# Patient Record
Sex: FEMALE | Race: WHITE | NOT HISPANIC OR LATINO | Employment: UNEMPLOYED | URBAN - METROPOLITAN AREA
[De-identification: names, ages, dates, MRNs, and addresses within clinical notes are randomized per-mention and may not be internally consistent; named-entity substitution may affect disease eponyms.]

---

## 2017-01-04 ENCOUNTER — OFFICE VISIT (OUTPATIENT)
Dept: FAMILY MEDICINE | Facility: CLINIC | Age: 47
End: 2017-01-04
Payer: MEDICAID

## 2017-01-04 VITALS
WEIGHT: 141.13 LBS | DIASTOLIC BLOOD PRESSURE: 68 MMHG | SYSTOLIC BLOOD PRESSURE: 118 MMHG | OXYGEN SATURATION: 95 % | TEMPERATURE: 98 F | HEART RATE: 80 BPM | HEIGHT: 63 IN | BODY MASS INDEX: 25.01 KG/M2

## 2017-01-04 DIAGNOSIS — Z72.0 TOBACCO USE: ICD-10-CM

## 2017-01-04 DIAGNOSIS — G44.86 CERVICOGENIC HEADACHE: ICD-10-CM

## 2017-01-04 DIAGNOSIS — G56.00 CARPAL TUNNEL SYNDROME, UNSPECIFIED LATERALITY: ICD-10-CM

## 2017-01-04 DIAGNOSIS — J44.9 CHRONIC OBSTRUCTIVE PULMONARY DISEASE, UNSPECIFIED COPD TYPE: ICD-10-CM

## 2017-01-04 DIAGNOSIS — R10.9 LEFT FLANK PAIN: ICD-10-CM

## 2017-01-04 DIAGNOSIS — M48.02 CERVICAL STENOSIS OF SPINE: ICD-10-CM

## 2017-01-04 DIAGNOSIS — M54.16 LUMBAR RADICULOPATHY, CHRONIC: Primary | ICD-10-CM

## 2017-01-04 DIAGNOSIS — F41.9 ANXIETY: ICD-10-CM

## 2017-01-04 PROCEDURE — 99999 PR PBB SHADOW E&M-EST. PATIENT-LVL III: CPT | Mod: PBBFAC,,, | Performed by: FAMILY MEDICINE

## 2017-01-04 PROCEDURE — 99213 OFFICE O/P EST LOW 20 MIN: CPT | Mod: PBBFAC,PO | Performed by: FAMILY MEDICINE

## 2017-01-04 PROCEDURE — 99214 OFFICE O/P EST MOD 30 MIN: CPT | Mod: S$PBB,,, | Performed by: FAMILY MEDICINE

## 2017-01-04 RX ORDER — HYDROCODONE BITARTRATE AND ACETAMINOPHEN 10; 325 MG/1; MG/1
1 TABLET ORAL EVERY 8 HOURS PRN
Qty: 90 TABLET | Refills: 0 | Status: SHIPPED | OUTPATIENT
Start: 2017-01-04 | End: 2017-02-06 | Stop reason: SDUPTHER

## 2017-01-04 RX ORDER — TIOTROPIUM BROMIDE 18 UG/1
18 CAPSULE ORAL; RESPIRATORY (INHALATION) DAILY
Qty: 30 CAPSULE | Refills: 11 | Status: SHIPPED | OUTPATIENT
Start: 2017-01-04 | End: 2017-02-06 | Stop reason: SDUPTHER

## 2017-01-04 RX ORDER — ALPRAZOLAM 2 MG/1
TABLET ORAL
Qty: 60 TABLET | Refills: 0 | Status: SHIPPED | OUTPATIENT
Start: 2017-01-04 | End: 2017-02-06 | Stop reason: SDUPTHER

## 2017-01-04 RX ORDER — ALBUTEROL SULFATE 90 UG/1
2 AEROSOL, METERED RESPIRATORY (INHALATION) EVERY 6 HOURS PRN
Qty: 18 G | Refills: 5 | Status: SHIPPED | OUTPATIENT
Start: 2017-01-04 | End: 2017-02-21 | Stop reason: SDUPTHER

## 2017-01-04 RX ORDER — PREDNISONE 5 MG/1
5 TABLET ORAL DAILY
Qty: 30 TABLET | Refills: 0 | Status: SHIPPED | OUTPATIENT
Start: 2017-01-04 | End: 2017-01-14

## 2017-01-04 NOTE — PROGRESS NOTES
"Chief Complaint   Patient presents with    Medication Refill       SUBJECTIVE:  Yasmeen Valderrama is a 46 y.o. female here for follow up of anxiety with chronic pain and chronic cough and sputum with some Rowan.  She is doing very well on her current medications.  Currently has co-morbidities including per problem list.      Social History   Substance Use Topics    Smoking status: Current Every Day Smoker     Packs/day: 2.00     Years: 35.00     Types: Cigarettes     Start date: 12/1/1983    Smokeless tobacco: Never Used    Alcohol use No       Patient Active Problem List    Diagnosis Date Noted    Chronic obstructive pulmonary disease 01/04/2017    Cervical stenosis of spine 01/04/2017    Lumbar radiculopathy, chronic 09/09/2015    Screening 09/09/2015    Unilateral emphysema 12/01/2014    Tobacco use 12/01/2014    Chronic hoarseness 12/01/2014    SOB (shortness of breath) 12/01/2014       Review of Systems   Constitutional: Negative.    HENT: Negative.    Eyes: Negative.    Respiratory: Positive for cough and sputum production.    Cardiovascular: Negative.    Gastrointestinal: Negative.    Genitourinary: Negative.    Musculoskeletal: Positive for joint pain, myalgias and neck pain.   Skin: Negative.    Neurological: Negative.    Endo/Heme/Allergies: Negative.    Psychiatric/Behavioral: The patient is nervous/anxious and has insomnia.        OBJECTIVE:  Visit Vitals    /68 (BP Location: Right arm, Patient Position: Sitting, BP Method: Manual)    Pulse 80    Temp 97.8 °F (36.6 °C) (Oral)    Ht 5' 3" (1.6 m)    Wt 64 kg (141 lb 1.5 oz)    SpO2 95%    BMI 24.99 kg/m2       Wt Readings from Last 3 Encounters:   01/04/17 64 kg (141 lb 1.5 oz)   12/09/16 67.1 kg (148 lb)   12/06/16 67.4 kg (148 lb 9.4 oz)     BP Readings from Last 3 Encounters:   01/04/17 118/68   12/06/16 136/88   11/26/16 139/89       Chronic hoarseness noted, alert and oriented  She has no focal neurological deficits noted on " brief screening exam.  She has limited ROM in her neck with pain    Review of old Records:  Reviewed per [facility]    Review of old labs:  Lab Results   Component Value Date    TSH 1.450 03/08/2016     Lab Results   Component Value Date    WBC 11.27 11/26/2016    HGB 14.5 11/26/2016    HCT 43.5 11/26/2016    MCV 93 11/26/2016     11/26/2016       Chemistry        Component Value Date/Time     11/26/2016 1630    K 3.8 11/26/2016 1630     11/26/2016 1630    CO2 25 11/26/2016 1630    BUN 7 11/26/2016 1630    CREATININE 0.7 11/26/2016 1630    GLU 95 11/26/2016 1630        Component Value Date/Time    CALCIUM 9.5 11/26/2016 1630    ALKPHOS 88 11/26/2016 1630    AST 22 11/26/2016 1630    ALT 13 11/26/2016 1630    BILITOT 0.8 11/26/2016 1630        Lab Results   Component Value Date    CHOL 182 09/09/2015     Lab Results   Component Value Date    HDL 54 09/09/2015     Lab Results   Component Value Date    LDLCALC 110.0 09/09/2015     Lab Results   Component Value Date    TRIG 90 09/09/2015     Lab Results   Component Value Date    CHOLHDL 29.7 09/09/2015         Review of old imaging:  C4/5: There is a posteriorly oriented disc osteophyte complex resulting in moderate narrowing of bilateral neural foramina and mild narrowing of the central canal.    C5/6: There is a circumferential disc osteophyte complex resulting in severe narrowing of the right neural foramina. Moderate central canal narrowing and moderate narrowing of the left neural foramina.    C6/C7: There is a posteriorly directed disc osteophyte complex resulting in moderate central canal narrowing.    C7/T1: There no significant degenerative changes at this level.    The adjacent soft tissues are unremarkable.   Impression     1. There degenerative changes of the cervical spine with some MRI appear most severe at C4-5 and C5-6.         ASSESSMENT:  1. Lumbar radiculopathy, chronic    2. Tobacco use    3. Anxiety    4. Carpal tunnel syndrome,  unspecified laterality    5. Left flank pain    6. Chronic obstructive pulmonary disease, unspecified COPD type    7. Cervicogenic headache    8. Cervical stenosis of spine          PLAN:  1. Tobacco use  Counseled to continue her attempts at quitting  - albuterol (VENTOLIN HFA) 90 mcg/actuation inhaler; Inhale 2 puffs into the lungs every 6 (six) hours as needed for Wheezing.  Dispense: 18 g; Refill: 5    2. Anxiety  The current medical regimen is effective;  continue present plan and medications.  Continue cymbalta as well  - alprazolam (XANAX) 2 MG Tab; TAKE ONE TABLET TWICE DAILY *MAY CAUSE DROWSINESS*  Dispense: 60 tablet; Refill: 0    3. Carpal tunnel syndrome, unspecified laterality  Noted, had prior surgery  - hydrocodone-acetaminophen 10-325mg (NORCO)  mg Tab; Take 1 tablet by mouth every 8 (eight) hours as needed for Pain.  Dispense: 90 tablet; Refill: 0    4. Lumbar radiculopathy, chronic  The current medical regimen is effective;  continue present plan and medications.    - hydrocodone-acetaminophen 10-325mg (NORCO)  mg Tab; Take 1 tablet by mouth every 8 (eight) hours as needed for Pain.  Dispense: 90 tablet; Refill: 0    5. Left flank pain  The current medical regimen is effective;  continue present plan and medications.    - hydrocodone-acetaminophen 10-325mg (NORCO)  mg Tab; Take 1 tablet by mouth every 8 (eight) hours as needed for Pain.  Dispense: 90 tablet; Refill: 0    6. Chronic obstructive pulmonary disease, unspecified COPD type  Start spiriva  - predniSONE (DELTASONE) 5 MG tablet; Take 1 tablet (5 mg total) by mouth once daily.  Dispense: 30 tablet; Refill: 0  - tiotropium (SPIRIVA) 18 mcg inhalation capsule; Inhale 1 capsule (18 mcg total) into the lungs once daily.  Dispense: 30 capsule; Refill: 11    7. Cervicogenic headache  Noted, likley from the neck we will continue with current treatment, consider muscle relaxer    8. Cervical stenosis of spine  Noted.      Return in  about 4 weeks (around 2/1/2017) for assess treatment plan.

## 2017-01-04 NOTE — MR AVS SNAPSHOT
[facility]  [address]  [address]  [place] [address]  Phone: [phone]  Fax: [phone]                  Yasmeen Valderrama   2017 10:30 AM   Office Visit    Description:  Female : 1970   Provider:  Clovis Beauchamp MD   Department:  [facility]           Reason for Visit     Medication Refill           Diagnoses this Visit        Comments    Lumbar radiculopathy, chronic    -  Primary     Tobacco use         Anxiety         Carpal tunnel syndrome, unspecified laterality         Left flank pain         Chronic obstructive pulmonary disease, unspecified COPD type         Cervicogenic headache         Cervical stenosis of spine                To Do List           Goals (5 Years of Data)     None       These Medications        Disp Refills Start End    albuterol (VENTOLIN HFA) 90 mcg/actuation inhaler 18 g 5 2017     Inhale 2 puffs into the lungs every 6 (six) hours as needed for Wheezing. - Inhalation    Pharmacy: [facility] [place] - [address] Ph #: [phone]       alprazolam (XANAX) 2 MG Tab 60 tablet 0 2017     TAKE ONE TABLET TWICE DAILY *MAY CAUSE DROWSINESS*    Pharmacy: [facility] - [facility] [place] - [address] Ph #: [phone]       hydrocodone-acetaminophen 10-325mg (NORCO)  mg Tab 90 tablet 0 2017    Take 1 tablet by mouth every 8 (eight) hours as needed for Pain. - Oral    Pharmacy: [facility] - [facility] [place] - [address] Ph #: [phone]       predniSONE (DELTASONE) 5 MG tablet 30 tablet 0 2017    Take 1 tablet (5 mg total) by mouth once daily. - Oral    Pharmacy: [facility] - [facility] [place] - [address] Ph #: [phone]       tiotropium (SPIRIVA) 18 mcg inhalation capsule 30 capsule 11 2017    Inhale 1 capsule (18 mcg total) into the lungs once daily. -  Inhalation    Pharmacy: [facility] [place] - [address]  #: [phone]         [facility]     [facility] Line - 24/7 Assistance  Registered nurses in the [facility] provide clinical advisement, health education, appointment booking, and other advisory services.  Call for this free service at [phone].             Medications           Message regarding Medications     Verify the changes and/or additions to your medication regime listed below are the same as discussed with your clinician today.  If any of these changes or additions are incorrect, please notify your healthcare provider.        START taking these NEW medications        Refills    predniSONE (DELTASONE) 5 MG tablet 0    Sig: Take 1 tablet (5 mg total) by mouth once daily.    Class: Normal    Route: Oral    tiotropium (SPIRIVA) 18 mcg inhalation capsule 11    Sig: Inhale 1 capsule (18 mcg total) into the lungs once daily.    Class: Normal    Route: Inhalation           Verify that the below list of medications is an accurate representation of the medications you are currently taking.  If none reported, the list may be blank. If incorrect, please contact your healthcare provider. Carry this list with you in case of emergency.           Current Medications     albuterol (PROVENTIL) 2.5 mg /3 mL (0.083 %) nebulizer solution Take 3 mLs (2.5 mg total) by nebulization every 6 (six) hours as needed for Wheezing.    albuterol (VENTOLIN HFA) 90 mcg/actuation inhaler Inhale 2 puffs into the lungs every 6 (six) hours as needed for Wheezing.    alprazolam (XANAX) 2 MG Tab TAKE ONE TABLET TWICE DAILY *MAY CAUSE DROWSINESS*    diazePAM (VALIUM) 10 MG Tab Take one one hour prior to MRI, may repeat in one hour if needed    docusate sodium (COLACE) 100 MG capsule Take 1 capsule (100 mg total) by mouth 3 (three) times daily as needed for Constipation.    duloxetine (CYMBALTA) 20 MG capsule Take 1  "capsule (20 mg total) by mouth once daily.    hydrocodone-acetaminophen 10-325mg (NORCO)  mg Tab Take 1 tablet by mouth every 8 (eight) hours as needed for Pain.    lactulose (CHRONULAC) 10 gram/15 mL solution     predniSONE (DELTASONE) 5 MG tablet Take 1 tablet (5 mg total) by mouth once daily.    tiotropium (SPIRIVA) 18 mcg inhalation capsule Inhale 1 capsule (18 mcg total) into the lungs once daily.           Clinical Reference Information           Vital Signs - Last Recorded  Most recent update: 1/4/2017 10:39 AM by Michelle Andrews MA    BP Pulse Temp Ht Wt SpO2    118/68 (BP Location: Right arm, Patient Position: Sitting, BP Method: Manual) 80 97.8 °F (36.6 °C) (Oral) 5' 3" (1.6 m) 64 kg (141 lb 1.5 oz) 95%    BMI                24.99 kg/m2          Blood Pressure          Most Recent Value    BP  118/68      Allergies as of 1/4/2017     Antivert [Meclizine]      Immunizations Administered on Date of Encounter - 1/4/2017     None      Smoking Cessation     If you would like to quit smoking:   You may be eligible for free services if you are a [place] resident and started smoking cigarettes before September 1, 1988.  Call the [employer] ([employer]) toll free at (872) 146-1143 or ([phone].   Call 8-206-QUIT-NOW if you do not meet the above criteria.            "

## 2017-02-06 ENCOUNTER — OFFICE VISIT (OUTPATIENT)
Dept: FAMILY MEDICINE | Facility: CLINIC | Age: 47
End: 2017-02-06
Payer: MEDICAID

## 2017-02-06 VITALS
HEART RATE: 79 BPM | OXYGEN SATURATION: 95 % | TEMPERATURE: 98 F | BODY MASS INDEX: 26.57 KG/M2 | WEIGHT: 149.94 LBS | SYSTOLIC BLOOD PRESSURE: 132 MMHG | DIASTOLIC BLOOD PRESSURE: 80 MMHG | HEIGHT: 63 IN

## 2017-02-06 DIAGNOSIS — R10.9 LEFT FLANK PAIN: ICD-10-CM

## 2017-02-06 DIAGNOSIS — G56.00 CARPAL TUNNEL SYNDROME, UNSPECIFIED LATERALITY: ICD-10-CM

## 2017-02-06 DIAGNOSIS — J44.1 COPD EXACERBATION: Primary | ICD-10-CM

## 2017-02-06 DIAGNOSIS — F41.9 ANXIETY: ICD-10-CM

## 2017-02-06 DIAGNOSIS — J44.9 CHRONIC OBSTRUCTIVE PULMONARY DISEASE, UNSPECIFIED COPD TYPE: ICD-10-CM

## 2017-02-06 DIAGNOSIS — F40.240 CLAUSTROPHOBIA: ICD-10-CM

## 2017-02-06 DIAGNOSIS — M54.16 LUMBAR RADICULOPATHY, CHRONIC: ICD-10-CM

## 2017-02-06 PROCEDURE — 99999 PR PBB SHADOW E&M-EST. PATIENT-LVL III: CPT | Mod: PBBFAC,,, | Performed by: FAMILY MEDICINE

## 2017-02-06 PROCEDURE — 99213 OFFICE O/P EST LOW 20 MIN: CPT | Mod: PBBFAC,PO | Performed by: FAMILY MEDICINE

## 2017-02-06 PROCEDURE — 99214 OFFICE O/P EST MOD 30 MIN: CPT | Mod: S$PBB,,, | Performed by: FAMILY MEDICINE

## 2017-02-06 RX ORDER — TIOTROPIUM BROMIDE 18 UG/1
18 CAPSULE ORAL; RESPIRATORY (INHALATION) DAILY
Qty: 30 CAPSULE | Refills: 11 | Status: SHIPPED | OUTPATIENT
Start: 2017-02-06 | End: 2017-02-21 | Stop reason: SDUPTHER

## 2017-02-06 RX ORDER — DOXYCYCLINE 100 MG/1
100 CAPSULE ORAL 2 TIMES DAILY
Qty: 28 CAPSULE | Refills: 0 | Status: SHIPPED | OUTPATIENT
Start: 2017-02-06 | End: 2017-02-21 | Stop reason: ALTCHOICE

## 2017-02-06 RX ORDER — HYDROCODONE BITARTRATE AND ACETAMINOPHEN 10; 325 MG/1; MG/1
1 TABLET ORAL EVERY 8 HOURS PRN
Qty: 90 TABLET | Refills: 0 | Status: SHIPPED | OUTPATIENT
Start: 2017-02-06 | End: 2017-02-21 | Stop reason: SDUPTHER

## 2017-02-06 RX ORDER — PREDNISONE 20 MG/1
20 TABLET ORAL DAILY
Qty: 14 TABLET | Refills: 0 | Status: SHIPPED | OUTPATIENT
Start: 2017-02-06 | End: 2017-02-21 | Stop reason: SDUPTHER

## 2017-02-06 RX ORDER — ALPRAZOLAM 2 MG/1
TABLET ORAL
Qty: 60 TABLET | Refills: 5 | Status: SHIPPED | OUTPATIENT
Start: 2017-02-06 | End: 2017-02-21 | Stop reason: SDUPTHER

## 2017-02-06 RX ORDER — DIAZEPAM 10 MG/1
TABLET ORAL
Qty: 2 TABLET | Refills: 0 | Status: CANCELLED | OUTPATIENT
Start: 2017-02-06

## 2017-02-06 NOTE — PROGRESS NOTES
"Chief Complaint   Patient presents with    Medication Refill    Back Pain    Neck Pain       SUBJECTIVE:  Yasmeen Valderrama is a 46 y.o. female here for follow up of chronic conditions that are stable including her pain and her anxiety, but has increased cough and sputum with Rowan and is still smoking.  Currently has co-morbidities including per problem list.      Social History   Substance Use Topics    Smoking status: Current Every Day Smoker     Packs/day: 2.00     Years: 35.00     Types: Cigarettes     Start date: 12/1/1983    Smokeless tobacco: Never Used    Alcohol use No       Patient Active Problem List    Diagnosis Date Noted    Chronic obstructive pulmonary disease 01/04/2017    Cervical stenosis of spine 01/04/2017    Lumbar radiculopathy, chronic 09/09/2015    Screening 09/09/2015    Unilateral emphysema 12/01/2014    Tobacco use 12/01/2014    Chronic hoarseness 12/01/2014    SOB (shortness of breath) 12/01/2014       Review of Systems   Constitutional: Negative.    HENT: Negative.    Eyes: Negative.    Respiratory: Positive for cough, sputum production, shortness of breath and wheezing.    Cardiovascular: Negative.    Gastrointestinal: Negative.    Genitourinary: Negative.    Musculoskeletal: Positive for joint pain and myalgias. Negative for back pain, falls and neck pain.   Skin: Negative.    Neurological: Negative.    Endo/Heme/Allergies: Negative.    Psychiatric/Behavioral: Negative for depression, hallucinations, memory loss, substance abuse and suicidal ideas. The patient is nervous/anxious and has insomnia.        OBJECTIVE:  Visit Vitals    /80 (BP Location: Right arm, Patient Position: Sitting, BP Method: Manual)    Pulse 79    Temp 98 °F (36.7 °C) (Oral)    Ht 5' 3" (1.6 m)    Wt 68 kg (149 lb 14.6 oz)    SpO2 95%    BMI 26.56 kg/m2       Wt Readings from Last 3 Encounters:   02/06/17 68 kg (149 lb 14.6 oz)   01/04/17 64 kg (141 lb 1.5 oz)   12/09/16 67.1 kg (148 " lb)     BP Readings from Last 3 Encounters:   02/06/17 132/80   01/04/17 118/68   12/06/16 136/88       Appears chronically ill, alert and oriented  Hoarse voice unchanged.  Sinuses with mild inflammation  Lungs with coarseness and wheezing  Heart exam normal  No signs of consolidation    Review of old Records:  Reviewed per [facility]    Review of old labs:  Lab Results   Component Value Date    TSH 1.450 03/08/2016     Lab Results   Component Value Date    WBC 11.27 11/26/2016    HGB 14.5 11/26/2016    HCT 43.5 11/26/2016    MCV 93 11/26/2016     11/26/2016       Chemistry        Component Value Date/Time     11/26/2016 1630    K 3.8 11/26/2016 1630     11/26/2016 1630    CO2 25 11/26/2016 1630    BUN 7 11/26/2016 1630    CREATININE 0.7 11/26/2016 1630    GLU 95 11/26/2016 1630        Component Value Date/Time    CALCIUM 9.5 11/26/2016 1630    ALKPHOS 88 11/26/2016 1630    AST 22 11/26/2016 1630    ALT 13 11/26/2016 1630    BILITOT 0.8 11/26/2016 1630        Lab Results   Component Value Date    CHOL 182 09/09/2015     Lab Results   Component Value Date    HDL 54 09/09/2015     Lab Results   Component Value Date    LDLCALC 110.0 09/09/2015     Lab Results   Component Value Date    TRIG 90 09/09/2015     Lab Results   Component Value Date    CHOLHDL 29.7 09/09/2015         Review of old imaging:  N/a      ASSESSMENT:  1. COPD exacerbation    2. Chronic obstructive pulmonary disease, unspecified COPD type    3. Claustrophobia    4. Carpal tunnel syndrome, unspecified laterality    5. Lumbar radiculopathy, chronic    6. Left flank pain    7. Anxiety          PLAN:  1. Chronic obstructive pulmonary disease, unspecified COPD type  The current medical regimen is effective;  continue present plan and medications.    - tiotropium (SPIRIVA) 18 mcg inhalation capsule; Inhale 1 capsule (18 mcg total) into the lungs once daily.  Dispense: 30 capsule; Refill: 11    2. Claustrophobia  Noted, she did well, stop  extra medication    3. Carpal tunnel syndrome, unspecified laterality  The current medical regimen is effective;  continue present plan and medications.    - hydrocodone-acetaminophen 10-325mg (NORCO)  mg Tab; Take 1 tablet by mouth every 8 (eight) hours as needed for Pain.  Dispense: 90 tablet; Refill: 0    4. Lumbar radiculopathy, chronic  The current medical regimen is effective;  continue present plan and medications.    - hydrocodone-acetaminophen 10-325mg (NORCO)  mg Tab; Take 1 tablet by mouth every 8 (eight) hours as needed for Pain.  Dispense: 90 tablet; Refill: 0    5. Left flank pain  Resolved mainly  - hydrocodone-acetaminophen 10-325mg (NORCO)  mg Tab; Take 1 tablet by mouth every 8 (eight) hours as needed for Pain.  Dispense: 90 tablet; Refill: 0    6. COPD exacerbation  Potential medication side effects were discussed with the patient; let me know if any occur.  Most quit tobacco but very difficult, continue to encourage.  - predniSONE (DELTASONE) 20 MG tablet; Take 1 tablet (20 mg total) by mouth once daily.  Dispense: 14 tablet; Refill: 0  - doxycycline (MONODOX) 100 MG capsule; Take 1 capsule (100 mg total) by mouth 2 (two) times daily.  Dispense: 28 capsule; Refill: 0    7. Anxiety  The current medical regimen is effective;  continue present plan and medications.    - alprazolam (XANAX) 2 MG Tab; TAKE ONE TABLET TWICE DAILY *MAY CAUSE DROWSINESS*  Dispense: 60 tablet; Refill: 5      No Follow-up on file.

## 2017-02-21 ENCOUNTER — OFFICE VISIT (OUTPATIENT)
Dept: FAMILY MEDICINE | Facility: CLINIC | Age: 47
End: 2017-02-21
Payer: MEDICAID

## 2017-02-21 VITALS
TEMPERATURE: 97 F | BODY MASS INDEX: 26.57 KG/M2 | HEIGHT: 63 IN | SYSTOLIC BLOOD PRESSURE: 126 MMHG | HEART RATE: 85 BPM | OXYGEN SATURATION: 95 % | WEIGHT: 149.94 LBS | DIASTOLIC BLOOD PRESSURE: 66 MMHG

## 2017-02-21 DIAGNOSIS — F41.9 ANXIETY: ICD-10-CM

## 2017-02-21 DIAGNOSIS — J44.9 CHRONIC OBSTRUCTIVE PULMONARY DISEASE, UNSPECIFIED COPD TYPE: ICD-10-CM

## 2017-02-21 DIAGNOSIS — J44.1 COPD EXACERBATION: Primary | ICD-10-CM

## 2017-02-21 DIAGNOSIS — M54.16 LUMBAR RADICULOPATHY, CHRONIC: ICD-10-CM

## 2017-02-21 DIAGNOSIS — J01.01 ACUTE RECURRENT MAXILLARY SINUSITIS: ICD-10-CM

## 2017-02-21 DIAGNOSIS — Z72.0 TOBACCO USE: ICD-10-CM

## 2017-02-21 PROCEDURE — 99999 PR PBB SHADOW E&M-EST. PATIENT-LVL III: CPT | Mod: PBBFAC,,, | Performed by: FAMILY MEDICINE

## 2017-02-21 PROCEDURE — 99213 OFFICE O/P EST LOW 20 MIN: CPT | Mod: S$PBB,,, | Performed by: FAMILY MEDICINE

## 2017-02-21 PROCEDURE — 99213 OFFICE O/P EST LOW 20 MIN: CPT | Mod: PBBFAC,PO | Performed by: FAMILY MEDICINE

## 2017-02-21 RX ORDER — AMOXICILLIN AND CLAVULANATE POTASSIUM 875; 125 MG/1; MG/1
1 TABLET, FILM COATED ORAL 2 TIMES DAILY
Qty: 20 TABLET | Refills: 0 | Status: SHIPPED | OUTPATIENT
Start: 2017-02-21 | End: 2017-03-03

## 2017-02-21 RX ORDER — PREDNISONE 20 MG/1
40 TABLET ORAL DAILY
Qty: 14 TABLET | Refills: 0 | Status: SHIPPED | OUTPATIENT
Start: 2017-02-21 | End: 2017-02-28

## 2017-02-21 RX ORDER — ALBUTEROL SULFATE 90 UG/1
2 AEROSOL, METERED RESPIRATORY (INHALATION) EVERY 6 HOURS PRN
Qty: 18 G | Refills: 5 | Status: SHIPPED | OUTPATIENT
Start: 2017-02-21 | End: 2017-04-03 | Stop reason: SDUPTHER

## 2017-02-21 RX ORDER — ALBUTEROL SULFATE 0.83 MG/ML
2.5 SOLUTION RESPIRATORY (INHALATION) EVERY 6 HOURS PRN
Qty: 1 BOX | Refills: 11 | Status: SHIPPED | OUTPATIENT
Start: 2017-02-21 | End: 2017-04-03 | Stop reason: SDUPTHER

## 2017-02-21 RX ORDER — HYDROCODONE BITARTRATE AND ACETAMINOPHEN 10; 325 MG/1; MG/1
1 TABLET ORAL EVERY 8 HOURS PRN
Qty: 90 TABLET | Refills: 0 | Status: SHIPPED | OUTPATIENT
Start: 2017-02-21 | End: 2017-04-03 | Stop reason: SDUPTHER

## 2017-02-21 RX ORDER — TIOTROPIUM BROMIDE 18 UG/1
18 CAPSULE ORAL; RESPIRATORY (INHALATION) DAILY
Qty: 30 CAPSULE | Refills: 11 | Status: SHIPPED | OUTPATIENT
Start: 2017-02-21 | End: 2017-04-03 | Stop reason: SDUPTHER

## 2017-02-21 RX ORDER — ALPRAZOLAM 2 MG/1
TABLET ORAL
Qty: 60 TABLET | Refills: 5 | Status: SHIPPED | OUTPATIENT
Start: 2017-02-21 | End: 2017-05-03 | Stop reason: SDUPTHER

## 2017-02-21 NOTE — PROGRESS NOTES
"Chief Complaint   Patient presents with    Sore Throat       SUBJECTIVE:  Yasmeen Valderrama is a 46 y.o. female here for follow up of COPD exacerbation with more sinusitis symptoms and dealing with family member in dying process and she is doing better.  Currently has co-morbidities including per problem list.      Social History   Substance Use Topics    Smoking status: Current Every Day Smoker     Packs/day: 2.00     Years: 35.00     Types: Cigarettes     Start date: 12/1/1983    Smokeless tobacco: Never Used    Alcohol use No       Patient Active Problem List    Diagnosis Date Noted    Anxiety 02/21/2017    Chronic obstructive pulmonary disease 01/04/2017    Cervical stenosis of spine 01/04/2017 12/2016 MRI severe disease C5/6      Lumbar radiculopathy, chronic 09/09/2015    Screening 09/09/2015    Unilateral emphysema 12/01/2014    Tobacco use 12/01/2014    Chronic hoarseness 12/01/2014    SOB (shortness of breath) 12/01/2014       Review of Systems   Constitutional: Positive for malaise/fatigue. Negative for weight loss.   HENT: Positive for congestion and ear discharge.    Eyes: Negative.    Respiratory: Negative.    Cardiovascular: Negative.    Gastrointestinal: Negative.    Genitourinary: Negative.    Musculoskeletal: Positive for back pain, joint pain and myalgias.   Skin: Negative.    Neurological: Positive for headaches.   Endo/Heme/Allergies: Negative.    Psychiatric/Behavioral: Positive for depression. The patient is nervous/anxious and has insomnia.        OBJECTIVE:  Visit Vitals    /66 (BP Location: Right arm, Patient Position: Sitting, BP Method: Manual)    Pulse 85    Temp 97.2 °F (36.2 °C) (Oral)    Ht 5' 3" (1.6 m)    Wt 68 kg (149 lb 14.6 oz)    SpO2 95%    BMI 26.56 kg/m2       Wt Readings from Last 3 Encounters:   02/21/17 68 kg (149 lb 14.6 oz)   02/06/17 68 kg (149 lb 14.6 oz)   01/04/17 64 kg (141 lb 1.5 oz)     BP Readings from Last 3 Encounters:   02/21/17 " 126/66   02/06/17 132/80   01/04/17 118/68       She appears ill and hoarseness and alert and oriented  Nose with thick discharge and facial pain  Neck without LAD  Hoarseness present.  Lungs with coarseness and no consolidation on exam    Review of old Records:  Reviewed per [facility]    Review of old labs:        Review of old imaging:        ASSESSMENT:  1. COPD exacerbation    2. Anxiety    3. Lumbar radiculopathy, chronic    4. Chronic obstructive pulmonary disease, unspecified COPD type    5. Tobacco use    6. Acute recurrent maxillary sinusitis      Problem List Items Addressed This Visit     Tobacco use    Relevant Medications    albuterol (VENTOLIN HFA) 90 mcg/actuation inhaler    Lumbar radiculopathy, chronic    Relevant Medications    hydrocodone-acetaminophen 10-325mg (NORCO)  mg Tab    Chronic obstructive pulmonary disease    Relevant Medications    tiotropium (SPIRIVA) 18 mcg inhalation capsule    albuterol (PROVENTIL) 2.5 mg /3 mL (0.083 %) nebulizer solution    Anxiety    Relevant Medications    alprazolam (XANAX) 2 MG Tab      Other Visit Diagnoses     COPD exacerbation    -  Primary    Relevant Medications    amoxicillin-clavulanate 875-125mg (AUGMENTIN) 875-125 mg per tablet    predniSONE (DELTASONE) 20 MG tablet    Other Relevant Orders    Sedimentation rate, manual    C-reactive protein    Acute recurrent maxillary sinusitis                  PLAN:         Medication List with Changes/Refills   New Medications    AMOXICILLIN-CLAVULANATE 875-125MG (AUGMENTIN) 875-125 MG PER TABLET    Take 1 tablet by mouth 2 (two) times daily.   Current Medications    DOCUSATE SODIUM (COLACE) 100 MG CAPSULE    Take 1 capsule (100 mg total) by mouth 3 (three) times daily as needed for Constipation.    DULOXETINE (CYMBALTA) 20 MG CAPSULE    Take 1 capsule (20 mg total) by mouth once daily.    LACTULOSE (CHRONULAC) 10 GRAM/15 ML SOLUTION       Changed and/or Refilled Medications    Modified Medication Previous  Medication    ALBUTEROL (PROVENTIL) 2.5 MG /3 ML (0.083 %) NEBULIZER SOLUTION albuterol (PROVENTIL) 2.5 mg /3 mL (0.083 %) nebulizer solution       Take 3 mLs (2.5 mg total) by nebulization every 6 (six) hours as needed for Wheezing.    Take 3 mLs (2.5 mg total) by nebulization every 6 (six) hours as needed for Wheezing.    ALBUTEROL (VENTOLIN HFA) 90 MCG/ACTUATION INHALER albuterol (VENTOLIN HFA) 90 mcg/actuation inhaler       Inhale 2 puffs into the lungs every 6 (six) hours as needed for Wheezing.    Inhale 2 puffs into the lungs every 6 (six) hours as needed for Wheezing.    ALPRAZOLAM (XANAX) 2 MG TAB alprazolam (XANAX) 2 MG Tab       TAKE ONE TABLET TWICE DAILY *MAY CAUSE DROWSINESS*    TAKE ONE TABLET TWICE DAILY *MAY CAUSE DROWSINESS*    HYDROCODONE-ACETAMINOPHEN 10-325MG (NORCO)  MG TAB hydrocodone-acetaminophen 10-325mg (NORCO)  mg Tab       Take 1 tablet by mouth every 8 (eight) hours as needed for Pain.    Take 1 tablet by mouth every 8 (eight) hours as needed for Pain.    PREDNISONE (DELTASONE) 20 MG TABLET predniSONE (DELTASONE) 20 MG tablet       Take 2 tablets (40 mg total) by mouth once daily.    Take 1 tablet (20 mg total) by mouth once daily.    TIOTROPIUM (SPIRIVA) 18 MCG INHALATION CAPSULE tiotropium (SPIRIVA) 18 mcg inhalation capsule       Inhale 1 capsule (18 mcg total) into the lungs once daily.    Inhale 1 capsule (18 mcg total) into the lungs once daily.   Discontinued Medications    DOXYCYCLINE (MONODOX) 100 MG CAPSULE    Take 1 capsule (100 mg total) by mouth 2 (two) times daily.     Switch to aumgemtin and prednisone  No Follow-up on file.

## 2017-04-03 ENCOUNTER — OFFICE VISIT (OUTPATIENT)
Dept: FAMILY MEDICINE | Facility: CLINIC | Age: 47
End: 2017-04-03
Payer: MEDICAID

## 2017-04-03 VITALS
WEIGHT: 143.31 LBS | HEIGHT: 63 IN | HEART RATE: 79 BPM | SYSTOLIC BLOOD PRESSURE: 148 MMHG | BODY MASS INDEX: 25.39 KG/M2 | OXYGEN SATURATION: 95 % | TEMPERATURE: 97 F | DIASTOLIC BLOOD PRESSURE: 84 MMHG

## 2017-04-03 DIAGNOSIS — Z99.81 ON HOME OXYGEN THERAPY: ICD-10-CM

## 2017-04-03 DIAGNOSIS — J44.1 COPD EXACERBATION: Primary | ICD-10-CM

## 2017-04-03 DIAGNOSIS — Z72.0 TOBACCO USE: ICD-10-CM

## 2017-04-03 DIAGNOSIS — J43.0 UNILATERAL EMPHYSEMA: ICD-10-CM

## 2017-04-03 DIAGNOSIS — J44.9 CHRONIC OBSTRUCTIVE PULMONARY DISEASE, UNSPECIFIED COPD TYPE: ICD-10-CM

## 2017-04-03 DIAGNOSIS — M54.16 LUMBAR RADICULOPATHY, CHRONIC: ICD-10-CM

## 2017-04-03 PROCEDURE — 99999 PR PBB SHADOW E&M-EST. PATIENT-LVL III: CPT | Mod: PBBFAC,,, | Performed by: FAMILY MEDICINE

## 2017-04-03 PROCEDURE — 99215 OFFICE O/P EST HI 40 MIN: CPT | Mod: S$PBB,,, | Performed by: FAMILY MEDICINE

## 2017-04-03 PROCEDURE — 99213 OFFICE O/P EST LOW 20 MIN: CPT | Mod: PBBFAC,PO | Performed by: FAMILY MEDICINE

## 2017-04-03 RX ORDER — DOXYCYCLINE 100 MG/1
100 CAPSULE ORAL 2 TIMES DAILY
Qty: 60 CAPSULE | Refills: 0 | Status: SHIPPED | OUTPATIENT
Start: 2017-04-03 | End: 2017-05-03

## 2017-04-03 RX ORDER — PREDNISONE 20 MG/1
TABLET ORAL
Qty: 40 TABLET | Refills: 0 | Status: SHIPPED | OUTPATIENT
Start: 2017-04-03 | End: 2017-05-03

## 2017-04-03 RX ORDER — FLUTICASONE PROPIONATE 220 UG/1
1 AEROSOL, METERED RESPIRATORY (INHALATION) 2 TIMES DAILY
Qty: 12 G | Refills: 0 | Status: SHIPPED | OUTPATIENT
Start: 2017-04-03 | End: 2017-05-03

## 2017-04-03 RX ORDER — TIOTROPIUM BROMIDE 18 UG/1
18 CAPSULE ORAL; RESPIRATORY (INHALATION) DAILY
Qty: 30 CAPSULE | Refills: 11 | Status: SHIPPED | OUTPATIENT
Start: 2017-04-03 | End: 2017-05-03 | Stop reason: SDUPTHER

## 2017-04-03 RX ORDER — ALBUTEROL SULFATE 0.83 MG/ML
2.5 SOLUTION RESPIRATORY (INHALATION) EVERY 6 HOURS PRN
Qty: 1 BOX | Refills: 11 | Status: SHIPPED | OUTPATIENT
Start: 2017-04-03 | End: 2017-05-03 | Stop reason: SDUPTHER

## 2017-04-03 RX ORDER — HYDROCODONE BITARTRATE AND ACETAMINOPHEN 10; 325 MG/1; MG/1
1 TABLET ORAL EVERY 8 HOURS PRN
Qty: 90 TABLET | Refills: 0 | Status: SHIPPED | OUTPATIENT
Start: 2017-04-03 | End: 2017-05-03 | Stop reason: SDUPTHER

## 2017-04-03 RX ORDER — ALBUTEROL SULFATE 90 UG/1
2 AEROSOL, METERED RESPIRATORY (INHALATION) EVERY 6 HOURS PRN
Qty: 18 G | Refills: 5 | Status: SHIPPED | OUTPATIENT
Start: 2017-04-03 | End: 2017-05-03 | Stop reason: SDUPTHER

## 2017-04-03 NOTE — PROGRESS NOTES
Chief Complaint   Patient presents with    Sleeping Problem       SUBJECTIVE:  Yasmeen Valderrama is a 46 y.o. female here for new problem of WORSE sleeping with more cough and SoB, has to sleep in chair at times despite her oxygen use at night, she has been having a little more pain as well, using her inhalers and nebulizer the is >5 years old.  She is still smoking but trying to quit with tobacco cessation program and she is getting more sputum and Rowan, no fever.  She notes no leg swelling or CP.  Currently has co-morbidities including per problem list.      Past Medical History:   Diagnosis Date    Anxiety     Asthma     Carpal tunnel syndrome, bilateral     COPD (chronic obstructive pulmonary disease)      Past Surgical History:   Procedure Laterality Date    CARPAL TUNNEL RELEASE      FINGER SURGERY      HYSTERECTOMY       Social History     Social History    Marital status:      Spouse name: N/A    Number of children: N/A    Years of education: N/A     Occupational History    Not on file.     Social History Main Topics    Smoking status: Current Every Day Smoker     Packs/day: 2.00     Years: 35.00     Types: Cigarettes     Start date: 12/1/1983    Smokeless tobacco: Never Used    Alcohol use No    Drug use: No    Sexual activity: Yes     Partners: Male     Other Topics Concern    Not on file     Social History Narrative     Family History   Problem Relation Age of Onset    COPD Mother     Heart disease Father     No Known Problems Sister     No Known Problems Brother      Current Outpatient Prescriptions on File Prior to Visit   Medication Sig Dispense Refill    alprazolam (XANAX) 2 MG Tab TAKE ONE TABLET TWICE DAILY *MAY CAUSE DROWSINESS* 60 tablet 5    docusate sodium (COLACE) 100 MG capsule Take 1 capsule (100 mg total) by mouth 3 (three) times daily as needed for Constipation. 60 capsule 0    duloxetine (CYMBALTA) 20 MG capsule Take 1 capsule (20 mg total) by mouth once  "daily. 30 capsule 11    lactulose (CHRONULAC) 10 gram/15 mL solution        No current facility-administered medications on file prior to visit.      Review of patient's allergies indicates:   Allergen Reactions    Antivert [meclizine] Other (See Comments)     Behavioral changes         Review of Systems   Constitutional: Positive for malaise/fatigue. Negative for chills, diaphoresis, fever and weight loss.   HENT: Positive for congestion.    Eyes: Negative.    Respiratory: Positive for cough, sputum production, shortness of breath and wheezing. Negative for hemoptysis.    Cardiovascular: Negative.    Gastrointestinal: Negative.    Genitourinary: Negative.    Musculoskeletal: Positive for back pain, joint pain, myalgias and neck pain. Negative for falls.   Skin: Negative.    Neurological: Positive for weakness. Negative for headaches.   Endo/Heme/Allergies: Negative.    Psychiatric/Behavioral: Positive for depression. Negative for hallucinations, memory loss, substance abuse and suicidal ideas. The patient has insomnia. The patient is not nervous/anxious.        OBJECTIVE:  BP (!) 148/84 (BP Location: Left arm, Patient Position: Sitting, BP Method: Manual)  Pulse 79  Temp 97.2 °F (36.2 °C) (Oral)   Ht 5' 3" (1.6 m)  Wt 65 kg (143 lb 4.8 oz)  SpO2 95%  BMI 25.38 kg/m2    Wt Readings from Last 3 Encounters:   04/03/17 65 kg (143 lb 4.8 oz)   02/21/17 68 kg (149 lb 14.6 oz)   02/06/17 68 kg (149 lb 14.6 oz)     BP Readings from Last 3 Encounters:   04/03/17 (!) 148/84   02/21/17 126/66   02/06/17 132/80       She is chronically ill appearing, hoarse, alert and oriented  No clubbing no acute cyanosis, mild use of accessory muscles to breath, mild tachypnea  Poor expiratory BS, heart exam is normal  HEENT: with poor dentition and some coryza and PND    Review of old Records:  None noted since last visit.  Poor access to speciality care given insurance    Review of old labs:  Didn't get inflammatory panel, " secondary to transportation    Review of old imaging:  No new imaging noted.  Reviewed prior CT with lung scarring and paraseptal epmhysema  ASSESSMENT:  Problem List Items Addressed This Visit     Unilateral emphysema    Relevant Orders    NEBULIZER KIT (SUPPLIES) FOR HOME USE    Tobacco use, since teenager, has tried to quit, is trying to quit    Relevant Medications    albuterol (VENTOLIN HFA) 90 mcg/actuation inhaler    On home oxygen therapy    Relevant Orders    Ambulatory referral to Pulmonology    Six Minute Walk Test to qualify for Home Oxygen    Lumbar radiculopathy, chronic    Relevant Medications    hydrocodone-acetaminophen 10-325mg (NORCO)  mg Tab    Chronic obstructive pulmonary disease    Relevant Medications    albuterol (PROVENTIL) 2.5 mg /3 mL (0.083 %) nebulizer solution    tiotropium (SPIRIVA) 18 mcg inhalation capsule    fluticasone (FLOVENT HFA) 220 mcg/actuation inhaler    Other Relevant Orders    NEBULIZER KIT (SUPPLIES) FOR HOME USE    NEBULIZER KIT (SUPPLIES) FOR HOME USE      Other Visit Diagnoses     COPD exacerbation    -  Primary    Relevant Medications    doxycycline (MONODOX) 100 MG capsule    predniSONE (DELTASONE) 20 MG tablet    Other Relevant Orders    Ambulatory referral to Pulmonology    Six Minute Walk Test to qualify for Home Oxygen    NEBULIZER KIT (SUPPLIES) FOR HOME USE          ICD-10-CM ICD-9-CM   1. COPD exacerbation J44.1 491.21   2. Tobacco use Z72.0 305.1   3. Chronic obstructive pulmonary disease, unspecified COPD type J44.9 496   4. Lumbar radiculopathy, chronic M54.16 724.4   5. On home oxygen therapy Z99.81 V46.2   6. Unilateral emphysema J43.0 492.8         PLAN:  1. Tobacco use  Must quit  - albuterol (VENTOLIN HFA) 90 mcg/actuation inhaler; Inhale 2 puffs into the lungs every 6 (six) hours as needed for Wheezing.  Dispense: 18 g; Refill: 5    2. Chronic obstructive pulmonary disease, unspecified COPD type  Add ICS, consider LABA next  - albuterol  (PROVENTIL) 2.5 mg /3 mL (0.083 %) nebulizer solution; Take 3 mLs (2.5 mg total) by nebulization every 6 (six) hours as needed for Wheezing.  Dispense: 1 Box; Refill: 11  - tiotropium (SPIRIVA) 18 mcg inhalation capsule; Inhale 1 capsule (18 mcg total) into the lungs once daily.  Dispense: 30 capsule; Refill: 11    3. Lumbar radiculopathy, chronic  The current medical regimen is effective;  continue present plan and medications.    - hydrocodone-acetaminophen 10-325mg (NORCO)  mg Tab; Take 1 tablet by mouth every 8 (eight) hours as needed for Pain.  Dispense: 90 tablet; Refill: 0    4. COPD exacerbation  Potential medication side effects were discussed with the patient; let me know if any occur.  Must stop tobacco  - doxycycline (MONODOX) 100 MG capsule; Take 1 capsule (100 mg total) by mouth 2 (two) times daily.  Dispense: 60 capsule; Refill: 0  - predniSONE (DELTASONE) 20 MG tablet; Take 2 po daily x 7 days, then 1 PO daily x 14 days, then 1/2 tablet for 7 days  Dispense: 40 tablet; Refill: 0    Very high risk, gave ER precautions, RTC in 3 days if not improving, severe exacerbation, get nebulizer, doesn't qualify for oxygen at this time, once she is better from exacerbation will do a resting test and 6 minute walking test to assess for oxygen needs.  Currently only at night.  Medication List with Changes/Refills   New Medications    DOXYCYCLINE (MONODOX) 100 MG CAPSULE    Take 1 capsule (100 mg total) by mouth 2 (two) times daily.    FLUTICASONE (FLOVENT HFA) 220 MCG/ACTUATION INHALER    Inhale 1 puff into the lungs 2 (two) times daily. Controller    PREDNISONE (DELTASONE) 20 MG TABLET    Take 2 po daily x 7 days, then 1 PO daily x 14 days, then 1/2 tablet for 7 days   Current Medications    ALPRAZOLAM (XANAX) 2 MG TAB    TAKE ONE TABLET TWICE DAILY *MAY CAUSE DROWSINESS*    DOCUSATE SODIUM (COLACE) 100 MG CAPSULE    Take 1 capsule (100 mg total) by mouth 3 (three) times daily as needed for Constipation.     DULOXETINE (CYMBALTA) 20 MG CAPSULE    Take 1 capsule (20 mg total) by mouth once daily.    LACTULOSE (CHRONULAC) 10 GRAM/15 ML SOLUTION       Changed and/or Refilled Medications    Modified Medication Previous Medication    ALBUTEROL (PROVENTIL) 2.5 MG /3 ML (0.083 %) NEBULIZER SOLUTION albuterol (PROVENTIL) 2.5 mg /3 mL (0.083 %) nebulizer solution       Take 3 mLs (2.5 mg total) by nebulization every 6 (six) hours as needed for Wheezing.    Take 3 mLs (2.5 mg total) by nebulization every 6 (six) hours as needed for Wheezing.    ALBUTEROL (VENTOLIN HFA) 90 MCG/ACTUATION INHALER albuterol (VENTOLIN HFA) 90 mcg/actuation inhaler       Inhale 2 puffs into the lungs every 6 (six) hours as needed for Wheezing.    Inhale 2 puffs into the lungs every 6 (six) hours as needed for Wheezing.    HYDROCODONE-ACETAMINOPHEN 10-325MG (NORCO)  MG TAB hydrocodone-acetaminophen 10-325mg (NORCO)  mg Tab       Take 1 tablet by mouth every 8 (eight) hours as needed for Pain.    Take 1 tablet by mouth every 8 (eight) hours as needed for Pain.    TIOTROPIUM (SPIRIVA) 18 MCG INHALATION CAPSULE tiotropium (SPIRIVA) 18 mcg inhalation capsule       Inhale 1 capsule (18 mcg total) into the lungs once daily.    Inhale 1 capsule (18 mcg total) into the lungs once daily.       Return in about 3 days (around 4/6/2017) for reassess acute condition.

## 2017-04-04 ENCOUNTER — TELEPHONE (OUTPATIENT)
Dept: FAMILY MEDICINE | Facility: CLINIC | Age: 47
End: 2017-04-04

## 2017-04-04 NOTE — TELEPHONE ENCOUNTER
----- Message from Kemi Mclean sent at 4/4/2017  2:29 PM CDT -----  Contact: Self/[phone]  Patient is following up on a request for a nebulizer machine. Patient is also requesting an Order for a portable oxygen tank. Thank you.

## 2017-04-11 ENCOUNTER — HOSPITAL ENCOUNTER (OUTPATIENT)
Dept: RESPIRATORY THERAPY | Facility: HOSPITAL | Age: 47
Discharge: HOME | End: 2017-04-11
Attending: FAMILY MEDICINE
Payer: MEDICAID

## 2017-04-11 VITALS — OXYGEN SATURATION: 94 % | HEART RATE: 65 BPM | RESPIRATION RATE: 20 BRPM

## 2017-04-11 DIAGNOSIS — Z99.81 ON HOME OXYGEN THERAPY: ICD-10-CM

## 2017-04-11 DIAGNOSIS — J44.1 COPD WITH EXACERBATION: ICD-10-CM

## 2017-04-11 DIAGNOSIS — J44.1 COPD EXACERBATION: ICD-10-CM

## 2017-04-11 PROCEDURE — 94620 *HC PUL STRESS; SIMPLE (6MIN WALK): CPT

## 2017-04-11 PROCEDURE — 94060 EVALUATION OF WHEEZING: CPT | Mod: 59

## 2017-04-11 PROCEDURE — 25000242 PHARM REV CODE 250 ALT 637 W/ HCPCS: Performed by: FAMILY MEDICINE

## 2017-04-11 RX ORDER — ALBUTEROL SULFATE 2.5 MG/.5ML
2.5 SOLUTION RESPIRATORY (INHALATION) ONCE
Status: COMPLETED | OUTPATIENT
Start: 2017-04-11 | End: 2017-04-11

## 2017-04-11 RX ADMIN — ALBUTEROL SULFATE 2.5 MG: 2.5 SOLUTION RESPIRATORY (INHALATION) at 10:04

## 2017-05-03 ENCOUNTER — TELEPHONE (OUTPATIENT)
Dept: FAMILY MEDICINE | Facility: CLINIC | Age: 47
End: 2017-05-03

## 2017-05-03 ENCOUNTER — OFFICE VISIT (OUTPATIENT)
Dept: FAMILY MEDICINE | Facility: CLINIC | Age: 47
End: 2017-05-03
Payer: MEDICAID

## 2017-05-03 VITALS
BODY MASS INDEX: 26.09 KG/M2 | SYSTOLIC BLOOD PRESSURE: 130 MMHG | OXYGEN SATURATION: 96 % | WEIGHT: 147.25 LBS | TEMPERATURE: 98 F | HEART RATE: 94 BPM | HEIGHT: 63 IN | DIASTOLIC BLOOD PRESSURE: 84 MMHG

## 2017-05-03 DIAGNOSIS — R49.0 CHRONIC HOARSENESS: ICD-10-CM

## 2017-05-03 DIAGNOSIS — J02.9 SORE THROAT: Primary | ICD-10-CM

## 2017-05-03 DIAGNOSIS — R22.1 NECK MASS: ICD-10-CM

## 2017-05-03 DIAGNOSIS — J44.9 CHRONIC OBSTRUCTIVE PULMONARY DISEASE, UNSPECIFIED COPD TYPE: ICD-10-CM

## 2017-05-03 DIAGNOSIS — M54.16 LUMBAR RADICULOPATHY, CHRONIC: ICD-10-CM

## 2017-05-03 DIAGNOSIS — G56.00 CARPAL TUNNEL SYNDROME, UNSPECIFIED LATERALITY: ICD-10-CM

## 2017-05-03 DIAGNOSIS — G89.4 CHRONIC PAIN SYNDROME: ICD-10-CM

## 2017-05-03 DIAGNOSIS — B96.89 BACTERIAL PHARYNGITIS: ICD-10-CM

## 2017-05-03 DIAGNOSIS — J02.8 BACTERIAL PHARYNGITIS: ICD-10-CM

## 2017-05-03 DIAGNOSIS — Z72.0 TOBACCO USE: ICD-10-CM

## 2017-05-03 DIAGNOSIS — F41.9 ANXIETY: ICD-10-CM

## 2017-05-03 PROCEDURE — 99999 PR PBB SHADOW E&M-EST. PATIENT-LVL III: CPT | Mod: PBBFAC,,, | Performed by: FAMILY MEDICINE

## 2017-05-03 PROCEDURE — 99215 OFFICE O/P EST HI 40 MIN: CPT | Mod: S$PBB,,, | Performed by: FAMILY MEDICINE

## 2017-05-03 PROCEDURE — 99213 OFFICE O/P EST LOW 20 MIN: CPT | Mod: PBBFAC,PO | Performed by: FAMILY MEDICINE

## 2017-05-03 RX ORDER — ALBUTEROL SULFATE 0.83 MG/ML
2.5 SOLUTION RESPIRATORY (INHALATION) EVERY 6 HOURS PRN
Qty: 1 BOX | Refills: 11 | Status: SHIPPED | OUTPATIENT
Start: 2017-05-03 | End: 2017-06-02 | Stop reason: SDUPTHER

## 2017-05-03 RX ORDER — AMOXICILLIN AND CLAVULANATE POTASSIUM 875; 125 MG/1; MG/1
1 TABLET, FILM COATED ORAL EVERY 12 HOURS
Qty: 20 TABLET | Refills: 0 | Status: SHIPPED | OUTPATIENT
Start: 2017-05-03 | End: 2017-06-02

## 2017-05-03 RX ORDER — TIOTROPIUM BROMIDE 18 UG/1
18 CAPSULE ORAL; RESPIRATORY (INHALATION) DAILY
Qty: 30 CAPSULE | Refills: 11 | Status: SHIPPED | OUTPATIENT
Start: 2017-05-03 | End: 2017-06-02 | Stop reason: SDUPTHER

## 2017-05-03 RX ORDER — ALPRAZOLAM 2 MG/1
TABLET ORAL
Qty: 45 TABLET | Refills: 0 | Status: SHIPPED | OUTPATIENT
Start: 2017-05-03 | End: 2017-06-02 | Stop reason: SDUPTHER

## 2017-05-03 RX ORDER — ALBUTEROL SULFATE 90 UG/1
2 AEROSOL, METERED RESPIRATORY (INHALATION) EVERY 6 HOURS PRN
Qty: 18 G | Refills: 5 | Status: SHIPPED | OUTPATIENT
Start: 2017-05-03 | End: 2017-06-02 | Stop reason: SDUPTHER

## 2017-05-03 RX ORDER — DULOXETIN HYDROCHLORIDE 20 MG/1
20 CAPSULE, DELAYED RELEASE ORAL DAILY
Qty: 30 CAPSULE | Refills: 11 | Status: SHIPPED | OUTPATIENT
Start: 2017-05-03 | End: 2017-06-02

## 2017-05-03 RX ORDER — HYDROCODONE BITARTRATE AND ACETAMINOPHEN 10; 325 MG/1; MG/1
1 TABLET ORAL EVERY 8 HOURS PRN
Qty: 45 TABLET | Refills: 0 | Status: SHIPPED | OUTPATIENT
Start: 2017-05-03 | End: 2017-05-03 | Stop reason: SDUPTHER

## 2017-05-03 RX ORDER — PREDNISONE 20 MG/1
20 TABLET ORAL DAILY
Qty: 10 TABLET | Refills: 0 | Status: SHIPPED | OUTPATIENT
Start: 2017-05-03 | End: 2017-05-13

## 2017-05-03 RX ORDER — HYDROCODONE BITARTRATE AND ACETAMINOPHEN 10; 325 MG/1; MG/1
1 TABLET ORAL EVERY 8 HOURS PRN
Qty: 45 TABLET | Refills: 0 | Status: SHIPPED | OUTPATIENT
Start: 2017-05-17 | End: 2017-06-02 | Stop reason: SDUPTHER

## 2017-05-03 RX ORDER — HYDROCODONE BITARTRATE AND ACETAMINOPHEN 10; 325 MG/1; MG/1
1 TABLET ORAL EVERY 8 HOURS PRN
Qty: 90 TABLET | Refills: 0 | Status: SHIPPED | OUTPATIENT
Start: 2017-05-03 | End: 2017-06-02 | Stop reason: SDUPTHER

## 2017-05-03 NOTE — MR AVS SNAPSHOT
[facility]  [address]  [address]  [place] [address]  Phone: [phone]  Fax: [phone]                  Yasmeen Valderrama   5/3/2017 9:30 AM   Office Visit    Description:  Female : 1970   Provider:  Clovis Beauchamp MD   Department:  [facility]           Reason for Visit     Medication Refill     Sore Throat           Diagnoses this Visit        Comments    Sore throat    -  Primary     Chronic obstructive pulmonary disease, unspecified COPD type         Tobacco use         Carpal tunnel syndrome, unspecified laterality         Lumbar radiculopathy, chronic         Anxiety         Chronic hoarseness         Neck mass         Bacterial pharyngitis         Chronic pain syndrome                To Do List           Future Appointments        Provider Department Dept Phone    5/3/2017 11:15 AM [id number] [phone]    2017 10:30 AM Shiraz Montaño MD [facility] [phone]      Goals (5 Years of Data)     None       These Medications        Disp Refills Start End    albuterol (PROVENTIL) 2.5 mg /3 mL (0.083 %) nebulizer solution 1 Box 11 5/3/2017     Take 3 mLs (2.5 mg total) by nebulization every 6 (six) hours as needed for Wheezing. - Nebulization    Pharmacy: [facility] [address] Ph #: [phone]       albuterol (VENTOLIN HFA) 90 mcg/actuation inhaler 18 g 5 5/3/2017     Inhale 2 puffs into the lungs every 6 (six) hours as needed for Wheezing. - Inhalation    Pharmacy: [facility] - [place], [place] - [address] Ph #: [phone]       duloxetine (CYMBALTA) 20 MG capsule 30 capsule 11 5/3/2017 5/3/2018    Take 1 capsule (20 mg total) by mouth once daily. - Oral    Pharmacy: [facility] - [place], [place] - [address] Ph #: [phone]       tiotropium (SPIRIVA) 18 mcg inhalation capsule 30 capsule 11  5/3/2017 5/3/2018    Inhale 1 capsule (18 mcg total) into the lungs once daily. - Inhalation    Pharmacy: [place] [id number] [facility] [address] Ph #: [phone]       amoxicillin-clavulanate 875-125mg (AUGMENTIN) 875-125 mg per tablet 20 tablet 0 5/3/2017     Take 1 tablet by mouth every 12 (twelve) hours. - Oral    Pharmacy: [facility] [address] Ph #: [phone]       predniSONE (DELTASONE) 20 MG tablet 10 tablet 0 5/3/2017 5/13/2017    Take 1 tablet (20 mg total) by mouth once daily. - Oral    Pharmacy: [facility] [address] Ph #: [phone]       alprazolam (XANAX) 2 MG Tab 45 tablet 0 5/3/2017     TAKE ONE TABLET TWICE DAILY *MAY CAUSE DROWSINESS*    Pharmacy: [facility] [address] Ph #: [phone]       Notes to Pharmacy: Weaning down, please cancel prior scripts, this is 1 month supply    hydrocodone-acetaminophen 10-325mg (NORCO)  mg Tab 45 tablet 0 5/3/2017 5/18/2017    Take 1 tablet by mouth every 8 (eight) hours as needed for Pain. - Oral    Pharmacy: [facility] [address] Ph #: [phone]       hydrocodone-acetaminophen 10-325mg (NORCO)  mg Tab 45 tablet 0 5/17/2017 6/1/2017    Take 1 tablet by mouth every 8 (eight) hours as needed for Pain. - Oral    Pharmacy: [facility] [address] Ph #: [phone]         [facility]     [facility] Nurse Care Line - 24/7 Assistance  Unless otherwise directed by your provider, please contact [facility], our nurse care line that is available for 24/7 assistance.     Registered nurses in the [facility] provide: appointment scheduling, clinical advisement, health education, and other advisory services.  Call: [phone] (toll free)               Medications           Message  regarding Medications     Verify the changes and/or additions to your medication regime listed below are the same as discussed with your clinician today.  If any of these changes or additions are incorrect, please notify your healthcare provider.        START taking these NEW medications        Refills    amoxicillin-clavulanate 875-125mg (AUGMENTIN) 875-125 mg per tablet 0    Sig: Take 1 tablet by mouth every 12 (twelve) hours.    Class: Normal    Route: Oral    predniSONE (DELTASONE) 20 MG tablet 0    Sig: Take 1 tablet (20 mg total) by mouth once daily.    Class: Normal    Route: Oral    hydrocodone-acetaminophen 10-325mg (NORCO)  mg Tab 0    Starting on: 5/17/2017    Sig: Take 1 tablet by mouth every 8 (eight) hours as needed for Pain.    Class: Print    Route: Oral      STOP taking these medications     doxycycline (MONODOX) 100 MG capsule Take 1 capsule (100 mg total) by mouth 2 (two) times daily.    fluticasone (FLOVENT HFA) 220 mcg/actuation inhaler Inhale 1 puff into the lungs 2 (two) times daily. Controller    lactulose (CHRONULAC) 10 gram/15 mL solution            Verify that the below list of medications is an accurate representation of the medications you are currently taking.  If none reported, the list may be blank. If incorrect, please contact your healthcare provider. Carry this list with you in case of emergency.           Current Medications     albuterol (PROVENTIL) 2.5 mg /3 mL (0.083 %) nebulizer solution Take 3 mLs (2.5 mg total) by nebulization every 6 (six) hours as needed for Wheezing.    albuterol (VENTOLIN HFA) 90 mcg/actuation inhaler Inhale 2 puffs into the lungs every 6 (six) hours as needed for Wheezing.    alprazolam (XANAX) 2 MG Tab TAKE ONE TABLET TWICE DAILY *MAY CAUSE DROWSINESS*    docusate sodium (COLACE) 100 MG capsule Take 1 capsule (100 mg total) by mouth 3 (three) times daily as needed for Constipation.    duloxetine (CYMBALTA) 20 MG capsule Take 1 capsule (20 mg  "total) by mouth once daily.    tiotropium (SPIRIVA) 18 mcg inhalation capsule Inhale 1 capsule (18 mcg total) into the lungs once daily.    amoxicillin-clavulanate 875-125mg (AUGMENTIN) 875-125 mg per tablet Take 1 tablet by mouth every 12 (twelve) hours.    hydrocodone-acetaminophen 10-325mg (NORCO)  mg Tab Take 1 tablet by mouth every 8 (eight) hours as needed for Pain.    hydrocodone-acetaminophen 10-325mg (NORCO)  mg Tab Starting on May 17, 2017. Take 1 tablet by mouth every 8 (eight) hours as needed for Pain.    predniSONE (DELTASONE) 20 MG tablet Take 1 tablet (20 mg total) by mouth once daily.           Clinical Reference Information           Your Vitals Were     BP Pulse Temp Height Weight SpO2    130/84 (BP Location: Right arm, Patient Position: Sitting, BP Method: Manual) 94 98.3 °F (36.8 °C) (Oral) 5' 3" (1.6 m) 66.8 kg (147 lb 4.3 oz) 96%    BMI                26.09 kg/m2          Blood Pressure          Most Recent Value    BP  130/84      Allergies as of 5/3/2017     Antivert [Meclizine]      Immunizations Administered on Date of Encounter - 5/3/2017     None      Orders Placed During Today's Visit     Future Labs/Procedures Expected by Expires    CT Soft Tissue Neck WO Contrast  5/3/2017 5/3/2018      Smoking Cessation     If you would like to quit smoking:   You may be eligible for free services if you are a [place] resident and started smoking cigarettes before September 1, 1988.  Call the Smoking Cessation Trust ([facility]) toll free at [phone] or [phone].   Call 1-800-QUIT-NOW if you do not meet the above criteria.   Contact us via email: [email or web address]   View our website for more information: [email or web address]        Language Assistance Services     ATTENTION: Language assistance services are available, free of charge. Please call [phone].      ATENCIÓN: Si habla español, tiene a beasley disposición servicios gratuitos de asistencia lingüística. " Herber goldberg [phone].     SHERYL Ý: N?u b?n nói Ti?ng Vi?t, có các d?ch v? h? tr? ngôn ng? mi?n phí dành cho b?n. G?i s? [phone].         [place] [facility] complies with applicable Federal civil rights laws and does not discriminate on the basis of race, color, national origin, age, disability, or sex.

## 2017-05-03 NOTE — TELEPHONE ENCOUNTER
----- Message from Lesley Kearney sent at 5/3/2017  9:59 AM CDT -----  Contact: [facility]   Pt is requesting that hydrocodone-acetaminophen 10-325mg (NORCO)  mg Tab start day of 5/18 be changed to today so that she can get it filled now. Please call pt to discuss        Thanks

## 2017-05-08 ENCOUNTER — TELEPHONE (OUTPATIENT)
Dept: FAMILY MEDICINE | Facility: CLINIC | Age: 47
End: 2017-05-08

## 2017-05-08 ENCOUNTER — HOSPITAL ENCOUNTER (OUTPATIENT)
Dept: RADIOLOGY | Facility: HOSPITAL | Age: 47
Discharge: HOME | End: 2017-05-08
Attending: FAMILY MEDICINE
Payer: MEDICAID

## 2017-05-08 DIAGNOSIS — R22.1 NECK MASS: ICD-10-CM

## 2017-05-08 DIAGNOSIS — J38.3 VOCAL CORD MASS: Primary | ICD-10-CM

## 2017-05-08 DIAGNOSIS — R49.0 CHRONIC HOARSENESS: ICD-10-CM

## 2017-05-08 DIAGNOSIS — Z72.0 TOBACCO USE: ICD-10-CM

## 2017-05-08 DIAGNOSIS — J02.9 SORE THROAT: ICD-10-CM

## 2017-05-08 PROCEDURE — 70490 CT SOFT TISSUE NECK W/O DYE: CPT | Mod: TC

## 2017-05-08 PROCEDURE — 70490 CT SOFT TISSUE NECK W/O DYE: CPT | Mod: 26,,, | Performed by: RADIOLOGY

## 2017-05-08 NOTE — TELEPHONE ENCOUNTER
----- Message from Ashia Cobb sent at 5/8/2017  1:11 PM CDT -----  Contact: Self  Pt requesting results. Please call [phone]

## 2017-05-08 NOTE — TELEPHONE ENCOUNTER
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

## 2017-05-16 ENCOUNTER — CLINICAL SUPPORT (OUTPATIENT)
Dept: SMOKING CESSATION | Facility: CLINIC | Age: 47
End: 2017-05-16
Payer: COMMERCIAL

## 2017-05-16 DIAGNOSIS — F17.200 NICOTINE DEPENDENCE: Primary | ICD-10-CM

## 2017-05-16 PROCEDURE — 99407 BEHAV CHNG SMOKING > 10 MIN: CPT | Mod: S$GLB,,,

## 2017-06-02 ENCOUNTER — LAB VISIT (OUTPATIENT)
Dept: LAB | Facility: HOSPITAL | Age: 47
End: 2017-06-02
Attending: FAMILY MEDICINE
Payer: MEDICAID

## 2017-06-02 ENCOUNTER — OFFICE VISIT (OUTPATIENT)
Dept: FAMILY MEDICINE | Facility: CLINIC | Age: 47
End: 2017-06-02
Payer: MEDICAID

## 2017-06-02 VITALS
HEART RATE: 88 BPM | HEIGHT: 63 IN | WEIGHT: 140.88 LBS | BODY MASS INDEX: 24.96 KG/M2 | OXYGEN SATURATION: 92 % | DIASTOLIC BLOOD PRESSURE: 68 MMHG | SYSTOLIC BLOOD PRESSURE: 120 MMHG | TEMPERATURE: 99 F

## 2017-06-02 DIAGNOSIS — Z23 NEED FOR VACCINATION FOR STREP PNEUMONIAE: ICD-10-CM

## 2017-06-02 DIAGNOSIS — G56.00 CARPAL TUNNEL SYNDROME, UNSPECIFIED LATERALITY: ICD-10-CM

## 2017-06-02 DIAGNOSIS — Z23 NEED FOR TD VACCINE: ICD-10-CM

## 2017-06-02 DIAGNOSIS — B37.0 CANDIDA INFECTION, ORAL: ICD-10-CM

## 2017-06-02 DIAGNOSIS — J38.3 VOCAL CORD MASS: Primary | ICD-10-CM

## 2017-06-02 DIAGNOSIS — J44.9 CHRONIC OBSTRUCTIVE PULMONARY DISEASE, UNSPECIFIED COPD TYPE: ICD-10-CM

## 2017-06-02 DIAGNOSIS — C14.0: ICD-10-CM

## 2017-06-02 DIAGNOSIS — M54.16 LUMBAR RADICULOPATHY, CHRONIC: ICD-10-CM

## 2017-06-02 DIAGNOSIS — F41.9 ANXIETY: ICD-10-CM

## 2017-06-02 DIAGNOSIS — Z72.0 TOBACCO USE: ICD-10-CM

## 2017-06-02 LAB
ALBUMIN SERPL BCP-MCNC: 3.8 G/DL
ALP SERPL-CCNC: 91 U/L
ALT SERPL W/O P-5'-P-CCNC: 21 U/L
ANION GAP SERPL CALC-SCNC: 9 MMOL/L
AST SERPL-CCNC: 19 U/L
BASOPHILS # BLD AUTO: 0.05 K/UL
BASOPHILS NFR BLD: 0.4 %
BILIRUB SERPL-MCNC: 0.7 MG/DL
BUN SERPL-MCNC: 15 MG/DL
CALCIUM SERPL-MCNC: 10 MG/DL
CHLORIDE SERPL-SCNC: 103 MMOL/L
CO2 SERPL-SCNC: 27 MMOL/L
CREAT SERPL-MCNC: 0.7 MG/DL
CRP SERPL-MCNC: 71 MG/L
DIFFERENTIAL METHOD: ABNORMAL
EOSINOPHIL # BLD AUTO: 0.3 K/UL
EOSINOPHIL NFR BLD: 1.9 %
ERYTHROCYTE [DISTWIDTH] IN BLOOD BY AUTOMATED COUNT: 15 %
ERYTHROCYTE [SEDIMENTATION RATE] IN BLOOD BY WESTERGREN METHOD: 7 MM/HR
EST. GFR  (AFRICAN AMERICAN): >60 ML/MIN/1.73 M^2
EST. GFR  (NON AFRICAN AMERICAN): >60 ML/MIN/1.73 M^2
GLUCOSE SERPL-MCNC: 120 MG/DL
HCT VFR BLD AUTO: 44.3 %
HGB BLD-MCNC: 14.5 G/DL
LYMPHOCYTES # BLD AUTO: 3.2 K/UL
LYMPHOCYTES NFR BLD: 22.9 %
MCH RBC QN AUTO: 33.1 PG
MCHC RBC AUTO-ENTMCNC: 32.7 %
MCV RBC AUTO: 101 FL
MONOCYTES # BLD AUTO: 1.2 K/UL
MONOCYTES NFR BLD: 8.6 %
NEUTROPHILS # BLD AUTO: 9.1 K/UL
NEUTROPHILS NFR BLD: 66.2 %
PLATELET # BLD AUTO: 285 K/UL
PMV BLD AUTO: 10.2 FL
POTASSIUM SERPL-SCNC: 4.2 MMOL/L
PROT SERPL-MCNC: 6.9 G/DL
RBC # BLD AUTO: 4.38 M/UL
SODIUM SERPL-SCNC: 139 MMOL/L
WBC # BLD AUTO: 13.77 K/UL

## 2017-06-02 PROCEDURE — 36415 COLL VENOUS BLD VENIPUNCTURE: CPT

## 2017-06-02 PROCEDURE — 80053 COMPREHEN METABOLIC PANEL: CPT

## 2017-06-02 PROCEDURE — 85025 COMPLETE CBC W/AUTO DIFF WBC: CPT

## 2017-06-02 PROCEDURE — 86140 C-REACTIVE PROTEIN: CPT

## 2017-06-02 PROCEDURE — 85651 RBC SED RATE NONAUTOMATED: CPT

## 2017-06-02 PROCEDURE — 99215 OFFICE O/P EST HI 40 MIN: CPT | Mod: S$PBB,,, | Performed by: FAMILY MEDICINE

## 2017-06-02 PROCEDURE — 99999 PR PBB SHADOW E&M-EST. PATIENT-LVL III: CPT | Mod: PBBFAC,,, | Performed by: FAMILY MEDICINE

## 2017-06-02 RX ORDER — DULOXETIN HYDROCHLORIDE 20 MG/1
20 CAPSULE, DELAYED RELEASE ORAL DAILY
Qty: 30 CAPSULE | Refills: 11 | Status: CANCELLED | OUTPATIENT
Start: 2017-06-02 | End: 2018-06-02

## 2017-06-02 RX ORDER — TIOTROPIUM BROMIDE 18 UG/1
18 CAPSULE ORAL; RESPIRATORY (INHALATION) DAILY
Qty: 30 CAPSULE | Refills: 11 | Status: SHIPPED | OUTPATIENT
Start: 2017-06-02 | End: 2017-07-03 | Stop reason: SDUPTHER

## 2017-06-02 RX ORDER — ALBUTEROL SULFATE 0.83 MG/ML
2.5 SOLUTION RESPIRATORY (INHALATION) EVERY 6 HOURS PRN
Qty: 1 BOX | Refills: 11 | Status: SHIPPED | OUTPATIENT
Start: 2017-06-02 | End: 2017-07-03 | Stop reason: SDUPTHER

## 2017-06-02 RX ORDER — VARENICLINE TARTRATE 1 MG/1
1 TABLET, FILM COATED ORAL 2 TIMES DAILY
Qty: 60 TABLET | Refills: 2 | Status: SHIPPED | OUTPATIENT
Start: 2017-06-02 | End: 2017-12-11

## 2017-06-02 RX ORDER — ALBUTEROL SULFATE 90 UG/1
2 AEROSOL, METERED RESPIRATORY (INHALATION) EVERY 6 HOURS PRN
Qty: 18 G | Refills: 5 | Status: SHIPPED | OUTPATIENT
Start: 2017-06-02 | End: 2017-07-03 | Stop reason: SDUPTHER

## 2017-06-02 RX ORDER — HYDROCODONE BITARTRATE AND ACETAMINOPHEN 10; 325 MG/1; MG/1
1 TABLET ORAL EVERY 6 HOURS PRN
Qty: 120 TABLET | Refills: 0 | Status: SHIPPED | OUTPATIENT
Start: 2017-06-02 | End: 2017-07-03 | Stop reason: SDUPTHER

## 2017-06-02 RX ORDER — ALPRAZOLAM 2 MG/1
TABLET ORAL
Qty: 60 TABLET | Refills: 2 | Status: SHIPPED | OUTPATIENT
Start: 2017-06-02 | End: 2017-08-07 | Stop reason: SDUPTHER

## 2017-06-02 RX ORDER — FLUCONAZOLE 200 MG/1
200 TABLET ORAL DAILY
Qty: 14 TABLET | Refills: 0 | Status: SHIPPED | OUTPATIENT
Start: 2017-06-02 | End: 2017-06-16

## 2017-06-02 NOTE — PROGRESS NOTES
Chief Complaint   Patient presents with    Medication Refill     1 month follow up       SUBJECTIVE:  Yasmeen Valderrama is a 46 y.o. female here for follow up of vocal cord neck mass and pain. Seen by DR. 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  She saw radiation oncology as well. She still has severe throat pain and she is following up as instructed, hydrocodone helps, sleep is hard, anxiety is rough, wants to quit smoking. Currently has co-morbidities including per problem list.      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     Right side with CT scan  Referred to ENT for visualization      Candida infection, oral 06/02/2017    Localized cancer of throat 06/02/2017     Squamous cell cancer  Need radiation and possibly surgery      Chronic pain syndrome 05/03/2017     New guidelines  Wean to 0.5 mg 25/month of xanax  Stay with 10/325 mg of hydrocodone      On home oxygen therapy 04/03/2017     Nightly still not controlled      Anxiety 02/21/2017    Chronic obstructive pulmonary disease 01/04/2017    Cervical stenosis of spine 01/04/2017 12/2016 MRI severe disease C5/6      Lumbar radiculopathy, chronic 09/09/2015     L3-4, mild annular bulging with superimposed broad based left posterior lateral and far lateral disk protrusion resulting in moderate effacement of the left foramen and may contact the exiting L4 nerve root within the foramen.    Degenerative changes of the L2-3 disk noted with mild bulging of the disk resulting in mild flattening of the adjacent ventral thecal sac.      Electronically signed by: Dr. Spring Lanza  Date: 04/24/15    PT/OT several times prior, modest  "benefit.    Tylenol/NSAID OTC and Rx of minimal help with pain, some side effects.  cymbalta and norco help      Screening 09/09/2015    Unilateral emphysema 12/01/2014     Atelectasis or parenchymal scarring in the right lower lobe laterally and anteriorly and in the inferior lingular portion of the left upper lobe.    Paraseptal emphysematous changes in the medial aspect of the right lung apex.      Electronically signed by: JUAN JOSE JOYCE MD  Date: 04/12/16  Time: 07:47     CT scan      Tobacco use, since teenager, has tried to quit, is trying to quit 12/01/2014     Counseled on tobacco cessation 05/03/2017        Chronic hoarseness 12/01/2014     H/o vocal cord nodules as a child      SOB (shortness of breath) 12/01/2014       Review of Systems   Constitutional: Positive for chills and malaise/fatigue. Negative for fever.   HENT: Positive for sore throat. Negative for hearing loss.    Eyes: Negative.    Respiratory: Positive for cough.    Cardiovascular: Negative.    Gastrointestinal: Negative.    Genitourinary: Negative.    Musculoskeletal: Negative.    Skin: Negative.    Neurological: Positive for headaches.   Endo/Heme/Allergies: Negative.    Psychiatric/Behavioral: The patient is nervous/anxious and has insomnia.      Still with chronic hoarseness and pain in the area with burning  OBJECTIVE:  /68   Pulse 88   Temp 98.5 °F (36.9 °C) (Oral)   Ht 5' 3" (1.6 m)   Wt 63.9 kg (140 lb 14 oz)   SpO2 (!) 92%   BMI 24.95 kg/m²     Wt Readings from Last 3 Encounters:   06/02/17 63.9 kg (140 lb 14 oz)   05/03/17 66.8 kg (147 lb 4.3 oz)   04/03/17 65 kg (143 lb 4.8 oz)     BP Readings from Last 3 Encounters:   06/02/17 120/68   05/03/17 130/84   04/03/17 (!) 148/84       Hoarse  Weight stable  Anxiety high  No visible lesion in mouth  Right neck is enlarged  No new LAD  Lungs still with coarseness  Oral cavity with erythema    Review of old Records:  Reviewed per epic  Will seek records from " them.    Review of old labs:  New labs today      Review of old imaging:  Reviewed per epic      ASSESSMENT:  Problem List Items Addressed This Visit     Vocal cord mass - Primary    Tobacco use, since teenager, has tried to quit, is trying to quit    Relevant Medications    albuterol (VENTOLIN HFA) 90 mcg/actuation inhaler    varenicline (CHANTIX) 1 mg Tab    Lumbar radiculopathy, chronic    Relevant Medications    hydrocodone-acetaminophen 10-325mg (NORCO)  mg Tab    Localized cancer of throat    Relevant Orders    Comprehensive metabolic panel    CBC auto differential    Sedimentation rate, manual    C-reactive protein    Chronic obstructive pulmonary disease    Relevant Medications    albuterol (PROVENTIL) 2.5 mg /3 mL (0.083 %) nebulizer solution    tiotropium (SPIRIVA) 18 mcg inhalation capsule    Candida infection, oral    Relevant Medications    fluconazole (DIFLUCAN) 200 MG Tab    Other Relevant Orders    Sedimentation rate, manual    C-reactive protein    Anxiety    Relevant Medications    alprazolam (XANAX) 2 MG Tab      Other Visit Diagnoses     Carpal tunnel syndrome, unspecified laterality        Need for TD vaccine        Relevant Orders    Td Vaccine- Preservative Free    Need for vaccination for Strep pneumoniae        Relevant Orders    Pneumococcal Polysaccharide Vaccine (23 Valent) (SQ/IM)          ICD-10-CM ICD-9-CM   1. Vocal cord mass J38.3 478.5   2. Lumbar radiculopathy, chronic M54.16 724.4   3. Chronic obstructive pulmonary disease, unspecified COPD type J44.9 496   4. Tobacco use Z72.0 305.1   5. Anxiety F41.9 300.00   6. Carpal tunnel syndrome, unspecified laterality G56.00 354.0   7. Candida infection, oral B37.0 112.0   8. Localized cancer of throat C14.0 149.0   9. Need for TD vaccine Z23 V06.5   10. Need for vaccination for Strep pneumoniae Z23 V03.82               PLAN:     4x day hydrocodone 10 for pain  Xanax for anxiety  chantix for tobacco abuse  Counseled on risk with  benzo and opioid, won't use together  Diflucan for yeast infection oral  Immunizations prior to oncology treatment  We had a prolonged discussion about these complex clinical issues and went over the various important aspects to consider. All questions were answered.  High risk  Get her back in 2-4 weeks depending on how she is doing    Medication List with Changes/Refills   New Medications    FLUCONAZOLE (DIFLUCAN) 200 MG TAB    Take 1 tablet (200 mg total) by mouth once daily.    VARENICLINE (CHANTIX) 1 MG TAB    Take 1 tablet (1 mg total) by mouth 2 (two) times daily.   Current Medications    DOCUSATE SODIUM (COLACE) 100 MG CAPSULE    Take 1 capsule (100 mg total) by mouth 3 (three) times daily as needed for Constipation.   Changed and/or Refilled Medications    Modified Medication Previous Medication    ALBUTEROL (PROVENTIL) 2.5 MG /3 ML (0.083 %) NEBULIZER SOLUTION albuterol (PROVENTIL) 2.5 mg /3 mL (0.083 %) nebulizer solution       Take 3 mLs (2.5 mg total) by nebulization every 6 (six) hours as needed for Wheezing.    Take 3 mLs (2.5 mg total) by nebulization every 6 (six) hours as needed for Wheezing.    ALBUTEROL (VENTOLIN HFA) 90 MCG/ACTUATION INHALER albuterol (VENTOLIN HFA) 90 mcg/actuation inhaler       Inhale 2 puffs into the lungs every 6 (six) hours as needed for Wheezing.    Inhale 2 puffs into the lungs every 6 (six) hours as needed for Wheezing.    ALPRAZOLAM (XANAX) 2 MG TAB alprazolam (XANAX) 2 MG Tab       TAKE ONE TABLET TWICE DAILY *MAY CAUSE DROWSINESS*    TAKE ONE TABLET TWICE DAILY *MAY CAUSE DROWSINESS*    HYDROCODONE-ACETAMINOPHEN 10-325MG (NORCO)  MG TAB hydrocodone-acetaminophen 10-325mg (NORCO)  mg Tab       Take 1 tablet by mouth every 6 (six) hours as needed for Pain.    Take 1 tablet by mouth every 8 (eight) hours as needed for Pain.    TIOTROPIUM (SPIRIVA) 18 MCG INHALATION CAPSULE tiotropium (SPIRIVA) 18 mcg inhalation capsule       Inhale 1 capsule (18 mcg total)  into the lungs once daily.    Inhale 1 capsule (18 mcg total) into the lungs once daily.   Discontinued Medications    AMOXICILLIN-CLAVULANATE 875-125MG (AUGMENTIN) 875-125 MG PER TABLET    Take 1 tablet by mouth every 12 (twelve) hours.    DULOXETINE (CYMBALTA) 20 MG CAPSULE    Take 1 capsule (20 mg total) by mouth once daily.    HYDROCODONE-ACETAMINOPHEN 10-325MG (NORCO)  MG TAB    Take 1 tablet by mouth every 8 (eight) hours as needed for Pain.       Return in about 4 weeks (around 6/30/2017) for reassess acute condition.

## 2017-06-06 DIAGNOSIS — J44.9 CHRONIC OBSTRUCTIVE PULMONARY DISEASE, UNSPECIFIED COPD TYPE: ICD-10-CM

## 2017-06-06 RX ORDER — FLUTICASONE PROPIONATE 220 UG/1
AEROSOL, METERED RESPIRATORY (INHALATION)
Qty: 12 G | Refills: 5 | Status: SHIPPED | OUTPATIENT
Start: 2017-06-06 | End: 2017-07-03 | Stop reason: SDUPTHER

## 2017-07-03 ENCOUNTER — OFFICE VISIT (OUTPATIENT)
Dept: FAMILY MEDICINE | Facility: CLINIC | Age: 47
End: 2017-07-03
Payer: MEDICAID

## 2017-07-03 VITALS
SYSTOLIC BLOOD PRESSURE: 124 MMHG | BODY MASS INDEX: 24.84 KG/M2 | DIASTOLIC BLOOD PRESSURE: 82 MMHG | RESPIRATION RATE: 18 BRPM | HEIGHT: 63 IN | TEMPERATURE: 98 F | WEIGHT: 140.19 LBS | OXYGEN SATURATION: 95 % | HEART RATE: 88 BPM

## 2017-07-03 DIAGNOSIS — Z72.0 TOBACCO USE: ICD-10-CM

## 2017-07-03 DIAGNOSIS — B37.0 CANDIDA INFECTION, ORAL: ICD-10-CM

## 2017-07-03 DIAGNOSIS — Z12.31 OTHER SCREENING MAMMOGRAM: ICD-10-CM

## 2017-07-03 DIAGNOSIS — G89.4 CHRONIC PAIN SYNDROME: ICD-10-CM

## 2017-07-03 DIAGNOSIS — J44.9 CHRONIC OBSTRUCTIVE PULMONARY DISEASE, UNSPECIFIED COPD TYPE: ICD-10-CM

## 2017-07-03 DIAGNOSIS — J38.3 VOCAL CORD MASS: Primary | ICD-10-CM

## 2017-07-03 PROCEDURE — 99213 OFFICE O/P EST LOW 20 MIN: CPT | Mod: PBBFAC,PO | Performed by: FAMILY MEDICINE

## 2017-07-03 PROCEDURE — 99999 PR PBB SHADOW E&M-EST. PATIENT-LVL III: CPT | Mod: PBBFAC,,, | Performed by: FAMILY MEDICINE

## 2017-07-03 PROCEDURE — 99214 OFFICE O/P EST MOD 30 MIN: CPT | Mod: S$PBB,,, | Performed by: FAMILY MEDICINE

## 2017-07-03 RX ORDER — ALBUTEROL SULFATE 0.83 MG/ML
2.5 SOLUTION RESPIRATORY (INHALATION) EVERY 6 HOURS PRN
Qty: 1 BOX | Refills: 11 | Status: SHIPPED | OUTPATIENT
Start: 2017-07-03 | End: 2017-08-07 | Stop reason: SDUPTHER

## 2017-07-03 RX ORDER — HYDROCODONE BITARTRATE AND ACETAMINOPHEN 7.5; 325 MG/1; MG/1
1 TABLET ORAL EVERY 4 HOURS PRN
Qty: 140 TABLET | Refills: 0 | Status: SHIPPED | OUTPATIENT
Start: 2017-07-03 | End: 2017-08-07 | Stop reason: SDUPTHER

## 2017-07-03 RX ORDER — PREDNISONE 10 MG/1
TABLET ORAL
COMMUNITY
Start: 2017-06-06 | End: 2017-12-11

## 2017-07-03 RX ORDER — FLUTICASONE PROPIONATE 220 UG/1
AEROSOL, METERED RESPIRATORY (INHALATION)
Qty: 12 G | Refills: 5 | Status: SHIPPED | OUTPATIENT
Start: 2017-07-03 | End: 2017-08-07 | Stop reason: ALTCHOICE

## 2017-07-03 RX ORDER — HYDROCODONE BITARTRATE AND ACETAMINOPHEN 5; 325 MG/1; MG/1
TABLET ORAL
COMMUNITY
Start: 2017-05-18 | End: 2017-07-03 | Stop reason: DRUGHIGH

## 2017-07-03 RX ORDER — ALBUTEROL SULFATE 90 UG/1
2 AEROSOL, METERED RESPIRATORY (INHALATION) EVERY 6 HOURS PRN
Qty: 18 G | Refills: 5 | Status: SHIPPED | OUTPATIENT
Start: 2017-07-03 | End: 2017-08-07 | Stop reason: SDUPTHER

## 2017-07-03 RX ORDER — FLUCONAZOLE 150 MG/1
TABLET ORAL
COMMUNITY
Start: 2017-05-24 | End: 2017-07-03

## 2017-07-03 RX ORDER — LIDOCAINE HYDROCHLORIDE 20 MG/ML
SOLUTION ORAL; TOPICAL
COMMUNITY
Start: 2017-06-19 | End: 2018-03-07 | Stop reason: SDUPTHER

## 2017-07-03 RX ORDER — HYDROCODONE BITARTRATE AND ACETAMINOPHEN 5; 325 MG/1; MG/1
TABLET ORAL
Status: CANCELLED | OUTPATIENT
Start: 2017-07-03

## 2017-07-03 RX ORDER — FLUCONAZOLE 100 MG/1
200 TABLET ORAL DAILY
Refills: 0 | COMMUNITY
Start: 2017-06-02 | End: 2017-07-03

## 2017-07-03 RX ORDER — HYDROCODONE BITARTRATE AND ACETAMINOPHEN 7.5; 325 MG/1; MG/1
TABLET ORAL
COMMUNITY
Start: 2017-06-22 | End: 2017-07-03 | Stop reason: SDUPTHER

## 2017-07-03 RX ORDER — TIOTROPIUM BROMIDE 18 UG/1
18 CAPSULE ORAL; RESPIRATORY (INHALATION) DAILY
Qty: 30 CAPSULE | Refills: 11 | Status: SHIPPED | OUTPATIENT
Start: 2017-07-03 | End: 2017-08-07 | Stop reason: SDUPTHER

## 2017-07-03 NOTE — PROGRESS NOTES
Chief Complaint   Patient presents with    Follow-up    Medication Refill       SUBJECTIVE:  Yasmeen Valderrama is a 46 y.o. female here for follow up of chronic pain and chronic hoarseness, new biopsy was clear from ENT, so holding off on the radiation for now and she is still having the back and CTS pain, she is still having good relief from the pain medicine.  Currently has co-morbidities including per problem list.      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     Right side with CT scan  Referred to ENT for visualization      Localized cancer of throat 06/02/2017     Squamous cell cancer  Need radiation and possibly surgery      Chronic pain syndrome 05/03/2017     New guidelines  Wean to 0.5 mg 25/month of xanax  Stay with 10/325 mg of hydrocodone      On home oxygen therapy 04/03/2017     Nightly still not controlled      Anxiety 02/21/2017    Chronic obstructive pulmonary disease 01/04/2017    Cervical stenosis of spine 01/04/2017 12/2016 MRI severe disease C5/6      Lumbar radiculopathy, chronic 09/09/2015     L3-4, mild annular bulging with superimposed broad based left posterior lateral and far lateral disk protrusion resulting in moderate effacement of the left foramen and may contact the exiting L4 nerve root within the foramen.    Degenerative changes of the L2-3 disk noted with mild bulging of the disk resulting in mild flattening of the adjacent ventral thecal sac.      Electronically signed by: Dr. Spring Lanza  Date: 04/24/15    PT/OT several times prior, modest benefit.    Tylenol/NSAID OTC and Rx of minimal help with pain, some side effects.  cymbalta and norco help      Screening 09/09/2015    Unilateral  "emphysema 12/01/2014     Atelectasis or parenchymal scarring in the right lower lobe laterally and anteriorly and in the inferior lingular portion of the left upper lobe.    Paraseptal emphysematous changes in the medial aspect of the right lung apex.      Electronically signed by: JUAN JOSE JOYCE MD  Date: 04/12/16  Time: 07:47     CT scan      Tobacco use, since teenager, has tried to quit, is trying to quit 12/01/2014     Counseled on tobacco cessation 05/03/2017        Chronic hoarseness 12/01/2014     H/o vocal cord nodules as a child      SOB (shortness of breath) 12/01/2014       ROS  Answers for HPI/ROS submitted by the patient on 7/1/2017   Back pain  Chronicity: chronic  Onset: more than 1 year ago  Frequency: constantly  Progression since onset: rapidly worsening  Pain location: sacro-iliac  Pain quality: aching, shooting  Radiates to: right thigh  Pain - numeric: 9/10  Pain is: the same all the time  Aggravated by: bending, lying down, sitting, standing  Stiffness is present: in the morning, at night, all day  bladder incontinence: No  bowel incontinence: No  leg pain: Yes  numbness: Yes  paresis: No  paresthesias: Yes  pelvic pain: No  perianal numbness: No  genital pain: No  Risk factors: history of cancer  Pain severity: severe  Treatments tried: analgesics, NSAIDs, bed rest, heat  Improvement on treatment: mild    OBJECTIVE:  /82   Pulse 88   Temp 98.2 °F (36.8 °C) (Oral)   Resp 18   Ht 5' 3" (1.6 m)   Wt 63.6 kg (140 lb 3.4 oz)   SpO2 95%   BMI 24.84 kg/m²     Wt Readings from Last 3 Encounters:   07/03/17 63.6 kg (140 lb 3.4 oz)   06/02/17 63.9 kg (140 lb 14 oz)   05/03/17 66.8 kg (147 lb 4.3 oz)     BP Readings from Last 3 Encounters:   07/03/17 124/82   06/02/17 120/68   05/03/17 130/84       She appears well, in no apparent distress.  Alert and oriented times three, pleasant and cooperative. Vital signs are as documented in vital signs section.  Hoarseness still noted   The " neck is supple and free of adenopathy or masses, the thyroid is normal without enlargement or nodules.  Lungs with some coarseness  No new focal neurological deficits    Review of old Records:  Reviewed per [facility]    Review of old labs:    Seek old records    Review of old imaging:        ASSESSMENT:  Problem List Items Addressed This Visit     Vocal cord mass - Primary    Tobacco use, since teenager, has tried to quit, is trying to quit    Relevant Medications    albuterol (VENTOLIN HFA) 90 mcg/actuation inhaler    Chronic pain syndrome    Chronic obstructive pulmonary disease    Relevant Medications    albuterol (PROVENTIL) 2.5 mg /3 mL (0.083 %) nebulizer solution    tiotropium (SPIRIVA) 18 mcg inhalation capsule    fluticasone (FLOVENT HFA) 220 mcg/actuation inhaler    RESOLVED: Candida infection, oral      Other Visit Diagnoses     Other screening mammogram        Relevant Orders    Mammo Digital Screening Bilat with CAD          ICD-10-CM ICD-9-CM   1. Vocal cord mass J38.3 478.5   2. Chronic obstructive pulmonary disease, unspecified COPD type J44.9 496   3. Tobacco use Z72.0 305.1   4. Candida infection, oral B37.0 112.0   5. Chronic pain syndrome G89.4 338.4   6. Other screening mammogram Z12.31 V76.12               PLAN:         Medication List with Changes/Refills   Current Medications    ALPRAZOLAM (XANAX) 2 MG TAB    TAKE ONE TABLET TWICE DAILY *MAY CAUSE DROWSINESS*    DOCUSATE SODIUM (COLACE) 100 MG CAPSULE    Take 1 capsule (100 mg total) by mouth 3 (three) times daily as needed for Constipation.    LIDOCAINE VISCOUS 2 % SOLUTION        PREDNISONE (DELTASONE) 10 MG TABLET        VARENICLINE (CHANTIX) 1 MG TAB    Take 1 tablet (1 mg total) by mouth 2 (two) times daily.   Changed and/or Refilled Medications    Modified Medication Previous Medication    ALBUTEROL (PROVENTIL) 2.5 MG /3 ML (0.083 %) NEBULIZER SOLUTION albuterol (PROVENTIL) 2.5 mg /3 mL (0.083 %) nebulizer solution       Take 3 mLs (2.5 mg  total) by nebulization every 6 (six) hours as needed for Wheezing.    Take 3 mLs (2.5 mg total) by nebulization every 6 (six) hours as needed for Wheezing.    ALBUTEROL (VENTOLIN HFA) 90 MCG/ACTUATION INHALER albuterol (VENTOLIN HFA) 90 mcg/actuation inhaler       Inhale 2 puffs into the lungs every 6 (six) hours as needed for Wheezing.    Inhale 2 puffs into the lungs every 6 (six) hours as needed for Wheezing.    FLUTICASONE (FLOVENT HFA) 220 MCG/ACTUATION INHALER FLOVENT  mcg/actuation inhaler       Inhale 1 puff into the lungs 2 times daily. Controller    Inhale 1 puff into the lungs 2 times daily. Controller    HYDROCODONE-ACETAMINOPHEN 7.5-325MG (NORCO) 7.5-325 MG PER TABLET hydrocodone-acetaminophen 7.5-325mg (NORCO) 7.5-325 mg per tablet       Take 1 tablet by mouth every 4 (four) hours as needed for Pain.        TIOTROPIUM (SPIRIVA) 18 MCG INHALATION CAPSULE tiotropium (SPIRIVA) 18 mcg inhalation capsule       Inhale 1 capsule (18 mcg total) into the lungs once daily.    Inhale 1 capsule (18 mcg total) into the lungs once daily.   Discontinued Medications    FLUCONAZOLE (DIFLUCAN) 100 MG TABLET    Take 200 mg by mouth once daily.    FLUCONAZOLE (DIFLUCAN) 150 MG TAB        HYDROCODONE-ACETAMINOPHEN 5-325MG (NORCO) 5-325 MG PER TABLET           No Follow-up on file.

## 2017-07-10 ENCOUNTER — PATIENT MESSAGE (OUTPATIENT)
Dept: FAMILY MEDICINE | Facility: CLINIC | Age: 47
End: 2017-07-10

## 2017-08-02 ENCOUNTER — TELEPHONE (OUTPATIENT)
Dept: FAMILY MEDICINE | Facility: CLINIC | Age: 47
End: 2017-08-02

## 2017-08-02 NOTE — TELEPHONE ENCOUNTER
----- Message from Lyly May sent at 8/2/2017  4:13 PM CDT -----  Contact: Self   Patient would like to know if she can get a refill on her medication until her appointment on Monday . Please call at [phone]    hydrocodone-acetaminophen 7.5-325mg (NORCO) 7.5-325 mg per tablet

## 2017-08-02 NOTE — TELEPHONE ENCOUNTER
Patient having phone problem try calling her and she could not hear me, wanted to informed patient Dr. Beauchamp is out of the office until Monday Aug 7, patient called back and was notify.

## 2017-08-07 ENCOUNTER — OFFICE VISIT (OUTPATIENT)
Dept: FAMILY MEDICINE | Facility: CLINIC | Age: 47
End: 2017-08-07
Payer: MEDICAID

## 2017-08-07 VITALS
DIASTOLIC BLOOD PRESSURE: 74 MMHG | HEIGHT: 63 IN | WEIGHT: 134.5 LBS | TEMPERATURE: 98 F | HEART RATE: 88 BPM | OXYGEN SATURATION: 95 % | SYSTOLIC BLOOD PRESSURE: 126 MMHG | BODY MASS INDEX: 23.83 KG/M2

## 2017-08-07 DIAGNOSIS — J44.9 CHRONIC OBSTRUCTIVE PULMONARY DISEASE, UNSPECIFIED COPD TYPE: ICD-10-CM

## 2017-08-07 DIAGNOSIS — G89.4 CHRONIC PAIN SYNDROME: Primary | ICD-10-CM

## 2017-08-07 DIAGNOSIS — F41.9 ANXIETY: ICD-10-CM

## 2017-08-07 DIAGNOSIS — Z72.0 TOBACCO USE: ICD-10-CM

## 2017-08-07 PROCEDURE — 3008F BODY MASS INDEX DOCD: CPT | Mod: ,,, | Performed by: FAMILY MEDICINE

## 2017-08-07 PROCEDURE — 99213 OFFICE O/P EST LOW 20 MIN: CPT | Mod: PBBFAC,PO | Performed by: FAMILY MEDICINE

## 2017-08-07 PROCEDURE — 99214 OFFICE O/P EST MOD 30 MIN: CPT | Mod: S$PBB,,, | Performed by: FAMILY MEDICINE

## 2017-08-07 PROCEDURE — 99999 PR PBB SHADOW E&M-EST. PATIENT-LVL III: CPT | Mod: PBBFAC,,, | Performed by: FAMILY MEDICINE

## 2017-08-07 RX ORDER — TIOTROPIUM BROMIDE 18 UG/1
18 CAPSULE ORAL; RESPIRATORY (INHALATION) DAILY
Qty: 30 CAPSULE | Refills: 11 | Status: SHIPPED | OUTPATIENT
Start: 2017-08-07 | End: 2017-12-11 | Stop reason: CLARIF

## 2017-08-07 RX ORDER — FLUTICASONE FUROATE AND VILANTEROL 200; 25 UG/1; UG/1
1 POWDER RESPIRATORY (INHALATION) DAILY
Qty: 30 EACH | Refills: 5 | Status: SHIPPED | OUTPATIENT
Start: 2017-08-07 | End: 2018-03-07 | Stop reason: SDUPTHER

## 2017-08-07 RX ORDER — ALBUTEROL SULFATE 0.83 MG/ML
2.5 SOLUTION RESPIRATORY (INHALATION) EVERY 6 HOURS PRN
Qty: 1 BOX | Refills: 11 | Status: SHIPPED | OUTPATIENT
Start: 2017-08-07 | End: 2018-03-07 | Stop reason: SDUPTHER

## 2017-08-07 RX ORDER — ALPRAZOLAM 2 MG/1
TABLET ORAL
Qty: 60 TABLET | Refills: 2 | Status: SHIPPED | OUTPATIENT
Start: 2017-08-07 | End: 2017-10-16 | Stop reason: SDUPTHER

## 2017-08-07 RX ORDER — ALBUTEROL SULFATE 90 UG/1
2 AEROSOL, METERED RESPIRATORY (INHALATION) EVERY 6 HOURS PRN
Qty: 18 G | Refills: 5 | Status: SHIPPED | OUTPATIENT
Start: 2017-08-07 | End: 2018-03-07 | Stop reason: SDUPTHER

## 2017-08-07 RX ORDER — HYDROCODONE BITARTRATE AND ACETAMINOPHEN 7.5; 325 MG/1; MG/1
1 TABLET ORAL EVERY 4 HOURS PRN
Qty: 140 TABLET | Refills: 0 | Status: SHIPPED | OUTPATIENT
Start: 2017-08-07 | End: 2017-09-06 | Stop reason: SDUPTHER

## 2017-08-07 NOTE — PROGRESS NOTES
Chief Complaint   Patient presents with    Medication Refill       SUBJECTIVE:  Yasmeen Valderrama is a 47 y.o. female here for follow up of her pain and her anxiety with more trouble breathing and she couldn't get the spiriva due to formulary, we are slowly weaning down off of xanax.  Currently has co-morbidities including per problem list.    Answers for HPI/ROS submitted by the patient on 8/1/2017   Back pain  Chronicity: chronic  Onset: more than 1 year ago  Frequency: constantly  Progression since onset: rapidly worsening  Pain location: sacro-iliac  Pain quality: aching, burning, shooting, stabbing  Radiates to: right foot, right knee, right thigh  Pain - numeric: 10/10  Pain is: the same all the time  Aggravated by: bending, position, lying down, sitting, standing  Stiffness is present: all day  bladder incontinence: No  bowel incontinence: No  leg pain: Yes  numbness: Yes  paresthesias: Yes  pelvic pain: No  perianal numbness: No  genital pain: No  Risk factors: history of cancer, menopause  Pain severity: severe  Treatments tried: analgesics, NSAIDs, bed rest  Improvement on treatment: mild    She is more inactive and back is worse  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     Right side with CT scan  Referred to ENT for visualization      Localized cancer of throat 06/02/2017     Squamous cell cancer  Need radiation and possibly surgery      Chronic pain syndrome 05/03/2017     New guidelines  Wean to 0.5 mg 25/month of xanax  Stay with 10/325 mg of hydrocodone      On home oxygen therapy 04/03/2017     Nightly still not controlled      Anxiety 02/21/2017    Chronic obstructive pulmonary disease 01/04/2017    Cervical stenosis of  "spine 01/04/2017 12/2016 MRI severe disease C5/6      Lumbar radiculopathy, chronic 09/09/2015     L3-4, mild annular bulging with superimposed broad based left posterior lateral and far lateral disk protrusion resulting in moderate effacement of the left foramen and may contact the exiting L4 nerve root within the foramen.    Degenerative changes of the L2-3 disk noted with mild bulging of the disk resulting in mild flattening of the adjacent ventral thecal sac.      Electronically signed by: Dr. Spring Lanza  Date: 04/24/15    PT/OT several times prior, modest benefit.    Tylenol/NSAID OTC and Rx of minimal help with pain, some side effects.  cymbalta and norco help      Screening 09/09/2015    Unilateral emphysema 12/01/2014     Atelectasis or parenchymal scarring in the right lower lobe laterally and anteriorly and in the inferior lingular portion of the left upper lobe.    Paraseptal emphysematous changes in the medial aspect of the right lung apex.      Electronically signed by: JUAN JOSE JOYCE MD  Date: 04/12/16  Time: 07:47     CT scan      Tobacco use, since teenager, has tried to quit, is trying to quit 12/01/2014     Counseled on tobacco cessation 05/03/2017        Chronic hoarseness 12/01/2014     H/o vocal cord nodules as a child      SOB (shortness of breath) 12/01/2014       ROS    OBJECTIVE:  /74 (BP Location: Right arm, Patient Position: Sitting)   Pulse 88   Temp 97.6 °F (36.4 °C) (Oral)   Ht 5' 3" (1.6 m)   Wt 61 kg (134 lb 7.7 oz)   SpO2 95%   BMI 23.82 kg/m²     Wt Readings from Last 3 Encounters:   08/07/17 61 kg (134 lb 7.7 oz)   07/03/17 63.6 kg (140 lb 3.4 oz)   06/02/17 63.9 kg (140 lb 14 oz)     BP Readings from Last 3 Encounters:   08/07/17 126/74   07/03/17 124/82   06/02/17 120/68       Time spent in counseling  Lungs with coarse BS  Still hoarse  No neck masses noted    Review of old Records:  Reviewed per [facility]    Review of old labs:    Reviewed per " epic    Review of old imaging:  n/a      ASSESSMENT:  Problem List Items Addressed This Visit     Tobacco use, since teenager, has tried to quit, is trying to quit    Relevant Medications    albuterol (VENTOLIN HFA) 90 mcg/actuation inhaler    Chronic pain syndrome - Primary    Relevant Medications    hydrocodone-acetaminophen 7.5-325mg (NORCO) 7.5-325 mg per tablet    Chronic obstructive pulmonary disease    Relevant Medications    tiotropium (SPIRIVA) 18 mcg inhalation capsule    albuterol (PROVENTIL) 2.5 mg /3 mL (0.083 %) nebulizer solution    fluticasone-vilanterol (BREO) 200-25 mcg/dose DsDv diskus inhaler    Anxiety    Relevant Medications    alprazolam (XANAX) 2 MG Tab      Other Visit Diagnoses    None.         ICD-10-CM ICD-9-CM   1. Chronic pain syndrome G89.4 338.4   2. Tobacco use Z72.0 305.1   3. Chronic obstructive pulmonary disease, unspecified COPD type J44.9 496   4. Anxiety F41.9 300.00               PLAN:     continue pain treatment with opioids for now until we can get more info on her cancer/lesion  Continue to work on tobacco cessation  breo for COPD  Monitor her anxiety    Medication List with Changes/Refills   New Medications    FLUTICASONE-VILANTEROL (BREO) 200-25 MCG/DOSE DSDV DISKUS INHALER    Inhale 1 puff into the lungs once daily. Controller   Current Medications    DOCUSATE SODIUM (COLACE) 100 MG CAPSULE    Take 1 capsule (100 mg total) by mouth 3 (three) times daily as needed for Constipation.    LIDOCAINE VISCOUS 2 % SOLUTION        PREDNISONE (DELTASONE) 10 MG TABLET        VARENICLINE (CHANTIX) 1 MG TAB    Take 1 tablet (1 mg total) by mouth 2 (two) times daily.   Changed and/or Refilled Medications    Modified Medication Previous Medication    ALBUTEROL (PROVENTIL) 2.5 MG /3 ML (0.083 %) NEBULIZER SOLUTION albuterol (PROVENTIL) 2.5 mg /3 mL (0.083 %) nebulizer solution       Take 3 mLs (2.5 mg total) by nebulization every 6 (six) hours as needed for Wheezing.    Take 3 mLs (2.5  mg total) by nebulization every 6 (six) hours as needed for Wheezing.    ALBUTEROL (VENTOLIN HFA) 90 MCG/ACTUATION INHALER albuterol (VENTOLIN HFA) 90 mcg/actuation inhaler       Inhale 2 puffs into the lungs every 6 (six) hours as needed for Wheezing.    Inhale 2 puffs into the lungs every 6 (six) hours as needed for Wheezing.    ALPRAZOLAM (XANAX) 2 MG TAB alprazolam (XANAX) 2 MG Tab       TAKE ONE TABLET TWICE DAILY *MAY CAUSE DROWSINESS*    TAKE ONE TABLET TWICE DAILY *MAY CAUSE DROWSINESS*    HYDROCODONE-ACETAMINOPHEN 7.5-325MG (NORCO) 7.5-325 MG PER TABLET hydrocodone-acetaminophen 7.5-325mg (NORCO) 7.5-325 mg per tablet       Take 1 tablet by mouth every 4 (four) hours as needed for Pain.    Take 1 tablet by mouth every 4 (four) hours as needed for Pain.    TIOTROPIUM (SPIRIVA) 18 MCG INHALATION CAPSULE tiotropium (SPIRIVA) 18 mcg inhalation capsule       Inhale 1 capsule (18 mcg total) into the lungs once daily.    Inhale 1 capsule (18 mcg total) into the lungs once daily.   Discontinued Medications    FLUTICASONE (FLOVENT HFA) 220 MCG/ACTUATION INHALER    Inhale 1 puff into the lungs 2 times daily. Controller       Return in about 4 weeks (around 9/4/2017) for assess treatment plan.

## 2017-08-18 ENCOUNTER — PATIENT MESSAGE (OUTPATIENT)
Dept: FAMILY MEDICINE | Facility: CLINIC | Age: 47
End: 2017-08-18

## 2017-09-06 ENCOUNTER — OFFICE VISIT (OUTPATIENT)
Dept: FAMILY MEDICINE | Facility: CLINIC | Age: 47
End: 2017-09-06
Payer: MEDICAID

## 2017-09-06 VITALS
WEIGHT: 136.69 LBS | BODY MASS INDEX: 24.22 KG/M2 | HEIGHT: 63 IN | TEMPERATURE: 98 F | SYSTOLIC BLOOD PRESSURE: 134 MMHG | OXYGEN SATURATION: 95 % | HEART RATE: 93 BPM | DIASTOLIC BLOOD PRESSURE: 76 MMHG

## 2017-09-06 DIAGNOSIS — M79.604 RIGHT LEG PAIN: Primary | ICD-10-CM

## 2017-09-06 DIAGNOSIS — G89.4 CHRONIC PAIN SYNDROME: ICD-10-CM

## 2017-09-06 PROCEDURE — 3008F BODY MASS INDEX DOCD: CPT | Mod: ,,, | Performed by: FAMILY MEDICINE

## 2017-09-06 PROCEDURE — 99213 OFFICE O/P EST LOW 20 MIN: CPT | Mod: PBBFAC,PO | Performed by: FAMILY MEDICINE

## 2017-09-06 PROCEDURE — 99214 OFFICE O/P EST MOD 30 MIN: CPT | Mod: S$PBB,,, | Performed by: FAMILY MEDICINE

## 2017-09-06 PROCEDURE — 99999 PR PBB SHADOW E&M-EST. PATIENT-LVL III: CPT | Mod: PBBFAC,,, | Performed by: FAMILY MEDICINE

## 2017-09-06 RX ORDER — HYDROCODONE BITARTRATE AND ACETAMINOPHEN 7.5; 325 MG/1; MG/1
1 TABLET ORAL EVERY 4 HOURS PRN
Qty: 140 TABLET | Refills: 0 | Status: SHIPPED | OUTPATIENT
Start: 2017-10-03 | End: 2018-02-26

## 2017-09-06 RX ORDER — CARISOPRODOL 350 MG/1
350 TABLET ORAL 4 TIMES DAILY PRN
Qty: 25 TABLET | Refills: 0 | Status: SHIPPED | OUTPATIENT
Start: 2017-10-03 | End: 2017-10-13

## 2017-09-06 RX ORDER — BUSPIRONE HYDROCHLORIDE 30 MG/1
30 TABLET ORAL 2 TIMES DAILY
Qty: 60 TABLET | Refills: 11 | Status: SHIPPED | OUTPATIENT
Start: 2017-09-06 | End: 2018-03-07 | Stop reason: SDUPTHER

## 2017-09-06 RX ORDER — HYDROCODONE BITARTRATE AND ACETAMINOPHEN 7.5; 325 MG/1; MG/1
1 TABLET ORAL EVERY 4 HOURS PRN
Qty: 140 TABLET | Refills: 0 | Status: SHIPPED | OUTPATIENT
Start: 2017-09-06 | End: 2017-11-02 | Stop reason: SDUPTHER

## 2017-09-06 RX ORDER — CARISOPRODOL 350 MG/1
350 TABLET ORAL 4 TIMES DAILY PRN
Qty: 25 TABLET | Refills: 0 | Status: SHIPPED | OUTPATIENT
Start: 2017-09-06 | End: 2017-09-16

## 2017-09-07 PROBLEM — J44.9 CHRONIC OBSTRUCTIVE PULMONARY DISEASE: Status: RESOLVED | Noted: 2017-01-04 | Resolved: 2017-09-07

## 2017-09-07 PROBLEM — J38.3 VOCAL CORD MASS: Status: RESOLVED | Noted: 2017-06-02 | Resolved: 2017-09-07

## 2017-09-09 ENCOUNTER — PATIENT MESSAGE (OUTPATIENT)
Dept: FAMILY MEDICINE | Facility: CLINIC | Age: 47
End: 2017-09-09

## 2017-09-12 RX ORDER — BENZONATATE 200 MG/1
200 CAPSULE ORAL 3 TIMES DAILY PRN
Qty: 90 CAPSULE | Refills: 0 | Status: SHIPPED | OUTPATIENT
Start: 2017-09-12 | End: 2017-09-22

## 2017-10-16 DIAGNOSIS — F41.9 ANXIETY: ICD-10-CM

## 2017-10-16 RX ORDER — ALPRAZOLAM 1 MG/1
TABLET ORAL
Qty: 90 TABLET | Refills: 2 | Status: SHIPPED | OUTPATIENT
Start: 2017-10-16 | End: 2017-11-17 | Stop reason: SDUPTHER

## 2017-10-16 NOTE — TELEPHONE ENCOUNTER
----- Message from Ashia Cobb sent at 10/16/2017  8:09 AM CDT -----  Contact: Self  Refill : alprazolam (XANAX) 2 MG Tab      [facility] - [facility] - [facility] [id number] [address]

## 2017-10-17 RX ORDER — ALPRAZOLAM 2 MG/1
TABLET ORAL
Qty: 60 TABLET | OUTPATIENT
Start: 2017-10-17

## 2017-10-17 NOTE — TELEPHONE ENCOUNTER
----- Message from Leola Francois sent at 10/17/2017  2:13 PM CDT -----  Contact: self  Patient would like to know why the strength on her medication was changed alprazolam (XANAX) 1 MG ? Patient can be reached at [phone].        Thanks,

## 2017-10-17 NOTE — TELEPHONE ENCOUNTER
----- Message from Leola Francois sent at 10/17/2017  2:13 PM CDT -----  Contact: self  Patient would like to know why the strength on her medication was changed alprazolam (XANAX) 1 MG ? Patient can be reached at [phone].        Thanks,

## 2017-11-02 DIAGNOSIS — G89.4 CHRONIC PAIN SYNDROME: ICD-10-CM

## 2017-11-02 RX ORDER — HYDROCODONE BITARTRATE AND ACETAMINOPHEN 7.5; 325 MG/1; MG/1
1 TABLET ORAL EVERY 4 HOURS PRN
Qty: 140 TABLET | Refills: 0 | OUTPATIENT
Start: 2017-11-02

## 2017-11-02 RX ORDER — HYDROCODONE BITARTRATE AND ACETAMINOPHEN 7.5; 325 MG/1; MG/1
1 TABLET ORAL EVERY 6 HOURS PRN
Qty: 130 TABLET | Refills: 0 | Status: SHIPPED | OUTPATIENT
Start: 2017-11-02 | End: 2017-12-01 | Stop reason: SDUPTHER

## 2017-11-02 NOTE — TELEPHONE ENCOUNTER
----- Message from Alina Cagle sent at 11/2/2017  1:50 PM CDT -----  Contact: SELF  Is prescription for Hydrocodone ready for  ?     [phone]   LL

## 2017-11-08 ENCOUNTER — TELEPHONE (OUTPATIENT)
Dept: FAMILY MEDICINE | Facility: CLINIC | Age: 47
End: 2017-11-08

## 2017-11-08 ENCOUNTER — OFFICE VISIT (OUTPATIENT)
Dept: FAMILY MEDICINE | Facility: CLINIC | Age: 47
End: 2017-11-08
Payer: MEDICAID

## 2017-11-08 VITALS
DIASTOLIC BLOOD PRESSURE: 80 MMHG | OXYGEN SATURATION: 95 % | TEMPERATURE: 97 F | WEIGHT: 141.13 LBS | HEART RATE: 73 BPM | SYSTOLIC BLOOD PRESSURE: 120 MMHG | BODY MASS INDEX: 25.01 KG/M2 | HEIGHT: 63 IN

## 2017-11-08 DIAGNOSIS — G89.4 CHRONIC PAIN SYNDROME: Primary | ICD-10-CM

## 2017-11-08 DIAGNOSIS — F41.9 ANXIETY: ICD-10-CM

## 2017-11-08 DIAGNOSIS — M54.16 LUMBAR RADICULOPATHY, CHRONIC: ICD-10-CM

## 2017-11-08 DIAGNOSIS — Z12.31 ENCOUNTER FOR MAMMOGRAM TO ESTABLISH BASELINE MAMMOGRAM: ICD-10-CM

## 2017-11-08 DIAGNOSIS — C14.0: ICD-10-CM

## 2017-11-08 DIAGNOSIS — J43.0 UNILATERAL EMPHYSEMA: ICD-10-CM

## 2017-11-08 PROCEDURE — 96372 THER/PROPH/DIAG INJ SC/IM: CPT | Mod: PBBFAC,PO

## 2017-11-08 PROCEDURE — 99999 PR PBB SHADOW E&M-EST. PATIENT-LVL III: CPT | Mod: PBBFAC,,, | Performed by: FAMILY MEDICINE

## 2017-11-08 PROCEDURE — 99213 OFFICE O/P EST LOW 20 MIN: CPT | Mod: PBBFAC,PO,25 | Performed by: FAMILY MEDICINE

## 2017-11-08 PROCEDURE — 99214 OFFICE O/P EST MOD 30 MIN: CPT | Mod: S$PBB,,, | Performed by: FAMILY MEDICINE

## 2017-11-08 RX ORDER — OXYCODONE AND ACETAMINOPHEN 5; 325 MG/1; MG/1
1 TABLET ORAL EVERY 12 HOURS PRN
Qty: 60 TABLET | Refills: 0 | Status: SHIPPED | OUTPATIENT
Start: 2017-11-08 | End: 2017-12-11

## 2017-11-08 RX ORDER — KETOROLAC TROMETHAMINE 30 MG/ML
60 INJECTION, SOLUTION INTRAMUSCULAR; INTRAVENOUS
Status: COMPLETED | OUTPATIENT
Start: 2017-11-08 | End: 2017-11-08

## 2017-11-08 RX ORDER — DIAZEPAM 2 MG/1
2 TABLET ORAL EVERY 8 HOURS
Qty: 90 TABLET | Refills: 0 | Status: SHIPPED | OUTPATIENT
Start: 2017-11-08 | End: 2017-11-17 | Stop reason: SDUPTHER

## 2017-11-08 RX ADMIN — KETOROLAC TROMETHAMINE 60 MG: 60 INJECTION, SOLUTION INTRAMUSCULAR at 03:11

## 2017-11-08 NOTE — PROGRESS NOTES
No chief complaint on file.      SUBJECTIVE:  Yasmeen Valderrama is a 47 y.o. female here for follow up of her pain and see ROS in the back and the legs and she has chronic hoarseness and severe anxiety from social issues.  Currently has co-morbidities including per problem list.    Answers for HPI/ROS submitted by the patient on 11/6/2017   Back pain  Chronicity: chronic  Onset: more than 1 year ago  Frequency: constantly  Progression since onset: rapidly worsening  Pain location: sacro-iliac  Pain quality: aching, burning, shooting, stabbing  Radiates to: right foot, right knee, right thigh  Pain - numeric: 10/10  Pain is: the same all the time  Aggravated by: bending, position, lying down, sitting, standing, stress, twisting  Stiffness is present: in the morning  bladder incontinence: No  bowel incontinence: Yes  leg pain: Yes  numbness: Yes  paresis: No  paresthesias: Yes  pelvic pain: No  perianal numbness: No  genital pain: No  Risk factors: history of cancer, lack of exercise, menopause  Pain severity: severe  Treatments tried: analgesics, NSAIDs, bed rest  Improvement on treatment: no relief    Social History   Substance Use Topics    Smoking status: Current Every Day Smoker     Packs/day: 2.00     Years: 35.00     Types: Cigarettes     Start date: 12/1/1983    Smokeless tobacco: Never Used    Alcohol use No       Patient Active Problem List    Diagnosis Date Noted    Localized cancer of throat 06/02/2017     Squamous cell cancer  Need radiation and possibly surgery      Chronic pain syndrome 05/03/2017     New guidelines  Wean to 0.5 mg 25/month of xanax  Stay with 10/325 mg of hydrocodone      On home oxygen therapy 04/03/2017     Nightly still not controlled      Anxiety 02/21/2017    Cervical stenosis of spine 01/04/2017 12/2016 MRI severe disease C5/6      Lumbar radiculopathy, chronic 09/09/2015     L3-4, mild annular bulging with superimposed broad based left posterior lateral and far  "lateral disk protrusion resulting in moderate effacement of the left foramen and may contact the exiting L4 nerve root within the foramen.    Degenerative changes of the L2-3 disk noted with mild bulging of the disk resulting in mild flattening of the adjacent ventral thecal sac.      Electronically signed by: Dr. Spring Lanza  Date: 04/24/15    PT/OT several times prior, modest benefit.    Tylenol/NSAID OTC and Rx of minimal help with pain, some side effects.  cymbalta and norco help      Unilateral emphysema 12/01/2014     Atelectasis or parenchymal scarring in the right lower lobe laterally and anteriorly and in the inferior lingular portion of the left upper lobe.    Paraseptal emphysematous changes in the medial aspect of the right lung apex.      Electronically signed by: JUAN JOSE JOYCE MD  Date: 04/12/16  Time: 07:47     CT scan      Tobacco use, since teenager, has tried to quit, is trying to quit 12/01/2014     Counseled on tobacco cessation 05/03/2017        Chronic hoarseness 12/01/2014     H/o vocal cord nodules as a child      SOB (shortness of breath) 12/01/2014       Review of Systems   Constitutional: Positive for weight loss. Negative for fever.   Cardiovascular: Negative for chest pain.   Gastrointestinal: Positive for abdominal pain.   Genitourinary: Negative for dysuria and hematuria.   Neurological: Positive for tingling, weakness and headaches.       OBJECTIVE:  /80   Pulse 73   Temp 96.9 °F (36.1 °C) (Oral)   Ht 5' 3" (1.6 m)   Wt 64 kg (141 lb 1.5 oz)   SpO2 95%   BMI 24.99 kg/m²     Wt Readings from Last 3 Encounters:   11/08/17 64 kg (141 lb 1.5 oz)   09/06/17 62 kg (136 lb 11 oz)   08/07/17 61 kg (134 lb 7.7 oz)     BP Readings from Last 3 Encounters:   11/08/17 120/80   09/06/17 134/76   08/07/17 126/74       She is just very upset today.  Had a prolonged discussion about her home situation and it is not good at all.  We discussed multiple problems with primary " relationships and the stressors and her financial dependence and how she needs to improve this to have any hope to improve.  She was very emotional    Review of old Records:  Reviewed     Review of old labs:    Lab Results   Component Value Date    TSH 1.450 03/08/2016     Lab Results   Component Value Date    WBC 13.77 (H) 06/02/2017    HGB 14.5 06/02/2017    HCT 44.3 06/02/2017     (H) 06/02/2017     06/02/2017       Chemistry        Component Value Date/Time     06/02/2017 0815    K 4.2 06/02/2017 0815     06/02/2017 0815    CO2 27 06/02/2017 0815    BUN 15 06/02/2017 0815    CREATININE 0.7 06/02/2017 0815     (H) 06/02/2017 0815        Component Value Date/Time    CALCIUM 10.0 06/02/2017 0815    ALKPHOS 91 06/02/2017 0815    AST 19 06/02/2017 0815    ALT 21 06/02/2017 0815    BILITOT 0.7 06/02/2017 0815    ESTGFRAFRICA >60 06/02/2017 0815    EGFRNONAA >60 06/02/2017 0815        Lab Results   Component Value Date    CHOL 182 09/09/2015     Lab Results   Component Value Date    HDL 54 09/09/2015     Lab Results   Component Value Date    LDLCALC 110.0 09/09/2015     Lab Results   Component Value Date    TRIG 90 09/09/2015     Lab Results   Component Value Date    CHOLHDL 29.7 09/09/2015         Review of old imaging:  N/a        ASSESSMENT:  Problem List Items Addressed This Visit     Unilateral emphysema    Lumbar radiculopathy, chronic    Relevant Medications    oxyCODONE-acetaminophen (PERCOCET) 5-325 mg per tablet    ketorolac injection 60 mg (Completed)    Localized cancer of throat    Chronic pain syndrome - Primary    Relevant Medications    oxyCODONE-acetaminophen (PERCOCET) 5-325 mg per tablet    ketorolac injection 60 mg (Completed)    Anxiety    Relevant Medications    diazePAM (VALIUM) 2 MG tablet      Other Visit Diagnoses     Encounter for mammogram to establish baseline mammogram        Relevant Orders    Mammo Digital Screening Bilat with CAD          ICD-10-CM  ICD-9-CM   1. Chronic pain syndrome G89.4 338.4   2. Unilateral emphysema J43.0 492.8   3. Anxiety F41.9 300.00   4. Localized cancer of throat C14.0 149.0   5. Lumbar radiculopathy, chronic M54.16 724.4   6. Encounter for mammogram to establish baseline mammogram Z12.31 V76.12               PLAN:       Continue her care      She lost her scripts overboard, verified through her contacts.    We will do a substitute for this month and report to the [facility]      We had a prolonged discussion about these complex clinical issues and went over the various important aspects to consider. All questions were answered.  Spent >40 minutes with the patient with 1/2 time in face to face counseling about the above.    Medication List with Changes/Refills   New Medications    DIAZEPAM (VALIUM) 2 MG TABLET    Take 1 tablet (2 mg total) by mouth every 8 (eight) hours. Bridge until next xanax script    OXYCODONE-ACETAMINOPHEN (PERCOCET) 5-325 MG PER TABLET    Take 1 tablet by mouth every 12 (twelve) hours as needed for Pain. Bridge until next dose   Current Medications    ALBUTEROL (PROVENTIL) 2.5 MG /3 ML (0.083 %) NEBULIZER SOLUTION    Take 3 mLs (2.5 mg total) by nebulization every 6 (six) hours as needed for Wheezing.    ALBUTEROL (VENTOLIN HFA) 90 MCG/ACTUATION INHALER    Inhale 2 puffs into the lungs every 6 (six) hours as needed for Wheezing.    ALPRAZOLAM (XANAX) 1 MG TABLET    TAKE ONE TABLET TWICE DAILY *MAY CAUSE DROWSINESS*    BUSPIRONE (BUSPAR) 30 MG TAB    Take 1 tablet (30 mg total) by mouth 2 (two) times daily.    DOCUSATE SODIUM (COLACE) 100 MG CAPSULE    Take 1 capsule (100 mg total) by mouth 3 (three) times daily as needed for Constipation.    FLUTICASONE-VILANTEROL (BREO) 200-25 MCG/DOSE DSDV DISKUS INHALER    Inhale 1 puff into the lungs once daily. Controller    HYDROCODONE-ACETAMINOPHEN 7.5-325MG (NORCO) 7.5-325 MG PER TABLET    Take 1 tablet by mouth every 4 (four) hours as needed for Pain.     HYDROCODONE-ACETAMINOPHEN 7.5-325MG (NORCO) 7.5-325 MG PER TABLET    Take 1 tablet by mouth every 6 (six) hours as needed for Pain. With 10 additional pills to use PRN, slowly weaning    LIDOCAINE VISCOUS 2 % SOLUTION        PREDNISONE (DELTASONE) 10 MG TABLET        TIOTROPIUM (SPIRIVA) 18 MCG INHALATION CAPSULE    Inhale 1 capsule (18 mcg total) into the lungs once daily.    VARENICLINE (CHANTIX) 1 MG TAB    Take 1 tablet (1 mg total) by mouth 2 (two) times daily.       No Follow-up on file.

## 2017-11-08 NOTE — TELEPHONE ENCOUNTER
----- Message from Leola Francois sent at 11/8/2017  3:35 PM CST -----  Contact: Nicci with [facility]  Would like to know if doctor is changing patient Rx, diazePAM (VALIUM) , and Percocet?                           Nicci can be reached at       [phone]    Thanks,

## 2017-11-08 NOTE — PROGRESS NOTES
Administered Ketorolac 60 mg IM to right ventrogluteal.  Patient tolerated injection well, no adverse reactions noted.

## 2017-11-16 ENCOUNTER — PATIENT MESSAGE (OUTPATIENT)
Dept: FAMILY MEDICINE | Facility: CLINIC | Age: 47
End: 2017-11-16

## 2017-11-16 DIAGNOSIS — F41.9 ANXIETY: ICD-10-CM

## 2017-11-17 RX ORDER — ALPRAZOLAM 1 MG/1
TABLET ORAL
Qty: 60 TABLET | Refills: 2 | Status: SHIPPED | OUTPATIENT
Start: 2017-11-17 | End: 2018-03-01 | Stop reason: SDUPTHER

## 2017-12-01 DIAGNOSIS — G89.4 CHRONIC PAIN SYNDROME: ICD-10-CM

## 2017-12-01 RX ORDER — HYDROCODONE BITARTRATE AND ACETAMINOPHEN 7.5; 325 MG/1; MG/1
1 TABLET ORAL EVERY 6 HOURS PRN
Qty: 130 TABLET | Refills: 0 | Status: SHIPPED | OUTPATIENT
Start: 2017-12-01 | End: 2017-12-11 | Stop reason: SDUPTHER

## 2017-12-04 DIAGNOSIS — G89.4 CHRONIC PAIN SYNDROME: ICD-10-CM

## 2017-12-04 RX ORDER — HYDROCODONE BITARTRATE AND ACETAMINOPHEN 7.5; 325 MG/1; MG/1
1 TABLET ORAL EVERY 6 HOURS PRN
Qty: 130 TABLET | Refills: 0 | Status: CANCELLED | OUTPATIENT
Start: 2017-12-04

## 2017-12-04 NOTE — TELEPHONE ENCOUNTER
Spoke to patient informed her that Dr Beauchamp printed prescripiton. She will send her son to pick it up

## 2017-12-11 ENCOUNTER — OFFICE VISIT (OUTPATIENT)
Dept: FAMILY MEDICINE | Facility: CLINIC | Age: 47
End: 2017-12-11
Payer: MEDICAID

## 2017-12-11 ENCOUNTER — TELEPHONE (OUTPATIENT)
Dept: FAMILY MEDICINE | Facility: CLINIC | Age: 47
End: 2017-12-11

## 2017-12-11 VITALS
HEIGHT: 63 IN | RESPIRATION RATE: 16 BRPM | WEIGHT: 145.94 LBS | SYSTOLIC BLOOD PRESSURE: 130 MMHG | TEMPERATURE: 99 F | DIASTOLIC BLOOD PRESSURE: 70 MMHG | BODY MASS INDEX: 25.86 KG/M2 | HEART RATE: 72 BPM

## 2017-12-11 DIAGNOSIS — Z72.0 TOBACCO USE: ICD-10-CM

## 2017-12-11 DIAGNOSIS — Z23 NEED FOR PROPHYLACTIC VACCINATION AND INOCULATION AGAINST INFLUENZA: ICD-10-CM

## 2017-12-11 DIAGNOSIS — B37.2 CANDIDAL DIAPER RASH: ICD-10-CM

## 2017-12-11 DIAGNOSIS — L22 CANDIDAL DIAPER RASH: ICD-10-CM

## 2017-12-11 DIAGNOSIS — K59.09 CHRONIC CONSTIPATION: ICD-10-CM

## 2017-12-11 DIAGNOSIS — G89.4 CHRONIC PAIN SYNDROME: Primary | ICD-10-CM

## 2017-12-11 DIAGNOSIS — N95.9 POSTMENOPAUSAL SYMPTOMS: ICD-10-CM

## 2017-12-11 LAB
AMPHETAMINES UR QL SCN: NEGATIVE
BARBITURATE SCREEN URINE: NEGATIVE
BENZODIAZ UR QL SCN: POSITIVE
BUPRENORPHINE, URINE: POSITIVE
COCAINE (METABOLITE), QUAL, UR: NEGATIVE
METHADONE UR QL SCN: NEGATIVE
MOP MORPHINE 300 NG/ML: NEGATIVE
MTD METHADONE 300 NG/ML: POSITIVE
OXYCODONE,URINE, POC: POSITIVE
THC, UR: NEGATIVE

## 2017-12-11 PROCEDURE — 99215 OFFICE O/P EST HI 40 MIN: CPT | Mod: S$PBB,,, | Performed by: FAMILY MEDICINE

## 2017-12-11 PROCEDURE — 80305 DRUG TEST PRSMV DIR OPT OBS: CPT | Mod: PBBFAC,PO | Performed by: FAMILY MEDICINE

## 2017-12-11 PROCEDURE — 99999 PR PBB SHADOW E&M-EST. PATIENT-LVL IV: CPT | Mod: PBBFAC,,, | Performed by: FAMILY MEDICINE

## 2017-12-11 PROCEDURE — 99214 OFFICE O/P EST MOD 30 MIN: CPT | Mod: PBBFAC,PO | Performed by: FAMILY MEDICINE

## 2017-12-11 RX ORDER — CARISOPRODOL 350 MG/1
350 TABLET ORAL 3 TIMES DAILY PRN
Qty: 20 TABLET | Refills: 0 | Status: SHIPPED | OUTPATIENT
Start: 2018-01-07 | End: 2018-02-06

## 2017-12-11 RX ORDER — HYDROCODONE BITARTRATE AND ACETAMINOPHEN 7.5; 325 MG/1; MG/1
1 TABLET ORAL EVERY 6 HOURS PRN
Qty: 120 TABLET | Refills: 0 | Status: SHIPPED | OUTPATIENT
Start: 2018-01-02 | End: 2018-02-01

## 2017-12-11 RX ORDER — NALOXONE HYDROCHLORIDE 0.4 MG/ML
0.4 INJECTION, SOLUTION INTRAMUSCULAR; INTRAVENOUS; SUBCUTANEOUS ONCE AS NEEDED
Qty: 1 ML | Refills: 2 | Status: SHIPPED | OUTPATIENT
Start: 2017-12-11 | End: 2017-12-11

## 2017-12-11 RX ORDER — VENLAFAXINE 37.5 MG/1
37.5 TABLET ORAL 2 TIMES DAILY
Qty: 60 TABLET | Refills: 11 | Status: SHIPPED | OUTPATIENT
Start: 2017-12-11 | End: 2018-03-07 | Stop reason: SDUPTHER

## 2017-12-11 RX ORDER — CARISOPRODOL 350 MG/1
350 TABLET ORAL 3 TIMES DAILY PRN
Qty: 20 TABLET | Refills: 0 | Status: SHIPPED | OUTPATIENT
Start: 2018-02-03 | End: 2018-03-01 | Stop reason: SDUPTHER

## 2017-12-11 RX ORDER — FLUCONAZOLE 150 MG/1
150 TABLET ORAL DAILY
Qty: 1 TABLET | Refills: 1 | Status: SHIPPED | OUTPATIENT
Start: 2017-12-11 | End: 2017-12-12

## 2017-12-11 RX ORDER — HYDROCODONE BITARTRATE AND ACETAMINOPHEN 7.5; 325 MG/1; MG/1
1 TABLET ORAL EVERY 6 HOURS PRN
Qty: 120 TABLET | Refills: 0 | Status: SHIPPED | OUTPATIENT
Start: 2018-01-31 | End: 2018-02-26

## 2017-12-11 RX ORDER — CARISOPRODOL 350 MG/1
350 TABLET ORAL 3 TIMES DAILY PRN
Qty: 20 TABLET | Refills: 0 | Status: SHIPPED | OUTPATIENT
Start: 2017-12-11 | End: 2018-01-10

## 2017-12-11 NOTE — PROGRESS NOTES
Chief Complaint   Patient presents with    Flank Pain     left side that moves to abdomen, feels better after bowel movement or pass gas    Vaginitis     irritated in groin area and going down legs       SUBJECTIVE:  Yasmeen Valderrama is a 47 y.o. female here for follow up of chronic pain.     Back pain  Chronicity: chronic  Onset: more than 1 year ago  Frequency: constantly  Progression since onset: rapidly worsening  Pain location: sacro-iliac  Pain quality: aching, burning, shooting, stabbing  Radiates to: right foot, right knee, right thigh  Pain - numeric: 9/10  Pain is: the same all the time  Aggravated by: bending, position, lying down, sitting, standing, stress, twisting  Stiffness is present: in the morning  bladder incontinence: No  bowel incontinence: Yes  leg pain: Yes  numbness: Yes  paresis: No  paresthesias: Yes  pelvic pain: No  perianal numbness: No  genital pain: No  Risk factors: history of cancer, lack of exercise, menopause  Pain severity: severe  Treatments tried: analgesics, NSAIDs, bed rest  Improvement on treatment: no relief     Currently has co-morbidities including below problem list.      Social History   Substance Use Topics    Smoking status: Current Every Day Smoker     Packs/day: 2.00     Years: 35.00     Types: Cigarettes     Start date: 12/1/1983    Smokeless tobacco: Never Used    Alcohol use No       Patient Active Problem List    Diagnosis Date Noted    Localized cancer of throat 06/02/2017     Squamous cell cancer  Need radiation and possibly surgery      Chronic pain syndrome 05/03/2017     New guidelines  Wean to 0.5 mg 25/month of xanax  Stay with 10/325 mg of hydrocodone      On home oxygen therapy 04/03/2017     Nightly still not controlled      Anxiety 02/21/2017    Cervical stenosis of spine 01/04/2017 12/2016 MRI severe disease C5/6      Lumbar radiculopathy, chronic 09/09/2015     L3-4, mild annular bulging with superimposed broad based left posterior  "lateral and far lateral disk protrusion resulting in moderate effacement of the left foramen and may contact the exiting L4 nerve root within the foramen.    Degenerative changes of the L2-3 disk noted with mild bulging of the disk resulting in mild flattening of the adjacent ventral thecal sac.      Electronically signed by: Dr. Spring Lanza  Date: 04/24/15    PT/OT several times prior, modest benefit.    Tylenol/NSAID OTC and Rx of minimal help with pain, some side effects.  cymbalta and norco help      Unilateral emphysema 12/01/2014     Atelectasis or parenchymal scarring in the right lower lobe laterally and anteriorly and in the inferior lingular portion of the left upper lobe.    Paraseptal emphysematous changes in the medial aspect of the right lung apex.      Electronically signed by: JUAN JOSE JOYCE MD  Date: 04/12/16  Time: 07:47     CT scan      Tobacco use, since teenager, has tried to quit, is trying to quit 12/01/2014     Counseled on tobacco cessation 05/03/2017        Chronic hoarseness 12/01/2014     H/o vocal cord nodules as a child      SOB (shortness of breath) 12/01/2014       Review of Systems   Constitutional: Positive for weight loss. Negative for fever.   Cardiovascular: Negative for chest pain.   Gastrointestinal: Positive for abdominal pain.   Genitourinary: Negative for dysuria and hematuria.   Neurological: Positive for tingling and weakness. Negative for headaches.       OBJECTIVE:  /70   Pulse 72   Temp 98.5 °F (36.9 °C) (Oral)   Resp 16   Ht 5' 3" (1.6 m)   Wt 66.2 kg (145 lb 15.1 oz)   BMI 25.85 kg/m²     Wt Readings from Last 3 Encounters:   12/11/17 66.2 kg (145 lb 15.1 oz)   11/08/17 64 kg (141 lb 1.5 oz)   09/06/17 62 kg (136 lb 11 oz)     BP Readings from Last 3 Encounters:   12/11/17 130/70   11/08/17 120/80   09/06/17 134/76       She appears well, in no apparent distress.  Alert and oriented times three, pleasant and cooperative. Vital signs are as " documented in vital signs section.  Abdomen with increased air on palpation of stomach and diminished BS  Time spent in counseling on the xanax and the opioids and new rules and narcan and UDS    Review of old Records:  Reviewed per epic and     Review of old labs:    Last labs    Review of old imaging:  Imaging reviewed      ASSESSMENT:    ICD-10-CM ICD-9-CM   1. Chronic pain syndrome G89.4 338.4   2. Tobacco use, since teenager, has tried to quit, is trying to quit Z72.0 305.1   3. Chronic constipation K59.09 564.00   4. Postmenopausal symptoms N95.9 627.9   5. Candidal diaper rash B37.2 112.3    L22 691.0       No evidence of abuse/diversion/addiction noted through history and physical, or checking the .  Risks, benefits and alternate treatments are considered and plan of care reflects that shared decision with the patient.    PLAN:     went over everything and we will stay at 30 MME daily and limit xanax with a gradual weaning program    Work on Constipation with MOM and magnesium citrate, consider advanced Rx if can't manage with behavior modification    Start effexor for menopausal symptoms, sweats leading to yeast, treat aggressively with diflucan    counsled on tobacco cessation refer to cessation program    We had a prolonged discussion about these complex clinical issues and went over the various important aspects to consider. All questions were answered.  Spent >40 minutes with the patient with 1/2 time in face to face counseling about the above.      Medication List with Changes/Refills   New Medications    FLUCONAZOLE (DIFLUCAN) 150 MG TAB    Take 1 tablet (150 mg total) by mouth once daily.    HYDROCODONE-ACETAMINOPHEN 7.5-325MG (NORCO) 7.5-325 MG PER TABLET    Take 1 tablet by mouth every 6 (six) hours as needed for Pain.    NALOXONE (NARCAN) 0.4 MG/ML INJECTION    Inject 1 mL (0.4 mg total) into the muscle once as needed.    VENLAFAXINE (EFFEXOR) 37.5 MG TAB    Take 1 tablet (37.5 mg total) by  mouth 2 (two) times daily.   Current Medications    ALBUTEROL (PROVENTIL) 2.5 MG /3 ML (0.083 %) NEBULIZER SOLUTION    Take 3 mLs (2.5 mg total) by nebulization every 6 (six) hours as needed for Wheezing.    ALBUTEROL (VENTOLIN HFA) 90 MCG/ACTUATION INHALER    Inhale 2 puffs into the lungs every 6 (six) hours as needed for Wheezing.    ALPRAZOLAM (XANAX) 1 MG TABLET    TAKE ONE TABLET TWICE DAILY *MAY CAUSE DROWSINESS*    BUSPIRONE (BUSPAR) 30 MG TAB    Take 1 tablet (30 mg total) by mouth 2 (two) times daily.    FLUTICASONE-VILANTEROL (BREO) 200-25 MCG/DOSE DSDV DISKUS INHALER    Inhale 1 puff into the lungs once daily. Controller    HYDROCODONE-ACETAMINOPHEN 7.5-325MG (NORCO) 7.5-325 MG PER TABLET    Take 1 tablet by mouth every 4 (four) hours as needed for Pain.    LIDOCAINE VISCOUS 2 % SOLUTION       Changed and/or Refilled Medications    Modified Medication Previous Medication    HYDROCODONE-ACETAMINOPHEN 7.5-325MG (NORCO) 7.5-325 MG PER TABLET hydrocodone-acetaminophen 7.5-325mg (NORCO) 7.5-325 mg per tablet       Take 1 tablet by mouth every 6 (six) hours as needed for Pain.    Take 1 tablet by mouth every 6 (six) hours as needed for Pain. With 10 additional pills to use PRN, slowly weaning   Discontinued Medications    DOCUSATE SODIUM (COLACE) 100 MG CAPSULE    Take 1 capsule (100 mg total) by mouth 3 (three) times daily as needed for Constipation.    OXYCODONE-ACETAMINOPHEN (PERCOCET) 5-325 MG PER TABLET    Take 1 tablet by mouth every 12 (twelve) hours as needed for Pain. Bridge until next dose    PREDNISONE (DELTASONE) 10 MG TABLET        TIOTROPIUM (SPIRIVA) 18 MCG INHALATION CAPSULE    Inhale 1 capsule (18 mcg total) into the lungs once daily.    VARENICLINE (CHANTIX) 1 MG TAB    Take 1 tablet (1 mg total) by mouth 2 (two) times daily.       Referred patient to CDC.GOV to review the new opioid guidelines.  Explained there is a section for patient information that is very informative about the potential  risks of chronic opioid therapy and some strategies to minimize risk.  Counseled to f/u with me with any questions or concerns as our goal is always to restore function and  Reducing pain while minimizing side effects and avoiding rare but life threatening risks of overdose/addiction.    No future appointments.

## 2017-12-11 NOTE — TELEPHONE ENCOUNTER
----- Message from Kemi Mclean sent at 12/11/2017 12:20 PM CST -----  Contact: Rehan/[facility]/[phone]  Rehan states that they would have to order patient's prescription:  naloxone (NARCAN) 0.4 mg/mL injection for one injection. He would like to know if the staff can switch this prescription to a [facility]. Thank you.

## 2017-12-18 ENCOUNTER — TELEPHONE (OUTPATIENT)
Dept: SMOKING CESSATION | Facility: CLINIC | Age: 47
End: 2017-12-18

## 2017-12-18 NOTE — TELEPHONE ENCOUNTER
[facility]- called to check on patient, no show for intake appointment. Patient said she will call back to reschedule in January. Patient asked to call [phone] when ready to reschedule.

## 2018-01-02 ENCOUNTER — TELEPHONE (OUTPATIENT)
Dept: SMOKING CESSATION | Facility: CLINIC | Age: 48
End: 2018-01-02

## 2018-01-04 ENCOUNTER — TELEPHONE (OUTPATIENT)
Dept: SMOKING CESSATION | Facility: CLINIC | Age: 48
End: 2018-01-04

## 2018-01-05 ENCOUNTER — TELEPHONE (OUTPATIENT)
Dept: SMOKING CESSATION | Facility: CLINIC | Age: 48
End: 2018-01-05

## 2018-01-05 NOTE — TELEPHONE ENCOUNTER
Third attempt left message regarding smoking cessation quit 1 episode, will resolve episode.      '

## 2018-01-25 ENCOUNTER — PATIENT MESSAGE (OUTPATIENT)
Dept: FAMILY MEDICINE | Facility: CLINIC | Age: 48
End: 2018-01-25

## 2018-01-25 ENCOUNTER — TELEPHONE (OUTPATIENT)
Dept: FAMILY MEDICINE | Facility: CLINIC | Age: 48
End: 2018-01-25

## 2018-01-25 DIAGNOSIS — J44.1 COPD WITH ACUTE EXACERBATION: Primary | ICD-10-CM

## 2018-01-25 RX ORDER — AMOXICILLIN AND CLAVULANATE POTASSIUM 875; 125 MG/1; MG/1
1 TABLET, FILM COATED ORAL EVERY 12 HOURS
Qty: 20 TABLET | Refills: 0 | Status: SHIPPED | OUTPATIENT
Start: 2018-01-25 | End: 2018-02-04

## 2018-01-25 RX ORDER — PREDNISONE 20 MG/1
20 TABLET ORAL DAILY
Qty: 10 TABLET | Refills: 0 | Status: SHIPPED | OUTPATIENT
Start: 2018-01-25 | End: 2018-02-04

## 2018-01-25 NOTE — TELEPHONE ENCOUNTER
----- Message from Leola Francois sent at 1/25/2018 11:42 AM CST -----  Contact: self  Per patient she has bronchitis and blood pressure problems. Need to see doctor sooner than 2/22/2018. ( Medicaid)      Patient would like to be seen tomorrow. Patient can be reached at [phone].      Thanks,

## 2018-01-26 ENCOUNTER — PATIENT MESSAGE (OUTPATIENT)
Dept: FAMILY MEDICINE | Facility: CLINIC | Age: 48
End: 2018-01-26

## 2018-01-28 RX ORDER — POTASSIUM CHLORIDE 750 MG/1
10 CAPSULE, EXTENDED RELEASE ORAL 2 TIMES DAILY
Qty: 60 CAPSULE | Refills: 0 | Status: SHIPPED | OUTPATIENT
Start: 2018-01-28 | End: 2018-03-07 | Stop reason: SDUPTHER

## 2018-01-28 RX ORDER — FUROSEMIDE 40 MG/1
40 TABLET ORAL 2 TIMES DAILY
Qty: 60 TABLET | Refills: 11 | Status: SHIPPED | OUTPATIENT
Start: 2018-01-28 | End: 2018-03-07 | Stop reason: SDUPTHER

## 2018-02-01 ENCOUNTER — PATIENT MESSAGE (OUTPATIENT)
Dept: FAMILY MEDICINE | Facility: CLINIC | Age: 48
End: 2018-02-01

## 2018-02-23 RX ORDER — HYDROCODONE BITARTRATE AND ACETAMINOPHEN 7.5; 325 MG/1; MG/1
TABLET ORAL
Qty: 28 TABLET | Refills: 0 | Status: SHIPPED | OUTPATIENT
Start: 2018-02-23 | End: 2018-03-07 | Stop reason: SDUPTHER

## 2018-02-26 ENCOUNTER — TELEPHONE (OUTPATIENT)
Dept: FAMILY MEDICINE | Facility: CLINIC | Age: 48
End: 2018-02-26

## 2018-02-26 NOTE — TELEPHONE ENCOUNTER
----- Message from Radha Chandler sent at 2/26/2018 10:51 AM CST -----  Contact: [phone]  Refill:hydrocodone-acetaminophen 7.5-325mg (NORCO) 7.5-325 mg per tablet

## 2018-03-01 DIAGNOSIS — F41.9 ANXIETY: ICD-10-CM

## 2018-03-01 DIAGNOSIS — G89.4 CHRONIC PAIN SYNDROME: ICD-10-CM

## 2018-03-02 RX ORDER — ALPRAZOLAM 1 MG/1
TABLET ORAL
Qty: 60 TABLET | Refills: 0 | Status: SHIPPED | OUTPATIENT
Start: 2018-03-02 | End: 2018-03-07 | Stop reason: SDUPTHER

## 2018-03-02 RX ORDER — CARISOPRODOL 350 MG/1
TABLET ORAL
Qty: 20 TABLET | Refills: 0 | Status: SHIPPED | OUTPATIENT
Start: 2018-03-02 | End: 2018-03-07 | Stop reason: SDUPTHER

## 2018-03-07 ENCOUNTER — OFFICE VISIT (OUTPATIENT)
Dept: FAMILY MEDICINE | Facility: CLINIC | Age: 48
End: 2018-03-07
Payer: MEDICAID

## 2018-03-07 VITALS
HEIGHT: 63 IN | HEART RATE: 94 BPM | WEIGHT: 143.94 LBS | DIASTOLIC BLOOD PRESSURE: 86 MMHG | TEMPERATURE: 99 F | BODY MASS INDEX: 25.5 KG/M2 | OXYGEN SATURATION: 94 % | SYSTOLIC BLOOD PRESSURE: 136 MMHG

## 2018-03-07 DIAGNOSIS — M79.89 LEG SWELLING: Primary | ICD-10-CM

## 2018-03-07 DIAGNOSIS — Z12.31 ENCOUNTER FOR MAMMOGRAM TO ESTABLISH BASELINE MAMMOGRAM: ICD-10-CM

## 2018-03-07 DIAGNOSIS — N95.9 POSTMENOPAUSAL SYMPTOMS: ICD-10-CM

## 2018-03-07 DIAGNOSIS — J43.0 UNILATERAL EMPHYSEMA: ICD-10-CM

## 2018-03-07 DIAGNOSIS — G89.4 CHRONIC PAIN SYNDROME: ICD-10-CM

## 2018-03-07 DIAGNOSIS — Z72.0 TOBACCO USE: ICD-10-CM

## 2018-03-07 DIAGNOSIS — R49.0 CHRONIC HOARSENESS: ICD-10-CM

## 2018-03-07 DIAGNOSIS — F41.9 ANXIETY: ICD-10-CM

## 2018-03-07 DIAGNOSIS — J44.9 CHRONIC OBSTRUCTIVE PULMONARY DISEASE, UNSPECIFIED COPD TYPE: ICD-10-CM

## 2018-03-07 PROCEDURE — 99215 OFFICE O/P EST HI 40 MIN: CPT | Mod: S$PBB,,, | Performed by: FAMILY MEDICINE

## 2018-03-07 PROCEDURE — 99999 PR PBB SHADOW E&M-EST. PATIENT-LVL IV: CPT | Mod: PBBFAC,,, | Performed by: FAMILY MEDICINE

## 2018-03-07 PROCEDURE — 99214 OFFICE O/P EST MOD 30 MIN: CPT | Mod: PBBFAC,PO | Performed by: FAMILY MEDICINE

## 2018-03-07 RX ORDER — CARISOPRODOL 350 MG/1
TABLET ORAL
Qty: 20 TABLET | Refills: 0 | Status: SHIPPED | OUTPATIENT
Start: 2018-03-07 | End: 2018-04-05 | Stop reason: SDUPTHER

## 2018-03-07 RX ORDER — HYDROCODONE BITARTRATE AND ACETAMINOPHEN 7.5; 325 MG/1; MG/1
1 TABLET ORAL EVERY 6 HOURS PRN
Qty: 120 TABLET | Refills: 0 | Status: SHIPPED | OUTPATIENT
Start: 2018-03-07 | End: 2018-04-05 | Stop reason: SDUPTHER

## 2018-03-07 RX ORDER — FUROSEMIDE 40 MG/1
40 TABLET ORAL 2 TIMES DAILY
Qty: 60 TABLET | Refills: 11 | Status: SHIPPED | OUTPATIENT
Start: 2018-03-07 | End: 2018-03-07 | Stop reason: SDUPTHER

## 2018-03-07 RX ORDER — ALBUTEROL SULFATE 0.83 MG/ML
2.5 SOLUTION RESPIRATORY (INHALATION) EVERY 6 HOURS PRN
Qty: 1 BOX | Refills: 11 | Status: SHIPPED | OUTPATIENT
Start: 2018-03-07 | End: 2019-08-12 | Stop reason: SDUPTHER

## 2018-03-07 RX ORDER — ALPRAZOLAM 1 MG/1
1 TABLET ORAL 2 TIMES DAILY
Qty: 60 TABLET | Refills: 2 | Status: SHIPPED | OUTPATIENT
Start: 2018-03-07 | End: 2018-05-24 | Stop reason: SDUPTHER

## 2018-03-07 RX ORDER — BUSPIRONE HYDROCHLORIDE 30 MG/1
30 TABLET ORAL 2 TIMES DAILY
Qty: 60 TABLET | Refills: 11 | Status: SHIPPED | OUTPATIENT
Start: 2018-03-07 | End: 2019-08-12 | Stop reason: SDUPTHER

## 2018-03-07 RX ORDER — POTASSIUM CHLORIDE 750 MG/1
10 CAPSULE, EXTENDED RELEASE ORAL 2 TIMES DAILY
Qty: 60 CAPSULE | Refills: 2 | Status: SHIPPED | OUTPATIENT
Start: 2018-03-07 | End: 2018-05-18 | Stop reason: SDUPTHER

## 2018-03-07 RX ORDER — ALBUTEROL SULFATE 90 UG/1
2 AEROSOL, METERED RESPIRATORY (INHALATION) EVERY 6 HOURS PRN
Qty: 18 G | Refills: 5 | Status: SHIPPED | OUTPATIENT
Start: 2018-03-07 | End: 2018-09-17 | Stop reason: SDUPTHER

## 2018-03-07 RX ORDER — VENLAFAXINE 37.5 MG/1
37.5 TABLET ORAL 2 TIMES DAILY
Qty: 60 TABLET | Refills: 11 | Status: SHIPPED | OUTPATIENT
Start: 2018-03-07 | End: 2019-08-12 | Stop reason: SDUPTHER

## 2018-03-07 RX ORDER — FLUTICASONE FUROATE AND VILANTEROL 200; 25 UG/1; UG/1
1 POWDER RESPIRATORY (INHALATION) DAILY
Qty: 30 EACH | Refills: 5 | Status: SHIPPED | OUTPATIENT
Start: 2018-03-07 | End: 2018-09-17 | Stop reason: SDUPTHER

## 2018-03-07 RX ORDER — LIDOCAINE HYDROCHLORIDE 20 MG/ML
SOLUTION ORAL; TOPICAL
Qty: 100 ML | Refills: 11 | Status: SHIPPED | OUTPATIENT
Start: 2018-03-07 | End: 2019-08-12 | Stop reason: SDUPTHER

## 2018-03-07 RX ORDER — POTASSIUM CHLORIDE 750 MG/1
10 CAPSULE, EXTENDED RELEASE ORAL 2 TIMES DAILY
Qty: 60 CAPSULE | Refills: 0 | Status: SHIPPED | OUTPATIENT
Start: 2018-03-07 | End: 2018-03-07 | Stop reason: SDUPTHER

## 2018-03-07 RX ORDER — FUROSEMIDE 40 MG/1
40 TABLET ORAL 2 TIMES DAILY
Qty: 60 TABLET | Refills: 11 | Status: SHIPPED | OUTPATIENT
Start: 2018-03-07 | End: 2019-08-12 | Stop reason: SDUPTHER

## 2018-03-07 NOTE — PROGRESS NOTES
Chief Complaint   Patient presents with    Medication Refill       SUBJECTIVE:  Yasmeen Valderrama is a 47 y.o. female here for new problem of worsening hoarseness and raw throat, ENT told her repeated biopsies are normal without cancer and she continues to struggle with chronic back pain and now with neck and throat pain.  She is  Still smoking having a difficult time quitting.  Her breathing is getting worse as well over time and she is having to use her oxygen more and was told to get a lot of help, she is more depressed.  Currently has co-morbidities including per problem list.      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     Social History Main Topics    Smoking status: Current Every Day Smoker     Packs/day: 2.00     Years: 35.00     Types: Cigarettes     Start date: 12/1/1983    Smokeless tobacco: Never Used    Alcohol use No    Drug use: No    Sexual activity: Yes     Partners: Male     Other Topics Concern    Not on file     Social History Narrative    No narrative on file     Family History   Problem Relation Age of Onset    COPD Mother     Heart disease Father     No Known Problems Sister     No Known Problems Brother      Current Outpatient Prescriptions on File Prior to Visit   Medication Sig Dispense Refill    [DISCONTINUED] albuterol (PROVENTIL) 2.5 mg /3 mL (0.083 %) nebulizer solution Take 3 mLs (2.5 mg total) by nebulization every 6 (six) hours as needed for Wheezing. 1 Box 11     "[DISCONTINUED] albuterol (VENTOLIN HFA) 90 mcg/actuation inhaler Inhale 2 puffs into the lungs every 6 (six) hours as needed for Wheezing. 18 g 5    [DISCONTINUED] ALPRAZolam (XANAX) 1 MG tablet take 1 TABLET(S) BY MOUTH TWICE DAILY 60 tablet 0    [DISCONTINUED] busPIRone (BUSPAR) 30 MG Tab Take 1 tablet (30 mg total) by mouth 2 (two) times daily. 60 tablet 11    [DISCONTINUED] carisoprodol (SOMA) 350 MG tablet TAKE 1 TABLET(S) BY MOUTH THREE TIMES DAILY AS NEEDED. MAY CAUSE DROWSINESS. 20 tablet 0    [DISCONTINUED] fluticasone-vilanterol (BREO) 200-25 mcg/dose DsDv diskus inhaler Inhale 1 puff into the lungs once daily. Controller 30 each 5    [DISCONTINUED] furosemide (LASIX) 40 MG tablet Take 1 tablet (40 mg total) by mouth 2 (two) times daily. 60 tablet 11    [DISCONTINUED] hydrocodone-acetaminophen 7.5-325mg (NORCO) 7.5-325 mg per tablet TAKE ONE TABLET BY MOUTH EVERY 6 HOURS AS NEEDED FOR PAIN 28 tablet 0    [DISCONTINUED] LIDOCAINE VISCOUS 2 % solution       [DISCONTINUED] potassium chloride (MICRO-K) 10 MEQ CpSR Take 1 capsule (10 mEq total) by mouth 2 (two) times daily. 60 capsule 0    [DISCONTINUED] venlafaxine (EFFEXOR) 37.5 MG Tab Take 1 tablet (37.5 mg total) by mouth 2 (two) times daily. 60 tablet 11     No current facility-administered medications on file prior to visit.      Review of patient's allergies indicates:   Allergen Reactions    Antivert [meclizine] Other (See Comments)     Behavioral changes         Review of Systems   Constitutional: Positive for weight loss. Negative for fever.   Cardiovascular: Negative for chest pain.   Gastrointestinal: Negative for abdominal pain.   Genitourinary: Negative for dysuria and hematuria.   Neurological: Positive for tingling and weakness. Negative for headaches.       OBJECTIVE:  /86   Pulse 94   Temp 98.9 °F (37.2 °C) (Oral)   Ht 5' 3" (1.6 m)   Wt 65.3 kg (143 lb 15.4 oz)   SpO2 (!) 94%   BMI 25.50 kg/m²     Wt Readings from Last 3 " Encounters:   03/07/18 65.3 kg (143 lb 15.4 oz)   12/11/17 66.2 kg (145 lb 15.1 oz)   11/08/17 64 kg (141 lb 1.5 oz)     BP Readings from Last 3 Encounters:   03/07/18 136/86   12/11/17 130/70   11/08/17 120/80       apperas chronically ill, alert and oriented  Weight loss noted  She can't eat muc hanymore  She has no oral lesions  Neck is supple no gross LAD  Lungs with poor expiratory phase and coarse breath sounds noted.  She has some pedal edema    Review of old Records:  Seek ENT records    Review of old labs:  Lab Results   Component Value Date    TSH 1.450 03/08/2016     Lab Results   Component Value Date    WBC 13.77 (H) 06/02/2017    HGB 14.5 06/02/2017    HCT 44.3 06/02/2017     (H) 06/02/2017     06/02/2017       Chemistry        Component Value Date/Time     06/02/2017 0815    K 4.2 06/02/2017 0815     06/02/2017 0815    CO2 27 06/02/2017 0815    BUN 15 06/02/2017 0815    CREATININE 0.7 06/02/2017 0815     (H) 06/02/2017 0815        Component Value Date/Time    CALCIUM 10.0 06/02/2017 0815    ALKPHOS 91 06/02/2017 0815    AST 19 06/02/2017 0815    ALT 21 06/02/2017 0815    BILITOT 0.7 06/02/2017 0815    ESTGFRAFRICA >60 06/02/2017 0815    EGFRNONAA >60 06/02/2017 0815        Lab Results   Component Value Date    CHOL 182 09/09/2015     Lab Results   Component Value Date    HDL 54 09/09/2015     Lab Results   Component Value Date    LDLCALC 110.0 09/09/2015     Lab Results   Component Value Date    TRIG 90 09/09/2015     Lab Results   Component Value Date    CHOLHDL 29.7 09/09/2015         Review of old imaging:  Seek records    ASSESSMENT:  Problem List Items Addressed This Visit     Unilateral emphysema    Relevant Orders    2D echo with color flow doppler    Six Minute Walk Test to qualify for Home Oxygen    Tobacco use, since teenager, has tried to quit, is trying to quit    Relevant Medications    albuterol (VENTOLIN HFA) 90 mcg/actuation inhaler    Chronic pain  syndrome    Relevant Medications    busPIRone (BUSPAR) 30 MG Tab    carisoprodol (SOMA) 350 MG tablet    Chronic hoarseness    Relevant Orders    Ambulatory referral to ENT    Ambulatory Referral to Speech Therapy    Anxiety    Relevant Medications    ALPRAZolam (XANAX) 1 MG tablet      Other Visit Diagnoses     Leg swelling    -  Primary    Relevant Orders    2D echo with color flow doppler    Six Minute Walk Test to qualify for Home Oxygen    Chronic obstructive pulmonary disease, unspecified COPD type        Relevant Medications    albuterol (PROVENTIL) 2.5 mg /3 mL (0.083 %) nebulizer solution    fluticasone-vilanterol (BREO) 200-25 mcg/dose DsDv diskus inhaler    Postmenopausal symptoms        Relevant Medications    venlafaxine (EFFEXOR) 37.5 MG Tab    Encounter for mammogram to establish baseline mammogram        Relevant Orders    Mammo Digital Screening Bilat with CAD          ICD-10-CM ICD-9-CM   1. Leg swelling M79.89 729.81   2. Anxiety F41.9 300.00   3. Chronic obstructive pulmonary disease, unspecified COPD type J44.9 496   4. Tobacco use Z72.0 305.1   5. Chronic pain syndrome G89.4 338.4   6. Postmenopausal symptoms N95.9 627.9   7. Unilateral emphysema J43.0 492.8   8. Encounter for mammogram to establish baseline mammogram Z12.31 V76.12   9. Chronic hoarseness R49.0 784.42         PLAN:  1. Anxiety  Potential medication side effects were discussed with the patient; let me know if any occur.  The current medical regimen is effective;  continue present plan and medications.    - ALPRAZolam (XANAX) 1 MG tablet; Take 1 tablet (1 mg total) by mouth 2 (two) times daily.  Dispense: 60 tablet; Refill: 2    2. Chronic obstructive pulmonary disease, unspecified COPD type  The current medical regimen is effective;  continue present plan and medications.  Get new walking test  - albuterol (PROVENTIL) 2.5 mg /3 mL (0.083 %) nebulizer solution; Take 3 mLs (2.5 mg total) by nebulization every 6 (six) hours as  needed for Wheezing.  Dispense: 1 Box; Refill: 11  - fluticasone-vilanterol (BREO) 200-25 mcg/dose DsDv diskus inhaler; Inhale 1 puff into the lungs once daily. Controller  Dispense: 30 each; Refill: 5    3. Tobacco use  The current medical regimen is effective;  continue present plan and medications.    - albuterol (VENTOLIN HFA) 90 mcg/actuation inhaler; Inhale 2 puffs into the lungs every 6 (six) hours as needed for Wheezing.  Dispense: 18 g; Refill: 5    4. Chronic pain syndrome  The current medical regimen is effective;  continue present plan and medications.    - busPIRone (BUSPAR) 30 MG Tab; Take 1 tablet (30 mg total) by mouth 2 (two) times daily.  Dispense: 60 tablet; Refill: 11  - carisoprodol (SOMA) 350 MG tablet; TAKE 1 TABLET(S) BY MOUTH THREE TIMES DAILY AS NEEDED. MAY CAUSE DROWSINESS.  Dispense: 20 tablet; Refill: 0    5. Postmenopausal symptoms  The current medical regimen is effective;  continue present plan and medications.  Potential medication side effects were discussed with the patient; let me know if any occur.    - venlafaxine (EFFEXOR) 37.5 MG Tab; Take 1 tablet (37.5 mg total) by mouth 2 (two) times daily.  Dispense: 60 tablet; Refill: 11    6. Leg swelling  Check the heart  - 2D echo with color flow doppler; Future  - Six Minute Walk Test to qualify for Home Oxygen; Future    7. Unilateral emphysema    - 2D echo with color flow doppler; Future  - Six Minute Walk Test to qualify for Home Oxygen; Future    8. Encounter for mammogram to establish baseline mammogram    - Mammo Digital Screening Bilat with CAD; Future    9. Chronic hoarseness  Get a second opinion  - Ambulatory referral to ENT  - Ambulatory Referral to Speech Therapy    High risk medication review and potential serious life threatening diagnosis with weight loss and decreased ability to breathe    Medication List with Changes/Refills   Changed and/or Refilled Medications    Modified Medication Previous Medication    ALBUTEROL  (PROVENTIL) 2.5 MG /3 ML (0.083 %) NEBULIZER SOLUTION albuterol (PROVENTIL) 2.5 mg /3 mL (0.083 %) nebulizer solution       Take 3 mLs (2.5 mg total) by nebulization every 6 (six) hours as needed for Wheezing.    Take 3 mLs (2.5 mg total) by nebulization every 6 (six) hours as needed for Wheezing.    ALBUTEROL (VENTOLIN HFA) 90 MCG/ACTUATION INHALER albuterol (VENTOLIN HFA) 90 mcg/actuation inhaler       Inhale 2 puffs into the lungs every 6 (six) hours as needed for Wheezing.    Inhale 2 puffs into the lungs every 6 (six) hours as needed for Wheezing.    ALPRAZOLAM (XANAX) 1 MG TABLET ALPRAZolam (XANAX) 1 MG tablet       Take 1 tablet (1 mg total) by mouth 2 (two) times daily.    take 1 TABLET(S) BY MOUTH TWICE DAILY    BUSPIRONE (BUSPAR) 30 MG TAB busPIRone (BUSPAR) 30 MG Tab       Take 1 tablet (30 mg total) by mouth 2 (two) times daily.    Take 1 tablet (30 mg total) by mouth 2 (two) times daily.    CARISOPRODOL (SOMA) 350 MG TABLET carisoprodol (SOMA) 350 MG tablet       TAKE 1 TABLET(S) BY MOUTH THREE TIMES DAILY AS NEEDED. MAY CAUSE DROWSINESS.    TAKE 1 TABLET(S) BY MOUTH THREE TIMES DAILY AS NEEDED. MAY CAUSE DROWSINESS.    FLUTICASONE-VILANTEROL (BREO) 200-25 MCG/DOSE DSDV DISKUS INHALER fluticasone-vilanterol (BREO) 200-25 mcg/dose DsDv diskus inhaler       Inhale 1 puff into the lungs once daily. Controller    Inhale 1 puff into the lungs once daily. Controller    FUROSEMIDE (LASIX) 40 MG TABLET furosemide (LASIX) 40 MG tablet       Take 1 tablet (40 mg total) by mouth 2 (two) times daily.    Take 1 tablet (40 mg total) by mouth 2 (two) times daily.    HYDROCODONE-ACETAMINOPHEN 7.5-325MG (NORCO) 7.5-325 MG PER TABLET hydrocodone-acetaminophen 7.5-325mg (NORCO) 7.5-325 mg per tablet       Take 1 tablet by mouth every 6 (six) hours as needed.    TAKE ONE TABLET BY MOUTH EVERY 6 HOURS AS NEEDED FOR PAIN    LIDOCAINE VISCOUS 2 % SOLUTION LIDOCAINE VISCOUS 2 % solution       Can switch to equivalent, 10 ml  swish and gargle 4 x daily        POTASSIUM CHLORIDE (MICRO-K) 10 MEQ CPSR potassium chloride (MICRO-K) 10 MEQ CpSR       Take 1 capsule (10 mEq total) by mouth 2 (two) times daily.    Take 1 capsule (10 mEq total) by mouth 2 (two) times daily.    VENLAFAXINE (EFFEXOR) 37.5 MG TAB venlafaxine (EFFEXOR) 37.5 MG Tab       Take 1 tablet (37.5 mg total) by mouth 2 (two) times daily.    Take 1 tablet (37.5 mg total) by mouth 2 (two) times daily.       No Follow-up on file.  Answers for HPI/ROS submitted by the patient on 3/1/2018   Back pain  Chronicity: chronic  Onset: more than 1 year ago  Frequency: constantly  Progression since onset: rapidly worsening  Pain location: sacro-iliac  Pain quality: aching, burning, shooting, stabbing  Radiates to: right foot, right knee, right thigh  Pain - numeric: 9/10  Pain is: the same all the time  Aggravated by: bending, position, lying down, sitting, stress, twisting  Stiffness is present: in the morning  bladder incontinence: No  bowel incontinence: No  leg pain: Yes  numbness: Yes  paresis: Yes  paresthesias: Yes  pelvic pain: No  perianal numbness: No  genital pain: No  Risk factors: history of cancer, lack of exercise, menopause  Pain severity: severe  Treatments tried: analgesics, NSAIDs, bed rest

## 2018-03-12 ENCOUNTER — CLINICAL SUPPORT (OUTPATIENT)
Dept: REHABILITATION | Facility: HOSPITAL | Age: 48
End: 2018-03-12
Payer: MEDICAID

## 2018-03-12 DIAGNOSIS — R49.0 CHRONIC HOARSENESS: ICD-10-CM

## 2018-03-12 PROCEDURE — 92524 BEHAVRAL QUALIT ANALYS VOICE: CPT | Mod: PN

## 2018-03-13 DIAGNOSIS — R49.0 CHRONIC HOARSENESS: Primary | ICD-10-CM

## 2018-03-13 NOTE — PLAN OF CARE
"Voice Evaluation  3/12/2018     Referring provider: Dr. Clovis Beauchamp  Reason for visit:  Behavioral and qualitative analysis of voice and resonance (CPT 69223)    Subjective / History      Onset Date: ~1 year ago  Primary Diagnosis:  Chronic hoarseness  Treatment Diagnosis:  Chronic hoarseness   Referring Provider:  Dr. Beauchamp  Orders: ST for evaluation and treat  Current Medical History: Yasmeen Valderrama is a 47 y.o. female referred for voice evaluation (CPT 44828) by Dr. Beauchamp.  She presents with complaints of hoarseness and strained voice with intense burning which began ~1 year ago. The pt reports that the pain increases when consuming spicy foods, and that it has worsened over time. During periods of intense pain, she reports that she stops talking and withdraws from social situations. Pt has been treating the pain with oral Lidocaine, which she reports does not provide lasting benefits. Ms. Valderrama has received three laryngeal biopsies within the past 4 weeks, each ~6-8 weeks apart. The pt reports that the first biopsy indicated head and neck cancer. However, the second did not indicate cancer, and the results of the third are unknown to the patient. During one of the biopsies, Ms. Valderrama reports that scar tissue ("masses") were removed within the larynx.  The patient is scheduled for an appointment with the ENT on 3/14/18.The patient also reported the following complaints: reduced volume and increased tension in the neck. Social history: current smoker; 1.5 packs/day. Relevant history includes vocal nodule removal as a child secondary to excessive screaming. Pt reports that the nodules were removed, and no speech therapy was provided at that time. She reports that she has always presented with a lower than average vocal pitch.     Past Medical History:   Past Medical History:   Diagnosis Date    Anxiety     Asthma     Carpal tunnel syndrome, bilateral     COPD (chronic obstructive pulmonary disease)     " "Vocal cord mass 2017    Right side with CT scan Referred to ENT for visualization     Precautions: General  Prior Therapy: None  Pain:   7/10  Nutrition:  Regular   Environmental Concerns/Cultural/Spiritual/Developmental/Educational Needs: None  Prior Level of Function: Independent  Social History: Current smoker  Signs of Abuse: No  Functional Deficits Leading to Referral/Nature of Injury: Chronic hoarseness and burning pain in the throat  Patient Therapy Goals: "Will I ever have close to a normal voice again"      Swallowing: Not formally addressed   Breathing: stridor    Smokin.5 packs/day   EtOH: 0 drinks/week   Caffeine: 6-8 cups/day   Reflux: no  Water: 16 oz/day     Laryngeal endoscopy OR stroboscopy findings: N/A    Past Medical History:   Diagnosis Date    Anxiety     Asthma     Carpal tunnel syndrome, bilateral     COPD (chronic obstructive pulmonary disease)     Vocal cord mass 2017    Right side with CT scan Referred to ENT for visualization     Current Outpatient Prescriptions on File Prior to Visit   Medication Sig Dispense Refill    albuterol (PROVENTIL) 2.5 mg /3 mL (0.083 %) nebulizer solution Take 3 mLs (2.5 mg total) by nebulization every 6 (six) hours as needed for Wheezing. 1 Box 11    albuterol (VENTOLIN HFA) 90 mcg/actuation inhaler Inhale 2 puffs into the lungs every 6 (six) hours as needed for Wheezing. 18 g 5    ALPRAZolam (XANAX) 1 MG tablet Take 1 tablet (1 mg total) by mouth 2 (two) times daily. 60 tablet 2    busPIRone (BUSPAR) 30 MG Tab Take 1 tablet (30 mg total) by mouth 2 (two) times daily. 60 tablet 11    carisoprodol (SOMA) 350 MG tablet TAKE 1 TABLET(S) BY MOUTH THREE TIMES DAILY AS NEEDED. MAY CAUSE DROWSINESS. 20 tablet 0    fluticasone-vilanterol (BREO) 200-25 mcg/dose DsDv diskus inhaler Inhale 1 puff into the lungs once daily. Controller 30 each 5    furosemide (LASIX) 40 MG tablet Take 1 tablet (40 mg total) by mouth 2 (two) times daily. 60 tablet 11 " "   hydrocodone-acetaminophen 7.5-325mg (NORCO) 7.5-325 mg per tablet Take 1 tablet by mouth every 6 (six) hours as needed. 120 tablet 0    LIDOCAINE VISCOUS 2 % solution Can switch to equivalent, 10 ml swish and gargle 4 x daily 100 mL 11    potassium chloride (MICRO-K) 10 MEQ CpSR Take 1 capsule (10 mEq total) by mouth 2 (two) times daily. 60 capsule 2    venlafaxine (EFFEXOR) 37.5 MG Tab Take 1 tablet (37.5 mg total) by mouth 2 (two) times daily. 60 tablet 11     No current facility-administered medications on file prior to visit.        Objective      Perceptual assessment:    -Quality: strained  -Volume: decreased projection  -Pitch: low F0  -Flexibility: diminished    Habitual respiratory pattern: diaphragmatic.  CAPE-V Overall Score: Not formally assessed   MPT (ah > 18s WFL): 6 seconds. Phonation is reduced with unstable tone, pitch, and loudness.  S/Z ratio (ratio of 1.4+ may indicate a degree of vocal fold dysfunction): 1.4    VHI-10 (completed to assess self-perceived handicap associated with dysphonia; >11 considered abnormal): Pt scored a 108, indicating a severe impairment. The pt reported that others have difficulty understanding her speech, causing her to speak less often on the telephone, and that her voice difficulties restrict her personal and social life severely. She complains that she frequently runs out of air when she speaks, and was noted to speak on residual air from exhalation. She reports that her voice varies throughout the day, and improves the day following a day of vocal rest. She reports that people frequently question her about her vocal quality, that it sounds creaky and dry, that she has to strain to produce voice. She complains that the clarity is unpredictable, but worsens in the evening. The pt reports that her voice "gives out" in the middle of speaking and that speaking requires a great deal of effort. In regards to her personal feelings about her voice difficulties, the " pt believes that others seem irritated with her voice, she is tense when talking, she feels embarrassed and annoyed when others ask her to repeat herself, and that she is less outgoing due to these issues. Overall, the pt reports that she is ashamed of her voice problem and that it makes her feel handicapped.     Education / Stimulability Trials  Discussed importance of vocal hygiene including: hydration, smoking cessation, reducing caffeine consumption, reducing throat clearing and coughing. Suggested sips of water in place of throat clearing., conservation, quitting smoking, reducing caffeine/drying agents and reducing throat clearing, coughing, other phonotraumatic behaviors.  Patient was stimulable for improved voice using easy onset exercise.    G-Codes for Voice  Current status: LEVEL 3: Voice is functional for communication, but is consistently distracting and interferes with communication by drawing attention to itself. Participation in vocational, avocational, and social activities is limited most of the time.  - CL   Projected status: LEVEL 4: Voice is functional for communication, but sometimes distracting. The individual¢s ability to participate in vocational, avocational, and social activities requiring voice is occasionally affected in low-vocal demand activities, but consistently affected in high-vocal demand activities.  - CK     Functional goals  Short Term Goals:   1. Patient will participate in voice therapy tasks as determined by treating clinician.       Long Term Goals:   1. Patient will implement and adhere to vocal hygiene protocols on a daily basis,   including the elimination of phonotraumatic behaviors.  2. Patient and clinician will facilitate changes in vocal function in order to restore functional use of voice for daily occupational, social, and emotional demands    Assessment     Patient presents with severe dysphonia secondary to unknown causes characterized by hoarseness,  "pitch breaks, and labored speech during conversation. Pt noted to speak on residual air from exhalation, causing her intelligibility to decrease throughout her message. ENT follow-up has been scheduled.  Pt will benefit from skilled speech therapy to improve vocal quality and speech intelligibility. Prognosis for continued improvement is good.    Recommendations / POC    -Recommend  voice therapy 2-3 times per week over 8 weeks with a speech-language pathologist  -Continue exercises as discussed in session  -Contact clinician with any further questions   -Follow-up with ENT.    Breonna Means, ROBERT -  Clinician   3/12/2018      "I, MARJ Hanks, CCC-SLP , certify that I was present in the room directing the student in service delivery and guiding them using my skilled judgement.  As the co-signing therapist, I have reviewed the student's documentation and am responsible for the treatment, assessment and plan."    MARJ Hanks, CCC-SLP  Speech Language Pathologist    3/12/2018     "

## 2018-03-14 ENCOUNTER — HOSPITAL ENCOUNTER (OUTPATIENT)
Dept: RADIOLOGY | Facility: HOSPITAL | Age: 48
Discharge: HOME | End: 2018-03-14
Attending: FAMILY MEDICINE
Payer: MEDICAID

## 2018-03-14 ENCOUNTER — HOSPITAL ENCOUNTER (OUTPATIENT)
Dept: RESPIRATORY THERAPY | Facility: HOSPITAL | Age: 48
Discharge: HOME | End: 2018-03-14
Attending: FAMILY MEDICINE
Payer: MEDICAID

## 2018-03-14 ENCOUNTER — HOSPITAL ENCOUNTER (OUTPATIENT)
Dept: CARDIOLOGY | Facility: HOSPITAL | Age: 48
Discharge: HOME | End: 2018-03-14
Attending: FAMILY MEDICINE
Payer: MEDICAID

## 2018-03-14 VITALS — HEIGHT: 63 IN | BODY MASS INDEX: 25.34 KG/M2 | WEIGHT: 143 LBS

## 2018-03-14 DIAGNOSIS — Z12.31 ENCOUNTER FOR MAMMOGRAM TO ESTABLISH BASELINE MAMMOGRAM: ICD-10-CM

## 2018-03-14 DIAGNOSIS — M79.89 LEG SWELLING: ICD-10-CM

## 2018-03-14 DIAGNOSIS — J43.0 UNILATERAL EMPHYSEMA: ICD-10-CM

## 2018-03-14 LAB
DIASTOLIC DYSFUNCTION: NO
ESTIMATED PA SYSTOLIC PRESSURE: 32.38
GLOBAL PERICARDIAL EFFUSION: NORMAL
MITRAL VALVE MOBILITY: NORMAL
MITRAL VALVE REGURGITATION: NORMAL
RETIRED EF AND QEF - SEE NOTES: 55 (ref 55–65)
TRICUSPID VALVE REGURGITATION: NORMAL

## 2018-03-14 PROCEDURE — 93306 TTE W/DOPPLER COMPLETE: CPT | Mod: 26,,, | Performed by: INTERNAL MEDICINE

## 2018-03-14 PROCEDURE — 77067 SCR MAMMO BI INCL CAD: CPT | Mod: TC

## 2018-03-14 PROCEDURE — 93306 TTE W/DOPPLER COMPLETE: CPT

## 2018-03-14 PROCEDURE — 99900035 HC TECH TIME PER 15 MIN (STAT)

## 2018-03-14 PROCEDURE — 77067 SCR MAMMO BI INCL CAD: CPT | Mod: 26,,, | Performed by: RADIOLOGY

## 2018-03-14 NOTE — PROGRESS NOTES
Testing for Home O2    O2 Sats on RA at rest =95%    O2 sats on RA with exercise  =87%    O2 sats on __L O2 with exercise =

## 2018-04-05 ENCOUNTER — TELEPHONE (OUTPATIENT)
Dept: FAMILY MEDICINE | Facility: CLINIC | Age: 48
End: 2018-04-05

## 2018-04-05 ENCOUNTER — OFFICE VISIT (OUTPATIENT)
Dept: FAMILY MEDICINE | Facility: CLINIC | Age: 48
End: 2018-04-05
Payer: MEDICAID

## 2018-04-05 VITALS
OXYGEN SATURATION: 95 % | WEIGHT: 136.69 LBS | SYSTOLIC BLOOD PRESSURE: 138 MMHG | DIASTOLIC BLOOD PRESSURE: 70 MMHG | HEIGHT: 63 IN | HEART RATE: 88 BPM | BODY MASS INDEX: 24.22 KG/M2 | TEMPERATURE: 98 F

## 2018-04-05 DIAGNOSIS — L03.213 PERIORBITAL CELLULITIS OF RIGHT EYE: Primary | ICD-10-CM

## 2018-04-05 DIAGNOSIS — R06.02 SOB (SHORTNESS OF BREATH): ICD-10-CM

## 2018-04-05 DIAGNOSIS — G89.4 CHRONIC PAIN SYNDROME: ICD-10-CM

## 2018-04-05 DIAGNOSIS — J43.0 UNILATERAL EMPHYSEMA: ICD-10-CM

## 2018-04-05 PROCEDURE — 96372 THER/PROPH/DIAG INJ SC/IM: CPT | Mod: PBBFAC,PO

## 2018-04-05 PROCEDURE — 99215 OFFICE O/P EST HI 40 MIN: CPT | Mod: S$PBB,,, | Performed by: FAMILY MEDICINE

## 2018-04-05 PROCEDURE — 99213 OFFICE O/P EST LOW 20 MIN: CPT | Mod: PBBFAC,PO,25 | Performed by: FAMILY MEDICINE

## 2018-04-05 PROCEDURE — 99999 PR PBB SHADOW E&M-EST. PATIENT-LVL III: CPT | Mod: PBBFAC,,, | Performed by: FAMILY MEDICINE

## 2018-04-05 RX ORDER — METHYLPREDNISOLONE ACETATE 40 MG/ML
40 INJECTION, SUSPENSION INTRA-ARTICULAR; INTRALESIONAL; INTRAMUSCULAR; SOFT TISSUE
Status: COMPLETED | OUTPATIENT
Start: 2018-04-05 | End: 2018-04-05

## 2018-04-05 RX ORDER — CARISOPRODOL 350 MG/1
350 TABLET ORAL 3 TIMES DAILY PRN
Qty: 20 TABLET | Refills: 0 | Status: SHIPPED | OUTPATIENT
Start: 2018-05-02 | End: 2018-06-01

## 2018-04-05 RX ORDER — KETOROLAC TROMETHAMINE 30 MG/ML
30 INJECTION, SOLUTION INTRAMUSCULAR; INTRAVENOUS
Status: COMPLETED | OUTPATIENT
Start: 2018-04-05 | End: 2018-04-05

## 2018-04-05 RX ORDER — HYDROCODONE BITARTRATE AND ACETAMINOPHEN 7.5; 325 MG/1; MG/1
1 TABLET ORAL EVERY 6 HOURS PRN
Qty: 120 TABLET | Refills: 0 | Status: SHIPPED | OUTPATIENT
Start: 2018-05-02 | End: 2018-06-01

## 2018-04-05 RX ORDER — HYDROCODONE BITARTRATE AND ACETAMINOPHEN 7.5; 325 MG/1; MG/1
1 TABLET ORAL EVERY 6 HOURS PRN
Qty: 120 TABLET | Refills: 0 | Status: SHIPPED | OUTPATIENT
Start: 2018-04-05 | End: 2018-07-19 | Stop reason: SDUPTHER

## 2018-04-05 RX ORDER — CARISOPRODOL 350 MG/1
350 TABLET ORAL 3 TIMES DAILY PRN
Qty: 20 TABLET | Refills: 0 | Status: SHIPPED | OUTPATIENT
Start: 2018-05-29 | End: 2018-06-25 | Stop reason: SDUPTHER

## 2018-04-05 RX ORDER — CEPHALEXIN 500 MG/1
500 CAPSULE ORAL EVERY 6 HOURS
Qty: 40 CAPSULE | Refills: 0 | Status: SHIPPED | OUTPATIENT
Start: 2018-04-05 | End: 2018-04-15

## 2018-04-05 RX ORDER — CARISOPRODOL 350 MG/1
TABLET ORAL
Qty: 20 TABLET | Refills: 0 | Status: SHIPPED | OUTPATIENT
Start: 2018-04-05 | End: 2018-07-19 | Stop reason: SDUPTHER

## 2018-04-05 RX ORDER — HYDROCODONE BITARTRATE AND ACETAMINOPHEN 7.5; 325 MG/1; MG/1
1 TABLET ORAL EVERY 6 HOURS PRN
Qty: 120 TABLET | Refills: 0 | Status: SHIPPED | OUTPATIENT
Start: 2018-05-29 | End: 2018-06-25 | Stop reason: SDUPTHER

## 2018-04-05 RX ADMIN — METHYLPREDNISOLONE ACETATE 40 MG: 40 INJECTION, SUSPENSION INTRA-ARTICULAR; INTRALESIONAL; INTRAMUSCULAR; SOFT TISSUE at 11:04

## 2018-04-05 RX ADMIN — KETOROLAC TROMETHAMINE 30 MG: 30 INJECTION, SOLUTION INTRAMUSCULAR; INTRAVENOUS at 11:04

## 2018-04-05 NOTE — PROGRESS NOTES
Administered Ketorolac 30 mg IM to right ventrogluteal.  Patient tolerated injection well, no adverse reactions noted.   Administered Depo - Medrol 40 mg IM to right ventrogluteal.  Patient tolerated injection well, no adverse reactions noted.

## 2018-04-05 NOTE — TELEPHONE ENCOUNTER
Patient contacted and advised to treat as if it were contagious. Clean eyes from the inner to the outer corners of the eyes not using the same area of the towel .

## 2018-04-05 NOTE — TELEPHONE ENCOUNTER
----- Message from Lorna Hay sent at 4/5/2018  4:27 PM CDT -----  Contact: self  Pt states Dr. Beauchamp diagnosed her with Conjunctivitis today and would like to know if she is contagious.  [phone].

## 2018-04-05 NOTE — PROGRESS NOTES
Chief Complaint   Patient presents with    Conjunctivitis       SUBJECTIVE:  Yasmeen Valderrama is a 47 y.o. female here for follow up of chronic pain and she has viral illness with cough, more hoarseness, still with tobacco and severe right eye conjunctivitis.  She was treated at [facility] and given just allergy drops, now with more pain and tearing, no FB feeling.  She has serious problems with vision blurring and tearing.  She did have eye work up and no corneal abrasion.  Currently has co-morbidities including per problem list.      Social History   Substance Use Topics    Smoking status: Current Every Day Smoker     Packs/day: 2.00     Years: 35.00     Types: Cigarettes     Start date: 12/1/1983    Smokeless tobacco: Never Used    Alcohol use No       Patient Active Problem List    Diagnosis Date Noted    Localized cancer of throat 06/02/2017     Squamous cell cancer  Need radiation and possibly surgery      Chronic pain syndrome 05/03/2017     New guidelines  Wean to 0.5 mg 25/month of xanax  Stay with 10/325 mg of hydrocodone      On home oxygen therapy 04/03/2017     Nightly still not controlled      Anxiety 02/21/2017    Cervical stenosis of spine 01/04/2017 12/2016 MRI severe disease C5/6      Lumbar radiculopathy, chronic 09/09/2015     L3-4, mild annular bulging with superimposed broad based left posterior lateral and far lateral disk protrusion resulting in moderate effacement of the left foramen and may contact the exiting L4 nerve root within the foramen.    Degenerative changes of the L2-3 disk noted with mild bulging of the disk resulting in mild flattening of the adjacent ventral thecal sac.      Electronically signed by: Dr. Spring Lanza  Date: 04/24/15    PT/OT several times prior, modest benefit.    Tylenol/NSAID OTC and Rx of minimal help with pain, some side effects.  cymbalta and norco help      Unilateral emphysema 12/01/2014     Atelectasis or parenchymal scarring in the  "right lower lobe laterally and anteriorly and in the inferior lingular portion of the left upper lobe.    Paraseptal emphysematous changes in the medial aspect of the right lung apex.      Electronically signed by: JUAN JOSE JOYCE MD  Date: 04/12/16  Time: 07:47     CT scan      Tobacco use, since teenager, has tried to quit, is trying to quit 12/01/2014     Counseled on tobacco cessation 05/03/2017        Chronic hoarseness 12/01/2014     H/o vocal cord nodules as a child      SOB (shortness of breath) 12/01/2014       Review of Systems   Constitutional: Negative for fever and weight loss.   Cardiovascular: Negative for chest pain.   Gastrointestinal: Negative for abdominal pain.   Genitourinary: Negative for dysuria and hematuria.   Neurological: Positive for tingling and weakness. Negative for headaches.       OBJECTIVE:  /70   Pulse 88   Temp 98.2 °F (36.8 °C) (Oral)   Ht 5' 3" (1.6 m)   Wt 62 kg (136 lb 11 oz)   SpO2 95%   BMI 24.21 kg/m²     Wt Readings from Last 3 Encounters:   04/05/18 62 kg (136 lb 11 oz)   03/14/18 64.9 kg (143 lb)   03/07/18 65.3 kg (143 lb 15.4 oz)     BP Readings from Last 3 Encounters:   04/05/18 138/70   03/07/18 136/86   12/11/17 130/70       She appears well, in no apparent distress.  Alert and oriented times three, pleasant and cooperative. Vital signs are as documented in vital signs section.  Right eye severe conjunctivitis and sclera is red  Vision is good allowing for pain, tearing and blinking  Nose with coryza  Throat with PND  Lungs are corase, has hoarseness    Review of old Records:  Reviewed per [facility]    Review of old labs:    Labs reviewed    Review of old imaging:  Imaging reviewed    Respiratory Therapy      []Hide copied text  []Hover for attribution information  Testing for Home O2     O2 Sats on RA at rest =95%     O2 sats on RA with exercise  =87%     O2 sats on __L O2 with exercise =      Electronically signed by Malena Singh RRT at " 3/14/2018 11:26 AM        Complete PFT on 3/14/2018            Detailed Report     ASSESSMENT:  Problem List Items Addressed This Visit     Unilateral emphysema    Relevant Orders    OXYGEN FOR HOME USE    SOB (shortness of breath)    Relevant Orders    OXYGEN FOR HOME USE    Chronic pain syndrome    Relevant Medications    hydrocodone-acetaminophen 7.5-325mg (NORCO) 7.5-325 mg per tablet    carisoprodol (SOMA) 350 MG tablet    ketorolac injection 30 mg (Start on 4/5/2018 12:00 PM)    methylPREDNISolone acetate injection 40 mg (Start on 4/5/2018 12:00 PM)    hydrocodone-acetaminophen 7.5-325mg (NORCO) 7.5-325 mg per tablet (Start on 5/2/2018)    hydrocodone-acetaminophen 7.5-325mg (NORCO) 7.5-325 mg per tablet (Start on 5/29/2018)    carisoprodol (SOMA) 350 MG tablet (Start on 5/2/2018)    carisoprodol (SOMA) 350 MG tablet (Start on 5/29/2018)      Other Visit Diagnoses     Periorbital cellulitis of right eye    -  Primary    Relevant Medications    ketorolac injection 30 mg (Start on 4/5/2018 12:00 PM)    methylPREDNISolone acetate injection 40 mg (Start on 4/5/2018 12:00 PM)    cephALEXin (KEFLEX) 500 MG capsule          ICD-10-CM ICD-9-CM   1. Periorbital cellulitis of right eye L03.213 682.0   2. Chronic pain syndrome G89.4 338.4   3. SOB (shortness of breath) R06.02 786.05   4. Unilateral emphysema J43.0 492.8               PLAN:     see orders for the periorbital cellulitis    Continue with her pain management  She is high risk with the lung condition and oxygen.  She is opioid tolerant and doing well.  She has great improvement in pain with the medication and the anxiety medicine and she can do all of her ADL's and get to her appointments.  No abuse or misuse noted.    High risk patient and medication   Very complex  Multiple possible interactions.  Despite the high risk we have good rapport and evidence over last 6 months have shown she is doing well with this therapy.  I have no reason to believe she is not  being open and honest.  We will continue therapy for 3 more months.    Medication List with Changes/Refills   New Medications    CARISOPRODOL (SOMA) 350 MG TABLET    Take 1 tablet (350 mg total) by mouth 3 (three) times daily as needed.    CARISOPRODOL (SOMA) 350 MG TABLET    Take 1 tablet (350 mg total) by mouth 3 (three) times daily as needed.    CEPHALEXIN (KEFLEX) 500 MG CAPSULE    Take 1 capsule (500 mg total) by mouth every 6 (six) hours.    HYDROCODONE-ACETAMINOPHEN 7.5-325MG (NORCO) 7.5-325 MG PER TABLET    Take 1 tablet by mouth every 6 (six) hours as needed for Pain.    HYDROCODONE-ACETAMINOPHEN 7.5-325MG (NORCO) 7.5-325 MG PER TABLET    Take 1 tablet by mouth every 6 (six) hours as needed for Pain.   Current Medications    ALBUTEROL (PROVENTIL) 2.5 MG /3 ML (0.083 %) NEBULIZER SOLUTION    Take 3 mLs (2.5 mg total) by nebulization every 6 (six) hours as needed for Wheezing.    ALBUTEROL (VENTOLIN HFA) 90 MCG/ACTUATION INHALER    Inhale 2 puffs into the lungs every 6 (six) hours as needed for Wheezing.    ALPRAZOLAM (XANAX) 1 MG TABLET    Take 1 tablet (1 mg total) by mouth 2 (two) times daily.    BUSPIRONE (BUSPAR) 30 MG TAB    Take 1 tablet (30 mg total) by mouth 2 (two) times daily.    FLUTICASONE-VILANTEROL (BREO) 200-25 MCG/DOSE DSDV DISKUS INHALER    Inhale 1 puff into the lungs once daily. Controller    FUROSEMIDE (LASIX) 40 MG TABLET    Take 1 tablet (40 mg total) by mouth 2 (two) times daily.    LIDOCAINE VISCOUS 2 % SOLUTION    Can switch to equivalent, 10 ml swish and gargle 4 x daily    POTASSIUM CHLORIDE (MICRO-K) 10 MEQ CPSR    Take 1 capsule (10 mEq total) by mouth 2 (two) times daily.    VENLAFAXINE (EFFEXOR) 37.5 MG TAB    Take 1 tablet (37.5 mg total) by mouth 2 (two) times daily.   Changed and/or Refilled Medications    Modified Medication Previous Medication    CARISOPRODOL (SOMA) 350 MG TABLET carisoprodol (SOMA) 350 MG tablet       TAKE 1 TABLET(S) BY MOUTH THREE TIMES DAILY AS NEEDED.  MAY CAUSE DROWSINESS.    TAKE 1 TABLET(S) BY MOUTH THREE TIMES DAILY AS NEEDED. MAY CAUSE DROWSINESS.    HYDROCODONE-ACETAMINOPHEN 7.5-325MG (NORCO) 7.5-325 MG PER TABLET hydrocodone-acetaminophen 7.5-325mg (NORCO) 7.5-325 mg per tablet       Take 1 tablet by mouth every 6 (six) hours as needed.    Take 1 tablet by mouth every 6 (six) hours as needed.       No Follow-up on file.    Answers for HPI/ROS submitted by the patient on 3/29/2018   Back pain  Chronicity: chronic  Onset: more than 1 year ago  Frequency: constantly  Progression since onset: rapidly worsening  Pain location: sacro-iliac  Pain quality: aching, burning, cramping, shooting, stabbing  Radiates to: right foot, right knee, right thigh  Pain - numeric: 10/10  Pain is: worse during the night  Aggravated by: bending, position, lying down, sitting, standing, stress, twisting  Stiffness is present: in the morning  bladder incontinence: No  bowel incontinence: No  leg pain: Yes  numbness: Yes  paresis: Yes  paresthesias: Yes  pelvic pain: No  perianal numbness: No  genital pain: No  Risk factors: history of cancer, menopause  Pain severity: severe  Treatments tried: analgesics, NSAIDs, bed rest, heat, muscle relaxant  Improvement on treatment: no relief

## 2018-04-10 ENCOUNTER — TELEPHONE (OUTPATIENT)
Dept: REHABILITATION | Facility: HOSPITAL | Age: 48
End: 2018-04-10

## 2018-04-18 ENCOUNTER — TELEPHONE (OUTPATIENT)
Dept: FAMILY MEDICINE | Facility: CLINIC | Age: 48
End: 2018-04-18

## 2018-04-18 DIAGNOSIS — R06.02 SOB (SHORTNESS OF BREATH): Primary | ICD-10-CM

## 2018-04-18 DIAGNOSIS — J43.0 UNILATERAL EMPHYSEMA: ICD-10-CM

## 2018-04-18 NOTE — TELEPHONE ENCOUNTER
Patient requesting an order to have home oxygen tank serviced and also an order for portable oxygen tanks.  Please advise.

## 2018-04-18 NOTE — TELEPHONE ENCOUNTER
----- Message from Lorna andreiashasta sent at 4/18/2018  2:18 PM CDT -----  Contact: Shalonda with [employer]  Rep states they received orders for oxygen but pt lives outside of servicing area and cannot bring her the oxygen. They are asking that orders are forwarded to another servicing provider.   [phone].

## 2018-05-08 ENCOUNTER — TELEPHONE (OUTPATIENT)
Dept: FAMILY MEDICINE | Facility: CLINIC | Age: 48
End: 2018-05-08

## 2018-05-08 NOTE — TELEPHONE ENCOUNTER
She can get the tdap at pharmacy (I think she has too) not sure about portable oxygen, but it was ninaley denied

## 2018-05-08 NOTE — TELEPHONE ENCOUNTER
----- Message from Radha Chandler sent at 5/8/2018 11:18 AM CDT -----  Contact: [phone]  Pt wants to get a injection for whooping cough pt daughter is having a baby and they are advising the family to get this injection also the pt hasn't heard back from anyone about her portable oxygen  Please call pt at your earliest convenience.  Thanks !

## 2018-05-09 ENCOUNTER — CLINICAL SUPPORT (OUTPATIENT)
Dept: FAMILY MEDICINE | Facility: CLINIC | Age: 48
End: 2018-05-09
Payer: MEDICAID

## 2018-05-09 DIAGNOSIS — Z23 NEED FOR TDAP VACCINATION: ICD-10-CM

## 2018-05-09 DIAGNOSIS — Z99.81 ON HOME OXYGEN THERAPY: Primary | ICD-10-CM

## 2018-05-09 PROCEDURE — 99212 OFFICE O/P EST SF 10 MIN: CPT | Mod: S$PBB,25,, | Performed by: FAMILY MEDICINE

## 2018-05-09 PROCEDURE — 90715 TDAP VACCINE 7 YRS/> IM: CPT | Mod: PBBFAC,PO | Performed by: FAMILY MEDICINE

## 2018-05-09 NOTE — PROGRESS NOTES
She was denied the portable oxygen  Continue nightly, will need portable tanks.  Gave tdap  Medically necessary

## 2018-05-18 RX ORDER — POTASSIUM CHLORIDE 750 MG/1
CAPSULE, EXTENDED RELEASE ORAL
Qty: 60 CAPSULE | Refills: 11 | Status: SHIPPED | OUTPATIENT
Start: 2018-05-18 | End: 2019-08-12 | Stop reason: SDUPTHER

## 2018-05-24 DIAGNOSIS — F41.9 ANXIETY: ICD-10-CM

## 2018-05-25 RX ORDER — ALPRAZOLAM 1 MG/1
TABLET ORAL
Qty: 60 TABLET | Refills: 2 | Status: SHIPPED | OUTPATIENT
Start: 2018-05-25 | End: 2018-06-25 | Stop reason: SDUPTHER

## 2018-06-19 DIAGNOSIS — Z00.00 ANNUAL PHYSICAL EXAM: Primary | ICD-10-CM

## 2018-06-22 ENCOUNTER — LAB VISIT (OUTPATIENT)
Dept: LAB | Facility: HOSPITAL | Age: 48
End: 2018-06-22
Attending: FAMILY MEDICINE
Payer: MEDICAID

## 2018-06-22 DIAGNOSIS — G89.4 CHRONIC PAIN SYNDROME: ICD-10-CM

## 2018-06-22 DIAGNOSIS — Z00.00 ANNUAL PHYSICAL EXAM: ICD-10-CM

## 2018-06-22 LAB
ALBUMIN SERPL BCP-MCNC: 4 G/DL
ALP SERPL-CCNC: 115 U/L
ALT SERPL W/O P-5'-P-CCNC: 16 U/L
ANION GAP SERPL CALC-SCNC: 7 MMOL/L
AST SERPL-CCNC: 17 U/L
BASOPHILS # BLD AUTO: 0.09 K/UL
BASOPHILS NFR BLD: 0.9 %
BILIRUB SERPL-MCNC: 1.5 MG/DL
BUN SERPL-MCNC: 9 MG/DL
CALCIUM SERPL-MCNC: 10 MG/DL
CHLORIDE SERPL-SCNC: 105 MMOL/L
CHOLEST SERPL-MCNC: 168 MG/DL
CHOLEST/HDLC SERPL: 2.8 {RATIO}
CO2 SERPL-SCNC: 31 MMOL/L
CREAT SERPL-MCNC: 0.7 MG/DL
DIFFERENTIAL METHOD: ABNORMAL
EOSINOPHIL # BLD AUTO: 0.4 K/UL
EOSINOPHIL NFR BLD: 3.5 %
ERYTHROCYTE [DISTWIDTH] IN BLOOD BY AUTOMATED COUNT: 14 %
EST. GFR  (AFRICAN AMERICAN): >60 ML/MIN/1.73 M^2
EST. GFR  (NON AFRICAN AMERICAN): >60 ML/MIN/1.73 M^2
GLUCOSE SERPL-MCNC: 95 MG/DL
HCT VFR BLD AUTO: 45.3 %
HDLC SERPL-MCNC: 60 MG/DL
HDLC SERPL: 35.7 %
HGB BLD-MCNC: 15.2 G/DL
LDLC SERPL CALC-MCNC: 93.2 MG/DL
LYMPHOCYTES # BLD AUTO: 2.6 K/UL
LYMPHOCYTES NFR BLD: 25.2 %
MCH RBC QN AUTO: 31.8 PG
MCHC RBC AUTO-ENTMCNC: 33.6 G/DL
MCV RBC AUTO: 95 FL
MONOCYTES # BLD AUTO: 0.7 K/UL
MONOCYTES NFR BLD: 6.9 %
NEUTROPHILS # BLD AUTO: 6.5 K/UL
NEUTROPHILS NFR BLD: 63.5 %
NONHDLC SERPL-MCNC: 108 MG/DL
PLATELET # BLD AUTO: 346 K/UL
PMV BLD AUTO: 9.9 FL
POTASSIUM SERPL-SCNC: 4.9 MMOL/L
PROT SERPL-MCNC: 6.8 G/DL
RBC # BLD AUTO: 4.78 M/UL
SODIUM SERPL-SCNC: 143 MMOL/L
TRIGL SERPL-MCNC: 74 MG/DL
TSH SERPL DL<=0.005 MIU/L-ACNC: 1.49 UIU/ML
WBC # BLD AUTO: 10.3 K/UL

## 2018-06-22 PROCEDURE — 80053 COMPREHEN METABOLIC PANEL: CPT

## 2018-06-22 PROCEDURE — 84443 ASSAY THYROID STIM HORMONE: CPT

## 2018-06-22 PROCEDURE — 36415 COLL VENOUS BLD VENIPUNCTURE: CPT | Mod: PO

## 2018-06-22 PROCEDURE — 80061 LIPID PANEL: CPT

## 2018-06-22 PROCEDURE — 85025 COMPLETE CBC W/AUTO DIFF WBC: CPT

## 2018-06-22 RX ORDER — CARISOPRODOL 350 MG/1
TABLET ORAL
Qty: 20 TABLET | Refills: 0 | Status: CANCELLED | OUTPATIENT
Start: 2018-06-22

## 2018-06-22 RX ORDER — HYDROCODONE BITARTRATE AND ACETAMINOPHEN 7.5; 325 MG/1; MG/1
1 TABLET ORAL EVERY 6 HOURS PRN
Qty: 120 TABLET | Refills: 0 | Status: CANCELLED | OUTPATIENT
Start: 2018-06-22

## 2018-06-22 RX ORDER — HYDROCODONE BITARTRATE AND ACETAMINOPHEN 7.5; 325 MG/1; MG/1
1 TABLET ORAL EVERY 6 HOURS PRN
Qty: 120 TABLET | Refills: 0 | Status: CANCELLED | OUTPATIENT
Start: 2018-06-22 | End: 2018-07-22

## 2018-06-22 RX ORDER — CARISOPRODOL 350 MG/1
350 TABLET ORAL 3 TIMES DAILY PRN
Qty: 20 TABLET | Refills: 0 | Status: CANCELLED | OUTPATIENT
Start: 2018-06-22 | End: 2018-07-22

## 2018-06-25 DIAGNOSIS — G89.4 CHRONIC PAIN SYNDROME: ICD-10-CM

## 2018-06-25 DIAGNOSIS — F41.9 ANXIETY: ICD-10-CM

## 2018-06-25 RX ORDER — HYDROCODONE BITARTRATE AND ACETAMINOPHEN 7.5; 325 MG/1; MG/1
1 TABLET ORAL EVERY 6 HOURS PRN
Qty: 120 TABLET | Refills: 0 | Status: SHIPPED | OUTPATIENT
Start: 2018-06-25 | End: 2018-07-19 | Stop reason: SDUPTHER

## 2018-06-25 RX ORDER — ALPRAZOLAM 1 MG/1
1 TABLET ORAL 2 TIMES DAILY
Qty: 60 TABLET | Refills: 2 | Status: SHIPPED | OUTPATIENT
Start: 2018-06-25 | End: 2018-08-20 | Stop reason: SDUPTHER

## 2018-06-25 RX ORDER — CARISOPRODOL 350 MG/1
350 TABLET ORAL 3 TIMES DAILY PRN
Qty: 20 TABLET | Refills: 0 | Status: SHIPPED | OUTPATIENT
Start: 2018-06-25 | End: 2018-07-19 | Stop reason: SDUPTHER

## 2018-06-25 NOTE — PROGRESS NOTES
Refill her chronic pain regimen.  She is on hydrocodone moderate dose.  Xanax low dose without complications with the opioids but given the high risk we are working on getting her weaned to just PRN use from twice daily dosing.  Soma is for most severe of the spasms in her back and her neck/throat.  Really seems to help with the neck.    Have limited quantity for the interaction of addiction with this combination which we have discussed in detail and for over sedation.  Patient voiced understanding.    30 MME, due 7/22 for visit

## 2018-07-13 ENCOUNTER — PATIENT MESSAGE (OUTPATIENT)
Dept: FAMILY MEDICINE | Facility: CLINIC | Age: 48
End: 2018-07-13

## 2018-07-19 ENCOUNTER — OFFICE VISIT (OUTPATIENT)
Dept: FAMILY MEDICINE | Facility: CLINIC | Age: 48
End: 2018-07-19
Payer: MEDICAID

## 2018-07-19 VITALS
WEIGHT: 136.44 LBS | HEIGHT: 63 IN | OXYGEN SATURATION: 96 % | SYSTOLIC BLOOD PRESSURE: 140 MMHG | HEART RATE: 90 BPM | TEMPERATURE: 99 F | DIASTOLIC BLOOD PRESSURE: 80 MMHG | BODY MASS INDEX: 24.18 KG/M2

## 2018-07-19 DIAGNOSIS — G89.4 CHRONIC PAIN SYNDROME: ICD-10-CM

## 2018-07-19 DIAGNOSIS — M48.02 CERVICAL STENOSIS OF SPINE: Primary | ICD-10-CM

## 2018-07-19 DIAGNOSIS — H92.09 OTALGIA, UNSPECIFIED LATERALITY: ICD-10-CM

## 2018-07-19 PROCEDURE — 99999 PR PBB SHADOW E&M-EST. PATIENT-LVL III: CPT | Mod: PBBFAC,,, | Performed by: FAMILY MEDICINE

## 2018-07-19 PROCEDURE — 99214 OFFICE O/P EST MOD 30 MIN: CPT | Mod: S$PBB,,, | Performed by: FAMILY MEDICINE

## 2018-07-19 PROCEDURE — 99213 OFFICE O/P EST LOW 20 MIN: CPT | Mod: PBBFAC,PO | Performed by: FAMILY MEDICINE

## 2018-07-19 RX ORDER — CARISOPRODOL 350 MG/1
350 TABLET ORAL 3 TIMES DAILY PRN
Qty: 20 TABLET | Refills: 0 | Status: SHIPPED | OUTPATIENT
Start: 2018-08-25 | End: 2018-08-20 | Stop reason: SDUPTHER

## 2018-07-19 RX ORDER — HYDROCODONE BITARTRATE AND ACETAMINOPHEN 7.5; 325 MG/1; MG/1
1 TABLET ORAL EVERY 6 HOURS PRN
Qty: 120 TABLET | Refills: 0 | Status: SHIPPED | OUTPATIENT
Start: 2018-09-25 | End: 2018-08-20 | Stop reason: SDUPTHER

## 2018-07-19 RX ORDER — HYDROCODONE BITARTRATE AND ACETAMINOPHEN 7.5; 325 MG/1; MG/1
1 TABLET ORAL EVERY 6 HOURS PRN
Qty: 120 TABLET | Refills: 0 | Status: SHIPPED | OUTPATIENT
Start: 2018-07-25 | End: 2018-07-19 | Stop reason: SDUPTHER

## 2018-07-19 RX ORDER — CARISOPRODOL 350 MG/1
350 TABLET ORAL 3 TIMES DAILY PRN
Qty: 20 TABLET | Refills: 0 | Status: SHIPPED | OUTPATIENT
Start: 2018-09-25 | End: 2018-08-20 | Stop reason: SDUPTHER

## 2018-07-19 RX ORDER — HYDROCODONE BITARTRATE AND ACETAMINOPHEN 7.5; 325 MG/1; MG/1
1 TABLET ORAL EVERY 6 HOURS PRN
Qty: 120 TABLET | Refills: 0 | Status: SHIPPED | OUTPATIENT
Start: 2018-08-25 | End: 2018-08-20 | Stop reason: SDUPTHER

## 2018-07-19 RX ORDER — CARISOPRODOL 350 MG/1
350 TABLET ORAL 3 TIMES DAILY PRN
Qty: 20 TABLET | Refills: 0 | Status: SHIPPED | OUTPATIENT
Start: 2018-07-25 | End: 2018-07-19 | Stop reason: SDUPTHER

## 2018-07-19 NOTE — PROGRESS NOTES
Subjective:       Patient ID: Yasmeen Valderrama is a 48 y.o. female.    Chief Complaint: Follow Up/ Renew Meds and Right Ear Pain    Back Pain   This is a chronic problem. The current episode started more than 1 year ago. The problem occurs constantly. The problem has been rapidly worsening since onset. The pain is present in the sacro-iliac. The quality of the pain is described as aching, burning, shooting and stabbing. The pain radiates to the right knee and right thigh. The pain is at a severity of 8/10. The pain is severe. The pain is worse during the night. The symptoms are aggravated by bending, position, lying down, sitting, standing, stress and twisting. Stiffness is present all day. Associated symptoms include headaches, leg pain, numbness, paresthesias, tingling, weakness and weight loss. Pertinent negatives include no abdominal pain, bladder incontinence, bowel incontinence, chest pain, dysuria, fever, paresis, pelvic pain or perianal numbness. Risk factors include history of cancer, lack of exercise, menopause and poor posture. She has tried analgesics, NSAIDs and bed rest (hydrocodone 3 times day. she takes soma as needed. ) for the symptoms. The treatment provided no relief.   Otalgia    There is pain in the right ear. This is a new problem. The current episode started 1 to 4 weeks ago. The problem has been unchanged. There has been no fever. Associated symptoms include ear discharge, headaches and hearing loss. Pertinent negatives include no abdominal pain. Associated symptoms comments: Ear is tender to touch. . Treatments tried: tried to clean ears with Q tips.      Review of Systems   Constitutional: Positive for weight loss. Negative for fever.   HENT: Positive for ear discharge, ear pain and hearing loss.    Cardiovascular: Negative for chest pain.   Gastrointestinal: Negative for abdominal pain and bowel incontinence.   Genitourinary: Negative for bladder incontinence, dysuria, hematuria and  "pelvic pain.   Musculoskeletal: Positive for back pain.   Neurological: Positive for tingling, weakness, numbness, headaches and paresthesias.       Objective:       Vitals:    07/19/18 1404   BP: (!) 140/80   Pulse: 90   Temp: 98.6 °F (37 °C)   TempSrc: Oral   SpO2: 96%   Weight: 61.9 kg (136 lb 7.4 oz)   Height: 5' 3" (1.6 m)       Physical Exam   Constitutional: She is oriented to person, place, and time. She appears well-developed and well-nourished. No distress.   HENT:   Head: Normocephalic and atraumatic.   Right Ear: External ear normal.   Left Ear: External ear normal.   Mouth/Throat: Oropharynx is clear and moist.   Cardiovascular: Normal rate, regular rhythm and normal heart sounds.  Exam reveals no gallop and no friction rub.    No murmur heard.  Pulmonary/Chest: Effort normal and breath sounds normal. No respiratory distress. She has no wheezes. She has no rales.   Neurological: She is alert and oriented to person, place, and time.   Skin: She is not diaphoretic.       Assessment:       1. Cervical stenosis of spine    2. Chronic pain syndrome    3. Otalgia, unspecified laterality        Plan:       /Yasmeen was seen today for follow up/ renew meds and right ear pain.    Diagnoses and all orders for this visit:    Cervical stenosis of spine    Chronic pain syndrome  -      carisoprodol (SOMA) 350 MG tablet; Take 1 tablet (350 mg total) by mouth 3 (three) times daily as needed.  -      HYDROcodone-acetaminophen (NORCO) 7.5-325 mg per tablet; Take 1 tablet by mouth every 6 (six) hours as needed for Pain.  -     HYDROcodone-acetaminophen (NORCO) 7.5-325 mg per tablet; Take 1 tablet by mouth every 6 (six) hours as needed for Pain.  -     carisoprodol (SOMA) 350 MG tablet; Take 1 tablet (350 mg total) by mouth 3 (three) times daily as needed.  -     carisoprodol (SOMA) 350 MG tablet; Take 1 tablet (350 mg total) by mouth 3 (three) times daily as needed.  -     HYDROcodone-acetaminophen (NORCO) 7.5-325 mg per " tablet; Take 1 tablet by mouth every 6 (six) hours as needed for Pain.    Otalgia, unspecified laterality  ANTIHISTAMINE PRN.     Follow-up in about 3 months (around 10/19/2018).       Answers for HPI/ROS submitted by the patient on 7/18/2018   Back pain  genital pain: No

## 2018-07-19 NOTE — PROGRESS NOTES
"Subjective:       Patient ID: Yasmeen Valderrama is a 48 y.o. female.    Chief Complaint: Follow Up/ Renew Meds and Right Ear Pain    Back Pain   This is a chronic problem. The current episode started more than 1 year ago. The problem occurs constantly. The problem has been rapidly worsening since onset. The pain is present in the sacro-iliac. The quality of the pain is described as aching, burning, shooting and stabbing. The pain radiates to the right knee and right thigh. The pain is at a severity of 8/10. The pain is severe. The pain is worse during the night. The symptoms are aggravated by bending, position, lying down, sitting, standing, stress and twisting. Stiffness is present all day. Associated symptoms include headaches, leg pain, numbness, paresthesias, tingling, weakness and weight loss. Pertinent negatives include no abdominal pain, bladder incontinence, bowel incontinence, chest pain, dysuria, fever, paresis, pelvic pain or perianal numbness. Risk factors include history of cancer, lack of exercise, menopause and poor posture. She has tried analgesics, NSAIDs and bed rest for the symptoms. The treatment provided no relief.     Review of Systems   Constitutional: Positive for weight loss. Negative for fever.   Cardiovascular: Negative for chest pain.   Gastrointestinal: Negative for abdominal pain and bowel incontinence.   Genitourinary: Negative for bladder incontinence, dysuria, hematuria and pelvic pain.   Musculoskeletal: Positive for back pain.   Neurological: Positive for tingling, weakness, numbness, headaches and paresthesias.         IMAGIN. There degenerative changes of the cervical spine with some MRI appear most severe at C4-5 and C5-6.  Objective:       Vitals:    18 1404   BP: (!) 140/80   Pulse: 90   Temp: 98.6 °F (37 °C)   TempSrc: Oral   SpO2: 96%   Weight: 61.9 kg (136 lb 7.4 oz)   Height: 5' 3" (1.6 m)       Physical Exam   Constitutional: She is oriented to person, " place, and time. She appears well-developed and well-nourished. No distress.   HENT:   Head: Normocephalic and atraumatic.   Neck: Normal range of motion. Neck supple.   Cardiovascular: Normal rate, regular rhythm and normal heart sounds.  Exam reveals no gallop and no friction rub.    No murmur heard.  Pulmonary/Chest: Effort normal and breath sounds normal. No respiratory distress. She has no wheezes. She has no rales.   Musculoskeletal:        Cervical back: She exhibits decreased range of motion, tenderness, pain and spasm. She exhibits no bony tenderness.   Neurological: She is alert and oriented to person, place, and time.   Skin: She is not diaphoretic.   Psychiatric: She has a normal mood and affect.       Assessment:       1. Chronic pain syndrome        Plan:       Yasmeen was seen today for follow up/ renew meds and right ear pain.    Diagnoses and all orders for this visit:    Chronic pain syndrome    -     HYDROcodone-acetaminophen (NORCO) 7.5-325 mg per tablet; Take 1 tablet by mouth every 6 (six) hours as needed for Pain.  -     carisoprodol (SOMA) 350 MG tablet; Take 1 tablet (350 mg total) by mouth 3 (three) times daily as needed.  -     carisoprodol (SOMA) 350 MG tablet; Take 1 tablet (350 mg total) by mouth 3 (three) times daily as needed.  -     HYDROcodone-acetaminophen (NORCO) 7.5-325 mg per tablet; Take 1 tablet by mouth every 6 (six) hours as needed for Pain.       GIVEN A 3 MONTH SUPPLY OF BOTH. Follow-up in about 3 months (around 10/19/2018).

## 2018-07-20 ENCOUNTER — PATIENT MESSAGE (OUTPATIENT)
Dept: FAMILY MEDICINE | Facility: CLINIC | Age: 48
End: 2018-07-20

## 2018-07-24 NOTE — TELEPHONE ENCOUNTER
[facility] called and stated that patient does any Oxygen with them. They asked that the patient contact her insurance to see who they are paying for the insurance. Patient informed to contact insurance.

## 2018-07-25 ENCOUNTER — PATIENT MESSAGE (OUTPATIENT)
Dept: FAMILY MEDICINE | Facility: CLINIC | Age: 48
End: 2018-07-25

## 2018-07-31 DIAGNOSIS — Z23 NEED FOR PNEUMOCOCCAL VACCINATION: Primary | ICD-10-CM

## 2018-08-13 DIAGNOSIS — J44.9 CHRONIC OBSTRUCTIVE PULMONARY DISEASE, UNSPECIFIED COPD TYPE: ICD-10-CM

## 2018-08-13 DIAGNOSIS — Z72.0 TOBACCO USE: ICD-10-CM

## 2018-08-13 DIAGNOSIS — F41.9 ANXIETY: ICD-10-CM

## 2018-08-19 RX ORDER — ALPRAZOLAM 1 MG/1
TABLET ORAL
Qty: 60 TABLET | OUTPATIENT
Start: 2018-08-19

## 2018-08-19 RX ORDER — FLUTICASONE FUROATE AND VILANTEROL TRIFENATATE 200; 25 UG/1; UG/1
POWDER RESPIRATORY (INHALATION)
OUTPATIENT
Start: 2018-08-19

## 2018-08-19 RX ORDER — ALBUTEROL SULFATE 90 UG/1
AEROSOL, METERED RESPIRATORY (INHALATION)
Qty: 18 G | Refills: 5 | OUTPATIENT
Start: 2018-08-19

## 2018-08-20 ENCOUNTER — OFFICE VISIT (OUTPATIENT)
Dept: FAMILY MEDICINE | Facility: CLINIC | Age: 48
End: 2018-08-20
Payer: MEDICAID

## 2018-08-20 VITALS
BODY MASS INDEX: 23.83 KG/M2 | HEIGHT: 63 IN | OXYGEN SATURATION: 94 % | TEMPERATURE: 97 F | DIASTOLIC BLOOD PRESSURE: 80 MMHG | HEART RATE: 74 BPM | SYSTOLIC BLOOD PRESSURE: 116 MMHG | WEIGHT: 134.5 LBS

## 2018-08-20 DIAGNOSIS — G89.4 CHRONIC PAIN SYNDROME: ICD-10-CM

## 2018-08-20 DIAGNOSIS — Z23 NEED FOR VACCINATION FOR PNEUMOCOCCUS: Primary | ICD-10-CM

## 2018-08-20 DIAGNOSIS — F41.9 ANXIETY: ICD-10-CM

## 2018-08-20 PROCEDURE — 99215 OFFICE O/P EST HI 40 MIN: CPT | Mod: S$PBB,,, | Performed by: FAMILY MEDICINE

## 2018-08-20 PROCEDURE — 99999 PR PBB SHADOW E&M-EST. PATIENT-LVL III: CPT | Mod: PBBFAC,,, | Performed by: FAMILY MEDICINE

## 2018-08-20 PROCEDURE — 99213 OFFICE O/P EST LOW 20 MIN: CPT | Mod: PBBFAC,PO | Performed by: FAMILY MEDICINE

## 2018-08-20 PROCEDURE — 90670 PCV13 VACCINE IM: CPT | Mod: PBBFAC,PO | Performed by: FAMILY MEDICINE

## 2018-08-20 RX ORDER — HYDROCODONE BITARTRATE AND ACETAMINOPHEN 7.5; 325 MG/1; MG/1
1 TABLET ORAL EVERY 6 HOURS PRN
Qty: 120 TABLET | Refills: 0 | Status: SHIPPED | OUTPATIENT
Start: 2018-09-17 | End: 2018-10-17

## 2018-08-20 RX ORDER — HYDROCODONE BITARTRATE AND ACETAMINOPHEN 7.5; 325 MG/1; MG/1
1 TABLET ORAL EVERY 6 HOURS PRN
Qty: 120 TABLET | Refills: 0 | Status: SHIPPED | OUTPATIENT
Start: 2018-08-20 | End: 2018-09-19

## 2018-08-20 RX ORDER — CARISOPRODOL 350 MG/1
350 TABLET ORAL 3 TIMES DAILY PRN
Qty: 20 TABLET | Refills: 0 | Status: SHIPPED | OUTPATIENT
Start: 2018-09-16 | End: 2018-10-16

## 2018-08-20 RX ORDER — CARISOPRODOL 350 MG/1
350 TABLET ORAL 3 TIMES DAILY PRN
Qty: 20 TABLET | Refills: 0 | Status: SHIPPED | OUTPATIENT
Start: 2018-10-13 | End: 2018-11-12

## 2018-08-20 RX ORDER — HYDROCODONE BITARTRATE AND ACETAMINOPHEN 7.5; 325 MG/1; MG/1
1 TABLET ORAL EVERY 6 HOURS PRN
Qty: 120 TABLET | Refills: 0 | Status: SHIPPED | OUTPATIENT
Start: 2018-10-15 | End: 2018-11-14

## 2018-08-20 RX ORDER — CARISOPRODOL 350 MG/1
350 TABLET ORAL 3 TIMES DAILY PRN
Qty: 20 TABLET | Refills: 0 | Status: SHIPPED | OUTPATIENT
Start: 2018-09-25 | End: 2018-10-25

## 2018-08-20 RX ORDER — ALPRAZOLAM 1 MG/1
1 TABLET ORAL 2 TIMES DAILY
Qty: 60 TABLET | Refills: 2 | Status: SHIPPED | OUTPATIENT
Start: 2018-08-20 | End: 2019-08-12 | Stop reason: SDUPTHER
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
## 2018-08-22 ENCOUNTER — PATIENT MESSAGE (OUTPATIENT)
Dept: FAMILY MEDICINE | Facility: CLINIC | Age: 48
End: 2018-08-22

## 2018-08-22 DIAGNOSIS — Z99.81 ON HOME OXYGEN THERAPY: Primary | ICD-10-CM

## 2018-08-22 DIAGNOSIS — J43.0 UNILATERAL EMPHYSEMA: ICD-10-CM

## 2018-08-22 NOTE — TELEPHONE ENCOUNTER
Per [facility] patient has never been set up with any oxygen.  Stated order, testing and clinic notes are needed.  Please advise.

## 2018-09-10 ENCOUNTER — TELEPHONE (OUTPATIENT)
Dept: FAMILY MEDICINE | Facility: CLINIC | Age: 48
End: 2018-09-10

## 2018-09-10 NOTE — TELEPHONE ENCOUNTER
Marissa at [facility] called to informed  Us that they will not fill patient's pain medication anymore she is one Suboxone from other Doctor.

## 2018-09-10 NOTE — TELEPHONE ENCOUNTER
----- Message from Bethanie Briones sent at 9/10/2018  1:01 PM CDT -----  Contact: PHARMACY [phone]  Pharmacy is calling to inform the staff and Dr. Beauchamp that the patient has enrolled in a treatment program with another doctor. The doctor has prescribed for her  Suboxone. So the pharmacy says that they will no longer fill pain medication for the patient. Please call Ms. Ann at the pharmacy at your earliest convenience. She can be reached at [phone]/

## 2018-09-17 DIAGNOSIS — Z72.0 TOBACCO USE: ICD-10-CM

## 2018-09-17 DIAGNOSIS — J44.9 CHRONIC OBSTRUCTIVE PULMONARY DISEASE, UNSPECIFIED COPD TYPE: ICD-10-CM

## 2018-09-18 RX ORDER — FLUTICASONE FUROATE AND VILANTEROL TRIFENATATE 200; 25 UG/1; UG/1
POWDER RESPIRATORY (INHALATION)
Qty: 60 EACH | Refills: 5 | Status: SHIPPED | OUTPATIENT
Start: 2018-09-18 | End: 2019-03-20 | Stop reason: SDUPTHER

## 2018-09-18 RX ORDER — ALBUTEROL SULFATE 90 UG/1
AEROSOL, METERED RESPIRATORY (INHALATION)
Qty: 18 G | Refills: 5 | Status: SHIPPED | OUTPATIENT
Start: 2018-09-18 | End: 2019-03-20 | Stop reason: SDUPTHER

## 2019-03-20 DIAGNOSIS — Z72.0 TOBACCO USE: ICD-10-CM

## 2019-03-20 DIAGNOSIS — J44.9 CHRONIC OBSTRUCTIVE PULMONARY DISEASE, UNSPECIFIED COPD TYPE: ICD-10-CM

## 2019-03-20 RX ORDER — ALBUTEROL SULFATE 90 UG/1
AEROSOL, METERED RESPIRATORY (INHALATION)
Qty: 18 G | Refills: 5 | Status: SHIPPED | OUTPATIENT
Start: 2019-03-20 | End: 2019-08-12 | Stop reason: SDUPTHER

## 2019-03-20 RX ORDER — FLUTICASONE FUROATE AND VILANTEROL TRIFENATATE 200; 25 UG/1; UG/1
POWDER RESPIRATORY (INHALATION)
Qty: 60 EACH | Refills: 3 | Status: SHIPPED | OUTPATIENT
Start: 2019-03-20 | End: 2019-08-19 | Stop reason: CLARIF

## 2019-04-02 NOTE — TELEPHONE ENCOUNTER
Patient notified   Problem: Patient Care Overview  Goal: Plan of Care Review  Outcome: Ongoing (interventions implemented as appropriate)   04/02/19 7729   Coping/Psychosocial   Plan of Care Reviewed With patient   Coping/Psychosocial   Patient Agreement with Plan of Care agrees   Plan of Care Review   Progress improving   OTHER   Outcome Summary Patient reports appetite as fair; sleep as good; reports feeling helpless, hopeless and worthless; reports craving as 5; denies all other withdrawal symptoms       Problem: Overarching Goals (Adult)  Goal: Adheres to Safety Considerations for Self and Others  Outcome: Ongoing (interventions implemented as appropriate)    Goal: Optimized Coping Skills in Response to Life Stressors  Outcome: Ongoing (interventions implemented as appropriate)    Goal: Develops/Participates in Therapeutic [place] to Support Successful Transition  Outcome: Ongoing (interventions implemented as appropriate)

## 2019-04-26 ENCOUNTER — NURSE TRIAGE (OUTPATIENT)
Dept: ADMINISTRATIVE | Facility: CLINIC | Age: 49
End: 2019-04-26

## 2019-04-26 ENCOUNTER — TELEPHONE (OUTPATIENT)
Dept: FAMILY MEDICINE | Facility: CLINIC | Age: 49
End: 2019-04-26

## 2019-04-26 NOTE — TELEPHONE ENCOUNTER
Call placed to Pt, and she states that she is having difficulty breathing. That she has a Inhaler,but needs refills on her other med's. Pt encouraged to go to the ED if she is having difficulty breathing/SOB. Pt acknowledged that she will go. Pt wanted to schedule appointment for 5-3-19. Pt assisted with scheduling, but informed her that 's next available appointment was 5-22-19. She only wanted to see him and not another Provider. I scheduled her appointment and I instructed her to go to the ED for treatment and evaluation for her SOB/discomfort. Informed her that if she didn't have transportation, that she could call 911 to help assist her receive medical attention. Pt acknowledged understanding.

## 2019-05-08 ENCOUNTER — PATIENT MESSAGE (OUTPATIENT)
Dept: ADMINISTRATIVE | Facility: HOSPITAL | Age: 49
End: 2019-05-08

## 2019-05-08 ENCOUNTER — PATIENT OUTREACH (OUTPATIENT)
Dept: ADMINISTRATIVE | Facility: HOSPITAL | Age: 49
End: 2019-05-08

## 2019-05-14 DIAGNOSIS — Z12.11 COLON CANCER SCREENING: ICD-10-CM

## 2019-05-30 ENCOUNTER — PATIENT OUTREACH (OUTPATIENT)
Dept: ADMINISTRATIVE | Facility: HOSPITAL | Age: 49
End: 2019-05-30

## 2019-07-29 ENCOUNTER — PATIENT OUTREACH (OUTPATIENT)
Dept: ADMINISTRATIVE | Facility: HOSPITAL | Age: 49
End: 2019-07-29

## 2019-07-29 DIAGNOSIS — Z12.11 SCREENING FOR COLON CANCER: Primary | ICD-10-CM

## 2019-08-12 ENCOUNTER — TELEPHONE (OUTPATIENT)
Dept: FAMILY MEDICINE | Facility: CLINIC | Age: 49
End: 2019-08-12

## 2019-08-12 ENCOUNTER — OFFICE VISIT (OUTPATIENT)
Dept: FAMILY MEDICINE | Facility: CLINIC | Age: 49
End: 2019-08-12
Payer: MEDICAID

## 2019-08-12 VITALS
OXYGEN SATURATION: 92 % | HEIGHT: 63 IN | BODY MASS INDEX: 28.75 KG/M2 | WEIGHT: 162.25 LBS | HEART RATE: 95 BPM | TEMPERATURE: 99 F | DIASTOLIC BLOOD PRESSURE: 74 MMHG | SYSTOLIC BLOOD PRESSURE: 120 MMHG

## 2019-08-12 DIAGNOSIS — J44.9 CHRONIC OBSTRUCTIVE PULMONARY DISEASE, UNSPECIFIED COPD TYPE: ICD-10-CM

## 2019-08-12 DIAGNOSIS — Z72.0 TOBACCO USE: ICD-10-CM

## 2019-08-12 DIAGNOSIS — J38.3 VOCAL CORD MASS: ICD-10-CM

## 2019-08-12 DIAGNOSIS — N95.9 POSTMENOPAUSAL SYMPTOMS: ICD-10-CM

## 2019-08-12 DIAGNOSIS — R49.0 CHRONIC HOARSENESS: ICD-10-CM

## 2019-08-12 DIAGNOSIS — J44.1 COPD EXACERBATION: Primary | ICD-10-CM

## 2019-08-12 PROCEDURE — 99215 PR OFFICE/OUTPT VISIT, EST, LEVL V, 40-54 MIN: ICD-10-PCS | Mod: S$PBB,,, | Performed by: FAMILY MEDICINE

## 2019-08-12 PROCEDURE — 99999 PR PBB SHADOW E&M-EST. PATIENT-LVL III: CPT | Mod: PBBFAC,,, | Performed by: FAMILY MEDICINE

## 2019-08-12 PROCEDURE — 99999 PR PBB SHADOW E&M-EST. PATIENT-LVL III: ICD-10-PCS | Mod: PBBFAC,,, | Performed by: FAMILY MEDICINE

## 2019-08-12 PROCEDURE — 99215 OFFICE O/P EST HI 40 MIN: CPT | Mod: S$PBB,,, | Performed by: FAMILY MEDICINE

## 2019-08-12 PROCEDURE — 99213 OFFICE O/P EST LOW 20 MIN: CPT | Mod: PBBFAC,PO | Performed by: FAMILY MEDICINE

## 2019-08-12 RX ORDER — FUROSEMIDE 40 MG/1
40 TABLET ORAL 2 TIMES DAILY
Qty: 60 TABLET | Refills: 11 | Status: SHIPPED | OUTPATIENT
Start: 2019-08-12 | End: 2019-09-23 | Stop reason: SDUPTHER

## 2019-08-12 RX ORDER — HYDROXYZINE HYDROCHLORIDE 50 MG/1
TABLET, FILM COATED ORAL
Refills: 0 | COMMUNITY
Start: 2019-07-15 | End: 2019-08-12 | Stop reason: SDUPTHER

## 2019-08-12 RX ORDER — LIDOCAINE HYDROCHLORIDE 20 MG/ML
SOLUTION ORAL; TOPICAL
Qty: 100 ML | Refills: 11 | Status: SHIPPED | OUTPATIENT
Start: 2019-08-12 | End: 2021-02-07 | Stop reason: SDUPTHER

## 2019-08-12 RX ORDER — TRAZODONE HYDROCHLORIDE 100 MG/1
100 TABLET ORAL NIGHTLY
Refills: 0 | COMMUNITY
Start: 2019-07-15 | End: 2019-08-12 | Stop reason: SDUPTHER

## 2019-08-12 RX ORDER — GABAPENTIN 600 MG/1
TABLET ORAL
Refills: 0 | COMMUNITY
Start: 2019-07-15 | End: 2019-09-23 | Stop reason: SDUPTHER

## 2019-08-12 RX ORDER — FLUTICASONE FUROATE AND VILANTEROL 200; 25 UG/1; UG/1
POWDER RESPIRATORY (INHALATION)
Qty: 60 EACH | Refills: 3 | Status: CANCELLED | OUTPATIENT
Start: 2019-08-12

## 2019-08-12 RX ORDER — BUPROPION HYDROCHLORIDE 300 MG/1
TABLET ORAL
COMMUNITY
Start: 2019-08-10 | End: 2019-09-23 | Stop reason: SDUPTHER

## 2019-08-12 RX ORDER — VENLAFAXINE 37.5 MG/1
37.5 TABLET ORAL NIGHTLY PRN
Qty: 60 TABLET | Refills: 11 | Status: SHIPPED | OUTPATIENT
Start: 2019-08-12 | End: 2019-09-24 | Stop reason: SDUPTHER

## 2019-08-12 RX ORDER — ALBUTEROL SULFATE 90 UG/1
AEROSOL, METERED RESPIRATORY (INHALATION)
Qty: 18 G | Refills: 5 | Status: SHIPPED | OUTPATIENT
Start: 2019-08-12 | End: 2020-07-19

## 2019-08-12 RX ORDER — MELOXICAM 7.5 MG/1
TABLET ORAL
COMMUNITY
Start: 2019-08-10 | End: 2019-09-23 | Stop reason: SDUPTHER

## 2019-08-12 RX ORDER — POTASSIUM CHLORIDE 750 MG/1
10 CAPSULE, EXTENDED RELEASE ORAL 2 TIMES DAILY
Qty: 60 CAPSULE | Refills: 11 | Status: SHIPPED | OUTPATIENT
Start: 2019-08-12 | End: 2019-09-23 | Stop reason: SDUPTHER

## 2019-08-12 RX ORDER — PREDNISONE 20 MG/1
40 TABLET ORAL DAILY
Qty: 28 TABLET | Refills: 0 | Status: SHIPPED | OUTPATIENT
Start: 2019-08-12 | End: 2019-08-26

## 2019-08-12 RX ORDER — BUPRENORPHINE HYDROCHLORIDE, NALOXONE HYDROCHLORIDE 8; 2 MG/1; MG/1
FILM, SOLUBLE BUCCAL; SUBLINGUAL
Refills: 2 | COMMUNITY
Start: 2019-08-05 | End: 2020-04-22

## 2019-08-12 RX ORDER — LEVOFLOXACIN 500 MG/1
500 TABLET, FILM COATED ORAL 2 TIMES DAILY
Qty: 28 TABLET | Refills: 0 | Status: SHIPPED | OUTPATIENT
Start: 2019-08-12 | End: 2019-09-23

## 2019-08-12 RX ORDER — ALBUTEROL SULFATE 0.83 MG/ML
2.5 SOLUTION RESPIRATORY (INHALATION) EVERY 6 HOURS PRN
Qty: 1 BOX | Refills: 11 | Status: SHIPPED | OUTPATIENT
Start: 2019-08-12 | End: 2020-10-01

## 2019-08-12 NOTE — TELEPHONE ENCOUNTER
Dasha Davenport 100-62.5-25MCG/INH aerosol powder has been rejected by insurance. Would you like me to continue with PA?

## 2019-08-12 NOTE — PROGRESS NOTES
Chief Complaint   Patient presents with    Medication Refill       SUBJECTIVE:  Yasmeen Valderrama is a 49 y.o. female here for new problem of see the below.  Currently has co-morbidities including per problem list.      Past Medical History:   Diagnosis Date    Anxiety     Asthma     Carpal tunnel syndrome, bilateral     COPD (chronic obstructive pulmonary disease)     Vocal cord mass 6/2/2017    Right side with CT scan Referred to ENT for visualization     Past Surgical History:   Procedure Laterality Date    CARPAL TUNNEL RELEASE      FINGER SURGERY      HYSTERECTOMY      OOPHORECTOMY  1996    Hysterectomy    RELEASE-CARPAL TUNNEL- Dirty Left 2/27/2015    Per[facility] by [facility]    RELEASE-CARPAL TUNNEL- Right [place] Right 6/9/2015    Performed by [facility]ier Lopez MD at [facility] OR     Social History     Socioeconomic History    Marital status:      Spouse name: Not on file    Number of children: Not on file    Years of education: Not on file    Highest education level: Not on file   Occupational History    Not on file   Social Needs    Financial resource strain: Hard    Food insecurity:     Worry: Often true     Inability: Never true    Transportation needs:     Medical: No     Non-medical: No   Tobacco Use    Smoking status: Current Every Day Smoker     Packs/day: 2.00     Years: 35.00     Pack years: 70.00     Types: Cigarettes     Start date: 12/1/1983    Smokeless tobacco: Never Used   Substance and Sexual Activity    Alcohol use: No     Alcohol/week: 0.0 oz     Frequency: Never     Drinks per session: Patient refused     Binge frequency: Never    Drug use: No    Sexual activity: Yes     Partners: Male   Lifestyle    Physical activity:     Days per week: 0 days     Minutes per session: 0 min    Stress: Very much   Relationships    Social connections:     Talks on phone: Once a week     Gets together: Never     Attends Yazidism service: Not on file     Active  member of club or organization: No     Attends meetings of clubs or organizations: Never     Relationship status:    Other Topics Concern    Not on file   Social History Narrative    Not on file     Family History   Problem Relation Age of Onset    COPD Mother     Heart disease Father     No Known Problems Sister     No Known Problems Brother      Current Outpatient Medications on File Prior to Visit   Medication Sig Dispense Refill    BREO ELLIPTA 200-25 mcg/dose DsDv diskus inhaler INHALE 1 PUFF BY MOUTH INTO THE LUNGS ONCE DAILY 60 each 3    buPROPion (WELLBUTRIN XL) 300 MG 24 hr tablet       gabapentin (NEURONTIN) 600 MG tablet TAKE THREE TABLETS BY MOUTH AT BEDTIME for nerve FOR PAIN.  0    hydrOXYzine (ATARAX) 50 MG tablet TAKE 1 OR 2 TABLETS BY MOUTH EVERY NIGHT AT BEDTIME  0    meloxicam (MOBIC) 7.5 MG tablet       SUBOXONE 8-2 mg Film PLACE 1 FILM UNDER TONGUE TWICE DAILY.  2    traZODone (DESYREL) 100 MG tablet Take 100 mg by mouth nightly.  0    [DISCONTINUED] albuterol (PROVENTIL) 2.5 mg /3 mL (0.083 %) nebulizer solution Take 3 mLs (2.5 mg total) by nebulization every 6 (six) hours as needed for Wheezing. 1 Box 11    [DISCONTINUED] albuterol (PROVENTIL/VENTOLIN HFA) 90 mcg/actuation inhaler INHALE TWO PUFFS BY MOUTH INTO THE LUNGS EVERY 6 HOURS AS NEEDED FOR WHEEZING 18 g 5    [DISCONTINUED] ALPRAZolam (XANAX) 1 MG tablet Take 1 tablet (1 mg total) by mouth 2 (two) times daily. 60 tablet 2    [DISCONTINUED] LIDOCAINE VISCOUS 2 % solution Can switch to equivalent, 10 ml swish and gargle 4 x daily 100 mL 11    [DISCONTINUED] potassium chloride (MICRO-K) 10 MEQ CpSR TAKE 1 CAPSULE BY MOUTH TWICE DAILY. 60 capsule 11    busPIRone (BUSPAR) 30 MG Tab Take 1 tablet (30 mg total) by mouth 2 (two) times daily. 60 tablet 11    venlafaxine (EFFEXOR) 37.5 MG Tab Take 1 tablet (37.5 mg total) by mouth 2 (two) times daily. 60 tablet 11    [DISCONTINUED] furosemide (LASIX) 40 MG tablet Take  "1 tablet (40 mg total) by mouth 2 (two) times daily. 60 tablet 11     No current facility-administered medications on file prior to visit.      Review of patient's allergies indicates:   Allergen Reactions    Antivert [meclizine] Other (See Comments)     Behavioral changes         Review of Systems   Constitutional: Negative for fever.   HENT: Positive for sore throat. Negative for ear pain.    Respiratory: Positive for sputum production and wheezing. Negative for hemoptysis.    Cardiovascular: Positive for chest pain, orthopnea, claudication, leg swelling and PND.   Gastrointestinal: Negative for abdominal pain and vomiting.   Musculoskeletal: Positive for neck pain.   Skin: Negative for rash.   Neurological: Positive for headaches.     Answers for HPI/ROS submitted by the patient on 8/11/2019   Shortness of breath  Chronicity: chronic  Onset: more than 1 year ago  Frequency: constantly  Progression since onset: rapidly worsening  Episode duration: 20 years  coryza: No  leg pain: Yes  rhinorrhea: No  swollen glands: No  syncope: No  Aggravating factors: emotional upset, smoke, animal exposure, exercise, odors, URIs, any activity, fumes, pollens, weather changes, lying flat  Improvement on treatment: no relief  Risk factors for DVT/PE: prolonged immobilization, smoking  Treatments tried: OTC cough suppressants, beta agonist inhalers, body position changes, cool air, oral steroids, rest  asthma: Yes  allergies: No  COPD: Yes  pneumonia: Yes  aspirin allergies: No  CAD: No  DVT: No  heart failure: No  PE: No  recent surgery: No  bronchiolitis: Yes  chronic lung disease: Yes    OBJECTIVE:  /74   Pulse 95   Temp 98.5 °F (36.9 °C) (Oral)   Ht 5' 3" (1.6 m)   Wt 73.6 kg (162 lb 4.1 oz)   SpO2 (!) 92%   BMI 28.74 kg/m²     Wt Readings from Last 3 Encounters:   08/12/19 73.6 kg (162 lb 4.1 oz)   08/20/18 61 kg (134 lb 7.7 oz)   07/19/18 61.9 kg (136 lb 7.4 oz)     BP Readings from Last 3 Encounters:   08/12/19 " 120/74   08/20/18 116/80   07/19/18 (!) 140/80       Appears chronically ill, alert and oriented x 3  HEENT: without significant masses, LAD, no CN deficit  She is very hoarse, uses a lot of force to talk, muscles strained.  Neck without masses.  Lungs with wheezing and rhonchi, no overt consolidative findings.  Heart exam is normal  The abdomen is soft without tenderness, guarding, mass, rebound or organomegaly. Bowel sounds are normal. No CVA tenderness or inguinal adenopathy noted.  No focal neurological deficits.    Review of old Records:  Reviewed per epic and [facility]    Review of old labs:  Reviewed last labs    Review of old imaging:  Reviewed imaging    ASSESSMENT:  Problem List Items Addressed This Visit     Tobacco use, since teenager, has tried to quit, is trying to quit    Relevant Medications    albuterol (PROVENTIL/VENTOLIN HFA) 90 mcg/actuation inhaler    Other Relevant Orders    Complete PFT with bronchodilator    PULSE OXIMETRY WITH REST - PULM      Other Visit Diagnoses     COPD exacerbation    -  Primary    Relevant Orders    Complete PFT with bronchodilator    PULSE OXIMETRY WITH REST - PULM    Chronic obstructive pulmonary disease, unspecified COPD type        Relevant Medications    potassium chloride (MICRO-K) 10 MEQ CpSR    LIDOCAINE VISCOUS 2 % solution    furosemide (LASIX) 40 MG tablet    albuterol (PROVENTIL) 2.5 mg /3 mL (0.083 %) nebulizer solution    fluticasone-umeclidin-vilanter (TRELEGY ELLIPTA) 100-62.5-25 mcg DsDv    levoFLOXacin (LEVAQUIN) 500 MG tablet    predniSONE (DELTASONE) 20 MG tablet    Other Relevant Orders    Complete PFT with bronchodilator    PULSE OXIMETRY WITH REST - PULM          ICD-10-CM ICD-9-CM   1. COPD exacerbation J44.1 491.21   2. Chronic obstructive pulmonary disease, unspecified COPD type J44.9 496   3. Tobacco use Z72.0 305.1     Opioid abuse now on suboxone.  Was ashamed to face me, she understands it was ok.  I could understand and would have helped  her.    PLAN:  1. Chronic obstructive pulmonary disease, unspecified COPD type  Potential medication side effects were discussed with the patient; let me know if any occur.    - potassium chloride (MICRO-K) 10 MEQ CpSR; Take 1 capsule (10 mEq total) by mouth 2 (two) times daily.  Dispense: 60 capsule; Refill: 11  - LIDOCAINE VISCOUS 2 % solution; Can switch to equivalent, 10 ml swish and gargle 4 x daily  Dispense: 100 mL; Refill: 11  - furosemide (LASIX) 40 MG tablet; Take 1 tablet (40 mg total) by mouth 2 (two) times daily.  Dispense: 60 tablet; Refill: 11  - albuterol (PROVENTIL) 2.5 mg /3 mL (0.083 %) nebulizer solution; Take 3 mLs (2.5 mg total) by nebulization every 6 (six) hours as needed for Wheezing.  Dispense: 1 Box; Refill: 11  - fluticasone-umeclidin-vilanter (TRELEGY ELLIPTA) 100-62.5-25 mcg DsDv; Inhale 1 Inhaler into the lungs once daily.  Dispense: 30 each; Refill: 11  - levoFLOXacin (LEVAQUIN) 500 MG tablet; Take 1 tablet (500 mg total) by mouth 2 (two) times daily.  Dispense: 28 tablet; Refill: 0  - predniSONE (DELTASONE) 20 MG tablet; Take 2 tablets (40 mg total) by mouth once daily. for 14 days  Dispense: 28 tablet; Refill: 0  - Complete PFT with bronchodilator; Future  - PULSE OXIMETRY WITH REST - PULM; Future    2. Tobacco use  Reviewed the tobacco use  - albuterol (PROVENTIL/VENTOLIN HFA) 90 mcg/actuation inhaler; INHALE TWO PUFFS BY MOUTH INTO THE LUNGS EVERY 6 HOURS AS NEEDED FOR WHEEZING  Dispense: 18 g; Refill: 5  - Complete PFT with bronchodilator; Future  - PULSE OXIMETRY WITH REST - PULM; Future    3. COPD exacerbation  After better get new PFT, see if she needs oxygen  - Complete PFT with bronchodilator; Future  - PULSE OXIMETRY WITH REST - PULM; Future    High risk review.    Medication List with Changes/Refills   New Medications    FLUTICASONE-UMECLIDIN-VILANTER (TRELEGY ELLIPTA) 100-62.5-25 MCG DSDV    Inhale 1 Inhaler into the lungs once daily.    LEVOFLOXACIN (LEVAQUIN) 500 MG  TABLET    Take 1 tablet (500 mg total) by mouth 2 (two) times daily.    PREDNISONE (DELTASONE) 20 MG TABLET    Take 2 tablets (40 mg total) by mouth once daily. for 14 days   Current Medications    BREO ELLIPTA 200-25 MCG/DOSE DSDV DISKUS INHALER    INHALE 1 PUFF BY MOUTH INTO THE LUNGS ONCE DAILY    BUPROPION (WELLBUTRIN XL) 300 MG 24 HR TABLET        BUSPIRONE (BUSPAR) 30 MG TAB    Take 1 tablet (30 mg total) by mouth 2 (two) times daily.    GABAPENTIN (NEURONTIN) 600 MG TABLET    TAKE THREE TABLETS BY MOUTH AT BEDTIME for nerve FOR PAIN.    HYDROXYZINE (ATARAX) 50 MG TABLET    TAKE 1 OR 2 TABLETS BY MOUTH EVERY NIGHT AT BEDTIME    MELOXICAM (MOBIC) 7.5 MG TABLET        SUBOXONE 8-2 MG FILM    PLACE 1 FILM UNDER TONGUE TWICE DAILY.    TRAZODONE (DESYREL) 100 MG TABLET    Take 100 mg by mouth nightly.    VENLAFAXINE (EFFEXOR) 37.5 MG TAB    Take 1 tablet (37.5 mg total) by mouth 2 (two) times daily.   Changed and/or Refilled Medications    Modified Medication Previous Medication    ALBUTEROL (PROVENTIL) 2.5 MG /3 ML (0.083 %) NEBULIZER SOLUTION albuterol (PROVENTIL) 2.5 mg /3 mL (0.083 %) nebulizer solution       Take 3 mLs (2.5 mg total) by nebulization every 6 (six) hours as needed for Wheezing.    Take 3 mLs (2.5 mg total) by nebulization every 6 (six) hours as needed for Wheezing.    ALBUTEROL (PROVENTIL/VENTOLIN HFA) 90 MCG/ACTUATION INHALER albuterol (PROVENTIL/VENTOLIN HFA) 90 mcg/actuation inhaler       INHALE TWO PUFFS BY MOUTH INTO THE LUNGS EVERY 6 HOURS AS NEEDED FOR WHEEZING    INHALE TWO PUFFS BY MOUTH INTO THE LUNGS EVERY 6 HOURS AS NEEDED FOR WHEEZING    FUROSEMIDE (LASIX) 40 MG TABLET furosemide (LASIX) 40 MG tablet       Take 1 tablet (40 mg total) by mouth 2 (two) times daily.    Take 1 tablet (40 mg total) by mouth 2 (two) times daily.    LIDOCAINE VISCOUS 2 % SOLUTION LIDOCAINE VISCOUS 2 % solution       Can switch to equivalent, 10 ml swish and gargle 4 x daily    Can switch to equivalent, 10  ml swish and gargle 4 x daily    POTASSIUM CHLORIDE (MICRO-K) 10 MEQ CPSR potassium chloride (MICRO-K) 10 MEQ CpSR       Take 1 capsule (10 mEq total) by mouth 2 (two) times daily.    TAKE 1 CAPSULE BY MOUTH TWICE DAILY.   Discontinued Medications    ALPRAZOLAM (XANAX) 1 MG TABLET    Take 1 tablet (1 mg total) by mouth 2 (two) times daily.       No follow-ups on file.

## 2019-08-12 NOTE — TELEPHONE ENCOUNTER
Dasha Davenport 100-62.5-25MCG/INH aerosol powder has been rejected by insurance would you like me to try to get PA? .

## 2019-08-14 ENCOUNTER — TELEPHONE (OUTPATIENT)
Dept: FAMILY MEDICINE | Facility: CLINIC | Age: 49
End: 2019-08-14

## 2019-08-14 NOTE — TELEPHONE ENCOUNTER
Received authorization #[id number]  from Medicaid [place] for Dasha Davenport 100-62.5-25MCG/INH aerosol powder Good from 8/14/19-8/13/20. LM for patient that she can go to Pharmacy to pick get her RX.

## 2019-08-19 RX ORDER — FLUTICASONE PROPIONATE AND SALMETEROL XINAFOATE 230; 21 UG/1; UG/1
2 AEROSOL, METERED RESPIRATORY (INHALATION) 2 TIMES DAILY
Qty: 12 G | Refills: 0 | Status: SHIPPED | OUTPATIENT
Start: 2019-08-19 | End: 2019-09-23 | Stop reason: SDUPTHER

## 2019-08-21 ENCOUNTER — TELEPHONE (OUTPATIENT)
Dept: FAMILY MEDICINE | Facility: CLINIC | Age: 49
End: 2019-08-21

## 2019-08-23 ENCOUNTER — TELEPHONE (OUTPATIENT)
Dept: FAMILY MEDICINE | Facility: CLINIC | Age: 49
End: 2019-08-23

## 2019-08-23 NOTE — TELEPHONE ENCOUNTER
Received authorization #[id number] from [employer] for ADVAIR -21 MCG INHALER, good from 8/21/19-8/20/20

## 2019-09-09 ENCOUNTER — PATIENT OUTREACH (OUTPATIENT)
Dept: ADMINISTRATIVE | Facility: HOSPITAL | Age: 49
End: 2019-09-09

## 2019-09-09 DIAGNOSIS — Z12.12 SCREENING FOR COLORECTAL CANCER: Primary | ICD-10-CM

## 2019-09-09 DIAGNOSIS — Z12.11 SCREENING FOR COLORECTAL CANCER: Primary | ICD-10-CM

## 2019-09-22 ENCOUNTER — LAB VISIT (OUTPATIENT)
Dept: LAB | Facility: HOSPITAL | Age: 49
End: 2019-09-22
Attending: FAMILY MEDICINE
Payer: MEDICAID

## 2019-09-22 DIAGNOSIS — Z12.11 SCREENING FOR COLORECTAL CANCER: ICD-10-CM

## 2019-09-22 DIAGNOSIS — Z12.12 SCREENING FOR COLORECTAL CANCER: ICD-10-CM

## 2019-09-22 PROCEDURE — 82274 ASSAY TEST FOR BLOOD FECAL: CPT

## 2019-09-23 ENCOUNTER — OFFICE VISIT (OUTPATIENT)
Dept: FAMILY MEDICINE | Facility: CLINIC | Age: 49
End: 2019-09-23
Payer: MEDICAID

## 2019-09-23 VITALS
BODY MASS INDEX: 30 KG/M2 | HEIGHT: 63 IN | HEART RATE: 92 BPM | SYSTOLIC BLOOD PRESSURE: 130 MMHG | DIASTOLIC BLOOD PRESSURE: 80 MMHG | WEIGHT: 169.31 LBS | OXYGEN SATURATION: 91 % | TEMPERATURE: 98 F

## 2019-09-23 DIAGNOSIS — F41.9 ANXIETY: Primary | ICD-10-CM

## 2019-09-23 DIAGNOSIS — G89.4 CHRONIC PAIN SYNDROME: ICD-10-CM

## 2019-09-23 DIAGNOSIS — J44.9 CHRONIC OBSTRUCTIVE PULMONARY DISEASE, UNSPECIFIED COPD TYPE: ICD-10-CM

## 2019-09-23 DIAGNOSIS — N95.9 POSTMENOPAUSAL SYMPTOMS: ICD-10-CM

## 2019-09-23 PROBLEM — D14.1 LARYNGEAL PAPILLOMA: Status: ACTIVE | Noted: 2018-06-13

## 2019-09-23 PROBLEM — J38.1 REINKE'S EDEMA OF VOCAL FOLDS: Status: ACTIVE | Noted: 2018-05-14

## 2019-09-23 PROBLEM — R49.0 DYSPHONIA: Status: ACTIVE | Noted: 2018-05-14

## 2019-09-23 PROCEDURE — 99215 PR OFFICE/OUTPT VISIT, EST, LEVL V, 40-54 MIN: ICD-10-PCS | Mod: S$PBB,,, | Performed by: FAMILY MEDICINE

## 2019-09-23 PROCEDURE — 99215 OFFICE O/P EST HI 40 MIN: CPT | Mod: S$PBB,,, | Performed by: FAMILY MEDICINE

## 2019-09-23 PROCEDURE — 99999 PR PBB SHADOW E&M-EST. PATIENT-LVL III: CPT | Mod: PBBFAC,,, | Performed by: FAMILY MEDICINE

## 2019-09-23 PROCEDURE — 99999 PR PBB SHADOW E&M-EST. PATIENT-LVL III: ICD-10-PCS | Mod: PBBFAC,,, | Performed by: FAMILY MEDICINE

## 2019-09-23 PROCEDURE — 99213 OFFICE O/P EST LOW 20 MIN: CPT | Mod: PBBFAC,PO | Performed by: FAMILY MEDICINE

## 2019-09-23 RX ORDER — FLUTICASONE PROPIONATE AND SALMETEROL XINAFOATE 230; 21 UG/1; UG/1
2 AEROSOL, METERED RESPIRATORY (INHALATION) 2 TIMES DAILY
Qty: 12 G | Refills: 0 | Status: SHIPPED | OUTPATIENT
Start: 2019-09-23 | End: 2019-11-25 | Stop reason: SDUPTHER

## 2019-09-23 RX ORDER — FUROSEMIDE 40 MG/1
40 TABLET ORAL 2 TIMES DAILY PRN
Qty: 60 TABLET | Refills: 11 | Status: SHIPPED | OUTPATIENT
Start: 2019-09-23 | End: 2021-02-07 | Stop reason: SDUPTHER

## 2019-09-23 RX ORDER — GABAPENTIN 600 MG/1
TABLET ORAL
Qty: 180 TABLET | Refills: 11 | Status: SHIPPED | OUTPATIENT
Start: 2019-09-23 | End: 2020-12-08 | Stop reason: SDUPTHER

## 2019-09-23 RX ORDER — MELOXICAM 7.5 MG/1
7.5 TABLET ORAL DAILY
Qty: 90 TABLET | Refills: 3 | Status: SHIPPED | OUTPATIENT
Start: 2019-09-23 | End: 2019-11-25 | Stop reason: SDUPTHER

## 2019-09-23 RX ORDER — BUPROPION HYDROCHLORIDE 300 MG/1
300 TABLET ORAL DAILY
Qty: 90 TABLET | Refills: 3 | Status: SHIPPED | OUTPATIENT
Start: 2019-09-23 | End: 2019-11-25 | Stop reason: SDUPTHER

## 2019-09-23 NOTE — PROGRESS NOTES
"Chief Complaint   Patient presents with    Chronic Pain     SUBJECTIVE:  Yasmeen Valderrama is a 49 y.o. female    OBJECTIVE:  /80   Pulse 92   Temp 97.9 °F (36.6 °C) (Oral)   Ht 5' 3" (1.6 m)   Wt 76.8 kg (169 lb 5 oz)   SpO2 (!) 91%   BMI 29.99 kg/m²     ASSESSMENT:  1. Anxiety    2. Chronic obstructive pulmonary disease, unspecified COPD type    3. Postmenopausal symptoms    4. Chronic pain syndrome      PLAN:  Yasmeen was seen today for chronic pain.    Diagnoses and all orders for this visit:    Anxiety  -     buPROPion (WELLBUTRIN XL) 300 MG 24 hr tablet; Take 1 tablet (300 mg total) by mouth once daily.    Chronic obstructive pulmonary disease, unspecified COPD type  -     furosemide (LASIX) 40 MG tablet; Take 1 tablet (40 mg total) by mouth 2 (two) times daily as needed.  -     fluticasone-salmeterol 230-21 mcg/dose (ADVAIR HFA) 230-21 mcg/actuation HFAA inhaler; Inhale 2 puffs into the lungs 2 (two) times daily. Controller    Postmenopausal symptoms    Chronic pain syndrome  -     gabapentin (NEURONTIN) 600 MG tablet; 2 tablets PO TID for chronic pain  -     meloxicam (MOBIC) 7.5 MG tablet; Take 1 tablet (7.5 mg total) by mouth once daily.      You do have evidence of chronic dehydration.  Drink fluids only with food as much as possible.  Avoid cold fluids where you can and treat anytime you sweat with a small salty snack (4-5 salted nuts or chips, 1-2 crackers or a bite of beef jerky) with 6-8 oz of fluid.  Repeat every 30 minutes that you continue to sweat.    "

## 2019-09-24 DIAGNOSIS — N95.9 POSTMENOPAUSAL SYMPTOMS: ICD-10-CM

## 2019-09-24 RX ORDER — VENLAFAXINE 37.5 MG/1
37.5 TABLET ORAL NIGHTLY PRN
Qty: 60 TABLET | Refills: 11 | Status: SHIPPED | OUTPATIENT
Start: 2019-09-24 | End: 2022-06-28

## 2019-09-24 NOTE — TELEPHONE ENCOUNTER
----- Message from Aruea Mace sent at 9/24/2019 11:36 AM CDT -----  Contact: SELF  Type: RX Refill Request    Who Called: self    Refill or New Rx:refill    RX Name and Strength:venlafaxine (EFFEXOR) 37.5 MG Tab      Preferred Pharmacy with phone number:[facility] - [place] [address] [phone] (Phone)  [phone] (Fax)      Local or Mail Order:local    Ordering Provider:Dr Beauchamp    Would the patient rather a call back or a response via [facility]? call    Best Call Back Number:[phone]

## 2019-09-30 LAB — HEMOCCULT STL QL IA: NEGATIVE

## 2019-10-17 ENCOUNTER — TELEPHONE (OUTPATIENT)
Dept: FAMILY MEDICINE | Facility: CLINIC | Age: 49
End: 2019-10-17

## 2019-10-17 NOTE — TELEPHONE ENCOUNTER
Return call to Pt, and informed that she would have to see Provider and be evaluated before any test can be ordered. She acknowledged understanding.

## 2019-10-17 NOTE — TELEPHONE ENCOUNTER
----- Message from Lyly May sent at 10/17/2019  9:56 AM CDT -----  Contact: Self   Type: Patient Call Back    Who called: Self     What is the request in detail:patient would like to know how does she take the test to get more oxygen. Please call to advise     Can the clinic reply by [facility]? No     Would the patient rather a call back or a response via [facility]?  Call     Best call back number:[phone]    Additional Information:patient would also like orders for a CT of her head and neck  Because she has Migraines and headaches.

## 2019-10-18 ENCOUNTER — PATIENT MESSAGE (OUTPATIENT)
Dept: FAMILY MEDICINE | Facility: CLINIC | Age: 49
End: 2019-10-18

## 2019-10-18 DIAGNOSIS — G89.29 CHRONIC INTRACTABLE HEADACHE, UNSPECIFIED HEADACHE TYPE: Primary | ICD-10-CM

## 2019-10-18 DIAGNOSIS — R51.9 CHRONIC INTRACTABLE HEADACHE, UNSPECIFIED HEADACHE TYPE: Primary | ICD-10-CM

## 2019-10-18 NOTE — TELEPHONE ENCOUNTER
----- Message from Batool Davies sent at 10/18/2019 11:18 AM CDT -----  Contact: Self [phone]  PT called to reschedule PFT test. She can be reached at [phone].

## 2019-10-18 NOTE — TELEPHONE ENCOUNTER
Patient informed of number to [facility] Scheduling Department [phone] to schedule PFT test.     Patient verbalized understanding.

## 2019-10-23 ENCOUNTER — HOSPITAL ENCOUNTER (OUTPATIENT)
Dept: RESPIRATORY THERAPY | Facility: HOSPITAL | Age: 49
Discharge: HOME | End: 2019-10-23
Attending: FAMILY MEDICINE
Payer: MEDICAID

## 2019-10-23 VITALS — RESPIRATION RATE: 21 BRPM | OXYGEN SATURATION: 98 % | HEART RATE: 100 BPM

## 2019-10-23 DIAGNOSIS — J44.9 CHRONIC OBSTRUCTIVE PULMONARY DISEASE, UNSPECIFIED COPD TYPE: ICD-10-CM

## 2019-10-23 DIAGNOSIS — Z72.0 TOBACCO USE: ICD-10-CM

## 2019-10-23 DIAGNOSIS — J44.1 COPD EXACERBATION: ICD-10-CM

## 2019-10-23 PROCEDURE — 94729 DIFFUSING CAPACITY: CPT | Mod: 26,,, | Performed by: INTERNAL MEDICINE

## 2019-10-23 PROCEDURE — 94729 PR C02/MEMBANE DIFFUSE CAPACITY: ICD-10-PCS | Mod: 26,,, | Performed by: INTERNAL MEDICINE

## 2019-10-23 PROCEDURE — 94060 PR EVAL OF BRONCHOSPASM: ICD-10-PCS | Mod: 26,,, | Performed by: INTERNAL MEDICINE

## 2019-10-23 PROCEDURE — 25000242 PHARM REV CODE 250 ALT 637 W/ HCPCS: Performed by: FAMILY MEDICINE

## 2019-10-23 PROCEDURE — 94060 EVALUATION OF WHEEZING: CPT | Mod: 26,,, | Performed by: INTERNAL MEDICINE

## 2019-10-23 PROCEDURE — 94727 PR PULM FUNCTION TEST BY GAS: ICD-10-PCS | Mod: 26,,, | Performed by: INTERNAL MEDICINE

## 2019-10-23 PROCEDURE — 94727 GAS DIL/WSHOT DETER LNG VOL: CPT | Mod: 26,,, | Performed by: INTERNAL MEDICINE

## 2019-10-23 RX ORDER — ALBUTEROL SULFATE 2.5 MG/.5ML
2.5 SOLUTION RESPIRATORY (INHALATION) ONCE
Status: COMPLETED | OUTPATIENT
Start: 2019-10-23 | End: 2019-10-23

## 2019-10-23 RX ADMIN — ALBUTEROL SULFATE 2.5 MG: 2.5 SOLUTION RESPIRATORY (INHALATION) at 02:10

## 2019-10-24 DIAGNOSIS — J44.9 COPD MIXED TYPE: Primary | ICD-10-CM

## 2019-10-24 LAB
BRPFT: ABNORMAL
DLCO ADJ PRE: 16.37 ML/(MIN*MMHG) (ref 17.7–29.17)
DLCO SINGLE BREATH LLN: 17.7
DLCO SINGLE BREATH PRE REF: 69.9 %
DLCO SINGLE BREATH REF: 23.44
DLCOC SBVA LLN: 3.34
DLCOC SBVA PRE REF: 97.1 %
DLCOC SBVA REF: 4.91
DLCOC SINGLE BREATH LLN: 17.7
DLCOC SINGLE BREATH PRE REF: 69.9 %
DLCOC SINGLE BREATH REF: 23.44
DLCOVA LLN: 3.34
DLCOVA PRE REF: 97.1 %
DLCOVA PRE: 4.77 ML/(MIN*MMHG*L) (ref 3.34–6.49)
DLCOVA REF: 4.91
DLVAADJ PRE: 4.77 ML/(MIN*MMHG*L) (ref 3.34–6.49)
ERVN2 LLN: 0.92
ERVN2 PRE REF: 80.2 %
ERVN2 PRE: 0.74 L (ref 0.92–0.92)
ERVN2 REF: 0.92
FEF 25 75 CHG: -23.2 %
FEF 25 75 LLN: 1.45
FEF 25 75 POST REF: 15.4 %
FEF 25 75 PRE REF: 20.1 %
FEF 25 75 REF: 2.61
FET100 CHG: 35.3 %
FEV1 CHG: 1 %
FEV1 FVC CHG: -13 %
FEV1 FVC LLN: 69
FEV1 FVC POST REF: 63.2 %
FEV1 FVC PRE REF: 72.7 %
FEV1 FVC REF: 81
FEV1 LLN: 2.05
FEV1 POST REF: 37.8 %
FEV1 PRE REF: 37.4 %
FEV1 REF: 2.64
FRCN2 LLN: 1.81
FRCN2 PRE REF: 110.1 %
FRCN2 REF: 2.64
FVC CHG: 16.1 %
FVC LLN: 2.57
FVC POST REF: 59.4 %
FVC PRE REF: 51.2 %
FVC REF: 3.3
IVC PRE: 1.87 L (ref 2.57–4.03)
IVC SINGLE BREATH LLN: 2.57
IVC SINGLE BREATH PRE REF: 56.6 %
IVC SINGLE BREATH REF: 3.3
PEF CHG: -5.3 %
PEF LLN: 4.89
PEF POST REF: 47.5 %
PEF PRE REF: 50.2 %
PEF REF: 6.55
POST FEF 25 75: 0.4 L/S (ref 1.45–3.77)
POST FET 100: 8.99 SEC
POST FEV1 FVC: 50.92 % (ref 69.27–91.76)
POST FEV1: 1 L (ref 2.05–3.23)
POST FVC: 1.96 L (ref 2.57–4.03)
POST PEF: 3.11 L/S (ref 4.89–8.2)
PRE DLCO: 16.37 ML/(MIN*MMHG) (ref 17.7–29.17)
PRE FEF 25 75: 0.53 L/S (ref 1.45–3.77)
PRE FET 100: 6.65 SEC
PRE FEV1 FVC: 58.53 % (ref 69.27–91.76)
PRE FEV1: 0.99 L (ref 2.05–3.23)
PRE FRC N2: 2.9 L
PRE FVC: 1.69 L (ref 2.57–4.03)
PRE PEF: 3.29 L/S (ref 4.89–8.2)
RVN2 LLN: 1.14
RVN2 PRE REF: 126.2 %
RVN2 PRE: 2.16 L (ref 1.14–2.29)
RVN2 REF: 1.71
RVN2TLCN2 LLN: 26.71
RVN2TLCN2 PRE REF: 144.8 %
RVN2TLCN2 PRE: 52.55 % (ref 26.71–45.89)
RVN2TLCN2 REF: 36.3
TLCN2 LLN: 3.78
TLCN2 PRE REF: 86.2 %
TLCN2 PRE: 4.11 L (ref 3.78–5.76)
TLCN2 REF: 4.77
VA PRE: 3.43 L (ref 4.62–4.62)
VA SINGLE BREATH LLN: 4.62
VA SINGLE BREATH PRE REF: 74.3 %
VA SINGLE BREATH REF: 4.62
VCMAXN2 LLN: 2.57
VCMAXN2 PRE REF: 59.1 %
VCMAXN2 PRE: 1.95 L (ref 2.57–4.03)
VCMAXN2 REF: 3.3

## 2019-10-28 ENCOUNTER — PATIENT MESSAGE (OUTPATIENT)
Dept: FAMILY MEDICINE | Facility: CLINIC | Age: 49
End: 2019-10-28

## 2019-11-08 ENCOUNTER — HOSPITAL ENCOUNTER (OUTPATIENT)
Dept: RESPIRATORY THERAPY | Facility: HOSPITAL | Age: 49
Discharge: HOME | End: 2019-11-08
Attending: FAMILY MEDICINE
Payer: MEDICAID

## 2019-11-08 DIAGNOSIS — J44.1 COPD EXACERBATION: ICD-10-CM

## 2019-11-08 DIAGNOSIS — J44.9 COPD MIXED TYPE: ICD-10-CM

## 2019-11-08 DIAGNOSIS — J44.9 CHRONIC OBSTRUCTIVE PULMONARY DISEASE, UNSPECIFIED COPD TYPE: ICD-10-CM

## 2019-11-08 DIAGNOSIS — Z72.0 TOBACCO USE: ICD-10-CM

## 2019-11-08 PROCEDURE — 94618 PULMONARY STRESS TESTING: CPT

## 2019-11-11 ENCOUNTER — PATIENT MESSAGE (OUTPATIENT)
Dept: FAMILY MEDICINE | Facility: CLINIC | Age: 49
End: 2019-11-11

## 2019-11-11 ENCOUNTER — PATIENT MESSAGE (OUTPATIENT)
Dept: ADMINISTRATIVE | Facility: HOSPITAL | Age: 49
End: 2019-11-11

## 2019-11-11 ENCOUNTER — PATIENT OUTREACH (OUTPATIENT)
Dept: ADMINISTRATIVE | Facility: HOSPITAL | Age: 49
End: 2019-11-11

## 2019-11-11 ENCOUNTER — TELEPHONE (OUTPATIENT)
Dept: FAMILY MEDICINE | Facility: CLINIC | Age: 49
End: 2019-11-11

## 2019-11-11 DIAGNOSIS — F17.200 TOBACCO USE DISORDER: Primary | ICD-10-CM

## 2019-11-11 DIAGNOSIS — Z99.81 ON HOME OXYGEN THERAPY: ICD-10-CM

## 2019-11-11 DIAGNOSIS — Z12.31 ENCOUNTER FOR SCREENING MAMMOGRAM FOR BREAST CANCER: Primary | ICD-10-CM

## 2019-11-11 DIAGNOSIS — J43.0 UNILATERAL EMPHYSEMA: ICD-10-CM

## 2019-11-11 NOTE — TELEPHONE ENCOUNTER
Six minute walk distance is 285 meters (934 feet) with maximal heavy dyspnea.  During exercise, there was significant desaturation from 90% and 85% while breathing room air. Oxygen  saturation did not improve while using supplemental oxygen at 2 lpm.  Both blood pressure and heart rate remained stable with walking. Tachycardia was present prior to testing.  Significant exercise impairment is likely due to oxygen desaturation and subjective symptoms.  The patient did complete the study, walking 311 seconds of the 360 second test.  The patient may benefit from using supplemental oxygen during activity.  Based upon age and body mass index, exercise capacity is less than predicted.  (Physician 11/09/2019 03:08AM, Dr. Jose Varela / Final: 11/09/2019 03:08AM, Dr. Jose Varela)    Will need more time with oxygen  She felt better subjectively

## 2019-11-11 NOTE — TELEPHONE ENCOUNTER
----- Message from Clovis Beauchamp MD sent at 11/11/2019  7:34 AM CST -----  Send order for oxygen

## 2019-11-14 ENCOUNTER — TELEPHONE (OUTPATIENT)
Dept: FAMILY MEDICINE | Facility: CLINIC | Age: 49
End: 2019-11-14

## 2019-11-14 NOTE — TELEPHONE ENCOUNTER
Called insurance to process oxygen tank.  Incorrect number, linked to fax number.  Will try to locate correct number.

## 2019-11-14 NOTE — TELEPHONE ENCOUNTER
----- Message from Ashia Cobb sent at 11/14/2019  9:50 AM CST -----  Contact: self  Type: Patient Call Back    What is the request in detail: Pt calling to speak to a nurse regarding oxygen tank. pt states that she was told by ins comp that the process of getting a tank fast to her can be by calling the ins.    Can the clinic reply by [facility]? No    Would the patient rather a call back or a response via [facility]? Call back     Best call back number: [facility] [phone]/ medicaid [phone]

## 2019-11-18 ENCOUNTER — PATIENT MESSAGE (OUTPATIENT)
Dept: FAMILY MEDICINE | Facility: CLINIC | Age: 49
End: 2019-11-18

## 2019-11-18 ENCOUNTER — TELEPHONE (OUTPATIENT)
Dept: FAMILY MEDICINE | Facility: CLINIC | Age: 49
End: 2019-11-18

## 2019-11-18 NOTE — TELEPHONE ENCOUNTER
Return call to Pt, and she was inquiring about her Oxygen. Informed her that we tried to fax her orders but the fax number that was given was incorrect. She gave me the correct fax number, and I faxed orders to Medicaid and [facility].

## 2019-11-21 ENCOUNTER — TELEPHONE (OUTPATIENT)
Dept: FAMILY MEDICINE | Facility: CLINIC | Age: 49
End: 2019-11-21

## 2019-11-21 NOTE — TELEPHONE ENCOUNTER
----- Message from Sol Knutson sent at 11/21/2019  9:30 AM CST -----  Contact: MARCOS HUSSEIN [[id number]]  Name of Who is Calling:  MARCOS HUSSEIN [[id number]]      What is the request in detail:Pt is calling to check on the status of her oxygen machine .Please call to further assist.      [facility] Prefer a [facility] call       [facility] [phone]

## 2019-11-25 ENCOUNTER — OFFICE VISIT (OUTPATIENT)
Dept: FAMILY MEDICINE | Facility: CLINIC | Age: 49
End: 2019-11-25
Payer: MEDICAID

## 2019-11-25 VITALS
HEART RATE: 91 BPM | SYSTOLIC BLOOD PRESSURE: 130 MMHG | HEIGHT: 65 IN | DIASTOLIC BLOOD PRESSURE: 80 MMHG | BODY MASS INDEX: 26.08 KG/M2 | TEMPERATURE: 98 F | OXYGEN SATURATION: 95 % | WEIGHT: 156.5 LBS

## 2019-11-25 DIAGNOSIS — F41.9 ANXIETY: ICD-10-CM

## 2019-11-25 DIAGNOSIS — G44.021 INTRACTABLE CHRONIC CLUSTER HEADACHE: ICD-10-CM

## 2019-11-25 DIAGNOSIS — G89.4 CHRONIC PAIN SYNDROME: ICD-10-CM

## 2019-11-25 DIAGNOSIS — J44.9 CHRONIC OBSTRUCTIVE PULMONARY DISEASE, UNSPECIFIED COPD TYPE: ICD-10-CM

## 2019-11-25 DIAGNOSIS — Z99.81 ON HOME OXYGEN THERAPY: ICD-10-CM

## 2019-11-25 DIAGNOSIS — F17.200 TOBACCO USE DISORDER: ICD-10-CM

## 2019-11-25 DIAGNOSIS — M48.02 CERVICAL STENOSIS OF SPINE: ICD-10-CM

## 2019-11-25 DIAGNOSIS — R49.0 CHRONIC HOARSENESS: ICD-10-CM

## 2019-11-25 DIAGNOSIS — G89.4 CHRONIC PAIN SYNDROME: Primary | ICD-10-CM

## 2019-11-25 DIAGNOSIS — R06.02 SOB (SHORTNESS OF BREATH): ICD-10-CM

## 2019-11-25 PROCEDURE — 99999 PR PBB SHADOW E&M-EST. PATIENT-LVL III: ICD-10-PCS | Mod: PBBFAC,,, | Performed by: FAMILY MEDICINE

## 2019-11-25 PROCEDURE — 99215 OFFICE O/P EST HI 40 MIN: CPT | Mod: S$PBB,,, | Performed by: FAMILY MEDICINE

## 2019-11-25 PROCEDURE — 99213 OFFICE O/P EST LOW 20 MIN: CPT | Mod: PBBFAC,PO | Performed by: FAMILY MEDICINE

## 2019-11-25 PROCEDURE — 99215 PR OFFICE/OUTPT VISIT, EST, LEVL V, 40-54 MIN: ICD-10-PCS | Mod: S$PBB,,, | Performed by: FAMILY MEDICINE

## 2019-11-25 PROCEDURE — 99999 PR PBB SHADOW E&M-EST. PATIENT-LVL III: CPT | Mod: PBBFAC,,, | Performed by: FAMILY MEDICINE

## 2019-11-25 RX ORDER — BUPROPION HYDROCHLORIDE 300 MG/1
TABLET ORAL
Qty: 90 TABLET | Refills: 3 | Status: SHIPPED | OUTPATIENT
Start: 2019-11-25 | End: 2020-01-31

## 2019-11-25 RX ORDER — LEVOFLOXACIN 500 MG/1
500 TABLET, FILM COATED ORAL DAILY
Qty: 10 TABLET | Refills: 0 | Status: SHIPPED | OUTPATIENT
Start: 2019-11-25 | End: 2019-12-05

## 2019-11-25 RX ORDER — PREDNISONE 20 MG/1
40 TABLET ORAL DAILY
Qty: 60 TABLET | Refills: 0 | Status: SHIPPED | OUTPATIENT
Start: 2019-11-25 | End: 2020-04-22 | Stop reason: ALTCHOICE

## 2019-11-25 RX ORDER — FUROSEMIDE 40 MG/1
TABLET ORAL
Qty: 60 TABLET | Refills: 11 | Status: SHIPPED | OUTPATIENT
Start: 2019-11-25 | End: 2020-01-31

## 2019-11-25 RX ORDER — MELOXICAM 7.5 MG/1
TABLET ORAL
Qty: 90 TABLET | Refills: 3 | Status: SHIPPED | OUTPATIENT
Start: 2019-11-25 | End: 2021-02-09 | Stop reason: SDUPTHER

## 2019-11-25 RX ORDER — FLUTICASONE PROPIONATE AND SALMETEROL XINAFOATE 230; 21 UG/1; UG/1
2 AEROSOL, METERED RESPIRATORY (INHALATION) 2 TIMES DAILY
Qty: 12 G | Refills: 11 | Status: SHIPPED | OUTPATIENT
Start: 2019-11-25 | End: 2022-06-28

## 2019-11-25 NOTE — PROGRESS NOTES
Chief Complaint   Patient presents with    Migraine    COPD       SUBJECTIVE:  Yasmeen Valderrama is a 49 y.o. female here for new problem of here to f/u on her chornic COPD and hypoxia and qualified for oxygen and she is feeling like she can't breathe, Rowan, more cough and more phlegm.    Currently has co-morbidities including per problem list.    Answers for HPI/ROS submitted by the patient on 11/23/2019   Shortness of breath  Chronicity: chronic  Onset: more than 1 year ago  Frequency: constantly  Progression since onset: rapidly worsening  Episode duration: 30 days  coryza: No  syncope: No  Aggravating factors: emotional upset, occupational exposure, smoke, animal exposure, exercise, odors, URIs, any activity, fumes, pollens, weather changes, eating, lying flat  Improvement on treatment: mild  Risk factors for DVT/PE: prolonged immobilization, smoking  Treatments tried: beta agonist inhalers, body position changes, cool air, oral steroids, rest, steroid inhalers  asthma: Yes  allergies: Yes  COPD: Yes  pneumonia: Yes  aspirin allergies: No  CAD: No  DVT: No  heart failure: No  PE: No  recent surgery: No  bronchiolitis: Yes  chronic lung disease: Yes    Past Medical History:   Diagnosis Date    Anxiety     Asthma     Carpal tunnel syndrome, bilateral     COPD (chronic obstructive pulmonary disease)     Vocal cord mass 6/2/2017    Right side with CT scan Referred to ENT for visualization     Past Surgical History:   Procedure Laterality Date    CARPAL TUNNEL RELEASE      FINGER SURGERY      HYSTERECTOMY      OOPHORECTOMY  1996    Hysterectomy     Social History     Socioeconomic History    Marital status:      Spouse name: Not on file    Number of children: Not on file    Years of education: Not on file    Highest education level: Not on file   Occupational History    Not on file   Social Needs    Financial resource strain: Hard    Food insecurity:     Worry: Often true     Inability:  Never true    Transportation needs:     Medical: No     Non-medical: No   Tobacco Use    Smoking status: Current Every Day Smoker     Packs/day: 2.00     Years: 35.00     Pack years: 70.00     Types: Cigarettes     Start date: 12/1/1983    Smokeless tobacco: Never Used   Substance and Sexual Activity    Alcohol use: No     Alcohol/week: 0.0 standard drinks     Frequency: Never     Drinks per session: Patient refused     Binge frequency: Never    Drug use: No    Sexual activity: Yes     Partners: Male   Lifestyle    Physical activity:     Days per week: 0 days     Minutes per session: 0 min    Stress: Very much   Relationships    Social connections:     Talks on phone: Once a week     Gets together: Never     Attends Anabaptist service: Not on file     Active member of club or organization: No     Attends meetings of clubs or organizations: Never     Relationship status:    Other Topics Concern    Not on file   Social History Narrative    Not on file     Family History   Problem Relation Age of Onset    COPD Mother     Heart disease Father     No Known Problems Sister     No Known Problems Brother      Current Outpatient Medications on File Prior to Visit   Medication Sig Dispense Refill    albuterol (PROVENTIL) 2.5 mg /3 mL (0.083 %) nebulizer solution Take 3 mLs (2.5 mg total) by nebulization every 6 (six) hours as needed for Wheezing. 1 Box 11    albuterol (PROVENTIL/VENTOLIN HFA) 90 mcg/actuation inhaler INHALE TWO PUFFS BY MOUTH INTO THE LUNGS EVERY 6 HOURS AS NEEDED FOR WHEEZING 18 g 5    fluticasone-salmeterol 230-21 mcg/dose (ADVAIR HFA) 230-21 mcg/actuation HFAA inhaler Inhale 2 puffs into the lungs 2 (two) times daily. Controller 12 g 0    furosemide (LASIX) 40 MG tablet Take 1 tablet (40 mg total) by mouth 2 (two) times daily as needed. 60 tablet 11    gabapentin (NEURONTIN) 600 MG tablet 2 tablets PO TID for chronic pain 180 tablet 11    LIDOCAINE VISCOUS 2 % solution Can switch  "to equivalent, 10 ml swish and gargle 4 x daily 100 mL 11    SUBOXONE 8-2 mg Film PLACE 1 FILM UNDER TONGUE TWICE DAILY.  2    venlafaxine (EFFEXOR) 37.5 MG Tab Take 1 tablet (37.5 mg total) by mouth nightly as needed. 60 tablet 11    [DISCONTINUED] buPROPion (WELLBUTRIN XL) 300 MG 24 hr tablet Take 1 tablet (300 mg total) by mouth once daily. 90 tablet 3    [DISCONTINUED] meloxicam (MOBIC) 7.5 MG tablet Take 1 tablet (7.5 mg total) by mouth once daily. 90 tablet 3     No current facility-administered medications on file prior to visit.      Review of patient's allergies indicates:   Allergen Reactions    Antivert [meclizine] Other (See Comments)     Behavioral changes         ROS  Per HPI    OBJECTIVE:  /80   Pulse 91   Temp 97.6 °F (36.4 °C) (Oral)   Ht 5' 5" (1.651 m)   Wt 71 kg (156 lb 8.4 oz)   SpO2 95%   BMI 26.05 kg/m²     Wt Readings from Last 3 Encounters:   11/25/19 71 kg (156 lb 8.4 oz)   09/23/19 76.8 kg (169 lb 5 oz)   08/12/19 73.6 kg (162 lb 4.1 oz)     BP Readings from Last 3 Encounters:   11/25/19 130/80   09/23/19 130/80   08/12/19 120/74       She appears chronically ill, in no apparent distress.  Alert and oriented times three, pleasant and cooperative. Vital signs are as documented in vital signs section.  S1 and S2 normal, no murmurs, clicks, gallops or rubs. Regular rate and rhythm. Chest is clear; no wheezes or rales. No edema or JVD.      Review of old Records:  Reviewed per epic and [facility]    Review of old labs:  Lab Results   Component Value Date    TSH 1.494 06/22/2018     Lab Results   Component Value Date    WBC 10.30 06/22/2018    HGB 15.2 06/22/2018    HCT 45.3 06/22/2018    MCV 95 06/22/2018     06/22/2018       Chemistry        Component Value Date/Time     06/22/2018 0829    K 4.9 06/22/2018 0829     06/22/2018 0829    CO2 31 (H) 06/22/2018 0829    BUN 9 06/22/2018 0829    CREATININE 0.7 06/22/2018 0829    GLU 95 06/22/2018 0829        Component " Value Date/Time    CALCIUM 10.0 06/22/2018 0829    ALKPHOS 115 06/22/2018 0829    AST 17 06/22/2018 0829    ALT 16 06/22/2018 0829    BILITOT 1.5 (H) 06/22/2018 0829    ESTGFRAFRICA >60 06/22/2018 0829    EGFRNONAA >60 06/22/2018 0829        Lab Results   Component Value Date    CHOL 168 06/22/2018    CHOL 182 09/09/2015     Lab Results   Component Value Date    HDL 60 06/22/2018    HDL 54 09/09/2015     Lab Results   Component Value Date    LDLCALC 93.2 06/22/2018    LDLCALC 110.0 09/09/2015     Lab Results   Component Value Date    TRIG 74 06/22/2018    TRIG 90 09/09/2015     Lab Results   Component Value Date    CHOLHDL 35.7 06/22/2018    CHOLHDL 29.7 09/09/2015         Review of old imaging:  Reviewed the test on the oxygen      ASSESSMENT:  Problem List Items Addressed This Visit     Tobacco use disorder    SOB (shortness of breath)    On home oxygen therapy    Chronic pain syndrome - Primary    Chronic hoarseness    Cervical stenosis of spine          ICD-10-CM ICD-9-CM   1. Chronic pain syndrome G89.4 338.4   2. Chronic hoarseness R49.0 784.42   3. Cervical stenosis of spine M48.02 723.0   4. On home oxygen therapy Z99.81 V46.2   5. SOB (shortness of breath) R06.02 786.05   6. Tobacco use disorder F17.200 305.1         PLAN:  1. Chronic pain syndrome  Continue with the suboxone    2. Chronic hoarseness  The current medical regimen is effective;  continue present plan and medications.      3. Cervical stenosis of spine  The current medical regimen is effective;  continue present plan and medications.      4. On home oxygen therapy  She needs to have her oxygen therapy deliverred  Sent the testing on then.    5. SOB (shortness of breath)  Use home oxygen  We will treat for COPD exacerbation as well.    6. Tobacco use disorder  Continue to wor4k towards being smoke free.    Spent >40 (42) minutes with the patient with 1/2 time in face to face counseling about the above.    Medication List with Changes/Refills    Current Medications    ALBUTEROL (PROVENTIL) 2.5 MG /3 ML (0.083 %) NEBULIZER SOLUTION    Take 3 mLs (2.5 mg total) by nebulization every 6 (six) hours as needed for Wheezing.    ALBUTEROL (PROVENTIL/VENTOLIN HFA) 90 MCG/ACTUATION INHALER    INHALE TWO PUFFS BY MOUTH INTO THE LUNGS EVERY 6 HOURS AS NEEDED FOR WHEEZING    BUPROPION (WELLBUTRIN XL) 300 MG 24 HR TABLET    TAKE ONE TABLET BY MOUTH EVERY DAY    FLUTICASONE-SALMETEROL 230-21 MCG/DOSE (ADVAIR HFA) 230-21 MCG/ACTUATION HFAA INHALER    Inhale 2 puffs into the lungs 2 (two) times daily. Controller    FUROSEMIDE (LASIX) 40 MG TABLET    Take 1 tablet (40 mg total) by mouth 2 (two) times daily as needed.    FUROSEMIDE (LASIX) 40 MG TABLET    TAKE ONE TABLET BY MOUTH TWICE DAILY FOR FLUID.    GABAPENTIN (NEURONTIN) 600 MG TABLET    2 tablets PO TID for chronic pain    LIDOCAINE VISCOUS 2 % SOLUTION    Can switch to equivalent, 10 ml swish and gargle 4 x daily    MELOXICAM (MOBIC) 7.5 MG TABLET    TAKE ONE TABLET BY MOUTH EVERY DAY    SUBOXONE 8-2 MG FILM    PLACE 1 FILM UNDER TONGUE TWICE DAILY.    VENLAFAXINE (EFFEXOR) 37.5 MG TAB    Take 1 tablet (37.5 mg total) by mouth nightly as needed.       No follow-ups on file.

## 2019-11-29 ENCOUNTER — TELEPHONE (OUTPATIENT)
Dept: FAMILY MEDICINE | Facility: CLINIC | Age: 49
End: 2019-11-29

## 2019-11-29 NOTE — TELEPHONE ENCOUNTER
----- Message from Priscilla Varela sent at 11/29/2019 11:55 AM CST -----  Contact: Pt    Name of Who is Calling:MARCOS HUSSEIN [[id number]]    What is the request in detail: patient states she needs a new orders  COPD/oxygen Please contact to further discuss and advise    Can the clinic reply by [facility]:     What Number [facility] [phone]

## 2019-12-02 ENCOUNTER — PATIENT MESSAGE (OUTPATIENT)
Dept: FAMILY MEDICINE | Facility: CLINIC | Age: 49
End: 2019-12-02

## 2020-01-14 ENCOUNTER — PATIENT MESSAGE (OUTPATIENT)
Dept: FAMILY MEDICINE | Facility: CLINIC | Age: 50
End: 2020-01-14

## 2020-01-14 DIAGNOSIS — Z99.81 ON HOME OXYGEN THERAPY: ICD-10-CM

## 2020-01-14 DIAGNOSIS — R06.2 WHEEZING: Primary | ICD-10-CM

## 2020-01-29 ENCOUNTER — PATIENT OUTREACH (OUTPATIENT)
Dept: ADMINISTRATIVE | Facility: OTHER | Age: 50
End: 2020-01-29

## 2020-01-29 NOTE — PROGRESS NOTES
Chart reviewed.   Immunizations: updated  Orders placed: mammogram orders already placed  Upcoming appts: n/a

## 2020-01-31 ENCOUNTER — HOSPITAL ENCOUNTER (EMERGENCY)
Facility: HOSPITAL | Age: 50
Discharge: HOME | End: 2020-01-31
Attending: EMERGENCY MEDICINE
Payer: MEDICAID

## 2020-01-31 ENCOUNTER — LAB VISIT (OUTPATIENT)
Dept: LAB | Facility: HOSPITAL | Age: 50
End: 2020-01-31
Attending: PSYCHIATRY & NEUROLOGY
Payer: MEDICAID

## 2020-01-31 ENCOUNTER — OFFICE VISIT (OUTPATIENT)
Dept: NEUROLOGY | Facility: CLINIC | Age: 50
End: 2020-01-31
Payer: MEDICAID

## 2020-01-31 ENCOUNTER — TELEPHONE (OUTPATIENT)
Dept: NEUROLOGY | Facility: CLINIC | Age: 50
End: 2020-01-31

## 2020-01-31 VITALS
HEIGHT: 63 IN | TEMPERATURE: 98 F | WEIGHT: 176 LBS | RESPIRATION RATE: 22 BRPM | OXYGEN SATURATION: 94 % | DIASTOLIC BLOOD PRESSURE: 84 MMHG | HEART RATE: 71 BPM | BODY MASS INDEX: 31.18 KG/M2 | SYSTOLIC BLOOD PRESSURE: 134 MMHG

## 2020-01-31 VITALS
WEIGHT: 176.56 LBS | SYSTOLIC BLOOD PRESSURE: 140 MMHG | BODY MASS INDEX: 29.42 KG/M2 | HEIGHT: 65 IN | DIASTOLIC BLOOD PRESSURE: 85 MMHG | HEART RATE: 99 BPM

## 2020-01-31 DIAGNOSIS — E87.5 SERUM POTASSIUM ELEVATED: ICD-10-CM

## 2020-01-31 DIAGNOSIS — E86.0 DEHYDRATION: Primary | ICD-10-CM

## 2020-01-31 DIAGNOSIS — R51.9 PERSISTENT HEADACHES: Primary | ICD-10-CM

## 2020-01-31 DIAGNOSIS — R51.9 PERSISTENT HEADACHES: ICD-10-CM

## 2020-01-31 LAB
ALBUMIN SERPL BCP-MCNC: 4.1 G/DL (ref 3.5–5.2)
ALBUMIN SERPL BCP-MCNC: 4.5 G/DL (ref 3.5–5.2)
ALP SERPL-CCNC: 113 U/L (ref 55–135)
ALP SERPL-CCNC: 126 U/L (ref 55–135)
ALT SERPL W/O P-5'-P-CCNC: 14 U/L (ref 10–44)
ALT SERPL W/O P-5'-P-CCNC: 14 U/L (ref 10–44)
ANION GAP SERPL CALC-SCNC: 7 MMOL/L (ref 8–16)
ANION GAP SERPL CALC-SCNC: 8 MMOL/L (ref 8–16)
AST SERPL-CCNC: 15 U/L (ref 10–40)
AST SERPL-CCNC: 17 U/L (ref 10–40)
BASOPHILS # BLD AUTO: 0.08 K/UL (ref 0–0.2)
BASOPHILS # BLD AUTO: 0.11 K/UL (ref 0–0.2)
BASOPHILS NFR BLD: 0.7 % (ref 0–1.9)
BASOPHILS NFR BLD: 0.8 % (ref 0–1.9)
BILIRUB SERPL-MCNC: 0.4 MG/DL (ref 0.1–1)
BILIRUB SERPL-MCNC: 0.5 MG/DL (ref 0.1–1)
BUN SERPL-MCNC: 14 MG/DL (ref 6–20)
BUN SERPL-MCNC: 15 MG/DL (ref 6–20)
CALCIUM SERPL-MCNC: 10 MG/DL (ref 8.7–10.5)
CALCIUM SERPL-MCNC: 9.9 MG/DL (ref 8.7–10.5)
CHLORIDE SERPL-SCNC: 101 MMOL/L (ref 95–110)
CHLORIDE SERPL-SCNC: 98 MMOL/L (ref 95–110)
CO2 SERPL-SCNC: 36 MMOL/L (ref 23–29)
CO2 SERPL-SCNC: 36 MMOL/L (ref 23–29)
CREAT SERPL-MCNC: 0.7 MG/DL (ref 0.5–1.4)
CREAT SERPL-MCNC: 0.8 MG/DL (ref 0.5–1.4)
CRP SERPL-MCNC: 2.3 MG/L (ref 0–8.2)
DIFFERENTIAL METHOD: ABNORMAL
DIFFERENTIAL METHOD: ABNORMAL
EOSINOPHIL # BLD AUTO: 0.4 K/UL (ref 0–0.5)
EOSINOPHIL # BLD AUTO: 0.6 K/UL (ref 0–0.5)
EOSINOPHIL NFR BLD: 3.6 % (ref 0–8)
EOSINOPHIL NFR BLD: 4.2 % (ref 0–8)
ERYTHROCYTE [DISTWIDTH] IN BLOOD BY AUTOMATED COUNT: 13.9 % (ref 11.5–14.5)
ERYTHROCYTE [DISTWIDTH] IN BLOOD BY AUTOMATED COUNT: 14.1 % (ref 11.5–14.5)
ERYTHROCYTE [SEDIMENTATION RATE] IN BLOOD BY WESTERGREN METHOD: 3 MM/HR (ref 0–20)
EST. GFR  (AFRICAN AMERICAN): >60 ML/MIN/1.73 M^2
EST. GFR  (AFRICAN AMERICAN): >60 ML/MIN/1.73 M^2
EST. GFR  (NON AFRICAN AMERICAN): >60 ML/MIN/1.73 M^2
EST. GFR  (NON AFRICAN AMERICAN): >60 ML/MIN/1.73 M^2
GLUCOSE SERPL-MCNC: 100 MG/DL (ref 70–110)
GLUCOSE SERPL-MCNC: 91 MG/DL (ref 70–110)
HCT VFR BLD AUTO: 48.7 % (ref 37–48.5)
HCT VFR BLD AUTO: 51.9 % (ref 37–48.5)
HGB BLD-MCNC: 14.8 G/DL (ref 12–16)
HGB BLD-MCNC: 15.9 G/DL (ref 12–16)
IMM GRANULOCYTES # BLD AUTO: 0.07 K/UL (ref 0–0.04)
IMM GRANULOCYTES # BLD AUTO: 0.09 K/UL (ref 0–0.04)
IMM GRANULOCYTES NFR BLD AUTO: 0.6 % (ref 0–0.5)
IMM GRANULOCYTES NFR BLD AUTO: 0.6 % (ref 0–0.5)
LYMPHOCYTES # BLD AUTO: 3.6 K/UL (ref 1–4.8)
LYMPHOCYTES # BLD AUTO: 5.3 K/UL (ref 1–4.8)
LYMPHOCYTES NFR BLD: 29.3 % (ref 18–48)
LYMPHOCYTES NFR BLD: 36.5 % (ref 18–48)
MCH RBC QN AUTO: 29.4 PG (ref 27–31)
MCH RBC QN AUTO: 29.8 PG (ref 27–31)
MCHC RBC AUTO-ENTMCNC: 30.4 G/DL (ref 32–36)
MCHC RBC AUTO-ENTMCNC: 30.6 G/DL (ref 32–36)
MCV RBC AUTO: 96 FL (ref 82–98)
MCV RBC AUTO: 98 FL (ref 82–98)
MONOCYTES # BLD AUTO: 0.9 K/UL (ref 0.3–1)
MONOCYTES # BLD AUTO: 1 K/UL (ref 0.3–1)
MONOCYTES NFR BLD: 7.1 % (ref 4–15)
MONOCYTES NFR BLD: 7.4 % (ref 4–15)
NEUTROPHILS # BLD AUTO: 7.2 K/UL (ref 1.8–7.7)
NEUTROPHILS # BLD AUTO: 7.3 K/UL (ref 1.8–7.7)
NEUTROPHILS NFR BLD: 50.8 % (ref 38–73)
NEUTROPHILS NFR BLD: 58.4 % (ref 38–73)
NRBC BLD-RTO: 0 /100 WBC
NRBC BLD-RTO: 0 /100 WBC
PLATELET # BLD AUTO: 271 K/UL (ref 150–350)
PLATELET # BLD AUTO: 288 K/UL (ref 150–350)
PMV BLD AUTO: 10 FL (ref 9.2–12.9)
PMV BLD AUTO: 10.2 FL (ref 9.2–12.9)
POTASSIUM SERPL-SCNC: 4.5 MMOL/L (ref 3.5–5.1)
POTASSIUM SERPL-SCNC: 5.8 MMOL/L (ref 3.5–5.1)
PROT SERPL-MCNC: 7.2 G/DL (ref 6–8.4)
PROT SERPL-MCNC: 7.9 G/DL (ref 6–8.4)
RBC # BLD AUTO: 4.97 M/UL (ref 4–5.4)
RBC # BLD AUTO: 5.4 M/UL (ref 4–5.4)
SODIUM SERPL-SCNC: 142 MMOL/L (ref 136–145)
SODIUM SERPL-SCNC: 144 MMOL/L (ref 136–145)
WBC # BLD AUTO: 12.29 K/UL (ref 3.9–12.7)
WBC # BLD AUTO: 14.42 K/UL (ref 3.9–12.7)

## 2020-01-31 PROCEDURE — 80053 COMPREHEN METABOLIC PANEL: CPT | Mod: 91

## 2020-01-31 PROCEDURE — 80053 COMPREHEN METABOLIC PANEL: CPT

## 2020-01-31 PROCEDURE — 99999 PR PBB SHADOW E&M-EST. PATIENT-LVL IV: CPT | Mod: PBBFAC,,, | Performed by: PSYCHIATRY & NEUROLOGY

## 2020-01-31 PROCEDURE — 93005 ELECTROCARDIOGRAM TRACING: CPT

## 2020-01-31 PROCEDURE — 99999 PR PBB SHADOW E&M-EST. PATIENT-LVL IV: ICD-10-PCS | Mod: PBBFAC,,, | Performed by: PSYCHIATRY & NEUROLOGY

## 2020-01-31 PROCEDURE — 93010 EKG 12-LEAD: ICD-10-PCS | Mod: ,,, | Performed by: INTERNAL MEDICINE

## 2020-01-31 PROCEDURE — 86140 C-REACTIVE PROTEIN: CPT

## 2020-01-31 PROCEDURE — 99204 PR OFFICE/OUTPT VISIT, NEW, LEVL IV, 45-59 MIN: ICD-10-PCS | Mod: S$PBB,,, | Performed by: PSYCHIATRY & NEUROLOGY

## 2020-01-31 PROCEDURE — 63600175 PHARM REV CODE 636 W HCPCS: Performed by: EMERGENCY MEDICINE

## 2020-01-31 PROCEDURE — 85652 RBC SED RATE AUTOMATED: CPT

## 2020-01-31 PROCEDURE — 93010 ELECTROCARDIOGRAM REPORT: CPT | Mod: ,,, | Performed by: INTERNAL MEDICINE

## 2020-01-31 PROCEDURE — 99214 OFFICE O/P EST MOD 30 MIN: CPT | Mod: PBBFAC | Performed by: PSYCHIATRY & NEUROLOGY

## 2020-01-31 PROCEDURE — 85025 COMPLETE CBC W/AUTO DIFF WBC: CPT | Mod: 91

## 2020-01-31 PROCEDURE — 36415 COLL VENOUS BLD VENIPUNCTURE: CPT

## 2020-01-31 PROCEDURE — 85025 COMPLETE CBC W/AUTO DIFF WBC: CPT

## 2020-01-31 PROCEDURE — 99284 EMERGENCY DEPT VISIT MOD MDM: CPT | Mod: 25,27

## 2020-01-31 PROCEDURE — 99204 OFFICE O/P NEW MOD 45 MIN: CPT | Mod: S$PBB,,, | Performed by: PSYCHIATRY & NEUROLOGY

## 2020-01-31 RX ORDER — POTASSIUM CHLORIDE 750 MG/1
CAPSULE, EXTENDED RELEASE ORAL
COMMUNITY
Start: 2020-01-30 | End: 2020-09-08

## 2020-01-31 RX ADMIN — SODIUM CHLORIDE 1000 ML: 0.9 INJECTION, SOLUTION INTRAVENOUS at 08:01

## 2020-01-31 NOTE — PROGRESS NOTES
Neurology Consult Note    Chief Complaint: headaches    HPI:   Yasmeen Valderrama is a 49 y.o. female with medical conditions as outlined below who presents for further evaluation of headaches. She states she used to get migraines as a teenager. She states her headaches as a teenager were associated with photophobia, phonophobia and nausea. She states her headaches had gone away for years and then started again in September 2019. She states since that time, she wakes up with a holocephalic severe throbbing headache. It is associated with photophobia, phonophobia, blurry vision, nausea and vomiting. She states it is an 11/10 pain. She reports taking an excedrin in the morning and then the headache subsides after a few hours. She states this occurs 3 times a week. She states she has been on and off oxygen for 15 years. She states when she started to get these headaches, she was short of breath and restarted using oxygen, however, she states the headaches have not improved with oxygen. She has no further complaints.    Past Medical History:  Past Medical History:   Diagnosis Date    Anxiety     Asthma     Carpal tunnel syndrome, bilateral     COPD (chronic obstructive pulmonary disease)     Vocal cord mass 6/2/2017    Right side with CT scan Referred to ENT for visualization       Past Surgical History:  Past Surgical History:   Procedure Laterality Date    CARPAL TUNNEL RELEASE      FINGER SURGERY      HYSTERECTOMY      OOPHORECTOMY  1996    Hysterectomy       Social History:  Social History     Socioeconomic History    Marital status:      Spouse name: Not on file    Number of children: Not on file    Years of education: Not on file    Highest education level: Not on file   Occupational History    Not on file   Social Needs    Financial resource strain: Hard    Food insecurity:     Worry: Often true     Inability: Never true    Transportation needs:     Medical: No     Non-medical: No    Tobacco Use    Smoking status: Current Every Day Smoker     Packs/day: 2.00     Years: 35.00     Pack years: 70.00     Types: Cigarettes     Start date: 12/1/1983    Smokeless tobacco: Never Used   Substance and Sexual Activity    Alcohol use: No     Alcohol/week: 0.0 standard drinks     Frequency: Never     Drinks per session: Patient refused     Binge frequency: Never    Drug use: No    Sexual activity: Yes     Partners: Male   Lifestyle    Physical activity:     Days per week: 0 days     Minutes per session: 0 min    Stress: Very much   Relationships    Social connections:     Talks on phone: Once a week     Gets together: Never     Attends Sabianist service: Not on file     Active member of club or organization: No     Attends meetings of clubs or organizations: Never     Relationship status:    Other Topics Concern    Not on file   Social History Narrative    Not on file       Family History:  Family History   Problem Relation Age of Onset    COPD Mother     Heart disease Father     No Known Problems Sister     No Known Problems Brother        Medications:  Current Outpatient Medications   Medication Sig Dispense Refill    albuterol (PROVENTIL) 2.5 mg /3 mL (0.083 %) nebulizer solution Take 3 mLs (2.5 mg total) by nebulization every 6 (six) hours as needed for Wheezing. 1 Box 11    albuterol (PROVENTIL/VENTOLIN HFA) 90 mcg/actuation inhaler INHALE TWO PUFFS BY MOUTH INTO THE LUNGS EVERY 6 HOURS AS NEEDED FOR WHEEZING 18 g 5    fluticasone-salmeterol 230-21 mcg/dose (ADVAIR HFA) 230-21 mcg/actuation HFAA inhaler Inhale 2 puffs into the lungs 2 (two) times daily. Controller 12 g 11    fluticasone-umeclidin-vilanter (TRELEGY ELLIPTA) 100-62.5-25 mcg DsDv 1 puff daily 60 each 5    furosemide (LASIX) 40 MG tablet Take 1 tablet (40 mg total) by mouth 2 (two) times daily as needed. 60 tablet 11    gabapentin (NEURONTIN) 600 MG tablet 2 tablets PO TID for chronic pain 180 tablet 11     LIDOCAINE VISCOUS 2 % solution Can switch to equivalent, 10 ml swish and gargle 4 x daily 100 mL 11    meloxicam (MOBIC) 7.5 MG tablet TAKE ONE TABLET BY MOUTH EVERY DAY 90 tablet 3    potassium chloride (MICRO-K) 10 MEQ CpSR       predniSONE (DELTASONE) 20 MG tablet Take 2 tablets (40 mg total) by mouth once daily. 60 tablet 0    SUBOXONE 8-2 mg Film PLACE 1 FILM UNDER TONGUE TWICE DAILY.  2    venlafaxine (EFFEXOR) 37.5 MG Tab Take 1 tablet (37.5 mg total) by mouth nightly as needed. 60 tablet 11     No current facility-administered medications for this visit.        Allergies:  Review of patient's allergies indicates:   Allergen Reactions    Antivert [meclizine] Other (See Comments)     Behavioral changes       ROS:  A 12 point review of system was negative aside from pertinent positives and negatives as outlined above.    Physical Exam  Vitals:    01/31/20 1436   BP: (!) 140/85   Pulse: 99       General: well nourished, well developed  Eyes: no scleral icterus   Nose: nasal turbinates intact  Neck: supple, ROM intact  Skin: no rash or ecchymosis  Joints: no swelling or erythema  Cardiac: regular rate and rhythm  Lungs: clear to auscultation bilaterally    Neuro:  Mental status: AAO x 3, no dysarthria, no aphasia, communicating appropriately  CN: PERRL, EOMI, VFF, V1-V3 sensation intact, no facial asymmetry, hearing grossly intact, tongue midline  Motor:   RUE 5/5  RLE 5/5  LUE 5/5  LLE 5/5    Normal bulk and tone    Reflexes: 1+ throughout, toes equivocal bilaterally  Sensory: intact to light touch throughout  Coordination: no dysmetria on FTN  Gait: steady    Prior Imaging/Labs:  No recent neuroimaging available for review      Assessment and Plan:    49 y.o. female with severe headaches likely 2/2 migraine, however, given the severity of headaches and that headaches had gone away for years and now recurred, will obtain basic labs and MRI/MRA imaging.    1. Persistent headaches  - CBC auto differential;  Future  - Comprehensive metabolic panel; Future  - MRI Brain W WO Contrast; Future  - MRA Brain; Future  - Sedimentation rate; Future  - C-reactive protein; Future          Patient was advised to notify me for worsening symptoms. I will see patient back in 3 weeks after above imaging and if no alternative cause found, will consider starting medication for migraine.     Thank you for this consultation.    Fatmata Jay DO  [facility]  [address] [facility] [address]  [place] [address]  [phone]

## 2020-01-31 NOTE — TELEPHONE ENCOUNTER
Called patient regarding elevated potassium level. Potassium level is 5.8. Patient was advised to go to the ED today to have this further evaluated as high potassium can be dangerous at times. She demonstrated understanding and is in agreement with this plan.    Fatmata Jay DO

## 2020-01-31 NOTE — LETTER
January 31, 2020      Clovis Beauchamp MD  [address]  [place] [address]           [facility] [address]  [place] [address]  Phone: [phone]  Fax: [phone]          Patient: Yasmeen Valderrama   MR Number: [id number]   YOB: 1970   Date of Visit: 1/31/2020       Dear Dr. Clovis Beauchamp:    Thank you for referring Yasmeen Valderrama to me for evaluation. Attached you will find relevant portions of my assessment and plan of care.    If you have questions, please do not hesitate to call me. I look forward to following Yasmeen Valderrama along with you.    Sincerely,    Fatmata Jay,     Enclosure  CC:  No Recipients    If you would like to receive this communication electronically, please contact [email or web address] or [phone] to request more information on [employer] access.    For providers and/or their staff who would like to refer a patient to [facility], please contact us through our one-stop-shop provider referral line, [facility], at [phone].    If you feel you have received this communication in error or would no longer like to receive these types of communications, please e-mail [email or web address]

## 2020-02-01 NOTE — ED TRIAGE NOTES
"Pt presents to ED with complaints of abnormal lab results. "my doctor sent me here to be checked out".   "

## 2020-02-01 NOTE — ED PROVIDER NOTES
Encounter Date: 1/31/2020    SCRIBE #1 NOTE: I, Joyce Porter, am scribing for, and in the presence of,  Ping Blakely MD. I have scribed the entire note.       History     Chief Complaint   Patient presents with    Abnormal Lab     had blood work today, called stating potassium was danger high.     CC: Abnormal lab    HPI: This is a 49 y.o.smoking female patient, with a PMHx of COPD and Asthma, presenting to the ED with a complaint of an abnormal lab, that resulted earlier today. Patient states she went to see her Neurologist earlier today for a migraine headache. Patient received a call back to come to the ED after routine labs showed an elevated potassium. Patient denies a HX of CHF, MI, or Stroke. She states she drinks cokes all day. Patient reports she is on daily oxygen use at home. She denies taking a potassium pill in over a year. She states she took her last Lasix pill 2-3 days ago. Patient denies any tingling, numbness, chest pain, shortness of breath, palpitations or any other associated symptoms. No prior Tx. No alleviating or aggravating factors. Allergic to Antivert.    The history is provided by the patient.     Review of patient's allergies indicates:   Allergen Reactions    Antivert [meclizine] Other (See Comments)     Behavioral changes     Past Medical History:   Diagnosis Date    Anxiety     Asthma     Carpal tunnel syndrome, bilateral     COPD (chronic obstructive pulmonary disease)     Vocal cord mass 6/2/2017    Right side with CT scan Referred to ENT for visualization     Past Surgical History:   Procedure Laterality Date    CARPAL TUNNEL RELEASE      FINGER SURGERY      HYSTERECTOMY      OOPHORECTOMY  1996    Hysterectomy     Family History   Problem Relation Age of Onset    COPD Mother     Heart disease Father     No Known Problems Sister     No Known Problems Brother      Social History     Tobacco Use    Smoking status: Current Every Day Smoker     Packs/day: 1.00      Years: 35.00     Pack years: 35.00     Types: Cigarettes     Start date: 12/1/1983    Smokeless tobacco: Never Used   Substance Use Topics    Alcohol use: No     Alcohol/week: 0.0 standard drinks     Frequency: Never     Drinks per session: Patient refused     Binge frequency: Never    Drug use: No     Review of Systems   Constitutional: Negative for chills and fever.   HENT: Negative for congestion, ear pain, rhinorrhea and sore throat.    Eyes: Negative for pain and visual disturbance.   Respiratory: Negative for cough and shortness of breath.    Cardiovascular: Negative for chest pain.   Gastrointestinal: Negative for abdominal pain, diarrhea, nausea and vomiting.   Genitourinary: Negative for dysuria.   Musculoskeletal: Negative for back pain and neck pain.   Skin: Negative for rash.   Neurological: Negative for numbness and headaches.        -tingling       Physical Exam     Initial Vitals [01/31/20 1722]   BP Pulse Resp Temp SpO2   (!) 144/87 88 18 98.4 °F (36.9 °C) (!) 94 %      MAP       --         Physical Exam    Nursing note and vitals reviewed.  Constitutional: She appears well-developed and well-nourished.   HENT:   Head: Normocephalic and atraumatic.   Right Ear: External ear normal.   Left Ear: External ear normal.   Mouth/Throat: Oropharynx is clear and moist.   Eyes: EOM are normal. Pupils are equal, round, and reactive to light.   Neck: Normal range of motion and full passive range of motion without pain. Neck supple. No thyromegaly present. No JVD present.   Cardiovascular: Normal rate and regular rhythm. Exam reveals no gallop and no friction rub.    No murmur heard.  Pulmonary/Chest: Breath sounds normal. No respiratory distress. She has no wheezes.   Abdominal: Soft. Bowel sounds are normal. She exhibits no distension. There is no tenderness.   Musculoskeletal: Normal range of motion. She exhibits no edema or tenderness.   Neurological: She is alert and oriented to person, place, and time.  She has normal strength. No cranial nerve deficit or sensory deficit. GCS score is 15. GCS eye subscore is 4. GCS verbal subscore is 5. GCS motor subscore is 6.   Symmetric stregnth bilateral upper and lower extremities   Normal EOM  No sensory deficits  No aphasia or dysarthria  No cognitive deficits noted, following all instructions without difficulty     Skin: Skin is warm and dry. Capillary refill takes less than 2 seconds.   Psychiatric: She has a normal mood and affect. Thought content normal.         ED Course   Procedures  Labs Reviewed   CBC W/ AUTO DIFFERENTIAL - Abnormal; Notable for the following components:       Result Value    WBC 14.42 (*)     Hematocrit 51.9 (*)     Mean Corpuscular Hemoglobin Conc 30.6 (*)     Immature Granulocytes 0.6 (*)     Immature Grans (Abs) 0.09 (*)     Lymph # 5.3 (*)     Eos # 0.6 (*)     All other components within normal limits   COMPREHENSIVE METABOLIC PANEL - Abnormal; Notable for the following components:    CO2 36 (*)     All other components within normal limits     EKG Readings: (Independently Interpreted)   Initial Reading: No STEMI. Rhythm: Normal Sinus Rhythm. Heart Rate: 89. Ectopy: No Ectopy. Conduction: Normal. ST Segments: Normal ST Segments. T Waves: Normal. Clinical Impression: Normal Sinus Rhythm   T-waves do not appear peaked.       Imaging Results    None          Medical Decision Making:   Initial Assessment:   49 y.o. female presenting for evaluation of an abnormal lab. Exam unremarkable. Differential includes medication side effect versus, increased potassium intake, versus lab error.  EKG without any peaked T-waves or other changes. Will obtain labs. I will treat the pt's symptoms appropriately and reassess.  BI Blakely MD  7:42 PM    Patient's labs are consistent with dehydration.  Potassium is within normal limits at this time.  Patient was given fluids and discharged.  I have discussed all the findings today with the patient and she will follow  up with her physician next few days.  BI Blakely MD  4:28 AM      Independently Interpreted Test(s):   I have ordered and independently interpreted EKG Reading(s) - see prior notes  Clinical Tests:   Lab Tests: Ordered and Reviewed  Medical Tests: Ordered and Reviewed            Scribe Attestation:   Scribe #1: I performed the above scribed service and the documentation accurately describes the services I performed. I attest to the accuracy of the note.                          Clinical Impression:       ICD-10-CM ICD-9-CM   1. Serum potassium elevated E87.5 276.7            Scribe attestation: I, Ping Blakely, personally performed the services described in this documentation. All medical record entries made by the scribe were at my direction and in my presence.  I have reviewed the chart and agree that the record reflects my personal performance and is accurate and complete.                   Ping Blakely MD  02/01/20 0428

## 2020-02-10 ENCOUNTER — PATIENT MESSAGE (OUTPATIENT)
Dept: FAMILY MEDICINE | Facility: CLINIC | Age: 50
End: 2020-02-10

## 2020-02-10 ENCOUNTER — PATIENT MESSAGE (OUTPATIENT)
Dept: NEUROLOGY | Facility: CLINIC | Age: 50
End: 2020-02-10

## 2020-02-10 DIAGNOSIS — J43.0 UNILATERAL EMPHYSEMA: Primary | ICD-10-CM

## 2020-02-12 ENCOUNTER — PATIENT MESSAGE (OUTPATIENT)
Dept: FAMILY MEDICINE | Facility: CLINIC | Age: 50
End: 2020-02-12

## 2020-02-20 ENCOUNTER — PATIENT OUTREACH (OUTPATIENT)
Dept: ADMINISTRATIVE | Facility: OTHER | Age: 50
End: 2020-02-20

## 2020-02-20 NOTE — PROGRESS NOTES
Chart reviewed.   Requested updates from Care Everywhere.  Immunizations reconciled.    updated.    Mammogram appt 02/21/2020

## 2020-02-21 ENCOUNTER — OFFICE VISIT (OUTPATIENT)
Dept: NEUROLOGY | Facility: CLINIC | Age: 50
End: 2020-02-21
Payer: MEDICAID

## 2020-02-21 ENCOUNTER — HOSPITAL ENCOUNTER (OUTPATIENT)
Dept: RADIOLOGY | Facility: HOSPITAL | Age: 50
Discharge: HOME | End: 2020-02-21
Attending: FAMILY MEDICINE
Payer: MEDICAID

## 2020-02-21 VITALS — HEIGHT: 63 IN | BODY MASS INDEX: 31.17 KG/M2 | WEIGHT: 175.94 LBS

## 2020-02-21 VITALS
SYSTOLIC BLOOD PRESSURE: 118 MMHG | WEIGHT: 175 LBS | HEART RATE: 94 BPM | HEIGHT: 63 IN | BODY MASS INDEX: 31.01 KG/M2 | DIASTOLIC BLOOD PRESSURE: 73 MMHG

## 2020-02-21 DIAGNOSIS — Z12.31 ENCOUNTER FOR SCREENING MAMMOGRAM FOR BREAST CANCER: ICD-10-CM

## 2020-02-21 DIAGNOSIS — R51.9 PERSISTENT HEADACHES: Primary | ICD-10-CM

## 2020-02-21 PROCEDURE — 77063 BREAST TOMOSYNTHESIS BI: CPT | Mod: 26,,, | Performed by: RADIOLOGY

## 2020-02-21 PROCEDURE — 77063 MAMMO DIGITAL SCREENING BILAT WITH TOMOSYNTHESIS_CAD: ICD-10-PCS | Mod: 26,,, | Performed by: RADIOLOGY

## 2020-02-21 PROCEDURE — 99214 PR OFFICE/OUTPT VISIT, EST, LEVL IV, 30-39 MIN: ICD-10-PCS | Mod: S$PBB,,, | Performed by: PSYCHIATRY & NEUROLOGY

## 2020-02-21 PROCEDURE — 99999 PR PBB SHADOW E&M-EST. PATIENT-LVL III: CPT | Mod: PBBFAC,,, | Performed by: PSYCHIATRY & NEUROLOGY

## 2020-02-21 PROCEDURE — 77067 SCR MAMMO BI INCL CAD: CPT | Mod: TC

## 2020-02-21 PROCEDURE — 99213 OFFICE O/P EST LOW 20 MIN: CPT | Mod: PBBFAC | Performed by: PSYCHIATRY & NEUROLOGY

## 2020-02-21 PROCEDURE — 99214 OFFICE O/P EST MOD 30 MIN: CPT | Mod: S$PBB,,, | Performed by: PSYCHIATRY & NEUROLOGY

## 2020-02-21 PROCEDURE — 77067 MAMMO DIGITAL SCREENING BILAT WITH TOMOSYNTHESIS_CAD: ICD-10-PCS | Mod: 26,,, | Performed by: RADIOLOGY

## 2020-02-21 PROCEDURE — 77067 SCR MAMMO BI INCL CAD: CPT | Mod: 26,,, | Performed by: RADIOLOGY

## 2020-02-21 PROCEDURE — 99999 PR PBB SHADOW E&M-EST. PATIENT-LVL III: ICD-10-PCS | Mod: PBBFAC,,, | Performed by: PSYCHIATRY & NEUROLOGY

## 2020-02-21 NOTE — PROGRESS NOTES
Neurology Follow Up Note    Chief Complaint: follow up for headaches    Interval History:  Since last visit, patient continues to have headaches. She states every night when she is sleeping, it will awake her from sleep. She states it is a severe 10/10 pounding pain in her head with shortness of breath. She states she vomits with the headache and sometimes when she checks her oxygen at this time, it is 70s - 80s. She states if she takes excedrin and sits up, the headache subsides. She states the pain usually subsides after 20-30 min. She was unable to get MRI done as it was not approved by her insurance. She has no further complaints.       Initial Visit 1/31/2020:  Yasmeen Valderrama is a 49 y.o. female with medical conditions as outlined below who presents for further evaluation of headaches. She states she used to get migraines as a teenager. She states her headaches as a teenager were associated with photophobia, phonophobia and nausea. She states her headaches had gone away for years and then started again in September 2019. She states since that time, she wakes up with a holocephalic severe throbbing headache. It is associated with photophobia, phonophobia, blurry vision, nausea and vomiting. She states it is an 11/10 pain. She reports taking an excedrin in the morning and then the headache subsides after a few hours. She states this occurs 3 times a week. She states she has been on and off oxygen for 15 years. She states when she started to get these headaches, she was short of breath and restarted using oxygen, however, she states the headaches have not improved with oxygen. She has no further complaints.    Past Medical History:  Past Medical History:   Diagnosis Date    Anxiety     Asthma     Carpal tunnel syndrome, bilateral     COPD (chronic obstructive pulmonary disease)     Vocal cord mass 6/2/2017    Right side with CT scan Referred to ENT for visualization       Past Surgical History:  Past  Surgical History:   Procedure Laterality Date    CARPAL TUNNEL RELEASE      FINGER SURGERY      HYSTERECTOMY      OOPHORECTOMY  1996    Hysterectomy       Social History:  Social History     Socioeconomic History    Marital status:      Spouse name: Not on file    Number of children: Not on file    Years of education: Not on file    Highest education level: Not on file   Occupational History    Not on file   Social Needs    Financial resource strain: Hard    Food insecurity:     Worry: Often true     Inability: Never true    Transportation needs:     Medical: No     Non-medical: No   Tobacco Use    Smoking status: Current Every Day Smoker     Packs/day: 1.00     Years: 35.00     Pack years: 35.00     Types: Cigarettes     Start date: 12/1/1983    Smokeless tobacco: Never Used   Substance and Sexual Activity    Alcohol use: No     Alcohol/week: 0.0 standard drinks     Frequency: Never     Drinks per session: Patient refused     Binge frequency: Never    Drug use: No    Sexual activity: Yes     Partners: Male   Lifestyle    Physical activity:     Days per week: 0 days     Minutes per session: 0 min    Stress: Very much   Relationships    Social connections:     Talks on phone: Once a week     Gets together: Never     Attends Scientologist service: Not on file     Active member of club or organization: No     Attends meetings of clubs or organizations: Never     Relationship status:    Other Topics Concern    Not on file   Social History Narrative    Not on file       Family History:  Family History   Problem Relation Age of Onset    COPD Mother     Heart disease Father     No Known Problems Sister     No Known Problems Brother        Medications:  Current Outpatient Medications   Medication Sig Dispense Refill    albuterol (PROVENTIL) 2.5 mg /3 mL (0.083 %) nebulizer solution Take 3 mLs (2.5 mg total) by nebulization every 6 (six) hours as needed for Wheezing. 1 Box 11     albuterol (PROVENTIL/VENTOLIN HFA) 90 mcg/actuation inhaler INHALE TWO PUFFS BY MOUTH INTO THE LUNGS EVERY 6 HOURS AS NEEDED FOR WHEEZING 18 g 5    fluticasone-salmeterol 230-21 mcg/dose (ADVAIR HFA) 230-21 mcg/actuation HFAA inhaler Inhale 2 puffs into the lungs 2 (two) times daily. Controller 12 g 11    fluticasone-umeclidin-vilanter (TRELEGY ELLIPTA) 100-62.5-25 mcg DsDv 1 puff daily 60 each 5    furosemide (LASIX) 40 MG tablet Take 1 tablet (40 mg total) by mouth 2 (two) times daily as needed. 60 tablet 11    gabapentin (NEURONTIN) 600 MG tablet 2 tablets PO TID for chronic pain 180 tablet 11    LIDOCAINE VISCOUS 2 % solution Can switch to equivalent, 10 ml swish and gargle 4 x daily 100 mL 11    meloxicam (MOBIC) 7.5 MG tablet TAKE ONE TABLET BY MOUTH EVERY DAY 90 tablet 3    potassium chloride (MICRO-K) 10 MEQ CpSR       predniSONE (DELTASONE) 20 MG tablet Take 2 tablets (40 mg total) by mouth once daily. 60 tablet 0    SUBOXONE 8-2 mg Film PLACE 1 FILM UNDER TONGUE TWICE DAILY.  2    venlafaxine (EFFEXOR) 37.5 MG Tab Take 1 tablet (37.5 mg total) by mouth nightly as needed. 60 tablet 11     No current facility-administered medications for this visit.        Allergies:  Review of patient's allergies indicates:   Allergen Reactions    Antivert [meclizine] Other (See Comments)     Behavioral changes       ROS:  A 12 point review of system was negative aside from pertinent positives and negatives as outlined above.    Physical Exam  Vitals:    02/21/20 1409   BP: 118/73   Pulse: 94       General: well nourished, well developed  Eyes: no scleral icterus   Nose: nasal turbinates intact  Neck: supple, ROM intact  Skin: no rash or ecchymosis  Joints: no swelling or erythema  Cardiac: regular rate and rhythm  Lungs: clear to auscultation bilaterally    Neuro:  Mental status: AAO x 3, no dysarthria, no aphasia, communicating appropriately  CN: PERRL, EOMI, VFF, V1-V3 sensation intact, no facial asymmetry,  hearing grossly intact, tongue midline  Motor:   RUE 5/5  RLE 5/5  LUE 5/5  LLE 5/5     Normal bulk and tone     Reflexes: 1+ throughout, toes equivocal bilaterally  Sensory: intact to light touch throughout  Coordination: no dysmetria on FTN  Gait: steadysteady    Prior Imaging/Labs:  No recent neuroimaging available for review         Assessment and Plan:    49 y.o. female with severe headaches occurring out of sleep likely due to drop in oxygen. However, given severity of headache will obtain MRI brain w/wo contrast. Did peer to peer today, MRI brain w/wo contrast approved Authorization # [id number]. MRA not approved, will resubmit if MRI brain unrevealing.    1. Persistent headaches  MRI brain w/wo contrast, if unrevealing will obtain MRA brain  Likely due to lack of oxygen when sleeping. Patient has an appointment with pulmonologist on 3/4/2020.\  Patient is unsure if excedrin helping or if sitting up with oxygen helping. She was advise to try to avoid excedrin and see if sitting up helps. She was advised to go to the ED for any worsening in headaches and is in agreement with this plan.              Patient was advised to notify me for worsening symptoms. I will see patient back in 1 month or sooner if necessary.       Fatmata Jay DO  [facility]  [address] [facility] [address]  [place], [place] [id number] [phone]

## 2020-02-27 ENCOUNTER — PATIENT MESSAGE (OUTPATIENT)
Dept: FAMILY MEDICINE | Facility: CLINIC | Age: 50
End: 2020-02-27

## 2020-03-04 ENCOUNTER — OFFICE VISIT (OUTPATIENT)
Dept: PULMONOLOGY | Facility: CLINIC | Age: 50
End: 2020-03-04
Payer: MEDICAID

## 2020-03-04 ENCOUNTER — HOSPITAL ENCOUNTER (OUTPATIENT)
Dept: RADIOLOGY | Facility: HOSPITAL | Age: 50
Discharge: HOME | End: 2020-03-04
Attending: INTERNAL MEDICINE
Payer: MEDICAID

## 2020-03-04 VITALS
HEIGHT: 63 IN | BODY MASS INDEX: 32.48 KG/M2 | DIASTOLIC BLOOD PRESSURE: 110 MMHG | SYSTOLIC BLOOD PRESSURE: 139 MMHG | OXYGEN SATURATION: 91 % | HEART RATE: 106 BPM | WEIGHT: 183.31 LBS

## 2020-03-04 DIAGNOSIS — J41.0 SIMPLE CHRONIC BRONCHITIS: ICD-10-CM

## 2020-03-04 DIAGNOSIS — F17.200 TOBACCO USE DISORDER: ICD-10-CM

## 2020-03-04 DIAGNOSIS — J43.2 CENTRILOBULAR EMPHYSEMA: ICD-10-CM

## 2020-03-04 DIAGNOSIS — G47.33 OSA (OBSTRUCTIVE SLEEP APNEA): ICD-10-CM

## 2020-03-04 PROCEDURE — 99999 PR PBB SHADOW E&M-EST. PATIENT-LVL IV: CPT | Mod: PBBFAC,,, | Performed by: INTERNAL MEDICINE

## 2020-03-04 PROCEDURE — 99204 OFFICE O/P NEW MOD 45 MIN: CPT | Mod: S$PBB,,, | Performed by: INTERNAL MEDICINE

## 2020-03-04 PROCEDURE — 99204 PR OFFICE/OUTPT VISIT, NEW, LEVL IV, 45-59 MIN: ICD-10-PCS | Mod: S$PBB,,, | Performed by: INTERNAL MEDICINE

## 2020-03-04 PROCEDURE — 99214 OFFICE O/P EST MOD 30 MIN: CPT | Mod: PBBFAC,25 | Performed by: INTERNAL MEDICINE

## 2020-03-04 PROCEDURE — 71046 X-RAY EXAM CHEST 2 VIEWS: CPT | Mod: TC,FY

## 2020-03-04 PROCEDURE — 99999 PR PBB SHADOW E&M-EST. PATIENT-LVL IV: ICD-10-PCS | Mod: PBBFAC,,, | Performed by: INTERNAL MEDICINE

## 2020-03-04 PROCEDURE — 71046 X-RAY EXAM CHEST 2 VIEWS: CPT | Mod: 26,,, | Performed by: RADIOLOGY

## 2020-03-04 PROCEDURE — 71046 XR CHEST PA AND LATERAL: ICD-10-PCS | Mod: 26,,, | Performed by: RADIOLOGY

## 2020-03-04 NOTE — PROGRESS NOTES
Yasmeen Valderrama  was seen as a new patient at the request  Clovis Beauchamp MD for the evaluation of  copd.    CHIEF COMPLAINT:  Wheezing (on oxygen)      HISTORY OF PRESENT ILLNESS: Yasmeen Valderrama is a 49 y.o. female  has a past medical history of Anxiety, Asthma, Carpal tunnel syndrome, bilateral, COPD (chronic obstructive pulmonary disease), Laryngeal papilloma, and Vocal cord mass (6/2/2017).  Patient reports she was seen by ENT approximately 1 year ago (Dr Valladares at [facility]) for this complaint. She underwent multiple DL w/ biopsies with Dr. Valladares, most recently around July of 2017. There seems to be some confusion for the patient about the results of these biopsies. At one point she was told she had cancer and needed radiation but after subsequent biopsies was told she did not.   S/p ent evaluation by Dr. Champion and biopsy 5/2018 which was negative for malignancy.  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      Patient smoke 1-2 ppd since teenage years.  Trying to cut back.  Patient is on home oxygen.  Currently on albuterol.  Patient was prescribe trelegy but have not start.  No fever/chill.  No weight loss.  No hemoptysis.  +chronic cough with occasional green or brown phlegm.        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    Dysphonia 05/14/2018    Intractable chronic cluster headache 11/25/2019    Centrilobular emphysema 03/04/2020    KATHERINE (obstructive sleep apnea) 03/04/2020     Resolved Ambulatory Problems     Diagnosis Date  Noted    Abnormal PFTs (pulmonary function tests) 12/01/2014    Carpal tunnel syndrome on both sides 02/27/2015    CTS (carpal tunnel syndrome) 06/09/2015    Edema 09/03/2015    Screening 09/09/2015    Chronic obstructive pulmonary disease 01/04/2017    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    Candida infection, oral 06/02/2017     Past Medical History:   Diagnosis Date    Asthma     Carpal tunnel syndrome, bilateral     COPD (chronic obstructive pulmonary disease)                 PAST SURGICAL HISTORY:    Past Surgical History:   Procedure Laterality Date    CARPAL TUNNEL RELEASE      FINGER SURGERY      HYSTERECTOMY      OOPHORECTOMY  1996    Hysterectomy         FAMILY HISTORY:                Family History   Problem Relation Age of Onset    COPD Mother     Heart disease Father     No Known Problems Sister     No Known Problems Brother        SOCIAL HISTORY:          Tobacco:   Social History     Tobacco Use   Smoking Status Current Every Day Smoker    Packs/day: 1.00    Years: 35.00    Pack years: 35.00    Types: Cigarettes    Start date: 12/1/1983   Smokeless Tobacco Never Used     alcohol use:    Social History     Substance and Sexual Activity   Alcohol Use No    Alcohol/week: 0.0 standard drinks    Frequency: Never    Drinks per session: Patient refused    Binge frequency: Never               Occupation:  Disable.    ALLERGIES:    Review of patient's allergies indicates:   Allergen Reactions    Antivert [meclizine] Other (See Comments)     Behavioral changes       CURRENT MEDICATIONS:    Current Outpatient Medications   Medication Sig Dispense Refill    albuterol (PROVENTIL) 2.5 mg /3 mL (0.083 %) nebulizer solution Take 3 mLs (2.5 mg total) by nebulization every 6 (six) hours as needed for Wheezing. 1 Box 11    albuterol (PROVENTIL/VENTOLIN HFA) 90 mcg/actuation inhaler INHALE TWO PUFFS BY MOUTH INTO THE LUNGS EVERY 6 HOURS AS NEEDED FOR WHEEZING 18 g 5    fluticasone-salmeterol 230-21  "mcg/dose (ADVAIR HFA) 230-21 mcg/actuation HFAA inhaler Inhale 2 puffs into the lungs 2 (two) times daily. Controller 12 g 11    fluticasone-umeclidin-vilanter (TRELEGY ELLIPTA) 100-62.5-25 mcg DsDv 1 puff daily 60 each 5    furosemide (LASIX) 40 MG tablet Take 1 tablet (40 mg total) by mouth 2 (two) times daily as needed. 60 tablet 11    gabapentin (NEURONTIN) 600 MG tablet 2 tablets PO TID for chronic pain 180 tablet 11    LIDOCAINE VISCOUS 2 % solution Can switch to equivalent, 10 ml swish and gargle 4 x daily 100 mL 11    meloxicam (MOBIC) 7.5 MG tablet TAKE ONE TABLET BY MOUTH EVERY DAY 90 tablet 3    potassium chloride (MICRO-K) 10 MEQ CpSR       predniSONE (DELTASONE) 20 MG tablet Take 2 tablets (40 mg total) by mouth once daily. 60 tablet 0    SUBOXONE 8-2 mg Film PLACE 1 FILM UNDER TONGUE TWICE DAILY.  2    venlafaxine (EFFEXOR) 37.5 MG Tab Take 1 tablet (37.5 mg total) by mouth nightly as needed. 60 tablet 11     No current facility-administered medications for this visit.                   REVIEW OF SYSTEMS:     Pulmonary related symptoms as per HPI.  Gen:  no weight loss, no fever, no night sweat  HEENT:  no visual changes, no sore throat, no hearing loss  CV:  No chest pain, + orthopnea (sleep with wedge pillows), no PND  GI:  no melena, no hematochezia, no diarhea, no constipation.  :  no dysuria, no hematuria, no hesistancy, no dribbling  Neuro:  no syncope, + vertigo, no tinitus, chronic migraine  Psych:  No homocide or suicide ideation; no depression.  Endocrine:  No heat or cold intolerance.  Sleep:  No snoring; no witnessed apnea.  Tire upon awake  Otherwise, a balance of systems reviewed is negative.          PHYSICAL EXAM:  Vitals:    03/04/20 1328   BP: (!) 139/110   Pulse: 106   SpO2: (!) 91%   Weight: 83.2 kg (183 lb 5 oz)   Height: 5' 3" (1.6 m)   PainSc: 0-No pain     Body mass index is 32.47 kg/m².     GENERAL:  well develop; no apparent distress  HEENT:  no nasal congestion; no " discharge noted; class 3 modified mallampatti.   NECK:  supple; no palpable masses.  CARDIO: regular rate and rhythm  PULM:  clear to auscultation bilaterally; no intercostals retractions; no accessory muscle usage   ABDOMEN:  soft nontender/nondistended.  +bowel sound  EXTREMITIES no cce  NEURO:  CN II-XII intact.  5/5 motor in all extremities.  sensation grossly intact   to light touch.  PSYCH:  normal affect.  Alert and oriented x 4    LABS5;  Pulmonary Functions Testing Results(personally reviewed):  10/23/19 ratio of 59%; fev1 37%; fvc 51%; tlc 86%; dlco 70%  ABG (personally reviewed):  none  CXR (personally reviewed):  11/2/16 hyperinflation.  No effusion or consolidation  CT CHEST(personally reviewed):  3/29/16 stranding lingular region and rll.  No honeycombing.  No increased in reticulation.  No emphysematous changes.      Echo 3/14/18  CONCLUSIONS     1 - Normal left ventricular systolic function (EF 55-60%).     2 - No wall motion abnormalities.     3 - Trivial mitral regurgitation.     4 - Trivial tricuspid regurgitation.     5 - The estimated PA systolic pressure is 32 mmHg.    ASSESSMENT/PLAN  Problem List Items Addressed This Visit     Centrilobular emphysema    Overview     fev1 37%;           Current Assessment & Plan     Feeling better with cutting back on smoking.  Encourage complete smoking cessation.  Recommend starting trelegy.  Up to date with vaccination.         KATHERINE (obstructive sleep apnea)    Current Assessment & Plan     The patient symptomatically has snoring, restless sleep with findings of htn. This warrants further investigation for possible obstructive sleep apnea.  Patient will be contacted after sleep study is done.   Copd and chronic oxygen warrants in lab evaluation.           Relevant Orders    Polysomnogram (CPAP will be added if patient meets diagnostic criteria.)    Tobacco use disorder    Overview     Declined smoking cessation referral.                 Patient will Follow  up for after sleep study. with md/np.    CC: Send copy of this note to Clovis Beauchamp MD

## 2020-03-04 NOTE — ASSESSMENT & PLAN NOTE
Feeling better with cutting back on smoking.  Encourage complete smoking cessation.  Recommend starting trelegy.  Up to date with vaccination.

## 2020-03-04 NOTE — LETTER
March 4, 2020      Clovis Beauchamp MD  [address]  [place] [address]           [facility]  [address]   [place] [address]  Phone: [phone]  Fax: [phone]          Patient: Yasmeen Valderrama   MR Number: [id number]   YOB: 1970   Date of Visit: 3/4/2020       Dear Dr. Clovis Beauchamp:    Thank you for referring Yasmeen Valderrama to me for evaluation. Attached you will find relevant portions of my assessment and plan of care.    If you have questions, please do not hesitate to call me. I look forward to following Yasmeen Valderrama along with you.    Sincerely,    Shiraz Montaño MD    Enclosure  CC:  No Recipients    If you would like to receive this communication electronically, please contact [email or web address] or [phone] to request more information on [employer] access.    For providers and/or their staff who would like to refer a patient to [facility], please contact us through our one-stop-shop provider referral line, [facility], at [phone].    If you feel you have received this communication in error or would no longer like to receive these types of communications, please e-mail [email or web address]

## 2020-03-04 NOTE — ASSESSMENT & PLAN NOTE
The patient symptomatically has snoring, restless sleep with findings of htn. This warrants further investigation for possible obstructive sleep apnea.  Patient will be contacted after sleep study is done.   Copd and chronic oxygen warrants in lab evaluation.

## 2020-03-04 NOTE — PATIENT INSTRUCTIONS
Your provider has scheduled you a Sleep Study    What you need to know:     This referral will be sent to your insurance for authorization.  Depending on your insurance company, this process may take two weeks to be authorized.     Once your study has been authorized, Cyn or Priscilla will contact you to schedule your sleep study.   o Their number is [phone]. [facility] is located on 2nd floor of [facility].     We will call you when the sleep study results are ready - if you have not heard from us by 2 weeks from the date of the study, please call [phone].     For information regarding financial obligations, please call [employer] at [phone].    If you study is already scheduled, please call [phone].     Once your study has been completed, it will take up to 14 business days for the results to be sent back to your provider.    If you have any questions you can send a message through your [facility] account, or call the clinic at [phone].    You are advised to abstain from driving should you feel sleepy or drowsy.

## 2020-03-06 ENCOUNTER — PATIENT MESSAGE (OUTPATIENT)
Dept: PULMONOLOGY | Facility: CLINIC | Age: 50
End: 2020-03-06

## 2020-03-26 ENCOUNTER — TELEPHONE (OUTPATIENT)
Dept: NEUROLOGY | Facility: CLINIC | Age: 50
End: 2020-03-26

## 2020-04-02 ENCOUNTER — TELEPHONE (OUTPATIENT)
Dept: SLEEP MEDICINE | Facility: HOSPITAL | Age: 50
End: 2020-04-02

## 2020-04-22 ENCOUNTER — OFFICE VISIT (OUTPATIENT)
Dept: FAMILY MEDICINE | Facility: CLINIC | Age: 50
End: 2020-04-22
Payer: MEDICAID

## 2020-04-22 DIAGNOSIS — F41.9 ANXIETY: ICD-10-CM

## 2020-04-22 DIAGNOSIS — Z99.81 ON HOME OXYGEN THERAPY: Primary | ICD-10-CM

## 2020-04-22 PROCEDURE — 99442 PR PHYSICIAN TELEPHONE EVALUATION 11-20 MIN: ICD-10-PCS | Mod: 95,,, | Performed by: FAMILY MEDICINE

## 2020-04-22 PROCEDURE — 99442 PR PHYSICIAN TELEPHONE EVALUATION 11-20 MIN: CPT | Mod: 95,,, | Performed by: FAMILY MEDICINE

## 2020-04-22 RX ORDER — BUPROPION HYDROCHLORIDE 300 MG/1
TABLET ORAL
COMMUNITY
Start: 2020-03-27 | End: 2022-06-28

## 2020-04-22 RX ORDER — BUPRENORPHINE HYDROCHLORIDE, NALOXONE HYDROCHLORIDE 12; 3 MG/1; MG/1
FILM, SOLUBLE BUCCAL; SUBLINGUAL
COMMUNITY
Start: 2020-03-27 | End: 2021-02-09 | Stop reason: DRUGHIGH

## 2020-04-22 RX ORDER — MIRTAZAPINE 45 MG/1
45 TABLET, FILM COATED ORAL NIGHTLY
Qty: 90 TABLET | Refills: 3 | Status: SHIPPED | OUTPATIENT
Start: 2020-04-22 | End: 2022-06-28

## 2020-04-22 RX ORDER — AMLODIPINE AND BENAZEPRIL HYDROCHLORIDE 10; 40 MG/1; MG/1
CAPSULE ORAL
COMMUNITY
Start: 2020-02-04 | End: 2022-06-28

## 2020-04-22 RX ORDER — MIRTAZAPINE 15 MG/1
TABLET, FILM COATED ORAL
COMMUNITY
Start: 2020-04-16 | End: 2020-04-22 | Stop reason: SDUPTHER

## 2020-04-22 NOTE — PROGRESS NOTES
Established Patient - Audio Only Telehealth Visit     The patient location is: [place]/home  The chief complaint leading to consultation is: COPD  Visit type: Virtual visit with audio only (telephone)     The reason for the audio only service rather than synchronous audio and video virtual visit was related to technical difficulties or patient preference/necessity.     Each patient to whom I provide medical services by telemedicine is:  (1) informed of the relationship between the physician and patient and the respective role of any other health care provider with respect to management of the patient; and (2) notified that they may decline to receive medical services by telemedicine and may withdraw from such care at any time. Patient verbally consented to receive this service via voice-only telephone call.       HPI: COPD stable with oxygen some issues with the breathing with acute attacks, but uses her window unit/anxiety and sleep is an isue on remeron 15 mg and not really helping     Assessment and plan:    1. On home oxygen therapy    2. Anxiety      We will ramp up the remeron to 45 mg and give her time for it to work.  Medically necessary for the oxygen for COPD.  Quality of life is POOR without it.  Improved with it.  Avoid getting overheated     Spent 12 minutes  This service was not originating from a related E/M service provided within the previous 7 days nor will  to an E/M service or procedure within the next 24 hours or my soonest available appointment.  Prevailing standard of care was able to be met in this audio-only visit.

## 2020-06-04 ENCOUNTER — TELEPHONE (OUTPATIENT)
Dept: FAMILY MEDICINE | Facility: CLINIC | Age: 50
End: 2020-06-04

## 2020-06-04 NOTE — TELEPHONE ENCOUNTER
Patient gave verbal authorization to send copy of order to company - [facility] so that she may get information regarding portable oxygen.  State that currently she is using the allotted amount of oxygen provided during one trip from her home to [place] for doctor visits or grocery shopping.  Please be advised.     Order faxed to [facility] ATTN: Francois, per patient's request.

## 2020-06-04 NOTE — TELEPHONE ENCOUNTER
----- Message from Lorna Hay sent at 6/4/2020  8:46 AM CDT -----  Type: Patient Call Back    Who called: Francois with [facility]    What is the request in detail: Asking for a copy of prescription for oxygen b/c pt is trying to get portable oxygen    Can the clinic reply by [facility]? No     Would the patient rather a call back or a response via [facility]? Call back     Best call back number: [phone]    Additional Information: Fax # [phone]

## 2020-08-01 ENCOUNTER — PATIENT MESSAGE (OUTPATIENT)
Dept: FAMILY MEDICINE | Facility: CLINIC | Age: 50
End: 2020-08-01

## 2020-08-01 DIAGNOSIS — M79.10 MYALGIA: ICD-10-CM

## 2020-08-01 DIAGNOSIS — Z00.00 ANNUAL PHYSICAL EXAM: Primary | ICD-10-CM

## 2020-08-14 DIAGNOSIS — Z11.59 NEED FOR HEPATITIS C SCREENING TEST: ICD-10-CM

## 2020-09-08 ENCOUNTER — PATIENT OUTREACH (OUTPATIENT)
Dept: ADMINISTRATIVE | Facility: OTHER | Age: 50
End: 2020-09-08

## 2020-09-09 ENCOUNTER — OFFICE VISIT (OUTPATIENT)
Dept: NEUROLOGY | Facility: CLINIC | Age: 50
End: 2020-09-09
Payer: MEDICAID

## 2020-09-09 ENCOUNTER — LAB VISIT (OUTPATIENT)
Dept: LAB | Facility: HOSPITAL | Age: 50
End: 2020-09-09
Attending: NEUROLOGICAL SURGERY
Payer: MEDICAID

## 2020-09-09 VITALS
BODY MASS INDEX: 31.25 KG/M2 | WEIGHT: 176.38 LBS | HEIGHT: 63 IN | HEART RATE: 91 BPM | DIASTOLIC BLOOD PRESSURE: 73 MMHG | SYSTOLIC BLOOD PRESSURE: 127 MMHG

## 2020-09-09 DIAGNOSIS — H93.A9 PULSATILE TINNITUS: ICD-10-CM

## 2020-09-09 DIAGNOSIS — G43.019 INTRACTABLE MIGRAINE WITHOUT AURA AND WITHOUT STATUS MIGRAINOSUS: ICD-10-CM

## 2020-09-09 DIAGNOSIS — G43.019 INTRACTABLE MIGRAINE WITHOUT AURA AND WITHOUT STATUS MIGRAINOSUS: Primary | ICD-10-CM

## 2020-09-09 LAB
CREAT SERPL-MCNC: 0.7 MG/DL (ref 0.5–1.4)
EST. GFR  (AFRICAN AMERICAN): >60 ML/MIN/1.73 M^2
EST. GFR  (NON AFRICAN AMERICAN): >60 ML/MIN/1.73 M^2

## 2020-09-09 PROCEDURE — 82565 ASSAY OF CREATININE: CPT

## 2020-09-09 PROCEDURE — 99215 OFFICE O/P EST HI 40 MIN: CPT | Mod: S$PBB,,, | Performed by: NEUROLOGICAL SURGERY

## 2020-09-09 PROCEDURE — 99215 PR OFFICE/OUTPT VISIT, EST, LEVL V, 40-54 MIN: ICD-10-PCS | Mod: S$PBB,,, | Performed by: NEUROLOGICAL SURGERY

## 2020-09-09 PROCEDURE — 36415 COLL VENOUS BLD VENIPUNCTURE: CPT

## 2020-09-09 PROCEDURE — 99214 OFFICE O/P EST MOD 30 MIN: CPT | Mod: PBBFAC | Performed by: NEUROLOGICAL SURGERY

## 2020-09-09 PROCEDURE — 99999 PR PBB SHADOW E&M-EST. PATIENT-LVL IV: ICD-10-PCS | Mod: PBBFAC,,, | Performed by: NEUROLOGICAL SURGERY

## 2020-09-09 PROCEDURE — 99999 PR PBB SHADOW E&M-EST. PATIENT-LVL IV: CPT | Mod: PBBFAC,,, | Performed by: NEUROLOGICAL SURGERY

## 2020-09-09 RX ORDER — TOPIRAMATE 25 MG/1
TABLET ORAL
Qty: 42 TABLET | Refills: 0 | Status: SHIPPED | OUTPATIENT
Start: 2020-09-09 | End: 2021-02-09

## 2020-09-25 ENCOUNTER — NURSE TRIAGE (OUTPATIENT)
Dept: ADMINISTRATIVE | Facility: CLINIC | Age: 50
End: 2020-09-25

## 2020-09-25 NOTE — TELEPHONE ENCOUNTER
Patient reports bilateral leg swelling. Reports she does typically have swelling in her legs but reports she feels it is getting worse. Advised, to be seen today, per protocol. She verbalizes understanding and does have a ride.    Reason for Disposition   MODERATE swelling of both ankles (e.g., swelling extends up to the knees) AND new onset or worsening    Additional Information   Negative: Sounds like a life-threatening emergency to the triager   Negative: Difficulty breathing at rest   Negative: Entire foot is cool or blue in comparison to other side   Negative: SEVERE swelling (e.g., swelling extends above knee, entire leg is swollen, weeping fluid)   Negative: Thigh or calf pain and only 1 side and present > 1 hour   Negative: Thigh, calf, or ankle swelling in only one leg   Negative: Cast on leg or ankle and has increasing pain   Negative: Thigh, calf, or ankle swelling in both legs, but one side is definitely more swollen (Exception: longstanding difference between legs)   Negative: Can't walk or can barely stand (new onset)   Negative: Fever and red area (or area very tender to touch)   Negative: Patient sounds very sick or weak to the triager   Negative: Swelling of face, arm or hands (Exception: slight puffiness of fingers during hot weather)   Negative: Pregnant > 20 weeks and sudden weight gain (i.e., > 2 lbs, 1 kg in one week)    Protocols used: LEG SWELLING AND EDEMA-A-OH

## 2020-09-28 NOTE — PROGRESS NOTES
Chief Complaint   Patient presents with    Migraine        Yasmeen Valderrama is a 50 y.o. female with a history of multiple medical diagnoses as listed below that presents for evaluation of headaches.  She continues to have headaches almost stenosis lives at night.  She has been taking the medication as directed but has not found any significant improvement of the pain that she experiences.  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    Right side with CT scan Referred to ENT for visualization       PAST SURGICAL HISTORY:  Past Surgical History:   Procedure Laterality Date    CARPAL TUNNEL RELEASE      FINGER SURGERY      HYSTERECTOMY      OOPHORECTOMY  1996    Hysterectomy       SOCIAL HISTORY:  Social History     Socioeconomic History    Marital status:      Spouse name: Not on file    Number of children: Not on file    Years of education: Not on file    Highest education level: Not on file   Occupational History    Not on file   Social Needs    Financial resource strain: Hard    Food insecurity     Worry: Often true     Inability: Never true    Transportation needs     Medical: No     Non-medical: No   Tobacco Use    Smoking status: Current Every Day Smoker     Packs/day: 1.00     Years: 35.00     Pack years: 35.00     Types: Cigarettes     Start date: 12/1/1983    Smokeless tobacco: Never Used   Substance and Sexual Activity    Alcohol use: No     Alcohol/week: 0.0 standard drinks     Frequency: Never     Drinks per session: Patient refused     Binge frequency: Never    Drug use: No    Sexual activity: Yes     Partners: Male   Lifestyle    Physical activity     Days per week: 0 days     Minutes per session: 0 min     Stress: Very much   Relationships    Social connections     Talks on phone: Once a week     Gets together: Never     Attends Anabaptist service: Not on file     Active member of club or organization: No     Attends meetings of clubs or organizations: Never     Relationship status:    Other Topics Concern    Not on file   Social History Narrative    Not on file       FAMILY HISTORY:  Family History   Problem Relation Age of Onset    COPD Mother     Heart disease Father     No Known Problems Sister     No Known Problems Brother        ALLERGIES AND MEDICATIONS: updated and reviewed.  Review of patient's allergies indicates:   Allergen Reactions    Antivert [meclizine] Other (See Comments)     Behavioral changes     Current Outpatient Medications   Medication Sig Dispense Refill    albuterol (PROVENTIL) 2.5 mg /3 mL (0.083 %) nebulizer solution Take 3 mLs (2.5 mg total) by nebulization every 6 (six) hours as needed for Wheezing. 1 Box 11    albuterol (PROVENTIL/VENTOLIN HFA) 90 mcg/actuation inhaler INHALE TWO PUFFS EVERY 6 HOURS AS NEEDED FOR WHEEZING 8.5 g 0    amLODIPine-benazepriL (LOTREL) 10-40 mg per capsule       buPROPion (WELLBUTRIN XL) 300 MG 24 hr tablet       fluticasone-salmeterol 230-21 mcg/dose (ADVAIR HFA) 230-21 mcg/actuation HFAA inhaler Inhale 2 puffs into the lungs 2 (two) times daily. Controller 12 g 11    fluticasone-umeclidin-vilanter (TRELEGY ELLIPTA) 100-62.5-25 mcg DsDv 1 puff daily 60 each 5    furosemide (LASIX) 40 MG tablet Take 1 tablet (40 mg total) by mouth 2 (two) times daily as needed. 60 tablet 11    gabapentin (NEURONTIN) 600 MG tablet 2 tablets PO TID for chronic pain 180 tablet 11    LIDOCAINE VISCOUS 2 % solution Can switch to equivalent, 10 ml swish and gargle 4 x daily 100 mL 11    meloxicam (MOBIC) 7.5 MG tablet TAKE ONE TABLET BY MOUTH EVERY DAY 90 tablet 3    mirtazapine (REMERON) 45 MG tablet Take 1 tablet (45 mg total) by mouth every evening. 90  tablet 3    potassium chloride (MICRO-K) 10 MEQ CpSR TAKE 1 CAPSULE BY MOUTH TWICE DAILY FOR POTASSIUM 60 capsule 11    SUBOXONE 12-3 mg Film PLACE ONE film UNDER THE TONGUE TWICE DAILY      topiramate (TOPAMAX) 25 MG tablet One tablet PO for one week; then two tablets PO for one week; then three tablets PO for one week 42 tablet 0    venlafaxine (EFFEXOR) 37.5 MG Tab Take 1 tablet (37.5 mg total) by mouth nightly as needed. 60 tablet 11     No current facility-administered medications for this visit.        Review of Systems   Constitutional: Negative for activity change, appetite change, fever and unexpected weight change.   HENT: Negative for trouble swallowing and voice change.    Eyes: Negative for photophobia and visual disturbance.   Respiratory: Negative for apnea and shortness of breath.    Cardiovascular: Negative for chest pain and leg swelling.   Gastrointestinal: Negative for constipation and nausea.   Genitourinary: Negative for difficulty urinating.   Musculoskeletal: Negative for back pain, gait problem and neck pain.   Skin: Negative for color change and pallor.   Neurological: Positive for headaches. Negative for dizziness, seizures, syncope, weakness and numbness.   Hematological: Negative for adenopathy.   Psychiatric/Behavioral: Negative for agitation, confusion and decreased concentration.       Neurologic Exam     Mental Status   Oriented to person, place, and time.   Registration: recalls 3 of 3 objects.   Attention: normal. Concentration: normal.   Speech: speech is normal   Level of consciousness: alert  Knowledge: good.     Cranial Nerves     CN II   Visual fields full to confrontation.   Right visual field deficit: none  Left visual field deficit: none     CN III, IV, VI   Pupils are equal, round, and reactive to light.  Extraocular motions are normal.   Right pupil: Size: 3 mm. Shape: regular. Accommodation: intact.   Left pupil: Size: 3 mm. Shape: regular. Accommodation: intact.    CN III: no CN III palsy  CN VI: no CN VI palsy  Nystagmus: none   Diplopia: none  Ophthalmoparesis: none  Upgaze: normal  Downgaze: normal  Conjugate gaze: present    CN V   Facial sensation intact.   Right facial sensation deficit: none  Left facial sensation deficit: none    CN VII   Facial expression full, symmetric.   Right facial weakness: none  Left facial weakness: none    CN VIII   CN VIII normal.     CN IX, X   CN IX normal.   CN X normal.   Palate: symmetric    CN XI   CN XI normal.   Right sternocleidomastoid strength: normal  Left sternocleidomastoid strength: normal  Right trapezius strength: normal  Left trapezius strength: normal    CN XII   CN XII normal.   Tongue deviation: none    Motor Exam   Muscle bulk: normal  Overall muscle tone: normal  Right arm tone: normal  Left arm tone: normal  Right leg tone: normal  Left leg tone: normal    Strength   Strength 5/5 throughout.     Sensory Exam   Right arm light touch: normal  Left arm light touch: normal  Right leg light touch: normal  Left leg light touch: normal  Right arm vibration: normal  Left arm vibration: normal  Right leg vibration: normal  Left leg vibration: normal  Right arm proprioception: normal  Left arm proprioception: normal  Right leg proprioception: normal  Left leg proprioception: normal  Right arm pinprick: normal  Left arm pinprick: normal  Right leg pinprick: normal  Left leg pinprick: normal    Gait, Coordination, and Reflexes     Gait  Gait: normal    Coordination   Romberg: negative  Finger to nose coordination: normal  Heel to shin coordination: normal  Tandem walking coordination: normal    Tremor   Resting tremor: absent    Reflexes   Right brachioradialis: 2+  Left brachioradialis: 2+  Right biceps: 2+  Left biceps: 2+  Right triceps: 2+  Left triceps: 2+  Right patellar: 2+  Left patellar: 2+  Right achilles: 2+  Left achilles: 2+  Right plantar: normal  Left plantar: normal      Physical Exam  Vitals signs reviewed.  "  Constitutional:       Appearance: She is well-developed.   HENT:      Head: Normocephalic and atraumatic.   Eyes:      Extraocular Movements: EOM normal.      Pupils: Pupils are equal, round, and reactive to light.   Neck:      Musculoskeletal: Normal range of motion.   Cardiovascular:      Rate and Rhythm: Normal rate.   Pulmonary:      Effort: Pulmonary effort is normal. No respiratory distress.   Musculoskeletal: Normal range of motion.   Skin:     General: Skin is warm and dry.   Neurological:      Mental Status: She is alert and oriented to person, place, and time.      Coordination: Finger-Nose-Finger Test, Heel to Shin Test and Romberg Test normal.      Gait: Gait is intact. Tandem walk normal.      Deep Tendon Reflexes: Strength normal.      Reflex Scores:       Tricep reflexes are 2+ on the right side and 2+ on the left side.       Bicep reflexes are 2+ on the right side and 2+ on the left side.       Brachioradialis reflexes are 2+ on the right side and 2+ on the left side.       Patellar reflexes are 2+ on the right side and 2+ on the left side.       Achilles reflexes are 2+ on the right side and 2+ on the left side.  Psychiatric:         Speech: Speech normal.         Behavior: Behavior normal.         Thought Content: Thought content normal.         Vitals:    09/09/20 0948   BP: 127/73   BP Location: Right arm   Patient Position: Sitting   BP Method: Large (Automatic)   Pulse: 91   Weight: 80 kg (176 lb 5.9 oz)   Height: 5' 3" (1.6 m)       Assessment & Plan:    Problem List Items Addressed This Visit     None      Visit Diagnoses     Intractable migraine without aura and without status migrainosus    -  Primary    Relevant Medications    topiramate (TOPAMAX) 25 MG tablet    Other Relevant Orders    MRI Brain W WO Contrast    Creatinine, serum (Completed)    Pulsatile tinnitus        Relevant Orders    MRA Brain without contrast        More than 50% of this 45 minute encounter was spent in " counseling and coordinating care of headaches  Follow-up: Follow up in about 1 month (around 10/9/2020).    This note was done with the assistance of voice recognition software. Some errors may be present after proofreading.

## 2020-09-29 ENCOUNTER — LAB VISIT (OUTPATIENT)
Dept: LAB | Facility: HOSPITAL | Age: 50
End: 2020-09-29
Attending: FAMILY MEDICINE
Payer: MEDICAID

## 2020-09-29 DIAGNOSIS — Z00.00 ANNUAL PHYSICAL EXAM: ICD-10-CM

## 2020-09-29 DIAGNOSIS — Z11.59 NEED FOR HEPATITIS C SCREENING TEST: ICD-10-CM

## 2020-09-29 DIAGNOSIS — M79.10 MYALGIA: ICD-10-CM

## 2020-09-29 LAB
ALBUMIN SERPL BCP-MCNC: 4.3 G/DL (ref 3.5–5.2)
ALP SERPL-CCNC: 117 U/L (ref 55–135)
ALT SERPL W/O P-5'-P-CCNC: 8 U/L (ref 10–44)
ANION GAP SERPL CALC-SCNC: 10 MMOL/L (ref 8–16)
AST SERPL-CCNC: 14 U/L (ref 10–40)
BASOPHILS # BLD AUTO: 0.08 K/UL (ref 0–0.2)
BASOPHILS NFR BLD: 0.7 % (ref 0–1.9)
BILIRUB SERPL-MCNC: 0.6 MG/DL (ref 0.1–1)
BUN SERPL-MCNC: 9 MG/DL (ref 6–20)
CALCIUM SERPL-MCNC: 9.5 MG/DL (ref 8.7–10.5)
CHLORIDE SERPL-SCNC: 102 MMOL/L (ref 95–110)
CHOLEST SERPL-MCNC: 173 MG/DL (ref 120–199)
CHOLEST/HDLC SERPL: 2.7 {RATIO} (ref 2–5)
CO2 SERPL-SCNC: 28 MMOL/L (ref 23–29)
CREAT SERPL-MCNC: 0.7 MG/DL (ref 0.5–1.4)
DIFFERENTIAL METHOD: ABNORMAL
EOSINOPHIL # BLD AUTO: 0.4 K/UL (ref 0–0.5)
EOSINOPHIL NFR BLD: 3.1 % (ref 0–8)
ERYTHROCYTE [DISTWIDTH] IN BLOOD BY AUTOMATED COUNT: 14.6 % (ref 11.5–14.5)
EST. GFR  (AFRICAN AMERICAN): >60 ML/MIN/1.73 M^2
EST. GFR  (NON AFRICAN AMERICAN): >60 ML/MIN/1.73 M^2
GLUCOSE SERPL-MCNC: 107 MG/DL (ref 70–110)
HCT VFR BLD AUTO: 45 % (ref 37–48.5)
HDLC SERPL-MCNC: 64 MG/DL (ref 40–75)
HDLC SERPL: 37 % (ref 20–50)
HGB BLD-MCNC: 14.4 G/DL (ref 12–16)
IMM GRANULOCYTES # BLD AUTO: 0.06 K/UL (ref 0–0.04)
IMM GRANULOCYTES NFR BLD AUTO: 0.5 % (ref 0–0.5)
LDLC SERPL CALC-MCNC: 92 MG/DL (ref 63–159)
LYMPHOCYTES # BLD AUTO: 3.6 K/UL (ref 1–4.8)
LYMPHOCYTES NFR BLD: 31.3 % (ref 18–48)
MAGNESIUM SERPL-MCNC: 2.1 MG/DL (ref 1.6–2.6)
MCH RBC QN AUTO: 30.2 PG (ref 27–31)
MCHC RBC AUTO-ENTMCNC: 32 G/DL (ref 32–36)
MCV RBC AUTO: 94 FL (ref 82–98)
MONOCYTES # BLD AUTO: 0.8 K/UL (ref 0.3–1)
MONOCYTES NFR BLD: 6.7 % (ref 4–15)
NEUTROPHILS # BLD AUTO: 6.7 K/UL (ref 1.8–7.7)
NEUTROPHILS NFR BLD: 57.7 % (ref 38–73)
NONHDLC SERPL-MCNC: 109 MG/DL
NRBC BLD-RTO: 0 /100 WBC
PLATELET # BLD AUTO: 298 K/UL (ref 150–350)
PMV BLD AUTO: 10.2 FL (ref 9.2–12.9)
POTASSIUM SERPL-SCNC: 3.8 MMOL/L (ref 3.5–5.1)
PROT SERPL-MCNC: 7.4 G/DL (ref 6–8.4)
RBC # BLD AUTO: 4.77 M/UL (ref 4–5.4)
SODIUM SERPL-SCNC: 140 MMOL/L (ref 136–145)
T4 FREE SERPL-MCNC: 0.88 NG/DL (ref 0.71–1.51)
TRIGL SERPL-MCNC: 85 MG/DL (ref 30–150)
TSH SERPL DL<=0.005 MIU/L-ACNC: 2.7 UIU/ML (ref 0.4–4)
WBC # BLD AUTO: 11.56 K/UL (ref 3.9–12.7)

## 2020-09-29 PROCEDURE — 36415 COLL VENOUS BLD VENIPUNCTURE: CPT | Mod: PO

## 2020-09-29 PROCEDURE — 80061 LIPID PANEL: CPT

## 2020-09-29 PROCEDURE — 84443 ASSAY THYROID STIM HORMONE: CPT

## 2020-09-29 PROCEDURE — 80053 COMPREHEN METABOLIC PANEL: CPT

## 2020-09-29 PROCEDURE — 86803 HEPATITIS C AB TEST: CPT

## 2020-09-29 PROCEDURE — 82306 VITAMIN D 25 HYDROXY: CPT

## 2020-09-29 PROCEDURE — 85025 COMPLETE CBC W/AUTO DIFF WBC: CPT

## 2020-09-29 PROCEDURE — 84439 ASSAY OF FREE THYROXINE: CPT

## 2020-09-29 PROCEDURE — 83735 ASSAY OF MAGNESIUM: CPT

## 2020-09-30 ENCOUNTER — PATIENT MESSAGE (OUTPATIENT)
Dept: FAMILY MEDICINE | Facility: CLINIC | Age: 50
End: 2020-09-30

## 2020-09-30 DIAGNOSIS — R79.89 LOW VITAMIN D LEVEL: Primary | ICD-10-CM

## 2020-09-30 LAB
25(OH)D3+25(OH)D2 SERPL-MCNC: 9 NG/ML (ref 30–96)
HCV AB SERPL QL IA: NEGATIVE

## 2020-10-01 ENCOUNTER — PATIENT MESSAGE (OUTPATIENT)
Dept: FAMILY MEDICINE | Facility: CLINIC | Age: 50
End: 2020-10-01

## 2020-10-01 DIAGNOSIS — M79.89 LEG SWELLING: Primary | ICD-10-CM

## 2020-10-01 RX ORDER — ERGOCALCIFEROL 1.25 MG/1
50000 CAPSULE ORAL
Qty: 12 CAPSULE | Refills: 0 | Status: SHIPPED | OUTPATIENT
Start: 2020-10-01 | End: 2022-06-28

## 2020-10-10 NOTE — TELEPHONE ENCOUNTER
----- Message from Araceli May sent at 8/21/2019  2:59 PM CDT -----  Patient states she was supposed to have a 2 week follow up scheduled and has not heard from anyone to do so.   
Return call to Pt, and she states that she spoke with a Nurse who informed her that the Provider wanted her to follow up on 9-23-19. Pt scheduled for appointment on 9-23-19.   
no

## 2020-12-02 ENCOUNTER — PATIENT OUTREACH (OUTPATIENT)
Dept: ADMINISTRATIVE | Facility: OTHER | Age: 50
End: 2020-12-02

## 2020-12-02 DIAGNOSIS — Z12.11 COLON CANCER SCREENING: Primary | ICD-10-CM

## 2020-12-02 NOTE — PROGRESS NOTES
Health Maintenance Due   Topic Date Due    HIV Screening  07/11/1985    Shingles Vaccine (1 of 2) 07/11/2018    Influenza Vaccine (1) 08/01/2020    Colorectal Cancer Screening  09/22/2020     Updates were requested from care everywhere.  Chart was reviewed for overdue Proactive [facility] Encounters (BRIAN) topics (CRS, Breast Cancer Screening, Eye exam)  Health Maintenance has been updated.  LINKS immunization registry triggered.  Immunizations were reconciled.

## 2020-12-08 DIAGNOSIS — G89.4 CHRONIC PAIN SYNDROME: ICD-10-CM

## 2020-12-08 RX ORDER — GABAPENTIN 600 MG/1
TABLET ORAL
Qty: 180 TABLET | Refills: 11 | Status: SHIPPED | OUTPATIENT
Start: 2020-12-08 | End: 2021-05-21

## 2020-12-08 NOTE — TELEPHONE ENCOUNTER
Last Office Visit Info:   The patient's last visit with Clovis Beauchamp MD was on 4/22/2020.    The patient's last visit in current department was on Visit date not found.        Last CBC Results:   Lab Results   Component Value Date    WBC 11.56 09/29/2020    HGB 14.4 09/29/2020    HCT 45.0 09/29/2020     09/29/2020       Last CMP Results  Lab Results   Component Value Date     09/29/2020    K 3.8 09/29/2020     09/29/2020    CO2 28 09/29/2020    BUN 9 09/29/2020    CREATININE 0.7 09/29/2020    CALCIUM 9.5 09/29/2020    ALBUMIN 4.3 09/29/2020    AST 14 09/29/2020    ALT 8 (L) 09/29/2020       Last Lipids  Lab Results   Component Value Date    CHOL 173 09/29/2020    TRIG 85 09/29/2020    HDL 64 09/29/2020    LDLCALC 92.0 09/29/2020       Last A1C  Lab Results   Component Value Date    HGBA1C 5.4 03/08/2016       Last TSH  Lab Results   Component Value Date    TSH 2.702 09/29/2020         Current Med Refills  Medication List with Changes/Refills   Current Medications    ALBUTEROL (PROVENTIL) 2.5 MG /3 ML (0.083 %) NEBULIZER SOLUTION    USE 1 VIAL IN NEBULIZER EVERY 6 HOURS AS NEEDED FOR WHEEZE       Start Date: 10/1/2020 End Date: --    ALBUTEROL (PROVENTIL/VENTOLIN HFA) 90 MCG/ACTUATION INHALER    INHALE TWO PUFFS EVERY 6 HOURS AS NEEDED FOR WHEEZING       Start Date: 10/1/2020 End Date: --    AMLODIPINE-BENAZEPRIL (LOTREL) 10-40 MG PER CAPSULE           Start Date: 2/4/2020  End Date: --    BUPROPION (WELLBUTRIN XL) 300 MG 24 HR TABLET           Start Date: 3/27/2020 End Date: --    ERGOCALCIFEROL (ERGOCALCIFEROL) 50,000 UNIT CAP    Take 1 capsule (50,000 Units total) by mouth every 7 days.       Start Date: 10/1/2020 End Date: --    FLUTICASONE-SALMETEROL 230-21 MCG/DOSE (ADVAIR HFA) 230-21 MCG/ACTUATION HFAA INHALER    Inhale 2 puffs into the lungs 2 (two) times daily. Controller       Start Date: 11/25/2019End Date: 11/24/2020    FLUTICASONE-UMECLIDIN-VILANTER (TRELEGY ELLIPTA) 100-62.5-25 MCG  DSDV    1 puff daily       Start Date: 11/25/2019End Date: --    FUROSEMIDE (LASIX) 40 MG TABLET    Take 1 tablet (40 mg total) by mouth 2 (two) times daily as needed.       Start Date: 9/23/2019 End Date: 9/22/2020    GABAPENTIN (NEURONTIN) 600 MG TABLET    2 tablets PO TID for chronic pain       Start Date: 9/23/2019 End Date: --    LIDOCAINE VISCOUS 2 % SOLUTION    Can switch to equivalent, 10 ml swish and gargle 4 x daily       Start Date: 8/12/2019 End Date: --    MELOXICAM (MOBIC) 7.5 MG TABLET    TAKE ONE TABLET BY MOUTH EVERY DAY       Start Date: 11/25/2019End Date: --    MIRTAZAPINE (REMERON) 45 MG TABLET    Take 1 tablet (45 mg total) by mouth every evening.       Start Date: 4/22/2020 End Date: --    POTASSIUM CHLORIDE (MICRO-K) 10 MEQ CPSR    TAKE 1 CAPSULE BY MOUTH TWICE DAILY FOR POTASSIUM       Start Date: 9/8/2020  End Date: --    SUBOXONE 12-3 MG FILM    PLACE ONE film UNDER THE TONGUE TWICE DAILY       Start Date: 3/27/2020 End Date: --    TOPIRAMATE (TOPAMAX) 25 MG TABLET    One tablet PO for one week; then two tablets PO for one week; then three tablets PO for one week       Start Date: 9/9/2020  End Date: 9/30/2020    VENLAFAXINE (EFFEXOR) 37.5 MG TAB    Take 1 tablet (37.5 mg total) by mouth nightly as needed.       Start Date: 9/24/2019 End Date: 9/23/2020

## 2021-01-14 ENCOUNTER — PATIENT OUTREACH (OUTPATIENT)
Dept: ADMINISTRATIVE | Facility: HOSPITAL | Age: 51
End: 2021-01-14

## 2021-01-14 DIAGNOSIS — Z12.31 ENCOUNTER FOR SCREENING MAMMOGRAM FOR BREAST CANCER: Primary | ICD-10-CM

## 2021-01-20 ENCOUNTER — PATIENT OUTREACH (OUTPATIENT)
Dept: ADMINISTRATIVE | Facility: OTHER | Age: 51
End: 2021-01-20

## 2021-02-09 ENCOUNTER — OFFICE VISIT (OUTPATIENT)
Dept: FAMILY MEDICINE | Facility: CLINIC | Age: 51
End: 2021-02-09
Payer: MEDICAID

## 2021-02-09 ENCOUNTER — LAB VISIT (OUTPATIENT)
Dept: LAB | Facility: HOSPITAL | Age: 51
End: 2021-02-09
Attending: FAMILY MEDICINE
Payer: MEDICAID

## 2021-02-09 VITALS
SYSTOLIC BLOOD PRESSURE: 130 MMHG | HEIGHT: 63 IN | TEMPERATURE: 97 F | OXYGEN SATURATION: 96 % | DIASTOLIC BLOOD PRESSURE: 70 MMHG | HEART RATE: 88 BPM | BODY MASS INDEX: 34.76 KG/M2 | WEIGHT: 196.19 LBS

## 2021-02-09 DIAGNOSIS — R07.9 CHEST PAIN, UNSPECIFIED TYPE: ICD-10-CM

## 2021-02-09 DIAGNOSIS — J43.2 CENTRILOBULAR EMPHYSEMA: ICD-10-CM

## 2021-02-09 DIAGNOSIS — G89.4 CHRONIC PAIN SYNDROME: ICD-10-CM

## 2021-02-09 DIAGNOSIS — R07.9 CHEST PAIN, UNSPECIFIED TYPE: Primary | ICD-10-CM

## 2021-02-09 DIAGNOSIS — Z72.0 TOBACCO USE: ICD-10-CM

## 2021-02-09 LAB — D DIMER PPP IA.FEU-MCNC: 0.23 MG/L FEU

## 2021-02-09 PROCEDURE — 99214 OFFICE O/P EST MOD 30 MIN: CPT | Mod: S$PBB,,, | Performed by: FAMILY MEDICINE

## 2021-02-09 PROCEDURE — 99214 OFFICE O/P EST MOD 30 MIN: CPT | Mod: PBBFAC,PO | Performed by: FAMILY MEDICINE

## 2021-02-09 PROCEDURE — 99214 PR OFFICE/OUTPT VISIT, EST, LEVL IV, 30-39 MIN: ICD-10-PCS | Mod: S$PBB,,, | Performed by: FAMILY MEDICINE

## 2021-02-09 PROCEDURE — 85379 FIBRIN DEGRADATION QUANT: CPT

## 2021-02-09 PROCEDURE — 99999 PR PBB SHADOW E&M-EST. PATIENT-LVL IV: ICD-10-PCS | Mod: PBBFAC,,, | Performed by: FAMILY MEDICINE

## 2021-02-09 PROCEDURE — 99999 PR PBB SHADOW E&M-EST. PATIENT-LVL IV: CPT | Mod: PBBFAC,,, | Performed by: FAMILY MEDICINE

## 2021-02-09 PROCEDURE — 36415 COLL VENOUS BLD VENIPUNCTURE: CPT | Mod: PO

## 2021-02-09 RX ORDER — PREDNISONE 20 MG/1
20 TABLET ORAL DAILY
Qty: 10 TABLET | Refills: 0 | Status: SHIPPED | OUTPATIENT
Start: 2021-02-09 | End: 2021-02-19

## 2021-02-09 RX ORDER — TRAZODONE HYDROCHLORIDE 100 MG/1
200 TABLET ORAL NIGHTLY
COMMUNITY
Start: 2021-02-05 | End: 2022-06-28

## 2021-02-09 RX ORDER — AZITHROMYCIN 500 MG/1
500 TABLET, FILM COATED ORAL DAILY
Qty: 3 TABLET | Refills: 0 | Status: SHIPPED | OUTPATIENT
Start: 2021-02-09 | End: 2021-08-04 | Stop reason: SDUPTHER

## 2021-02-09 RX ORDER — MELOXICAM 7.5 MG/1
7.5 TABLET ORAL DAILY
Qty: 90 TABLET | Refills: 3 | OUTPATIENT
Start: 2021-02-09 | End: 2021-11-15

## 2021-02-09 RX ORDER — ALBUTEROL SULFATE 90 UG/1
AEROSOL, METERED RESPIRATORY (INHALATION)
Qty: 8.5 G | Refills: 11 | Status: SHIPPED | OUTPATIENT
Start: 2021-02-09 | End: 2021-11-15 | Stop reason: SDUPTHER

## 2021-02-09 RX ORDER — BUPRENORPHINE HYDROCHLORIDE, NALOXONE HYDROCHLORIDE 8; 2 MG/1; MG/1
FILM, SOLUBLE BUCCAL; SUBLINGUAL
COMMUNITY
Start: 2021-01-08 | End: 2022-09-13 | Stop reason: ALTCHOICE

## 2021-02-10 ENCOUNTER — TELEPHONE (OUTPATIENT)
Dept: FAMILY MEDICINE | Facility: CLINIC | Age: 51
End: 2021-02-10

## 2021-02-10 ENCOUNTER — PATIENT MESSAGE (OUTPATIENT)
Dept: FAMILY MEDICINE | Facility: CLINIC | Age: 51
End: 2021-02-10

## 2021-02-11 ENCOUNTER — TELEPHONE (OUTPATIENT)
Dept: FAMILY MEDICINE | Facility: CLINIC | Age: 51
End: 2021-02-11

## 2021-03-15 ENCOUNTER — PATIENT OUTREACH (OUTPATIENT)
Dept: ADMINISTRATIVE | Facility: OTHER | Age: 51
End: 2021-03-15

## 2021-03-19 ENCOUNTER — OFFICE VISIT (OUTPATIENT)
Dept: CARDIOLOGY | Facility: CLINIC | Age: 51
End: 2021-03-19
Payer: MEDICAID

## 2021-03-19 VITALS
WEIGHT: 180.56 LBS | HEART RATE: 109 BPM | DIASTOLIC BLOOD PRESSURE: 84 MMHG | OXYGEN SATURATION: 91 % | BODY MASS INDEX: 31.99 KG/M2 | SYSTOLIC BLOOD PRESSURE: 144 MMHG | HEIGHT: 63 IN | RESPIRATION RATE: 15 BRPM

## 2021-03-19 DIAGNOSIS — I70.0 AORTIC ATHEROSCLEROSIS: ICD-10-CM

## 2021-03-19 DIAGNOSIS — Z99.81 ON HOME OXYGEN THERAPY: ICD-10-CM

## 2021-03-19 DIAGNOSIS — Z78.9 PATIENT UNABLE TO EXERCISE: ICD-10-CM

## 2021-03-19 DIAGNOSIS — R06.09 DOE (DYSPNEA ON EXERTION): ICD-10-CM

## 2021-03-19 DIAGNOSIS — M79.89 LEG SWELLING: ICD-10-CM

## 2021-03-19 DIAGNOSIS — J43.2 CENTRILOBULAR EMPHYSEMA: ICD-10-CM

## 2021-03-19 DIAGNOSIS — R60.0 BILATERAL LOWER EXTREMITY EDEMA: Primary | ICD-10-CM

## 2021-03-19 DIAGNOSIS — I10 ESSENTIAL HYPERTENSION: ICD-10-CM

## 2021-03-19 DIAGNOSIS — F17.200 TOBACCO USE DISORDER: ICD-10-CM

## 2021-03-19 DIAGNOSIS — R94.31 ABNORMAL EKG: ICD-10-CM

## 2021-03-19 DIAGNOSIS — I73.9 CLAUDICATION OF BOTH LOWER EXTREMITIES: ICD-10-CM

## 2021-03-19 DIAGNOSIS — R07.9 CHEST PAIN, UNSPECIFIED TYPE: ICD-10-CM

## 2021-03-19 PROCEDURE — 99204 PR OFFICE/OUTPT VISIT, NEW, LEVL IV, 45-59 MIN: ICD-10-PCS | Mod: S$PBB,,, | Performed by: INTERNAL MEDICINE

## 2021-03-19 PROCEDURE — 99999 PR PBB SHADOW E&M-EST. PATIENT-LVL V: ICD-10-PCS | Mod: PBBFAC,,, | Performed by: INTERNAL MEDICINE

## 2021-03-19 PROCEDURE — 99999 PR PBB SHADOW E&M-EST. PATIENT-LVL V: CPT | Mod: PBBFAC,,, | Performed by: INTERNAL MEDICINE

## 2021-03-19 PROCEDURE — 93010 ELECTROCARDIOGRAM REPORT: CPT | Mod: S$PBB,,, | Performed by: INTERNAL MEDICINE

## 2021-03-19 PROCEDURE — 99204 OFFICE O/P NEW MOD 45 MIN: CPT | Mod: S$PBB,,, | Performed by: INTERNAL MEDICINE

## 2021-03-19 PROCEDURE — 93010 EKG 12-LEAD: ICD-10-PCS | Mod: S$PBB,,, | Performed by: INTERNAL MEDICINE

## 2021-03-19 PROCEDURE — 93005 ELECTROCARDIOGRAM TRACING: CPT | Mod: PBBFAC | Performed by: INTERNAL MEDICINE

## 2021-03-19 PROCEDURE — 99215 OFFICE O/P EST HI 40 MIN: CPT | Mod: PBBFAC,25 | Performed by: INTERNAL MEDICINE

## 2021-03-26 ENCOUNTER — PATIENT MESSAGE (OUTPATIENT)
Dept: CARDIOLOGY | Facility: CLINIC | Age: 51
End: 2021-03-26

## 2021-04-06 ENCOUNTER — PATIENT MESSAGE (OUTPATIENT)
Dept: ADMINISTRATIVE | Facility: HOSPITAL | Age: 51
End: 2021-04-06

## 2021-04-07 ENCOUNTER — PATIENT MESSAGE (OUTPATIENT)
Dept: FAMILY MEDICINE | Facility: CLINIC | Age: 51
End: 2021-04-07

## 2021-04-08 ENCOUNTER — PATIENT OUTREACH (OUTPATIENT)
Dept: ADMINISTRATIVE | Facility: HOSPITAL | Age: 51
End: 2021-04-08

## 2021-04-08 DIAGNOSIS — Z12.11 SCREENING FOR MALIGNANT NEOPLASM OF COLON: Primary | ICD-10-CM

## 2021-06-24 ENCOUNTER — PATIENT MESSAGE (OUTPATIENT)
Dept: FAMILY MEDICINE | Facility: CLINIC | Age: 51
End: 2021-06-24

## 2021-07-06 ENCOUNTER — PATIENT MESSAGE (OUTPATIENT)
Dept: ADMINISTRATIVE | Facility: HOSPITAL | Age: 51
End: 2021-07-06

## 2021-07-12 ENCOUNTER — PATIENT OUTREACH (OUTPATIENT)
Dept: ADMINISTRATIVE | Facility: OTHER | Age: 51
End: 2021-07-12

## 2021-07-14 ENCOUNTER — PATIENT MESSAGE (OUTPATIENT)
Dept: FAMILY MEDICINE | Facility: CLINIC | Age: 51
End: 2021-07-14

## 2021-07-14 DIAGNOSIS — J44.9 CHRONIC OBSTRUCTIVE PULMONARY DISEASE, UNSPECIFIED COPD TYPE: ICD-10-CM

## 2021-07-14 RX ORDER — ALBUTEROL SULFATE 0.83 MG/ML
SOLUTION RESPIRATORY (INHALATION)
Qty: 90 ML | Refills: 1 | Status: SHIPPED | OUTPATIENT
Start: 2021-07-14 | End: 2021-08-04

## 2021-08-03 ENCOUNTER — PATIENT MESSAGE (OUTPATIENT)
Dept: FAMILY MEDICINE | Facility: CLINIC | Age: 51
End: 2021-08-03

## 2021-08-04 ENCOUNTER — TELEPHONE (OUTPATIENT)
Dept: FAMILY MEDICINE | Facility: CLINIC | Age: 51
End: 2021-08-04

## 2021-08-04 ENCOUNTER — OFFICE VISIT (OUTPATIENT)
Dept: FAMILY MEDICINE | Facility: CLINIC | Age: 51
End: 2021-08-04
Payer: MEDICAID

## 2021-08-04 DIAGNOSIS — J44.9 CHRONIC OBSTRUCTIVE PULMONARY DISEASE, UNSPECIFIED COPD TYPE: ICD-10-CM

## 2021-08-04 DIAGNOSIS — J43.2 CENTRILOBULAR EMPHYSEMA: ICD-10-CM

## 2021-08-04 PROCEDURE — 99214 OFFICE O/P EST MOD 30 MIN: CPT | Mod: 95,,, | Performed by: FAMILY MEDICINE

## 2021-08-04 PROCEDURE — 99214 PR OFFICE/OUTPT VISIT, EST, LEVL IV, 30-39 MIN: ICD-10-PCS | Mod: 95,,, | Performed by: FAMILY MEDICINE

## 2021-08-04 RX ORDER — ALBUTEROL SULFATE 0.83 MG/ML
SOLUTION RESPIRATORY (INHALATION)
Qty: 30 ML | Refills: 11 | Status: SHIPPED | OUTPATIENT
Start: 2021-08-04 | End: 2021-11-15 | Stop reason: SDUPTHER

## 2021-08-04 RX ORDER — ALBUTEROL SULFATE 0.83 MG/ML
SOLUTION RESPIRATORY (INHALATION)
Qty: 30 ML | Refills: 11 | Status: SHIPPED | OUTPATIENT
Start: 2021-08-04 | End: 2021-08-04 | Stop reason: SDUPTHER

## 2021-08-04 RX ORDER — FLUCONAZOLE 150 MG/1
150 TABLET ORAL DAILY
Qty: 1 TABLET | Refills: 0 | Status: SHIPPED | OUTPATIENT
Start: 2021-08-04 | End: 2021-08-05

## 2021-08-04 RX ORDER — AZITHROMYCIN 500 MG/1
500 TABLET, FILM COATED ORAL DAILY
Qty: 3 TABLET | Refills: 0 | Status: SHIPPED | OUTPATIENT
Start: 2021-08-04 | End: 2021-08-04 | Stop reason: SDUPTHER

## 2021-08-04 RX ORDER — AZITHROMYCIN 500 MG/1
500 TABLET, FILM COATED ORAL DAILY
Qty: 3 TABLET | Refills: 0 | OUTPATIENT
Start: 2021-08-04 | End: 2022-05-04

## 2021-08-04 RX ORDER — FLUCONAZOLE 150 MG/1
150 TABLET ORAL DAILY
Qty: 1 TABLET | Refills: 0 | Status: SHIPPED | OUTPATIENT
Start: 2021-08-04 | End: 2021-08-04 | Stop reason: SDUPTHER

## 2021-08-04 RX ORDER — NYSTATIN 100000 U/G
CREAM TOPICAL 2 TIMES DAILY
Qty: 30 G | Refills: 1 | Status: SHIPPED | OUTPATIENT
Start: 2021-08-04 | End: 2022-06-28

## 2021-08-27 NOTE — PROGRESS NOTES
Left message for patient in regards to overdue Health Maintenance.  Portal message sent in regards to Overdue Health Maintenance.  
Unknown if ever smoked

## 2021-10-04 ENCOUNTER — PATIENT MESSAGE (OUTPATIENT)
Dept: ADMINISTRATIVE | Facility: HOSPITAL | Age: 51
End: 2021-10-04

## 2021-11-15 ENCOUNTER — HOSPITAL ENCOUNTER (EMERGENCY)
Facility: HOSPITAL | Age: 51
Discharge: HOME | End: 2021-11-15
Attending: EMERGENCY MEDICINE
Payer: MEDICAID

## 2021-11-15 VITALS
DIASTOLIC BLOOD PRESSURE: 70 MMHG | RESPIRATION RATE: 22 BRPM | OXYGEN SATURATION: 96 % | WEIGHT: 198 LBS | BODY MASS INDEX: 35.08 KG/M2 | TEMPERATURE: 99 F | SYSTOLIC BLOOD PRESSURE: 130 MMHG | HEIGHT: 63 IN | HEART RATE: 98 BPM

## 2021-11-15 DIAGNOSIS — R60.0 PEDAL EDEMA: ICD-10-CM

## 2021-11-15 DIAGNOSIS — J20.9 ACUTE BRONCHITIS, UNSPECIFIED ORGANISM: ICD-10-CM

## 2021-11-15 DIAGNOSIS — R06.02 SHORTNESS OF BREATH: ICD-10-CM

## 2021-11-15 DIAGNOSIS — J44.9 CHRONIC OBSTRUCTIVE PULMONARY DISEASE, UNSPECIFIED COPD TYPE: ICD-10-CM

## 2021-11-15 DIAGNOSIS — J44.1 ACUTE EXACERBATION OF CHRONIC OBSTRUCTIVE PULMONARY DISEASE (COPD): Primary | ICD-10-CM

## 2021-11-15 DIAGNOSIS — R07.89 CHEST PAIN, NON-CARDIAC: ICD-10-CM

## 2021-11-15 DIAGNOSIS — Z72.0 TOBACCO USE: ICD-10-CM

## 2021-11-15 LAB
ALBUMIN SERPL BCP-MCNC: 3.6 G/DL (ref 3.5–5.2)
ALP SERPL-CCNC: 125 U/L (ref 55–135)
ALT SERPL W/O P-5'-P-CCNC: 11 U/L (ref 10–44)
ANION GAP SERPL CALC-SCNC: 7 MMOL/L (ref 8–16)
AST SERPL-CCNC: 14 U/L (ref 10–40)
BASOPHILS # BLD AUTO: 0.08 K/UL (ref 0–0.2)
BASOPHILS NFR BLD: 0.6 % (ref 0–1.9)
BILIRUB SERPL-MCNC: 0.5 MG/DL (ref 0.1–1)
BILIRUB UR QL STRIP: NEGATIVE
BNP SERPL-MCNC: 17 PG/ML (ref 0–99)
BUN SERPL-MCNC: 9 MG/DL (ref 6–20)
CALCIUM SERPL-MCNC: 10.4 MG/DL (ref 8.7–10.5)
CHLORIDE SERPL-SCNC: 98 MMOL/L (ref 95–110)
CLARITY UR: CLEAR
CO2 SERPL-SCNC: 36 MMOL/L (ref 23–29)
COLOR UR: YELLOW
CREAT SERPL-MCNC: 0.6 MG/DL (ref 0.5–1.4)
CTP QC/QA: YES
DIFFERENTIAL METHOD: ABNORMAL
EOSINOPHIL # BLD AUTO: 0.4 K/UL (ref 0–0.5)
EOSINOPHIL NFR BLD: 3 % (ref 0–8)
ERYTHROCYTE [DISTWIDTH] IN BLOOD BY AUTOMATED COUNT: 13.5 % (ref 11.5–14.5)
EST. GFR  (AFRICAN AMERICAN): >60 ML/MIN/1.73 M^2
EST. GFR  (NON AFRICAN AMERICAN): >60 ML/MIN/1.73 M^2
GLUCOSE SERPL-MCNC: 102 MG/DL (ref 70–110)
GLUCOSE UR QL STRIP: NEGATIVE
HCT VFR BLD AUTO: 44.1 % (ref 37–48.5)
HGB BLD-MCNC: 13.7 G/DL (ref 12–16)
HGB UR QL STRIP: NEGATIVE
IMM GRANULOCYTES # BLD AUTO: 0.08 K/UL (ref 0–0.04)
IMM GRANULOCYTES NFR BLD AUTO: 0.6 % (ref 0–0.5)
KETONES UR QL STRIP: NEGATIVE
LEUKOCYTE ESTERASE UR QL STRIP: NEGATIVE
LYMPHOCYTES # BLD AUTO: 2.9 K/UL (ref 1–4.8)
LYMPHOCYTES NFR BLD: 21.6 % (ref 18–48)
MCH RBC QN AUTO: 29.6 PG (ref 27–31)
MCHC RBC AUTO-ENTMCNC: 31.1 G/DL (ref 32–36)
MCV RBC AUTO: 95 FL (ref 82–98)
MONOCYTES # BLD AUTO: 1.2 K/UL (ref 0.3–1)
MONOCYTES NFR BLD: 9.2 % (ref 4–15)
NEUTROPHILS # BLD AUTO: 8.8 K/UL (ref 1.8–7.7)
NEUTROPHILS NFR BLD: 65 % (ref 38–73)
NITRITE UR QL STRIP: NEGATIVE
NRBC BLD-RTO: 0 /100 WBC
PH UR STRIP: 7 [PH] (ref 5–8)
PLATELET # BLD AUTO: 304 K/UL (ref 150–450)
PMV BLD AUTO: 9.8 FL (ref 9.2–12.9)
POTASSIUM SERPL-SCNC: 4.5 MMOL/L (ref 3.5–5.1)
PROT SERPL-MCNC: 7.4 G/DL (ref 6–8.4)
PROT UR QL STRIP: NEGATIVE
RBC # BLD AUTO: 4.63 M/UL (ref 4–5.4)
SARS-COV-2 RDRP RESP QL NAA+PROBE: NEGATIVE
SODIUM SERPL-SCNC: 141 MMOL/L (ref 136–145)
SP GR UR STRIP: 1.02 (ref 1–1.03)
TROPONIN I SERPL DL<=0.01 NG/ML-MCNC: <0.006 NG/ML (ref 0–0.03)
URN SPEC COLLECT METH UR: NORMAL
UROBILINOGEN UR STRIP-ACNC: NEGATIVE EU/DL
WBC # BLD AUTO: 13.52 K/UL (ref 3.9–12.7)

## 2021-11-15 PROCEDURE — 25000242 PHARM REV CODE 250 ALT 637 W/ HCPCS: Performed by: EMERGENCY MEDICINE

## 2021-11-15 PROCEDURE — 96374 THER/PROPH/DIAG INJ IV PUSH: CPT

## 2021-11-15 PROCEDURE — 94761 N-INVAS EAR/PLS OXIMETRY MLT: CPT

## 2021-11-15 PROCEDURE — 63600175 PHARM REV CODE 636 W HCPCS: Performed by: EMERGENCY MEDICINE

## 2021-11-15 PROCEDURE — 81003 URINALYSIS AUTO W/O SCOPE: CPT | Performed by: NURSE PRACTITIONER

## 2021-11-15 PROCEDURE — 94640 AIRWAY INHALATION TREATMENT: CPT

## 2021-11-15 PROCEDURE — 84484 ASSAY OF TROPONIN QUANT: CPT | Performed by: NURSE PRACTITIONER

## 2021-11-15 PROCEDURE — 93010 ELECTROCARDIOGRAM REPORT: CPT | Mod: ,,, | Performed by: INTERNAL MEDICINE

## 2021-11-15 PROCEDURE — 93010 EKG 12-LEAD: ICD-10-PCS | Mod: ,,, | Performed by: INTERNAL MEDICINE

## 2021-11-15 PROCEDURE — 83880 ASSAY OF NATRIURETIC PEPTIDE: CPT | Performed by: NURSE PRACTITIONER

## 2021-11-15 PROCEDURE — 99285 EMERGENCY DEPT VISIT HI MDM: CPT | Mod: 25

## 2021-11-15 PROCEDURE — 93005 ELECTROCARDIOGRAM TRACING: CPT

## 2021-11-15 PROCEDURE — U0002 COVID-19 LAB TEST NON-CDC: HCPCS | Performed by: NURSE PRACTITIONER

## 2021-11-15 PROCEDURE — 85025 COMPLETE CBC W/AUTO DIFF WBC: CPT | Performed by: NURSE PRACTITIONER

## 2021-11-15 PROCEDURE — 27000221 HC OXYGEN, UP TO 24 HOURS

## 2021-11-15 PROCEDURE — 80053 COMPREHEN METABOLIC PANEL: CPT | Performed by: NURSE PRACTITIONER

## 2021-11-15 RX ORDER — DOXYCYCLINE 100 MG/1
100 CAPSULE ORAL 2 TIMES DAILY
Qty: 20 CAPSULE | Refills: 0 | Status: SHIPPED | OUTPATIENT
Start: 2021-11-15 | End: 2021-11-25

## 2021-11-15 RX ORDER — PROMETHAZINE HYDROCHLORIDE AND DEXTROMETHORPHAN HYDROBROMIDE 6.25; 15 MG/5ML; MG/5ML
5 SYRUP ORAL EVERY 6 HOURS PRN
Qty: 180 ML | Refills: 0 | Status: SHIPPED | OUTPATIENT
Start: 2021-11-15 | End: 2021-11-25

## 2021-11-15 RX ORDER — ALBUTEROL SULFATE 2.5 MG/.5ML
5 SOLUTION RESPIRATORY (INHALATION)
Status: COMPLETED | OUTPATIENT
Start: 2021-11-15 | End: 2021-11-15

## 2021-11-15 RX ORDER — DEXAMETHASONE SODIUM PHOSPHATE 4 MG/ML
8 INJECTION, SOLUTION INTRA-ARTICULAR; INTRALESIONAL; INTRAMUSCULAR; INTRAVENOUS; SOFT TISSUE
Status: COMPLETED | OUTPATIENT
Start: 2021-11-15 | End: 2021-11-15

## 2021-11-15 RX ORDER — ALBUTEROL SULFATE 0.83 MG/ML
SOLUTION RESPIRATORY (INHALATION)
Qty: 60 ML | Refills: 6 | Status: SHIPPED | OUTPATIENT
Start: 2021-11-15 | End: 2022-06-28

## 2021-11-15 RX ORDER — IPRATROPIUM BROMIDE AND ALBUTEROL SULFATE 2.5; .5 MG/3ML; MG/3ML
3 SOLUTION RESPIRATORY (INHALATION)
Status: COMPLETED | OUTPATIENT
Start: 2021-11-15 | End: 2021-11-15

## 2021-11-15 RX ORDER — FUROSEMIDE 40 MG/1
40 TABLET ORAL 2 TIMES DAILY
Qty: 10 TABLET | Refills: 0 | Status: SHIPPED | OUTPATIENT
Start: 2021-11-15 | End: 2022-06-28

## 2021-11-15 RX ORDER — PREDNISONE 20 MG/1
60 TABLET ORAL DAILY
Qty: 15 TABLET | Refills: 0 | Status: SHIPPED | OUTPATIENT
Start: 2021-11-15 | End: 2021-11-20

## 2021-11-15 RX ORDER — ALBUTEROL SULFATE 90 UG/1
AEROSOL, METERED RESPIRATORY (INHALATION)
Qty: 8 G | Refills: 6 | Status: SHIPPED | OUTPATIENT
Start: 2021-11-15 | End: 2022-06-28

## 2021-11-15 RX ADMIN — DEXAMETHASONE SODIUM PHOSPHATE 8 MG: 4 INJECTION INTRA-ARTICULAR; INTRALESIONAL; INTRAMUSCULAR; INTRAVENOUS; SOFT TISSUE at 03:11

## 2021-11-15 RX ADMIN — ALBUTEROL SULFATE 5 MG: 2.5 SOLUTION RESPIRATORY (INHALATION) at 03:11

## 2021-11-15 RX ADMIN — IPRATROPIUM BROMIDE AND ALBUTEROL SULFATE 3 ML: 2.5; .5 SOLUTION RESPIRATORY (INHALATION) at 03:11

## 2021-12-08 ENCOUNTER — PATIENT MESSAGE (OUTPATIENT)
Dept: ADMINISTRATIVE | Facility: HOSPITAL | Age: 51
End: 2021-12-08
Payer: MEDICAID

## 2021-12-15 ENCOUNTER — PATIENT MESSAGE (OUTPATIENT)
Dept: ADMINISTRATIVE | Facility: HOSPITAL | Age: 51
End: 2021-12-15
Payer: MEDICAID

## 2022-01-14 ENCOUNTER — TELEPHONE (OUTPATIENT)
Dept: FAMILY MEDICINE | Facility: CLINIC | Age: 52
End: 2022-01-14
Payer: MEDICAID

## 2022-01-20 ENCOUNTER — TELEPHONE (OUTPATIENT)
Dept: FAMILY MEDICINE | Facility: CLINIC | Age: 52
End: 2022-01-20
Payer: MEDICAID

## 2022-02-10 ENCOUNTER — HOSPITAL ENCOUNTER (OUTPATIENT)
Dept: CARDIOLOGY | Facility: HOSPITAL | Age: 52
Discharge: HOME | End: 2022-02-10
Attending: FAMILY MEDICINE
Payer: MEDICAID

## 2022-02-10 DIAGNOSIS — R06.00 DYSPNEA, UNSPECIFIED: ICD-10-CM

## 2022-02-10 LAB
ASCENDING AORTA: 2.38 CM
AV INDEX (PROSTH): 0.67
AV MEAN GRADIENT: 8 MMHG
AV PEAK GRADIENT: 14 MMHG
AV VALVE AREA: 2.18 CM2
AV VELOCITY RATIO: 0.64
CV ECHO LV RWT: 0.41 CM
DOP CALC AO PEAK VEL: 1.86 M/S
DOP CALC AO VTI: 36.17 CM
DOP CALC LVOT AREA: 3.3 CM2
DOP CALC LVOT DIAMETER: 2.04 CM
DOP CALC LVOT PEAK VEL: 1.19 M/S
DOP CALC LVOT STROKE VOLUME: 78.73 CM3
DOP CALCLVOT PEAK VEL VTI: 24.1 CM
E WAVE DECELERATION TIME: 164.83 MSEC
E/A RATIO: 0.78
E/E' RATIO: 7.6 M/S
ECHO LV POSTERIOR WALL: 1 CM (ref 0.6–1.1)
EJECTION FRACTION: 55 %
FRACTIONAL SHORTENING: 27 % (ref 28–44)
INTERVENTRICULAR SEPTUM: 1.02 CM (ref 0.6–1.1)
IVRT: 129.18 MSEC
LA MAJOR: 5.68 CM
LA MINOR: 5.92 CM
LA WIDTH: 3.54 CM
LEFT ATRIUM SIZE: 4.13 CM
LEFT ATRIUM VOLUME: 72.05 CM3
LEFT INTERNAL DIMENSION IN SYSTOLE: 3.61 CM (ref 2.1–4)
LEFT VENTRICLE DIASTOLIC VOLUME: 114.14 ML
LEFT VENTRICLE SYSTOLIC VOLUME: 54.79 ML
LEFT VENTRICULAR INTERNAL DIMENSION IN DIASTOLE: 4.92 CM (ref 3.5–6)
LEFT VENTRICULAR MASS: 179.61 G
LV LATERAL E/E' RATIO: 6.33 M/S
LV SEPTAL E/E' RATIO: 9.5 M/S
MV PEAK A VEL: 0.98 M/S
MV PEAK E VEL: 0.76 M/S
MV STENOSIS PRESSURE HALF TIME: 47.8 MS
MV VALVE AREA P 1/2 METHOD: 4.6 CM2
PISA TR MAX VEL: 3.32 M/S
PULM VEIN S/D RATIO: 0.84
PV PEAK D VEL: 0.61 M/S
PV PEAK S VEL: 0.51 M/S
PV PEAK VELOCITY: 1.27 CM/S
RA MAJOR: 4.81 CM
RA PRESSURE: 8 MMHG
RA WIDTH: 4.08 CM
RIGHT VENTRICULAR END-DIASTOLIC DIMENSION: 4.59 CM
RV TISSUE DOPPLER FREE WALL SYSTOLIC VELOCITY 1 (APICAL 4 CHAMBER VIEW): 13.99 CM/S
SINUS: 2.91 CM
STJ: 2.37 CM
TDI LATERAL: 0.12 M/S
TDI SEPTAL: 0.08 M/S
TDI: 0.1 M/S
TR MAX PG: 44 MMHG
TRICUSPID ANNULAR PLANE SYSTOLIC EXCURSION: 3.43 CM
TV REST PULMONARY ARTERY PRESSURE: 52 MMHG

## 2022-02-10 PROCEDURE — 93306 TTE W/DOPPLER COMPLETE: CPT

## 2022-02-10 PROCEDURE — 93306 TTE W/DOPPLER COMPLETE: CPT | Mod: 26,,, | Performed by: INTERNAL MEDICINE

## 2022-02-10 PROCEDURE — 93306 ECHO (CUPID ONLY): ICD-10-PCS | Mod: 26,,, | Performed by: INTERNAL MEDICINE

## 2022-03-28 ENCOUNTER — TELEPHONE (OUTPATIENT)
Dept: ADMINISTRATIVE | Facility: HOSPITAL | Age: 52
End: 2022-03-28
Payer: MEDICAID

## 2022-03-28 ENCOUNTER — PATIENT OUTREACH (OUTPATIENT)
Dept: ADMINISTRATIVE | Facility: HOSPITAL | Age: 52
End: 2022-03-28
Payer: MEDICAID

## 2022-04-13 DIAGNOSIS — Z12.31 OTHER SCREENING MAMMOGRAM: ICD-10-CM

## 2022-04-28 ENCOUNTER — HOSPITAL ENCOUNTER (OUTPATIENT)
Dept: RADIOLOGY | Facility: HOSPITAL | Age: 52
Discharge: HOME | End: 2022-04-28
Attending: FAMILY MEDICINE
Payer: MEDICAID

## 2022-04-28 DIAGNOSIS — Z12.31 OTHER SCREENING MAMMOGRAM: ICD-10-CM

## 2022-05-04 ENCOUNTER — HOSPITAL ENCOUNTER (EMERGENCY)
Facility: HOSPITAL | Age: 52
Discharge: HOME | End: 2022-05-04
Attending: EMERGENCY MEDICINE
Payer: MEDICAID

## 2022-05-04 VITALS
BODY MASS INDEX: 30.77 KG/M2 | RESPIRATION RATE: 26 BRPM | HEIGHT: 68 IN | SYSTOLIC BLOOD PRESSURE: 150 MMHG | DIASTOLIC BLOOD PRESSURE: 88 MMHG | TEMPERATURE: 99 F | WEIGHT: 203 LBS | HEART RATE: 99 BPM | OXYGEN SATURATION: 93 %

## 2022-05-04 DIAGNOSIS — J44.1 COPD EXACERBATION: Primary | ICD-10-CM

## 2022-05-04 DIAGNOSIS — R06.02 SHORTNESS OF BREATH: ICD-10-CM

## 2022-05-04 LAB
ALBUMIN SERPL BCP-MCNC: 3.8 G/DL (ref 3.5–5.2)
ALP SERPL-CCNC: 103 U/L (ref 55–135)
ALT SERPL W/O P-5'-P-CCNC: 14 U/L (ref 10–44)
ANION GAP SERPL CALC-SCNC: 12 MMOL/L (ref 8–16)
AST SERPL-CCNC: 17 U/L (ref 10–40)
BASOPHILS # BLD AUTO: 0.12 K/UL (ref 0–0.2)
BASOPHILS NFR BLD: 0.7 % (ref 0–1.9)
BILIRUB SERPL-MCNC: 0.6 MG/DL (ref 0.1–1)
BILIRUB UR QL STRIP: NEGATIVE
BNP SERPL-MCNC: 24 PG/ML (ref 0–99)
BUN SERPL-MCNC: 9 MG/DL (ref 6–20)
CALCIUM SERPL-MCNC: 9.2 MG/DL (ref 8.7–10.5)
CHLORIDE SERPL-SCNC: 99 MMOL/L (ref 95–110)
CLARITY UR: CLEAR
CO2 SERPL-SCNC: 31 MMOL/L (ref 23–29)
COLOR UR: COLORLESS
CREAT SERPL-MCNC: 0.6 MG/DL (ref 0.5–1.4)
CTP QC/QA: YES
DIFFERENTIAL METHOD: ABNORMAL
EOSINOPHIL # BLD AUTO: 0.5 K/UL (ref 0–0.5)
EOSINOPHIL NFR BLD: 2.9 % (ref 0–8)
ERYTHROCYTE [DISTWIDTH] IN BLOOD BY AUTOMATED COUNT: 14.4 % (ref 11.5–14.5)
EST. GFR  (AFRICAN AMERICAN): >60 ML/MIN/1.73 M^2
EST. GFR  (NON AFRICAN AMERICAN): >60 ML/MIN/1.73 M^2
GLUCOSE SERPL-MCNC: 92 MG/DL (ref 70–110)
GLUCOSE UR QL STRIP: NEGATIVE
HCT VFR BLD AUTO: 41.8 % (ref 37–48.5)
HGB BLD-MCNC: 12.9 G/DL (ref 12–16)
HGB UR QL STRIP: NEGATIVE
IMM GRANULOCYTES # BLD AUTO: 0.31 K/UL (ref 0–0.04)
IMM GRANULOCYTES NFR BLD AUTO: 1.8 % (ref 0–0.5)
KETONES UR QL STRIP: NEGATIVE
LEUKOCYTE ESTERASE UR QL STRIP: NEGATIVE
LYMPHOCYTES # BLD AUTO: 4.6 K/UL (ref 1–4.8)
LYMPHOCYTES NFR BLD: 26.4 % (ref 18–48)
MCH RBC QN AUTO: 29.6 PG (ref 27–31)
MCHC RBC AUTO-ENTMCNC: 30.9 G/DL (ref 32–36)
MCV RBC AUTO: 96 FL (ref 82–98)
MONOCYTES # BLD AUTO: 1.2 K/UL (ref 0.3–1)
MONOCYTES NFR BLD: 6.7 % (ref 4–15)
NEUTROPHILS # BLD AUTO: 10.7 K/UL (ref 1.8–7.7)
NEUTROPHILS NFR BLD: 61.5 % (ref 38–73)
NITRITE UR QL STRIP: NEGATIVE
NRBC BLD-RTO: 0 /100 WBC
PH UR STRIP: 7 [PH] (ref 5–8)
PLATELET # BLD AUTO: 315 K/UL (ref 150–450)
PMV BLD AUTO: 10.3 FL (ref 9.2–12.9)
POTASSIUM SERPL-SCNC: 4.5 MMOL/L (ref 3.5–5.1)
PROT SERPL-MCNC: 7 G/DL (ref 6–8.4)
PROT UR QL STRIP: NEGATIVE
RBC # BLD AUTO: 4.36 M/UL (ref 4–5.4)
SARS-COV-2 RDRP RESP QL NAA+PROBE: NEGATIVE
SODIUM SERPL-SCNC: 142 MMOL/L (ref 136–145)
SP GR UR STRIP: 1 (ref 1–1.03)
TROPONIN I SERPL DL<=0.01 NG/ML-MCNC: <0.006 NG/ML (ref 0–0.03)
URN SPEC COLLECT METH UR: ABNORMAL
UROBILINOGEN UR STRIP-ACNC: NEGATIVE EU/DL
WBC # BLD AUTO: 17.37 K/UL (ref 3.9–12.7)

## 2022-05-04 PROCEDURE — 93010 ELECTROCARDIOGRAM REPORT: CPT | Mod: ,,, | Performed by: INTERNAL MEDICINE

## 2022-05-04 PROCEDURE — 84484 ASSAY OF TROPONIN QUANT: CPT | Performed by: PHYSICIAN ASSISTANT

## 2022-05-04 PROCEDURE — 96374 THER/PROPH/DIAG INJ IV PUSH: CPT

## 2022-05-04 PROCEDURE — 85025 COMPLETE CBC W/AUTO DIFF WBC: CPT | Performed by: PHYSICIAN ASSISTANT

## 2022-05-04 PROCEDURE — 27000221 HC OXYGEN, UP TO 24 HOURS

## 2022-05-04 PROCEDURE — 94644 CONT INHLJ TX 1ST HOUR: CPT

## 2022-05-04 PROCEDURE — 93010 EKG 12-LEAD: ICD-10-PCS | Mod: ,,, | Performed by: INTERNAL MEDICINE

## 2022-05-04 PROCEDURE — 93005 ELECTROCARDIOGRAM TRACING: CPT

## 2022-05-04 PROCEDURE — 80053 COMPREHEN METABOLIC PANEL: CPT | Performed by: PHYSICIAN ASSISTANT

## 2022-05-04 PROCEDURE — 25000242 PHARM REV CODE 250 ALT 637 W/ HCPCS: Performed by: PHYSICIAN ASSISTANT

## 2022-05-04 PROCEDURE — 83880 ASSAY OF NATRIURETIC PEPTIDE: CPT | Performed by: PHYSICIAN ASSISTANT

## 2022-05-04 PROCEDURE — 96375 TX/PRO/DX INJ NEW DRUG ADDON: CPT

## 2022-05-04 PROCEDURE — 63600175 PHARM REV CODE 636 W HCPCS: Performed by: EMERGENCY MEDICINE

## 2022-05-04 PROCEDURE — 99291 CRITICAL CARE FIRST HOUR: CPT | Mod: 25

## 2022-05-04 PROCEDURE — 25000242 PHARM REV CODE 250 ALT 637 W/ HCPCS: Performed by: EMERGENCY MEDICINE

## 2022-05-04 PROCEDURE — 81003 URINALYSIS AUTO W/O SCOPE: CPT | Performed by: EMERGENCY MEDICINE

## 2022-05-04 PROCEDURE — U0002 COVID-19 LAB TEST NON-CDC: HCPCS | Performed by: PHYSICIAN ASSISTANT

## 2022-05-04 PROCEDURE — 94761 N-INVAS EAR/PLS OXIMETRY MLT: CPT

## 2022-05-04 PROCEDURE — 94640 AIRWAY INHALATION TREATMENT: CPT | Mod: XB

## 2022-05-04 RX ORDER — ALBUTEROL SULFATE 2.5 MG/.5ML
10 SOLUTION RESPIRATORY (INHALATION)
Status: COMPLETED | OUTPATIENT
Start: 2022-05-04 | End: 2022-05-04

## 2022-05-04 RX ORDER — METHYLPREDNISOLONE SOD SUCC 125 MG
125 VIAL (EA) INJECTION
Status: COMPLETED | OUTPATIENT
Start: 2022-05-04 | End: 2022-05-04

## 2022-05-04 RX ORDER — IPRATROPIUM BROMIDE AND ALBUTEROL SULFATE 2.5; .5 MG/3ML; MG/3ML
3 SOLUTION RESPIRATORY (INHALATION)
Status: COMPLETED | OUTPATIENT
Start: 2022-05-04 | End: 2022-05-04

## 2022-05-04 RX ORDER — AZITHROMYCIN 250 MG/1
TABLET, FILM COATED ORAL
Qty: 6 TABLET | Refills: 0 | Status: SHIPPED | OUTPATIENT
Start: 2022-05-04 | End: 2022-05-09

## 2022-05-04 RX ORDER — FUROSEMIDE 10 MG/ML
40 INJECTION INTRAMUSCULAR; INTRAVENOUS
Status: COMPLETED | OUTPATIENT
Start: 2022-05-04 | End: 2022-05-04

## 2022-05-04 RX ADMIN — ALBUTEROL SULFATE 10 MG: 2.5 SOLUTION RESPIRATORY (INHALATION) at 04:05

## 2022-05-04 RX ADMIN — FUROSEMIDE 40 MG: 10 INJECTION, SOLUTION INTRAMUSCULAR; INTRAVENOUS at 02:05

## 2022-05-04 RX ADMIN — IPRATROPIUM BROMIDE AND ALBUTEROL SULFATE 3 ML: 2.5; .5 SOLUTION RESPIRATORY (INHALATION) at 02:05

## 2022-05-04 RX ADMIN — METHYLPREDNISOLONE SODIUM SUCCINATE 125 MG: 125 INJECTION, POWDER, FOR SOLUTION INTRAMUSCULAR; INTRAVENOUS at 02:05

## 2022-05-04 NOTE — FIRST PROVIDER EVALUATION
Emergency Department TeleTriage Encounter Note      CHIEF COMPLAINT    Chief Complaint   Patient presents with    Shortness of Breath     Pt with hx of COPD c/o SOB x a few days worse today. Currently 96% sats on 3lpm. Able to speak complete sentences. Sent here from PCP office for further evaluation        VITAL SIGNS   Initial Vitals [05/04/22 1342]   BP Pulse Resp Temp SpO2   (!) 150/88 98 (!) 26 98.8 °F (37.1 °C) 96 %      MAP       --            ALLERGIES    Review of patient's allergies indicates:   Allergen Reactions    Antivert [meclizine] Other (See Comments)     Behavioral changes       PROVIDER TRIAGE NOTE  This is a teletriage evaluation of a 51 y.o. female with COPD presenting to the ED complaining of SOB x  the last few days.  She was sent by PCP for further evaluation.  On exam, patient visibly tachypneic.  Sats 96% on 3 L.     Initial orders will be placed and care will be transferred to an alternate provider when patient is roomed for a full evaluation. Any additional orders and the final disposition will be determined by that provider.           ORDERS  Labs Reviewed - No data to display    ED Orders (720h ago, onward)    None            Virtual Visit Note: The provider triage portion of this emergency department evaluation and documentation was performed via [employer], a HIPAA-compliant telemedicine application, in concert with a tele-presenter in the room. A face to face patient evaluation with one of my colleagues will occur once the patient is placed in an emergency department room.      DISCLAIMER: This note was prepared with [employer] voice recognition transcription software. Garbled syntax, mangled pronouns, and other bizarre constructions may be attributed to that software system.

## 2022-05-04 NOTE — ED TRIAGE NOTES
Patient arrived to the ER via personal transport w CC: SOB. Prior to arriving into her ER room patient was started on nebs tines 3 protocol. Upon arrival to room 14 patient has neb mask on her face and is breathing w increased rate and effort. Accessory muscle use noted. Tight insp and exp wheezing appreciated. RRT at bedside. Orderes received. Denies: CP/HA.

## 2022-05-04 NOTE — ED PROVIDER NOTES
"Encounter Date: 5/4/2022    SCRIBE #1 NOTE: I, Dena Zhang, am scribing for, and in the presence of,  Chikis Quintero MD. I have scribed the following portions of the note - Other sections scribed: HPI, ROS, PE.       History     Chief Complaint   Patient presents with    Shortness of Breath     Pt with hx of COPD c/o SOB x a few days worse today. Currently 96% sats on 3lpm. Able to speak complete sentences. Sent here from PCP office for further evaluation      This 51 y.o female, with a medical history of Asthma, Chronic obstructive pulmonary disease, Laryngeal papilloma, and Vocal cord mass, presents to the ED c/o acute, constant, severe (10/10) shortness of breath and chest tightness. Pt reports that her skin has been painful to the touch beginning this morning. She states that she has been feeling unwell for at least 1 month as she has been "fighting" fluid (unsure where it is coming from). She states that she visited her PCP today for a check up and was found to have no air movement. Pt was subsequently instructed to come into the ED for further evaluation. Of note, pt reports that she is on O2 at home. She states that she was on 2.5 liters, however, she notes that she had to increase it to 3.5 liters over the last month. Pt denies cough, or any other associated symptoms.     The history is provided by the patient.     Review of patient's allergies indicates:   Allergen Reactions    Antivert [meclizine] Other (See Comments)     Behavioral changes     Past Medical History:   Diagnosis Date    Anxiety     Asthma     Carpal tunnel syndrome, bilateral     COPD (chronic obstructive pulmonary disease)     Laryngeal papilloma     Vocal cord mass 6/2/2017    Right side with CT scan Referred to ENT for visualization     Past Surgical History:   Procedure Laterality Date    CARPAL TUNNEL RELEASE      FINGER SURGERY      HYSTERECTOMY      OOPHORECTOMY  1996    Hysterectomy     Family History   Problem " Relation Age of Onset    COPD Mother     Heart disease Father     No Known Problems Sister     No Known Problems Brother      Social History     Tobacco Use    Smoking status: Current Every Day Smoker     Packs/day: 2.00     Years: 35.00     Pack years: 70.00     Types: Cigarettes     Start date: 12/1/1983    Smokeless tobacco: Never Used   Substance Use Topics    Alcohol use: No     Alcohol/week: 0.0 standard drinks    Drug use: No     Review of Systems   Constitutional: Negative for fever.   HENT: Negative for sore throat.    Respiratory: Positive for chest tightness and shortness of breath. Negative for cough.    Cardiovascular: Negative for chest pain.   Gastrointestinal: Negative for nausea.   Genitourinary: Negative for dysuria.   Musculoskeletal: Negative for back pain.   Skin: Negative for rash.        (+) skin painful to the touch   Neurological: Negative for weakness.       Physical Exam     Initial Vitals [05/04/22 1342]   BP Pulse Resp Temp SpO2   (!) 150/88 98 (!) 26 98.8 °F (37.1 °C) 96 %      MAP       --         Physical Exam    Nursing note and vitals reviewed.  Constitutional: She appears well-developed and well-nourished. She is not diaphoretic. No distress.   HENT:   Head: Normocephalic and atraumatic.   Eyes: Conjunctivae are normal.   Neck:   Normal range of motion.  Cardiovascular: Normal rate, regular rhythm and normal heart sounds.   Pulmonary/Chest: No respiratory distress. She has wheezes.   There is diffuse wheezing and decreased air movement throughout. Accessory muscle usage present.   Abdominal: Abdomen is soft. There is no abdominal tenderness.   Musculoskeletal:         General: Edema present.      Cervical back: Normal range of motion.      Comments: Trace edema present to the bilateral lower extremities.     Neurological: She is alert and oriented to person, place, and time.   Skin: Skin is warm and dry.   Psychiatric: She has a normal mood and affect.         ED Course    Critical Care    Date/Time: 5/4/2022 5:57 PM  Performed by: Chikis Quintero MD  Authorized by: Chikis Quintero MD   Direct patient critical care time: 30 minutes  Additional history critical care time: 8 minutes  Ordering / reviewing critical care time: 7 minutes  Documentation critical care time: 5 minutes  Consult with family critical care time: 5 minutes  Total critical care time (exclusive of procedural time) : 55 minutes  Critical care time was exclusive of separately billable procedures and treating other patients and teaching time.  Critical care was necessary to treat or prevent imminent or life-threatening deterioration of the following conditions: respiratory failure.  Critical care was time spent personally by me on the following activities: development of treatment plan with patient or surrogate, blood draw for specimens, evaluation of patient's response to treatment, examination of patient, obtaining history from patient or surrogate, ordering and performing treatments and interventions, ordering and review of laboratory studies, ordering and review of radiographic studies, pulse oximetry, re-evaluation of patient's condition and review of old charts.        Labs Reviewed   CBC W/ AUTO DIFFERENTIAL - Abnormal; Notable for the following components:       Result Value    WBC 17.37 (*)     MCHC 30.9 (*)     Immature Granulocytes 1.8 (*)     Gran # (ANC) 10.7 (*)     Immature Grans (Abs) 0.31 (*)     Mono # 1.2 (*)     All other components within normal limits   COMPREHENSIVE METABOLIC PANEL - Abnormal; Notable for the following components:    CO2 31 (*)     All other components within normal limits   URINALYSIS - Abnormal; Notable for the following components:    Color, UA Colorless (*)     All other components within normal limits   B-TYPE NATRIURETIC PEPTIDE   TROPONIN I   TROPONIN I   B-TYPE NATRIURETIC PEPTIDE   SARS-COV-2 RDRP GENE    Narrative:     This test utilizes isothermal nucleic acid  amplification   technology to detect the SARS-CoV-2 RdRp nucleic acid segment.   The analytical sensitivity (limit of detection) is 125 genome   equivalents/mL.   A POSITIVE result implies infection with the SARS-CoV-2 virus;   the patient is presumed to be contagious.     A NEGATIVE result means that SARS-CoV-2 nucleic acids are not   present above the limit of detection. A NEGATIVE result should be   treated as presumptive. It does not rule out the possibility of   COVID-19 and should not be the sole basis for treatment decisions.   If COVID-19 is strongly suspected based on clinical and exposure   history, re-testing using an alternate molecular assay should be   considered.   This test is only for use under the Food and Drug   Administration s Emergency Use Authorization (EUA).   Commercial kits are provided by [employer]   Performance characteristics of the EUA have been independently   verified by [facility] Department of   Pathology and Laboratory Medicine.   _________________________________________________________________   The authorized Fact Sheet for Healthcare Providers and the authorized Fact   Sheet for Patients of the ID NOW COVID-19 are available on the FDA   website:     https://www.fda.gov/media/312194/download  https://www.fda.gov/media/571840/download            EKG Readings: (Independently Interpreted)   Initial Reading: No STEMI. Rhythm: Normal Sinus Rhythm. Heart Rate: 85. Ectopy: PACs.     ECG Results          EKG 12-lead (Final result)  Result time 05/05/22 23:03:19    Final result by Interface, Lab In [facility] (05/05/22 23:03:19)                 Narrative:    Test Reason : R06.02,    Vent. Rate : 085 BPM     Atrial Rate : 085 BPM     P-R Int : 150 ms          QRS Dur : 098 ms      QT Int : 366 ms       P-R-T Axes : 067 069 050 degrees     QTc Int : 435 ms    Sinus rhythm with Premature atrial complexes  Right ventricular conduction delay  Borderline Abnormal ECG  When  compared with ECG of 15-NOV-2021 12:35,  Significant changes have occurred  Confirmed by Yuliet OG, Jodie CHEEK ([id number]) on 5/5/2022 11:03:07 PM    Referred By: KARL   SELF           Confirmed By:Jodie Horvath MD                            Imaging Results          X-Ray Chest 1 View (Final result)  Result time 05/04/22 15:35:02    Final result by Fabiano Beach MD (05/04/22 15:35:02)                 Impression:      Prominence of the central pulmonary vasculature, stable.  No detrimental change in the appearance of the chest when compared to 11/15/2021.      Electronically signed by: Fabiano Beach MD  Date:    05/04/2022  Time:    15:35             Narrative:    EXAMINATION:  XR CHEST 1 VIEW    CLINICAL HISTORY:  shortness of breath;    TECHNIQUE:  Single frontal view of the chest was performed.    COMPARISON:  11/15/2021.    FINDINGS:  The heart and mediastinal structures are unremarkable.  There is mild prominence of the central pulmonary vasculature, similar to the prior examination.  The lungs are free of focal consolidations.  There is no evidence for pneumothorax or large pleural effusions.  Bony structures are grossly intact.                                 Medications   albuterol-ipratropium 2.5 mg-0.5 mg/3 mL nebulizer solution 3 mL (3 mLs Nebulization Given 5/4/22 1421)   furosemide injection 40 mg (40 mg Intravenous Given 5/4/22 1438)   methylPREDNISolone sodium succinate injection 125 mg (125 mg Intravenous Given 5/4/22 1437)   albuterol sulfate nebulizer solution 10 mg (10 mg Nebulization Given 5/4/22 1619)     Medical Decision Making:   History:   Old Medical Records: I decided to obtain old medical records.  Initial Assessment:   This is an emergent evaluation of a 51-year-old woman who presented to the emergency department secondary to shortness of breath.  Differential Diagnosis:   COPD exacerbation, CHF, pneumonia, ACS, amongst others.  Clinical Tests:   Lab Tests: Ordered and  "Reviewed  Radiological Study: Ordered and Reviewed  Medical Tests: Reviewed and Ordered  ED Management:  On physical examination, patient initially was short of breath with moderate respiratory distress.  She was tripoding and using accessory muscles to breathe.  She had diffuse wheezing throughout all lung fields with diminished air movement.  She had trace edema to bilateral lower extremities.  Labs, imaging, EKG were all obtained and patient was treated with a continuous albuterol nebulizer treatment as well as with DuoNebs, methylprednisolone, and furosemide.  She was observed for several hours in the emergency department until her breathing improved.  She was offered admission however advised that she adamantly did not want to stay with us in the hospital any longer.  She stated that she has a nebulizer machine at home and will return to our facility should she have any worsening of condition.  She states that she feels better at this point then she has in weeks".  I have given strict return precautions and instructed to follow with PCP in 2-3 days for reevaluation. I have provided a short course of azithromycin to cover for atypical pathogens. Patient states she has steroids at home.     Chikis Quintero MD  1757 PM  2022             Scribe Attestation:   Scribe #1: I performed the above scribed service and the documentation accurately describes the services I performed. I attest to the accuracy of the note.                 Clinical Impression:   Final diagnoses:  [R06.02] Shortness of breath  [J44.1] COPD exacerbation (Primary)          ED Disposition Condition    Discharge Stable        ED Prescriptions     Medication Sig Dispense Start Date End Date Auth. Provider    azithromycin (Z-NAEL) 250 MG tablet () Take 2 tablets by mouth on day 1; Take 1 tablet by mouth on days 2-5 6 tablet 2022 Chikis Quintero MD        Follow-up Information     Follow up With Specialties Details Why Contact Info "    Clovis Beauchamp MD Family Medicine   [address]  [place] [address]  [phone]             I,Chikis Orozco , personally performed the services described in this documentation. All medical record entries made by the scribe were at my direction and in my presence. I have reviewed the chart and agree that the record reflects my personal performance and is accurate and complete.     Chikis Orozco MD  05/12/22 1111

## 2022-05-04 NOTE — ED NOTES
Adult Physical Assessment  LOC: Yasmeen Valderrama, 51 y.o. female verified via two identifiers.  The patient is awake, alert, oriented and speaking in short sentences at this time. Breathing treatment in place.   APPEARANCE: Patient appears to be in minimal distress at this time. Patient is clean and well groomed, patient's clothing is properly fastened.  SKIN:The skin is warm and dry, color consistent with ethnicity, patient has normal skin turgor and moist mucus membranes, skin intact, no breakdown or brusing noted.  MUSCULOSKELETAL: Patient moving all extremities well, no obvious swelling or deformities noted.  RESPIRATORY: Airway is open and patent, respirations are spontaneous, patient has an INCREASED  effort and rate, some  accessory muscle use noted. Tight insp and exp wheezing appreciated.   CARDIAC: Patient w intermittent tachycardia, 12 EKD performed and reviewed by MD.no periphreal edema noted in any extremity, capillary refill < 3 seconds in all extremities  ABDOMEN: Soft and non tender to palpation, no abdominal distention noted. Bowel sounds present in all four quadrants.  NEUROLOGIC: Eyes open spontaneously, behavior appropriate to situation, follows commands, facial expression symmetrical, bilateral hand grasp equal and even, purposeful motor response noted, normal sensation in all extremities when touched with a finger.

## 2022-05-04 NOTE — DISCHARGE INSTRUCTIONS

## 2022-05-23 ENCOUNTER — HOSPITAL ENCOUNTER (OUTPATIENT)
Dept: RADIOLOGY | Facility: HOSPITAL | Age: 52
Discharge: HOME | End: 2022-05-23
Attending: FAMILY MEDICINE
Payer: MEDICAID

## 2022-05-23 VITALS — BODY MASS INDEX: 30.78 KG/M2 | HEIGHT: 68 IN | WEIGHT: 203.06 LBS

## 2022-05-23 DIAGNOSIS — N63.21 LUMP IN UPPER OUTER QUADRANT OF LEFT BREAST: ICD-10-CM

## 2022-05-23 PROCEDURE — 77062 MAMMO DIGITAL DIAGNOSTIC BILAT WITH TOMO: ICD-10-PCS | Mod: 26,,, | Performed by: RADIOLOGY

## 2022-05-23 PROCEDURE — 77066 MAMMO DIGITAL DIAGNOSTIC BILAT WITH TOMO: ICD-10-PCS | Mod: 26,,, | Performed by: RADIOLOGY

## 2022-05-23 PROCEDURE — 77066 DX MAMMO INCL CAD BI: CPT | Mod: TC

## 2022-05-23 PROCEDURE — 77062 BREAST TOMOSYNTHESIS BI: CPT | Mod: 26,,, | Performed by: RADIOLOGY

## 2022-05-23 PROCEDURE — 77066 DX MAMMO INCL CAD BI: CPT | Mod: 26,,, | Performed by: RADIOLOGY

## 2022-05-30 ENCOUNTER — HOSPITAL ENCOUNTER (OUTPATIENT)
Dept: RADIOLOGY | Facility: HOSPITAL | Age: 52
Discharge: HOME | End: 2022-05-30
Attending: FAMILY MEDICINE
Payer: MEDICAID

## 2022-05-30 ENCOUNTER — PATIENT MESSAGE (OUTPATIENT)
Dept: ADMINISTRATIVE | Facility: HOSPITAL | Age: 52
End: 2022-05-30
Payer: MEDICAID

## 2022-05-30 DIAGNOSIS — N63.21 LUMP IN UPPER OUTER QUADRANT OF LEFT BREAST: ICD-10-CM

## 2022-05-30 PROCEDURE — 76642 ULTRASOUND BREAST LIMITED: CPT | Mod: TC,LT

## 2022-05-30 PROCEDURE — 76642 ULTRASOUND BREAST LIMITED: CPT | Mod: 26,LT,, | Performed by: RADIOLOGY

## 2022-05-30 PROCEDURE — 76642 US BREAST LEFT LIMITED: ICD-10-PCS | Mod: 26,LT,, | Performed by: RADIOLOGY

## 2022-06-07 ENCOUNTER — PATIENT OUTREACH (OUTPATIENT)
Dept: ADMINISTRATIVE | Facility: HOSPITAL | Age: 52
End: 2022-06-07
Payer: MEDICAID

## 2022-06-07 ENCOUNTER — PATIENT MESSAGE (OUTPATIENT)
Dept: ADMINISTRATIVE | Facility: HOSPITAL | Age: 52
End: 2022-06-07
Payer: MEDICAID

## 2022-06-07 ENCOUNTER — TELEPHONE (OUTPATIENT)
Dept: ADMINISTRATIVE | Facility: HOSPITAL | Age: 52
End: 2022-06-07
Payer: MEDICAID

## 2022-06-07 NOTE — PROGRESS NOTES
LM for patient to clinic back to schedule nurse BP visit.  Portal message sent regarding colon cancer screening.

## 2022-06-09 ENCOUNTER — PATIENT OUTREACH (OUTPATIENT)
Dept: ADMINISTRATIVE | Facility: HOSPITAL | Age: 52
End: 2022-06-09
Payer: MEDICAID

## 2022-06-09 DIAGNOSIS — Z12.11 COLON CANCER SCREENING: Primary | ICD-10-CM

## 2022-06-28 ENCOUNTER — OFFICE VISIT (OUTPATIENT)
Dept: PULMONOLOGY | Facility: CLINIC | Age: 52
End: 2022-06-28
Payer: MEDICAID

## 2022-06-28 ENCOUNTER — HOSPITAL ENCOUNTER (OUTPATIENT)
Facility: HOSPITAL | Age: 52
Discharge: HOME | End: 2022-06-29
Attending: EMERGENCY MEDICINE | Admitting: HOSPITALIST
Payer: MEDICAID

## 2022-06-28 VITALS
DIASTOLIC BLOOD PRESSURE: 90 MMHG | SYSTOLIC BLOOD PRESSURE: 148 MMHG | TEMPERATURE: 99 F | HEART RATE: 102 BPM | OXYGEN SATURATION: 94 % | BODY MASS INDEX: 30.43 KG/M2 | WEIGHT: 200.75 LBS | HEIGHT: 68 IN

## 2022-06-28 DIAGNOSIS — J44.1 COPD EXACERBATION: ICD-10-CM

## 2022-06-28 DIAGNOSIS — I27.20 PULMONARY HYPERTENSION: ICD-10-CM

## 2022-06-28 DIAGNOSIS — Z99.81 CHRONIC RESPIRATORY FAILURE WITH HYPOXIA, ON HOME O2 THERAPY: ICD-10-CM

## 2022-06-28 DIAGNOSIS — G47.30 SLEEP-RELATED BREATHING DISORDER: ICD-10-CM

## 2022-06-28 DIAGNOSIS — J96.11 CHRONIC RESPIRATORY FAILURE WITH HYPOXIA, ON HOME O2 THERAPY: ICD-10-CM

## 2022-06-28 DIAGNOSIS — J44.89 COPD WITH CHRONIC BRONCHITIS AND EMPHYSEMA: ICD-10-CM

## 2022-06-28 DIAGNOSIS — F17.200 TOBACCO USE DISORDER: ICD-10-CM

## 2022-06-28 DIAGNOSIS — J44.89 COPD WITH CHRONIC BRONCHITIS AND EMPHYSEMA: Primary | ICD-10-CM

## 2022-06-28 DIAGNOSIS — R60.0 PERIPHERAL EDEMA: ICD-10-CM

## 2022-06-28 DIAGNOSIS — K12.1 STOMATITIS AND MUCOSITIS: ICD-10-CM

## 2022-06-28 DIAGNOSIS — R07.9 CHEST PAIN: ICD-10-CM

## 2022-06-28 DIAGNOSIS — K12.30 STOMATITIS AND MUCOSITIS: ICD-10-CM

## 2022-06-28 DIAGNOSIS — J43.9 COPD WITH CHRONIC BRONCHITIS AND EMPHYSEMA: ICD-10-CM

## 2022-06-28 DIAGNOSIS — J43.9 COPD WITH CHRONIC BRONCHITIS AND EMPHYSEMA: Primary | ICD-10-CM

## 2022-06-28 DIAGNOSIS — I27.20 PULMONARY HYPERTENSION: Primary | ICD-10-CM

## 2022-06-28 LAB
ALBUMIN SERPL BCP-MCNC: 3.6 G/DL (ref 3.5–5.2)
ALLENS TEST: ABNORMAL
ALP SERPL-CCNC: 108 U/L (ref 55–135)
ALT SERPL W/O P-5'-P-CCNC: 12 U/L (ref 10–44)
ANION GAP SERPL CALC-SCNC: 7 MMOL/L (ref 8–16)
AST SERPL-CCNC: 17 U/L (ref 10–40)
BASOPHILS # BLD AUTO: 0.06 K/UL (ref 0–0.2)
BASOPHILS NFR BLD: 0.6 % (ref 0–1.9)
BILIRUB SERPL-MCNC: 0.5 MG/DL (ref 0.1–1)
BNP SERPL-MCNC: <10 PG/ML (ref 0–99)
BUN SERPL-MCNC: 10 MG/DL (ref 6–20)
CALCIUM SERPL-MCNC: 9.2 MG/DL (ref 8.7–10.5)
CHLORIDE SERPL-SCNC: 96 MMOL/L (ref 95–110)
CO2 SERPL-SCNC: 36 MMOL/L (ref 23–29)
CREAT SERPL-MCNC: 0.6 MG/DL (ref 0.5–1.4)
CTP QC/QA: YES
DELSYS: ABNORMAL
DIFFERENTIAL METHOD: ABNORMAL
EOSINOPHIL # BLD AUTO: 0.4 K/UL (ref 0–0.5)
EOSINOPHIL NFR BLD: 3.7 % (ref 0–8)
ERYTHROCYTE [DISTWIDTH] IN BLOOD BY AUTOMATED COUNT: 13.2 % (ref 11.5–14.5)
EST. GFR  (AFRICAN AMERICAN): >60 ML/MIN/1.73 M^2
EST. GFR  (NON AFRICAN AMERICAN): >60 ML/MIN/1.73 M^2
GLUCOSE SERPL-MCNC: 119 MG/DL (ref 70–110)
HCO3 UR-SCNC: 39 MMOL/L (ref 24–28)
HCT VFR BLD AUTO: 40.5 % (ref 37–48.5)
HGB BLD-MCNC: 12.5 G/DL (ref 12–16)
IMM GRANULOCYTES # BLD AUTO: 0.05 K/UL (ref 0–0.04)
IMM GRANULOCYTES NFR BLD AUTO: 0.5 % (ref 0–0.5)
LYMPHOCYTES # BLD AUTO: 2.2 K/UL (ref 1–4.8)
LYMPHOCYTES NFR BLD: 21.1 % (ref 18–48)
MCH RBC QN AUTO: 29.1 PG (ref 27–31)
MCHC RBC AUTO-ENTMCNC: 30.9 G/DL (ref 32–36)
MCV RBC AUTO: 94 FL (ref 82–98)
MONOCYTES # BLD AUTO: 0.8 K/UL (ref 0.3–1)
MONOCYTES NFR BLD: 7.4 % (ref 4–15)
NEUTROPHILS # BLD AUTO: 6.8 K/UL (ref 1.8–7.7)
NEUTROPHILS NFR BLD: 66.7 % (ref 38–73)
NRBC BLD-RTO: 0 /100 WBC
PCO2 BLDA: 79.2 MMHG (ref 35–45)
PH SMN: 7.3 [PH] (ref 7.35–7.45)
PLATELET # BLD AUTO: 291 K/UL (ref 150–450)
PMV BLD AUTO: 10.3 FL (ref 9.2–12.9)
PO2 BLDA: 56 MMHG (ref 40–60)
POC BE: 9 MMOL/L
POC SATURATED O2: 83 % (ref 95–100)
POC TCO2: 41 MMOL/L (ref 24–29)
POTASSIUM SERPL-SCNC: 4.3 MMOL/L (ref 3.5–5.1)
PROCALCITONIN SERPL IA-MCNC: 0.02 NG/ML
PROT SERPL-MCNC: 6.9 G/DL (ref 6–8.4)
RBC # BLD AUTO: 4.3 M/UL (ref 4–5.4)
SAMPLE: ABNORMAL
SARS-COV-2 RDRP RESP QL NAA+PROBE: NEGATIVE
SITE: ABNORMAL
SODIUM SERPL-SCNC: 139 MMOL/L (ref 136–145)
TROPONIN I SERPL DL<=0.01 NG/ML-MCNC: 0.02 NG/ML (ref 0–0.03)
TROPONIN I SERPL DL<=0.01 NG/ML-MCNC: <0.006 NG/ML (ref 0–0.03)
WBC # BLD AUTO: 10.2 K/UL (ref 3.9–12.7)

## 2022-06-28 PROCEDURE — 93010 ELECTROCARDIOGRAM REPORT: CPT | Mod: ,,, | Performed by: INTERNAL MEDICINE

## 2022-06-28 PROCEDURE — 3008F PR BODY MASS INDEX (BMI) DOCUMENTED: ICD-10-PCS | Mod: CPTII,,, | Performed by: NURSE PRACTITIONER

## 2022-06-28 PROCEDURE — 99999 PR PBB SHADOW E&M-EST. PATIENT-LVL V: ICD-10-PCS | Mod: PBBFAC,,, | Performed by: NURSE PRACTITIONER

## 2022-06-28 PROCEDURE — 96374 THER/PROPH/DIAG INJ IV PUSH: CPT

## 2022-06-28 PROCEDURE — 25000242 PHARM REV CODE 250 ALT 637 W/ HCPCS

## 2022-06-28 PROCEDURE — U0002 COVID-19 LAB TEST NON-CDC: HCPCS

## 2022-06-28 PROCEDURE — 25000242 PHARM REV CODE 250 ALT 637 W/ HCPCS: Performed by: NURSE PRACTITIONER

## 2022-06-28 PROCEDURE — 94640 AIRWAY INHALATION TREATMENT: CPT | Mod: XB

## 2022-06-28 PROCEDURE — 99285 EMERGENCY DEPT VISIT HI MDM: CPT | Mod: 25,27

## 2022-06-28 PROCEDURE — 3077F PR MOST RECENT SYSTOLIC BLOOD PRESSURE >= 140 MM HG: ICD-10-PCS | Mod: CPTII,,, | Performed by: NURSE PRACTITIONER

## 2022-06-28 PROCEDURE — 93010 EKG 12-LEAD: ICD-10-PCS | Mod: ,,, | Performed by: INTERNAL MEDICINE

## 2022-06-28 PROCEDURE — 1159F MED LIST DOCD IN RCRD: CPT | Mod: CPTII,,, | Performed by: NURSE PRACTITIONER

## 2022-06-28 PROCEDURE — 63600175 PHARM REV CODE 636 W HCPCS: Performed by: PHYSICIAN ASSISTANT

## 2022-06-28 PROCEDURE — 1160F RVW MEDS BY RX/DR IN RCRD: CPT | Mod: CPTII,,, | Performed by: NURSE PRACTITIONER

## 2022-06-28 PROCEDURE — 99214 PR OFFICE/OUTPT VISIT, EST, LEVL IV, 30-39 MIN: ICD-10-PCS | Mod: S$PBB,,, | Performed by: NURSE PRACTITIONER

## 2022-06-28 PROCEDURE — 80053 COMPREHEN METABOLIC PANEL: CPT | Performed by: EMERGENCY MEDICINE

## 2022-06-28 PROCEDURE — 3080F DIAST BP >= 90 MM HG: CPT | Mod: CPTII,,, | Performed by: NURSE PRACTITIONER

## 2022-06-28 PROCEDURE — 85025 COMPLETE CBC W/AUTO DIFF WBC: CPT | Performed by: EMERGENCY MEDICINE

## 2022-06-28 PROCEDURE — 96376 TX/PRO/DX INJ SAME DRUG ADON: CPT | Performed by: EMERGENCY MEDICINE

## 2022-06-28 PROCEDURE — 1159F PR MEDICATION LIST DOCUMENTED IN MEDICAL RECORD: ICD-10-PCS | Mod: CPTII,,, | Performed by: NURSE PRACTITIONER

## 2022-06-28 PROCEDURE — 99900035 HC TECH TIME PER 15 MIN (STAT)

## 2022-06-28 PROCEDURE — G0378 HOSPITAL OBSERVATION PER HR: HCPCS

## 2022-06-28 PROCEDURE — 25000242 PHARM REV CODE 250 ALT 637 W/ HCPCS: Performed by: EMERGENCY MEDICINE

## 2022-06-28 PROCEDURE — 3077F SYST BP >= 140 MM HG: CPT | Mod: CPTII,,, | Performed by: NURSE PRACTITIONER

## 2022-06-28 PROCEDURE — 82803 BLOOD GASES ANY COMBINATION: CPT

## 2022-06-28 PROCEDURE — 3080F PR MOST RECENT DIASTOLIC BLOOD PRESSURE >= 90 MM HG: ICD-10-PCS | Mod: CPTII,,, | Performed by: NURSE PRACTITIONER

## 2022-06-28 PROCEDURE — 1160F PR REVIEW ALL MEDS BY PRESCRIBER/CLIN PHARMACIST DOCUMENTED: ICD-10-PCS | Mod: CPTII,,, | Performed by: NURSE PRACTITIONER

## 2022-06-28 PROCEDURE — 27000221 HC OXYGEN, UP TO 24 HOURS

## 2022-06-28 PROCEDURE — 94760 N-INVAS EAR/PLS OXIMETRY 1: CPT

## 2022-06-28 PROCEDURE — 99999 PR PBB SHADOW E&M-EST. PATIENT-LVL V: CPT | Mod: PBBFAC,,, | Performed by: NURSE PRACTITIONER

## 2022-06-28 PROCEDURE — 96375 TX/PRO/DX INJ NEW DRUG ADDON: CPT

## 2022-06-28 PROCEDURE — 93005 ELECTROCARDIOGRAM TRACING: CPT

## 2022-06-28 PROCEDURE — 25000003 PHARM REV CODE 250: Performed by: PHYSICIAN ASSISTANT

## 2022-06-28 PROCEDURE — 99214 OFFICE O/P EST MOD 30 MIN: CPT | Mod: S$PBB,,, | Performed by: NURSE PRACTITIONER

## 2022-06-28 PROCEDURE — 83880 ASSAY OF NATRIURETIC PEPTIDE: CPT | Performed by: EMERGENCY MEDICINE

## 2022-06-28 PROCEDURE — 99215 OFFICE O/P EST HI 40 MIN: CPT | Mod: PBBFAC | Performed by: NURSE PRACTITIONER

## 2022-06-28 PROCEDURE — 63600175 PHARM REV CODE 636 W HCPCS: Performed by: NURSE PRACTITIONER

## 2022-06-28 PROCEDURE — 3008F BODY MASS INDEX DOCD: CPT | Mod: CPTII,,, | Performed by: NURSE PRACTITIONER

## 2022-06-28 PROCEDURE — 84484 ASSAY OF TROPONIN QUANT: CPT | Mod: 91 | Performed by: EMERGENCY MEDICINE

## 2022-06-28 PROCEDURE — 84145 PROCALCITONIN (PCT): CPT | Performed by: EMERGENCY MEDICINE

## 2022-06-28 PROCEDURE — 63600175 PHARM REV CODE 636 W HCPCS: Performed by: EMERGENCY MEDICINE

## 2022-06-28 RX ORDER — FUROSEMIDE 10 MG/ML
40 INJECTION INTRAMUSCULAR; INTRAVENOUS
Status: COMPLETED | OUTPATIENT
Start: 2022-06-28 | End: 2022-06-28

## 2022-06-28 RX ORDER — IPRATROPIUM BROMIDE AND ALBUTEROL SULFATE 2.5; .5 MG/3ML; MG/3ML
3 SOLUTION RESPIRATORY (INHALATION)
Status: COMPLETED | OUTPATIENT
Start: 2022-06-28 | End: 2022-06-28

## 2022-06-28 RX ORDER — METHYLPREDNISOLONE SOD SUCC 125 MG
80 VIAL (EA) INJECTION
Status: DISCONTINUED | OUTPATIENT
Start: 2022-06-28 | End: 2022-06-29 | Stop reason: HOSPADM

## 2022-06-28 RX ORDER — BUPRENORPHINE AND NALOXONE 2; .5 MG/1; MG/1
4 FILM, SOLUBLE BUCCAL; SUBLINGUAL DAILY
Status: DISCONTINUED | OUTPATIENT
Start: 2022-06-28 | End: 2022-06-28

## 2022-06-28 RX ORDER — DOXYCYCLINE HYCLATE 100 MG
100 TABLET ORAL EVERY 12 HOURS
Status: DISCONTINUED | OUTPATIENT
Start: 2022-06-28 | End: 2022-06-29 | Stop reason: HOSPADM

## 2022-06-28 RX ORDER — METHYLPREDNISOLONE SOD SUCC 125 MG
80 VIAL (EA) INJECTION
Status: COMPLETED | OUTPATIENT
Start: 2022-06-28 | End: 2022-06-28

## 2022-06-28 RX ORDER — TALC
6 POWDER (GRAM) TOPICAL NIGHTLY PRN
Status: DISCONTINUED | OUTPATIENT
Start: 2022-06-28 | End: 2022-06-29 | Stop reason: HOSPADM

## 2022-06-28 RX ORDER — GUAIFENESIN 600 MG/1
600 TABLET, EXTENDED RELEASE ORAL 2 TIMES DAILY
Status: DISCONTINUED | OUTPATIENT
Start: 2022-06-28 | End: 2022-06-29 | Stop reason: HOSPADM

## 2022-06-28 RX ORDER — NYSTATIN 100000 [USP'U]/ML
SUSPENSION ORAL
COMMUNITY
End: 2023-09-26 | Stop reason: CLARIF

## 2022-06-28 RX ORDER — DICLOFENAC SODIUM 50 MG/1
TABLET, DELAYED RELEASE ORAL
COMMUNITY
End: 2022-06-28

## 2022-06-28 RX ORDER — AMOXICILLIN 250 MG
1 CAPSULE ORAL DAILY PRN
Status: DISCONTINUED | OUTPATIENT
Start: 2022-06-28 | End: 2022-06-29 | Stop reason: HOSPADM

## 2022-06-28 RX ORDER — BUPRENORPHINE AND NALOXONE 2; .5 MG/1; MG/1
4 FILM, SOLUBLE BUCCAL; SUBLINGUAL DAILY
Status: DISCONTINUED | OUTPATIENT
Start: 2022-06-28 | End: 2022-06-29 | Stop reason: HOSPADM

## 2022-06-28 RX ORDER — BUPRENORPHINE AND NALOXONE 2; .5 MG/1; MG/1
4 FILM, SOLUBLE BUCCAL; SUBLINGUAL DAILY
Status: DISCONTINUED | OUTPATIENT
Start: 2022-06-29 | End: 2022-06-28

## 2022-06-28 RX ORDER — IPRATROPIUM BROMIDE AND ALBUTEROL SULFATE 2.5; .5 MG/3ML; MG/3ML
3 SOLUTION RESPIRATORY (INHALATION) EVERY 4 HOURS
Status: DISCONTINUED | OUTPATIENT
Start: 2022-06-28 | End: 2022-06-29 | Stop reason: HOSPADM

## 2022-06-28 RX ORDER — SODIUM CHLORIDE 0.9 % (FLUSH) 0.9 %
3 SYRINGE (ML) INJECTION
Status: DISCONTINUED | OUTPATIENT
Start: 2022-06-28 | End: 2022-06-29 | Stop reason: HOSPADM

## 2022-06-28 RX ORDER — IBUPROFEN 200 MG
TABLET ORAL
COMMUNITY
End: 2023-09-26 | Stop reason: CLARIF

## 2022-06-28 RX ORDER — ACETAMINOPHEN 500 MG
500 TABLET ORAL EVERY 6 HOURS PRN
Status: DISCONTINUED | OUTPATIENT
Start: 2022-06-28 | End: 2022-06-29 | Stop reason: HOSPADM

## 2022-06-28 RX ORDER — BUDESONIDE, GLYCOPYRROLATE, AND FORMOTEROL FUMARATE 160; 9; 4.8 UG/1; UG/1; UG/1
2 AEROSOL, METERED RESPIRATORY (INHALATION) 2 TIMES DAILY
Qty: 10.7 G | Refills: 11 | Status: SHIPPED | OUTPATIENT
Start: 2022-06-28 | End: 2022-09-13 | Stop reason: ALTCHOICE

## 2022-06-28 RX ORDER — LIDOCAINE HYDROCHLORIDE 20 MG/ML
10 SOLUTION OROPHARYNGEAL EVERY 4 HOURS
Status: DISCONTINUED | OUTPATIENT
Start: 2022-06-28 | End: 2022-06-29 | Stop reason: HOSPADM

## 2022-06-28 RX ORDER — FUROSEMIDE 40 MG/1
40 TABLET ORAL 2 TIMES DAILY PRN
COMMUNITY

## 2022-06-28 RX ORDER — IPRATROPIUM BROMIDE AND ALBUTEROL SULFATE 2.5; .5 MG/3ML; MG/3ML
SOLUTION RESPIRATORY (INHALATION)
COMMUNITY
End: 2022-09-13 | Stop reason: SDUPTHER

## 2022-06-28 RX ORDER — DOXEPIN HYDROCHLORIDE 10 MG/1
CAPSULE ORAL
COMMUNITY
End: 2022-06-28

## 2022-06-28 RX ORDER — FUROSEMIDE 40 MG/1
40 TABLET ORAL 2 TIMES DAILY
Status: DISCONTINUED | OUTPATIENT
Start: 2022-06-28 | End: 2022-06-29 | Stop reason: HOSPADM

## 2022-06-28 RX ADMIN — IPRATROPIUM BROMIDE AND ALBUTEROL SULFATE 3 ML: 2.5; .5 SOLUTION RESPIRATORY (INHALATION) at 10:06

## 2022-06-28 RX ADMIN — LIDOCAINE HYDROCHLORIDE 10 ML: 20 SOLUTION ORAL; TOPICAL at 10:06

## 2022-06-28 RX ADMIN — METHYLPREDNISOLONE SODIUM SUCCINATE 80 MG: 125 INJECTION, POWDER, FOR SOLUTION INTRAMUSCULAR; INTRAVENOUS at 10:06

## 2022-06-28 RX ADMIN — LIDOCAINE HYDROCHLORIDE 10 ML: 20 SOLUTION ORAL; TOPICAL at 06:06

## 2022-06-28 RX ADMIN — METHYLPREDNISOLONE SODIUM SUCCINATE 80 MG: 125 INJECTION, POWDER, FOR SOLUTION INTRAMUSCULAR; INTRAVENOUS at 06:06

## 2022-06-28 RX ADMIN — BUPRENORPHINE AND NALOXONE 4 FILM: 2; .5 FILM BUCCAL; SUBLINGUAL at 06:06

## 2022-06-28 RX ADMIN — FUROSEMIDE 40 MG: 40 TABLET ORAL at 06:06

## 2022-06-28 RX ADMIN — GUAIFENESIN 600 MG: 600 TABLET, EXTENDED RELEASE ORAL at 09:06

## 2022-06-28 RX ADMIN — FUROSEMIDE 40 MG: 10 INJECTION, SOLUTION INTRAMUSCULAR; INTRAVENOUS at 10:06

## 2022-06-28 RX ADMIN — IPRATROPIUM BROMIDE AND ALBUTEROL SULFATE 3 ML: 2.5; .5 SOLUTION RESPIRATORY (INHALATION) at 11:06

## 2022-06-28 RX ADMIN — IPRATROPIUM BROMIDE AND ALBUTEROL SULFATE 3 ML: 2.5; .5 SOLUTION RESPIRATORY (INHALATION) at 07:06

## 2022-06-28 RX ADMIN — DOXYCYCLINE HYCLATE 100 MG: 100 TABLET, COATED ORAL at 09:06

## 2022-06-28 RX ADMIN — IPRATROPIUM BROMIDE AND ALBUTEROL SULFATE 3 ML: 2.5; .5 SOLUTION RESPIRATORY (INHALATION) at 05:06

## 2022-06-28 NOTE — PLAN OF CARE
Problem: Adjustment to Illness COPD (Chronic Obstructive Pulmonary Disease)  Goal: Optimal Chronic Illness Coping  Outcome: Ongoing, Progressing  Intervention: Support and Optimize Psychosocial Response  Flowsheets (Taken 6/28/2022 2780)  Supportive Measures:   active listening utilized   counseling provided   goal-setting facilitated   relaxation techniques promoted   self-care encouraged   self-reflection promoted  Family/Support System Care:   caregiver stress acknowledged   self-care encouraged   support provided  Life Transition/Adjustment: decision-making facilitated

## 2022-06-28 NOTE — PLAN OF CARE
Hospital bed order reviewed by [facility] ([facility]) and approved.     SOFIE left voice message for patient requesting a return call.     SOFIE spoke with patient's son Ousmane who confirmed patient's address on face sheet and received patient's spouse contact # [phone].     SOFIE secure [facility] ([facility]) with patient's spouse contact #.

## 2022-06-28 NOTE — PHARMACY MED REC
"Admission Medication History     The home medication history was taken by Carolyn Lanza CPhT.    You may go to "Admission" then "Reconcile Home Medications" tabs to review and/or act upon these items.      The home medication list has been updated by the Pharmacy department.    Please read ALL comments highlighted in yellow.    Please address this information as you see fit.     Feel free to contact us if you have any questions or require assistance.      The medications listed below were removed from the home medication list. Please reorder if appropriate:  Patient reports no longer taking the following medication(s):   Lotrel 10-40 mg per capsule   Voltaren 50 mg EC tablet   Sinequan 10 mg caps   Effexor 37.5 mg tab (ended 2/9/21)    Medications listed below were obtained from: Patient/family and Analytic software- [employer]  (Not in a hospital admission)      Patient stated she has not started therapy on budesonide-glycopyr-formotero (Breztri Aerospher) 160-9-4.8 mcg/actuation HFAA.  Has not picked up from pharmacy.        Carolyn Lanza CP.  [phone]                    .          "

## 2022-06-28 NOTE — ASSESSMENT & PLAN NOTE
Complaints of increasing SOB, wheezing, cough productive of sputum and post nasal drip. Sent from pulmonary clinic  Started on Doxy, steroids, and duo-nebs  -Supplemental O2 for 88-92%  -will begin Breztri on discharge begun today from pulmonary clinic  -mucinex  -Smoking cessation

## 2022-06-28 NOTE — PROGRESS NOTES
CHIEF COMPLAINT:    Chief Complaint   Patient presents with    Shortness of Breath       HISTORY OF PRESENT ILLNESS: Yasmeen Valderrama is a 51 y.o. 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 is here for pulmonary evaluation. Patient has severe COPD fev1 37% with symptoms of   SOB x months. Patient reports reliant on nebulizers following any activity every 15-20 min.   Patient is on home oxygen  Seen in ED and decline hospitalization 5/4/2022  Painful mouth ulcers on tongue x several weeks prevent utilization of trelegy powder but finds that she can utilize her MDI  Fallen 3 x and last fall 1-2 weeks ago occurred while she was dozing off while standing and hit her left knee  States she cannot lay down because unable to breath  States she is exhausted but sleep worsens her breathing and she wakes up more short of breath and with HA. 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 Negative for fever, chills, weight loss, activity change, appetite change and night sweats.   HENT: Positive for congestion (blocked up nasally, hard time blowing, using saline mist).    Eyes: Negative.    Respiratory: Positive for cough, sputum production (brown, dark green), chest tightness, wheezing, orthopnea, previous hospitalization due to pulmonary problems, dyspnea on extertion, somnolence and Paroxysmal Nocturnal Dyspnea. Negative for snoring (no per  ) and use of rescue inhaler.    Cardiovascular: Positive for leg swelling. Negative for chest pain and palpitations.   Genitourinary: Negative.    Endocrine: endocrine negative   Musculoskeletal: Positive for arthralgias (left pain fall 1-2 weeks ago, dozing off standing up) and gait problem (sob).   Skin: Negative.    Gastrointestinal: Negative for nausea, vomiting and acid reflux.   Neurological: Positive for headaches (every other day, wake up with HA).   Psychiatric/Behavioral: Positive for sleep disturbance (wake up short winded after napping over 1 year).     DATA  PERSONALLY REVIEWED:    PFT 2019: ratio 59 fev1 0.99 (37%) fvc 1.69 (51%) tlc 86% dlco 69.9%    CXR 5/4/2022: The heart and mediastinal structures are unremarkable.  There is mild prominence of the central pulmonary vasculature, similar to the prior examination.  The lungs are free of focal consolidations.  There is no evidence for pneumothorax or large pleural effusions.  Bony structures are grossly intact    CT chest 4/12/2016:IMPRESSION:     Atelectasis or parenchymal scarring in the right lower lobe laterally and anteriorly and in the inferior lingular portion of the left upper lobe.     Paraseptal emphysematous changes in the medial aspect of the right lung apex.    Sleep study:NA    ECHO 2/10/2022:  · The estimated ejection fraction is 55%.  · The left ventricle is normal in size with normal systolic function.  · Normal left ventricular diastolic function.  · Moderate right ventricular enlargement with mildly reduced right ventricular systolic function.  · Moderate left atrial enlargement.  · Mild tricuspid regurgitation.  · Mild right atrial enlargement.  · Intermediate central venous pressure (8 mmHg).  · The estimated PA systolic pressure is 52 mmHg.  · There is pulmonary hypertension.      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    Laryngeal papilloma 06/13/2018    Ale's edema of vocal folds 05/14/2018    Dysphonia 05/14/2018    Intractable chronic cluster headache 11/25/2019    KATHERINE (obstructive sleep apnea) 03/04/2020    Aortic atherosclerosis 03/19/2021    Sleep-related breathing disorder 06/28/2022    Chronic respiratory failure with hypoxia, on home O2 therapy 06/28/2022    Stomatitis and mucositis 06/28/2022    Pulmonary hypertension 06/28/2022     Resolved Ambulatory Problems     Diagnosis Date Noted    Abnormal PFTs (pulmonary function tests) 12/01/2014    Carpal tunnel syndrome on both sides 02/27/2015    CTS (carpal tunnel syndrome) 06/09/2015    Edema 09/03/2015    Screening 09/09/2015    Chronic obstructive pulmonary disease 01/04/2017    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    Candida infection, oral 06/02/2017     Past Medical History:   Diagnosis Date    Asthma     Carpal tunnel syndrome, bilateral     COPD (chronic obstructive pulmonary disease)                 PAST SURGICAL HISTORY:    Past Surgical History:   Procedure Laterality Date    CARPAL TUNNEL RELEASE      FINGER SURGERY      HYSTERECTOMY      OOPHORECTOMY  1996    Hysterectomy         FAMILY HISTORY:                Family History   Problem Relation Age of Onset    COPD Mother     Heart disease Father     No Known Problems Sister     No Known Problems Brother        SOCIAL HISTORY:          Tobacco:   Social History     Tobacco Use   Smoking Status Current Every Day Smoker    Packs/day: 2.00    Years: 35.00    Pack years: 70.00    Types: Cigarettes    Start date: 12/1/1983   Smokeless Tobacco Current User       alcohol use:    Social History     Substance and Sexual Activity   Alcohol Use  No    Alcohol/week: 0.0 standard drinks                   ALLERGIES:    Review of patient's allergies indicates:   Allergen Reactions    Antivert [meclizine] Other (See Comments)     Behavioral changes       CURRENT MEDICATIONS:    Current Outpatient Medications   Medication Sig Dispense Refill    amLODIPine-benazepriL (LOTREL) 10-40 mg per capsule       gabapentin (NEURONTIN) 600 MG tablet TAKE TWO TABLETS BY MOUTH THREE TIMES DAILY for chronic pain 180 tablet 11    LIDOCAINE VISCOUS 2 % solution Can switch to equivalent, 10 ml swish and gargle 4 x daily 100 mL 11    potassium chloride (MICRO-K) 10 MEQ CpSR TAKE 1 CAPSULE BY MOUTH TWICE DAILY FOR POTASSIUM 60 capsule 11    SUBOXONE 8-2 mg Film PLACE 1 FILM UNDER TONGUE TWICE DAILY      albuterol-ipratropium (DUO-NEB) 2.5 mg-0.5 mg/3 mL nebulizer solution ipratropium 0.5 mg-albuterol 3 mg (2.5 mg base)/3 mL nebulization soln   nebulize 1 vial FOUR TIMES DAILY      budesonide-glycopyr-formoterol (BREZTRI AEROSPHERE) 160-9-4.8 mcg/actuation HFAA Inhale 2 puffs into the lungs 2 (two) times daily. 10.7 g 11    diclofenac (VOLTAREN) 50 MG EC tablet diclofenac sodium 50 mg tablet,delayed release   Take 1 tablet twice a day by oral route as needed.      doxepin (SINEQUAN) 10 MG capsule doxepin 10 mg capsule   Take 1 capsule every day by oral route at bedtime.      furosemide (LASIX) 40 MG tablet 2 (two) times daily.      inhalation spacing device Use as directed for inhalation. 1 each 0    nicotine (NICODERM CQ) 21 mg/24 hr nicotine 21 mg/24 hr daily transdermal patch   apply 1 patch onto the skin daily      nystatin (MYCOSTATIN) 100,000 unit/mL suspension nystatin 100,000 unit/mL oral suspension   SHAKE WELL. TAKE 5 ml (1 teaspoon) BY MOUTH FOUR TIMES DAILY FOR 14 DAYS. DO NOT REFRIGERATE      venlafaxine (EFFEXOR) 37.5 MG Tab Take 1 tablet (37.5 mg total) by mouth nightly as needed. 60 tablet 11     No current facility-administered medications for this  "visit.                       PHYSICAL EXAM:  Vitals:    06/28/22 0747   BP: (!) 148/90   Pulse: 102   Temp: 98.8 °F (37.1 °C)   SpO2: (!) 94%   Weight: 91 kg (200 lb 11.7 oz)   Height: 5' 8" (1.727 m)   PainSc:   4   PainLoc: Leg     Body mass index is 30.52 kg/m².   Physical Exam   Constitutional: She is oriented to person, place, and time. She appears well-developed. No distress. She is obese.   HENT:   Head: Normocephalic.   Mouth/Throat: Mallampati Score: III.   Nasal congestion   Neck:   18 in   Cardiovascular: Regular rhythm and normal heart sounds.   tachy   Pulmonary/Chest: Normal expansion and effort normal. She has wheezes. She has rhonchi.   Musculoskeletal:         General: Edema (left knee patellar contusion and edematous to toes) present.      Cervical back: Neck supple.   Neurological: She is alert and oriented to person, place, and time.   ambulatory   Skin: Skin is warm. She is not diaphoretic.   Psychiatric: She has a normal mood and affect. Her behavior is normal. Judgment and thought content normal.          Lab Results   Component Value Date    TSH 2.702 09/29/2020      Lab Results   Component Value Date    WBC 17.37 (H) 05/04/2022    HGB 12.9 05/04/2022    HCT 41.8 05/04/2022    MCV 96 05/04/2022     05/04/2022     BMP  Lab Results   Component Value Date     05/04/2022    K 4.5 05/04/2022    CL 99 05/04/2022    CO2 31 (H) 05/04/2022    BUN 9 05/04/2022    CREATININE 0.6 05/04/2022    CALCIUM 9.2 05/04/2022    ANIONGAP 12 05/04/2022    ESTGFRAFRICA >60 05/04/2022    EGFRNONAA >60 05/04/2022     Lab Results   Component Value Date    HGBA1C 5.4 03/08/2016        ASSESSMENT    ICD-10-CM ICD-9-CM    1. COPD with chronic bronchitis and emphysema  J44.9 491.20 budesonide-glycopyr-formoterol ([facility]) 160-9-4.8 mcg/actuation HFAA      Polysomnogram (CPAP will be added if patient meets diagnostic criteria.)      inhalation spacing device      Complete PFT with bronchodilator      " Ambulatory referral/consult to Pulmonary Rehab      Ambulatory referral/consult to Smoking Cessation Program   2. Sleep-related breathing disorder  G47.30 780.59 Polysomnogram (CPAP will be added if patient meets diagnostic criteria.)   3. Chronic respiratory failure with hypoxia, on home O2 therapy  J96.11 518.83 Arterial Blood Gas    Z99.81 799.02      V46.2    4. Stomatitis and mucositis  K12.1 528.00     K12.30     5. Pulmonary hypertension  I27.20 416.8    6. Tobacco use disorder  F17.200 305.1 Ambulatory referral/consult to Smoking Cessation Program       PLAN:    Problem List Items Addressed This Visit        Unprioritized    Chronic respiratory failure with hypoxia, on home O2 therapy    Overview     home portable 2-3 years  87% RA at rest usually 92% on 2.5-3 L NC           Relevant Orders    Arterial Blood Gas    COPD with chronic bronchitis and emphysema - Primary    Overview     CT chest 4/12/2016:IMPRESSION:     Atelectasis or parenchymal scarring in the right lower lobe laterally and anteriorly and in the inferior lingular portion of the left upper lobe.     Paraseptal emphysematous changes in the medial aspect of the right lung apex.  PFT 2019: ratio 59 fev1 0.99 (37%) fvc 1.69 (51%) tlc 86% dlco 69.9%  Patient was on trelegy but stop taking due to painful stomatitis            Relevant Medications    budesonide-glycopyr-formoterol (BREZTRI AEROSPHERE) 160-9-4.8 mcg/actuation HFAA    inhalation spacing device    Other Relevant Orders    Polysomnogram (CPAP will be added if patient meets diagnostic criteria.)    Complete PFT with bronchodilator    Ambulatory referral/consult to Pulmonary Rehab    Ambulatory referral/consult to Smoking Cessation Program    Pulmonary hypertension    Overview     ECHO 2/10/2022:  · The estimated ejection fraction is 55%.  · The left ventricle is normal in size with normal systolic function.  · Normal left ventricular diastolic function.  · Moderate right ventricular  enlargement with mildly reduced right ventricular systolic function.  · Moderate left atrial enlargement.  · Mild tricuspid regurgitation.  · Mild right atrial enlargement.  · Intermediate central venous pressure (8 mmHg).  · The estimated PA systolic pressure is 52 mmHg.  · There is pulmonary hypertension.           Sleep-related breathing disorder    Overview     Fallen 3 x and last fall 1-2 weeks ago occurred while she was dozing off while standing and hit her left knee  States she cannot lay down because unable to breath  States she is exhausted but sleep worsens her breathing and she wakes up more short of breath and with HA. Sleep study was recommended in 2020 but covid broke and study was canceled and she was lost to follow up  Recommend PSG           Relevant Orders    Polysomnogram (CPAP will be added if patient meets diagnostic criteria.)    Stomatitis and mucositis    Overview     Will likely get some relief from steroids which she will likely obtain from ED           Tobacco use disorder    Overview     Declined smoking cessation referral.             Relevant Orders    Ambulatory referral/consult to Smoking Cessation Program            Patient will Follow up in about 1 month (around 7/28/2022), or with pft, ER today for copd exacerbation.

## 2022-06-28 NOTE — HPI
Yasmeen Valderrama 51 y.o. female with chronic hypoxemic respiratory failure on home oxygen, COPD, tobacco abuse, and obesity presents to the hospital chief complaint shortness of breath.  She reports chronic and progressive shortness of breath has acutely worsened the last 2 days.  She was seen in our outpatient pulmonary appointment today sent to the emergency room.  She describes the shortness of breath is worse with ambulation and improved with home albuterol inhaler.  She denies any other attempted treatment.  She has had congestion and cough productive of sputum.  She reports her  notices she has been wheezing.  She is on home oxygen.  He states she has chronic baseline lower extremity edema that she attributes to sitting with her legs dependent.  There has been no recent hospitalizations travel or antibiotic use.  She denies fever nausea vomiting chest pain abdominal pain syncope dizziness dysuria melena hematuria hematemesis.    In the ED, chest x-ray without acute process COVID negative pro count negative troponin negative BNP negative afebrile without leukocytosis VBG with pH 7.3 pCO2 79 PO2 56 and bicarb 39.

## 2022-06-28 NOTE — ASSESSMENT & PLAN NOTE
PA pressure 52 per echo 1/2022. Reports improvement with albuterol. Has lower extremity edema on exam though per patient is chronic and she attributes to dependency. No longer on amlodipine  Continue home lasix

## 2022-06-28 NOTE — H&P
[facility]  [facility] Medicine  History & Physical    Patient Name: Yasmeen Valderrama  MRN: [id number]  Patient Class: OP- Observation  Admission Date: 6/28/2022  Attending Physician: Rambo Rojo MD   Primary Care Provider: Clovis Beauchamp MD         Patient information was obtained from patient, past medical records and ER records.     Subjective:     Principal Problem:COPD exacerbation    Chief Complaint:   Chief Complaint   Patient presents with    Shortness of Breath    Chest Pain     Pt reports shortness of breath accompanied by chest tightness x 4 days. Pt has a history COPD, asthma, and bronchitis. Pt states she has taken a few breathing treatments with minimal relief. Pt on 2.5L O2 via NC. Pt also reports falling 3 days ago landing on left knee. Pt states since fall, her left lower extremity has been tender, swollen, reddened and her toes have started to change color. Pt states the toes on her left foot are turning black. Pt is not a diabetic.    Fall        HPI: Yasmeen Valderrama 51 y.o. female with chronic hypoxemic respiratory failure on home oxygen, COPD, tobacco abuse, and obesity presents to the hospital chief complaint shortness of breath.  She reports chronic and progressive shortness of breath has acutely worsened the last 2 days.  She was seen in our outpatient pulmonary appointment today sent to the emergency room.  She describes the shortness of breath is worse with ambulation and improved with home albuterol inhaler.  She denies any other attempted treatment.  She has had congestion and cough productive of sputum.  She reports her  notices she has been wheezing.  She is on home oxygen.  He states she has chronic baseline lower extremity edema that she attributes to sitting with her legs dependent.  There has been no recent hospitalizations travel or antibiotic use.  She denies fever nausea vomiting chest pain abdominal pain syncope dizziness dysuria melena  hematuria hematemesis.    In the ED, chest x-ray without acute process COVID negative pro count negative troponin negative BNP negative afebrile without leukocytosis VBG with pH 7.3 pCO2 79 PO2 56 and bicarb 39.       Past Medical History:   Diagnosis Date    Anxiety     Asthma     Carpal tunnel syndrome, bilateral     COPD (chronic obstructive pulmonary disease)     Laryngeal papilloma     Vocal cord mass 6/2/2017    Right side with CT scan Referred to ENT for visualization       Past Surgical History:   Procedure Laterality Date    CARPAL TUNNEL RELEASE      FINGER SURGERY      HYSTERECTOMY      OOPHORECTOMY  1996    Hysterectomy       Review of patient's allergies indicates:   Allergen Reactions    Antivert [meclizine] Other (See Comments)     Behavioral changes       No current facility-administered medications on file prior to encounter.     Current Outpatient Medications on File Prior to Encounter   Medication Sig    albuterol-ipratropium (DUO-NEB) 2.5 mg-0.5 mg/3 mL nebulizer solution ipratropium 0.5 mg-albuterol 3 mg (2.5 mg base)/3 mL nebulization soln   nebulize 1 vial FOUR TIMES DAILY    amLODIPine-benazepriL (LOTREL) 10-40 mg per capsule     budesonide-glycopyr-formoterol (BREZTRI AEROSPHERE) 160-9-4.8 mcg/actuation HFAA Inhale 2 puffs into the lungs 2 (two) times daily.    diclofenac (VOLTAREN) 50 MG EC tablet diclofenac sodium 50 mg tablet,delayed release   Take 1 tablet twice a day by oral route as needed.    doxepin (SINEQUAN) 10 MG capsule doxepin 10 mg capsule   Take 1 capsule every day by oral route at bedtime.    furosemide (LASIX) 40 MG tablet 2 (two) times daily.    gabapentin (NEURONTIN) 600 MG tablet TAKE TWO TABLETS BY MOUTH THREE TIMES DAILY for chronic pain    inhalation spacing device Use as directed for inhalation.    LIDOCAINE VISCOUS 2 % solution Can switch to equivalent, 10 ml swish and gargle 4 x daily    nicotine (NICODERM CQ) 21 mg/24 hr nicotine 21 mg/24 hr  daily transdermal patch   apply 1 patch onto the skin daily    nystatin (MYCOSTATIN) 100,000 unit/mL suspension nystatin 100,000 unit/mL oral suspension   SHAKE WELL. TAKE 5 ml (1 teaspoon) BY MOUTH FOUR TIMES DAILY FOR 14 DAYS. DO NOT REFRIGERATE    potassium chloride (MICRO-K) 10 MEQ CpSR TAKE 1 CAPSULE BY MOUTH TWICE DAILY FOR POTASSIUM    SUBOXONE 8-2 mg Film PLACE 1 FILM UNDER TONGUE TWICE DAILY    venlafaxine (EFFEXOR) 37.5 MG Tab Take 1 tablet (37.5 mg total) by mouth nightly as needed.    [DISCONTINUED] albuterol (PROVENTIL) 2.5 mg /3 mL (0.083 %) nebulizer solution NEBULIZE 1 vial EVERY 6 HOURS AS NEEDED    [DISCONTINUED] albuterol (PROVENTIL/VENTOLIN HFA) 90 mcg/actuation inhaler Rescue    [DISCONTINUED] buPROPion (WELLBUTRIN XL) 300 MG 24 hr tablet     [DISCONTINUED] ergocalciferol (ERGOCALCIFEROL) 50,000 unit Cap Take 1 capsule (50,000 Units total) by mouth every 7 days.    [DISCONTINUED] fluticasone-salmeterol 230-21 mcg/dose (ADVAIR HFA) 230-21 mcg/actuation HFAA inhaler Inhale 2 puffs into the lungs 2 (two) times daily. Controller    [DISCONTINUED] furosemide (LASIX) 40 MG tablet Take 1 tablet (40 mg total) by mouth 2 (two) times a day. for 5 days    [DISCONTINUED] mirtazapine (REMERON) 45 MG tablet Take 1 tablet (45 mg total) by mouth every evening.    [DISCONTINUED] nystatin (MYCOSTATIN) cream Apply topically 2 (two) times daily.    [DISCONTINUED] traZODone (DESYREL) 100 MG tablet Take 200 mg by mouth nightly.    [DISCONTINUED] TRELEGY ELLIPTA 100-62.5-25 mcg DsDv INHALE 1 PUFF BY MOUTH ONCE DAILY     Family History       Problem Relation (Age of Onset)    COPD Mother    Heart disease Father    No Known Problems Sister, Brother          Tobacco Use    Smoking status: Current Every Day Smoker     Packs/day: 2.00     Years: 35.00     Pack years: 70.00     Types: Cigarettes     Start date: 12/1/1983    Smokeless tobacco: Current User   Substance and Sexual Activity    Alcohol use: No      Alcohol/week: 0.0 standard drinks    Drug use: No    Sexual activity: Yes     Partners: Male     Review of Systems   Constitutional:  Negative for chills and fever.   HENT:  Positive for congestion. Negative for nosebleeds and tinnitus.    Eyes:  Negative for photophobia and visual disturbance.   Respiratory:  Positive for cough, shortness of breath and wheezing.    Cardiovascular:  Negative for chest pain, palpitations and leg swelling.   Gastrointestinal:  Negative for abdominal distention, nausea and vomiting.   Genitourinary:  Negative for dysuria, flank pain and hematuria.   Musculoskeletal:  Negative for gait problem and joint swelling.   Skin:  Negative for rash and wound.   Neurological:  Negative for seizures and syncope.   Objective:     Vital Signs (Most Recent):  Temp: 98.8 °F (37.1 °C) (06/28/22 0928)  Pulse: 81 (06/28/22 1301)  Resp: 20 (06/28/22 1136)  BP: (!) 129/94 (06/28/22 1301)  SpO2: (!) 92 % (06/28/22 1301)   Vital Signs (24h Range):  Temp:  [98.8 °F (37.1 °C)] 98.8 °F (37.1 °C)  Pulse:  [] 81  Resp:  [19-20] 20  SpO2:  [92 %-97 %] 92 %  BP: (120-148)/(65-94) 129/94     Weight: 90.7 kg (200 lb)  Body mass index is 35.43 kg/m².    Physical Exam  Vitals and nursing note reviewed.   Constitutional:       General: She is not in acute distress.     Appearance: She is well-developed.   HENT:      Head: Normocephalic and atraumatic.      Right Ear: External ear normal.      Left Ear: External ear normal.   Eyes:      General:         Right eye: No discharge.         Left eye: No discharge.      Conjunctiva/sclera: Conjunctivae normal.   Neck:      Thyroid: No thyromegaly.   Cardiovascular:      Rate and Rhythm: Normal rate and regular rhythm.      Heart sounds: No murmur heard.  Pulmonary:      Effort: Pulmonary effort is normal. No respiratory distress.      Breath sounds: Wheezing present.      Comments: On supplemental oxygen 3L speak in full sentences  Abdominal:      General: Bowel  sounds are normal. There is no distension.      Palpations: Abdomen is soft. There is no mass.      Tenderness: There is no abdominal tenderness.   Musculoskeletal:         General: No deformity.      Cervical back: Normal range of motion.      Right lower leg: Edema present.      Left lower leg: Edema present.      Comments: Symmetric bilateral lower extremity edema chronic per patient   Skin:     General: Skin is warm and dry.   Neurological:      Mental Status: She is alert and oriented to person, place, and time.   Psychiatric:         Mood and Affect: Mood normal.         Behavior: Behavior normal.           Significant Labs: CBC:   Recent Labs   Lab 06/28/22  1037   WBC 10.20   HGB 12.5   HCT 40.5        CMP:   Recent Labs   Lab 06/28/22  1037      K 4.3   CL 96   CO2 36*   *   BUN 10   CREATININE 0.6   CALCIUM 9.2   PROT 6.9   ALBUMIN 3.6   BILITOT 0.5   ALKPHOS 108   AST 17   ALT 12   ANIONGAP 7*   EGFRNONAA >60     Cardiac Markers:   Recent Labs   Lab 06/28/22  1037   BNP <10     Troponin:   Recent Labs   Lab 06/28/22  1037   TROPONINI <0.006       Significant Imaging:   Imaging Results              X-Ray Chest AP Portable (Final result)  Result time 06/28/22 10:46:16      Final result by Navid Lloyd MD (06/28/22 10:46:16)                   Impression:      No acute findings      Electronically signed by: Navid Lloyd MD  Date:    06/28/2022  Time:    10:46               Narrative:    EXAMINATION:  XR CHEST AP PORTABLE    CLINICAL HISTORY:  Chest Pain;    TECHNIQUE:  Single frontal view of the chest was performed.    COMPARISON:  05/04/2022    FINDINGS:  Cardiac size is within normal limits.  Lungs are clear.  No infiltrate or pleural effusion is seen.                                        Assessment/Plan:     * COPD exacerbation  Complaints of increasing SOB, wheezing, cough productive of sputum and post nasal drip. Sent from pulmonary clinic  Started on Doxy, steroids,  and duo-nebs  -Supplemental O2 for 88-92%  -will begin Breztri on discharge begun today from pulmonary clinic  -mucinex  -Smoking cessation    Pulmonary hypertension  PA pressure 52 per echo 1/2022. Reports improvement with albuterol. Has lower extremity edema on exam though per patient is chronic and she attributes to dependency. No longer on amlodipine  Continue home lasix    Chronic respiratory failure with hypoxia, on home O2 therapy  See above. Continue home supplemental oxygen. VBG with pH 7.3 / 79.2 / 56 / 39.0    Chronic pain syndrome  Continue home suboxone. Confirmed on  last fileld 6/2022    Tobacco use disorder  Smokes 1/2ppd. Greater than 3 minutes spent counseling patient on dangers of continued tobacco abuse. Will provide tobacco cessation education prior to discharge    COPD with chronic bronchitis and emphysema  See above  PFT 2019 : ratio 59 fev1 0.99 (37%) fvc 1.69 (51%) tlc 86% dlco 69.9%      VTE Risk Mitigation (From admission, onward)         Ordered     IP VTE HIGH RISK PATIENT  Once         06/28/22 1346     Place sequential compression device  Until discontinued         06/28/22 1346     Place BRIAN hose  Until discontinued         06/28/22 1346              VTE: BRIAN/SCD  Code: Full  Diet: Cardiac  Dispo: pending improvement in wheezing  As clarification, on 6/28/2022, patient should be admitted for hospital observation services under my care in collaboration with Rambo Rojo MD. Damien Howard PA-C  Department of Hospital Medicine   [facility] - Emergency Dept

## 2022-06-28 NOTE — NURSING
Patient arrived to the floor via stretcher with transport. Patient on 3L NC. No complaints at this time. Bed in lowest position, call light within reach, and oriented to room.  Will continue to monitor patient.

## 2022-06-28 NOTE — ED PROVIDER NOTES
Encounter Date: 6/28/2022       History     Chief Complaint   Patient presents with    Shortness of Breath    Chest Pain     Pt reports shortness of breath accompanied by chest tightness x 4 days. Pt has a history COPD, asthma, and bronchitis. Pt states she has taken a few breathing treatments with minimal relief. Pt on 2.5L O2 via NC. Pt also reports falling 3 days ago landing on left knee. Pt states since fall, her left lower extremity has been tender, swollen, reddened and her toes have started to change color. Pt states the toes on her left foot are turning black. Pt is not a diabetic.    Fall     50 y/o female with COPD, asthma, and chronic bronchitis presents to the ED for emergent evaluation of x2 yr hx of SOB and chest tightness that worsened a few days ago. Patient presented to her pulmonologist PTA, where she was advised to present to the ER for further evaluation of COPD exacerbation. Patient is on 3L O2 at home, where she states her O2 saturation is 92% on oxygen at home. Patient also reports an accidental fall onto her L knee a few days ago. She notes some bruising to her L toes and swelling to her L knee. She has consulted her PCP for these symptoms. She is able to ambulate well into the room. Patient is taking 40 mg lasix BID at home for her chronic LE swelling. She states she has an appt with cardiology next month. Patient smokes 1-1.5 packs of cigarettes daily for the past 15 years. Patient denies fever, chills, cough, head trauma, syncope, N/V/D, abdominal pain, chest pain, or dysuria. No other symptoms reported.    The history is provided by the patient and the spouse. No  was used.     Review of patient's allergies indicates:   Allergen Reactions    Antivert [meclizine] Other (See Comments)     Behavioral changes     Past Medical History:   Diagnosis Date    Anxiety     Asthma     Carpal tunnel syndrome, bilateral     COPD (chronic obstructive pulmonary disease)      Laryngeal papilloma     Vocal cord mass 6/2/2017    Right side with CT scan Referred to ENT for visualization     Past Surgical History:   Procedure Laterality Date    CARPAL TUNNEL RELEASE      FINGER SURGERY      HYSTERECTOMY      OOPHORECTOMY  1996    Hysterectomy     Family History   Problem Relation Age of Onset    COPD Mother     Heart disease Father     No Known Problems Sister     No Known Problems Brother      Social History     Tobacco Use    Smoking status: Current Every Day Smoker     Packs/day: 2.00     Years: 35.00     Pack years: 70.00     Types: Cigarettes     Start date: 12/1/1983    Smokeless tobacco: Current User   Substance Use Topics    Alcohol use: No     Alcohol/week: 0.0 standard drinks    Drug use: No     Review of Systems   Constitutional: Negative for chills and fever.   HENT: Negative for congestion, ear pain, rhinorrhea and sore throat.    Eyes: Negative for redness.   Respiratory: Positive for chest tightness and shortness of breath. Negative for cough.    Cardiovascular: Negative for chest pain.   Gastrointestinal: Negative for abdominal pain, diarrhea, nausea and vomiting.   Genitourinary: Negative for decreased urine volume, difficulty urinating, dysuria, frequency, hematuria and urgency.   Musculoskeletal: Negative for back pain and neck pain.   Skin: Negative for rash.   Neurological: Negative for syncope and headaches.   Psychiatric/Behavioral: Negative for confusion.       Physical Exam     Initial Vitals [06/28/22 0928]   BP Pulse Resp Temp SpO2   124/65 92 19 98.8 °F (37.1 °C) (!) 93 %      MAP       --         Physical Exam    Nursing note and vitals reviewed.  Constitutional: She appears well-developed and well-nourished.  Non-toxic appearance. She does not appear ill.   HENT:   Head: Normocephalic and atraumatic.   Mouth/Throat: Mucous membranes are normal.   Eyes: EOM are normal.   Neck: Neck supple.   Cardiovascular: Normal rate and regular rhythm.    Pulmonary/Chest: Effort normal. No respiratory distress.   Diffuse inspiratory wheezes to all lung fields. No chest wall tenderness.   Abdominal: Abdomen is soft. Bowel sounds are normal. She exhibits no distension. There is no abdominal tenderness.   Musculoskeletal:         General: Normal range of motion.      Cervical back: Neck supple.      Comments: 3+ pitting edema to b/l LE. Full ROM of L knee. Negative lachman and posterior drawer. MCL and LCL intact. Patient able to ambulate in the room.      Neurological: She is alert.   Skin: Skin is warm and dry.   Psychiatric: She has a normal mood and affect.         ED Course   Procedures  Labs Reviewed   CBC W/ AUTO DIFFERENTIAL - Abnormal; Notable for the following components:       Result Value    MCHC 30.9 (*)     Immature Grans (Abs) 0.05 (*)     All other components within normal limits   COMPREHENSIVE METABOLIC PANEL - Abnormal; Notable for the following components:    CO2 36 (*)     Glucose 119 (*)     Anion Gap 7 (*)     All other components within normal limits   ISTAT PROCEDURE - Abnormal; Notable for the following components:    POC PH 7.300 (*)     POC PCO2 79.2 (*)     POC HCO3 39.0 (*)     POC SATURATED O2 83 (*)     POC TCO2 41 (*)     All other components within normal limits   TROPONIN I   B-TYPE NATRIURETIC PEPTIDE   PROCALCITONIN   TROPONIN I   SARS-COV-2 RDRP GENE          Imaging Results          X-Ray Chest AP Portable (Final result)  Result time 06/28/22 10:46:16    Final result by Navid Lloyd MD (06/28/22 10:46:16)                 Impression:      No acute findings      Electronically signed by: Navid Lloyd MD  Date:    06/28/2022  Time:    10:46             Narrative:    EXAMINATION:  XR CHEST AP PORTABLE    CLINICAL HISTORY:  Chest Pain;    TECHNIQUE:  Single frontal view of the chest was performed.    COMPARISON:  05/04/2022    FINDINGS:  Cardiac size is within normal limits.  Lungs are clear.  No infiltrate or pleural  effusion is seen.                                 Medications   albuterol-ipratropium 2.5 mg-0.5 mg/3 mL nebulizer solution 3 mL (0 mLs Nebulization Hold 6/28/22 1230)   methylPREDNISolone sodium succinate injection 80 mg (has no administration in time range)   albuterol-ipratropium 2.5 mg-0.5 mg/3 mL nebulizer solution 3 mL (3 mLs Nebulization Given 6/28/22 1043)   furosemide injection 40 mg (40 mg Intravenous Given 6/28/22 1052)   methylPREDNISolone sodium succinate injection 80 mg (80 mg Intravenous Given 6/28/22 1049)   albuterol-ipratropium 2.5 mg-0.5 mg/3 mL nebulizer solution 3 mL (3 mLs Nebulization Given 6/28/22 1136)     Medical Decision Making:   ED Management:  52 y/o female with COPD, asthma, and chronic bronchitis presents to the ED for emergent evaluation of x2 yr hx of SOB and chest tightness that worsened a few days ago. On PE, patient is in no acute distress. Non toxic appearing. She has diffuse inspiratory wheezes to all lung fields. 3+ pitting edema to b/l LE. Full ROM of L knee. Negative lachman and posterior drawer. MCL and LCL intact. Patient able to ambulate in the room. I don't believe she needs imaging of her knee at this time. ISTAT revealed pH of 7.3, PCO2 79.2, HCO3 39, and O2 83. CBC unremarkable. CMP revealed CO2 of 36 otherwise unremarkable. Trop and BNP unremarkable. CXR revealed no acute cardiopulmonary abnormalities. Lasix, solumedrol, and breathing treatment ordered. Upon reassessment patient still reports SOB. Ordered another breathing treatment.     Consulted Ella Winston NP who will accept the patient and resume further care of the patient upon admission to observation for COPD exacerbation.    Case discussed with and care coordinated in conjunction with my attending, Dr. Back.                          Clinical Impression:   Final diagnoses:  [R07.9] Chest pain  [I27.20] Pulmonary hypertension (Primary)  [R60.9] Peripheral edema  [J44.9] COPD with chronic bronchitis and  emphysema  [J44.1] COPD exacerbation          ED Disposition Condition    Observation               Yulisa Deleon PA-C  06/28/22 1259       Yulisa Deleon PA-C  06/28/22 1301

## 2022-06-28 NOTE — ED TRIAGE NOTES
51 y.o female presents to the ED with chief complaint of SOB, CP, and fall. Pt reports chronic SOB and on 3 L NC at all time. Reports worsening SOB over the last couple of days and no relief from nebs. Reports chest tightness with SOB. Pt reports three falls in the last week. Reports pain to left knee and ankle. States she did not fall on her ankle but developed a bruise today. Pt denies CP, abdominal pain, N/V/D, and any other problems. AAOx4, NAD.

## 2022-06-28 NOTE — SUBJECTIVE & OBJECTIVE
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     Current Outpatient Medications on File Prior to Encounter   Medication Sig    albuterol-ipratropium (DUO-NEB) 2.5 mg-0.5 mg/3 mL nebulizer solution ipratropium 0.5 mg-albuterol 3 mg (2.5 mg base)/3 mL nebulization soln   nebulize 1 vial FOUR TIMES DAILY    amLODIPine-benazepriL (LOTREL) 10-40 mg per capsule     budesonide-glycopyr-formoterol (BREZTRI AEROSPHERE) 160-9-4.8 mcg/actuation HFAA Inhale 2 puffs into the lungs 2 (two) times daily.    diclofenac (VOLTAREN) 50 MG EC tablet diclofenac sodium 50 mg tablet,delayed release   Take 1 tablet twice a day by oral route as needed.    doxepin (SINEQUAN) 10 MG capsule doxepin 10 mg capsule   Take 1 capsule every day by oral route at bedtime.    furosemide (LASIX) 40 MG tablet 2 (two) times daily.    gabapentin (NEURONTIN) 600 MG tablet TAKE TWO TABLETS BY MOUTH THREE TIMES DAILY for chronic pain    inhalation spacing device Use as directed for inhalation.    LIDOCAINE VISCOUS 2 % solution Can switch to equivalent, 10 ml swish and gargle 4 x daily    nicotine (NICODERM CQ) 21 mg/24 hr nicotine 21 mg/24 hr daily transdermal patch   apply 1 patch onto the skin daily    nystatin (MYCOSTATIN) 100,000 unit/mL suspension nystatin 100,000 unit/mL oral suspension   SHAKE WELL. TAKE 5 ml (1 teaspoon) BY MOUTH FOUR TIMES DAILY FOR 14 DAYS. DO NOT  REFRIGERATE    potassium chloride (MICRO-K) 10 MEQ CpSR TAKE 1 CAPSULE BY MOUTH TWICE DAILY FOR POTASSIUM    SUBOXONE 8-2 mg Film PLACE 1 FILM UNDER TONGUE TWICE DAILY    venlafaxine (EFFEXOR) 37.5 MG Tab Take 1 tablet (37.5 mg total) by mouth nightly as needed.    [DISCONTINUED] albuterol (PROVENTIL) 2.5 mg /3 mL (0.083 %) nebulizer solution NEBULIZE 1 vial EVERY 6 HOURS AS NEEDED    [DISCONTINUED] albuterol (PROVENTIL/VENTOLIN HFA) 90 mcg/actuation inhaler Rescue    [DISCONTINUED] buPROPion (WELLBUTRIN XL) 300 MG 24 hr tablet     [DISCONTINUED] ergocalciferol (ERGOCALCIFEROL) 50,000 unit Cap Take 1 capsule (50,000 Units total) by mouth every 7 days.    [DISCONTINUED] fluticasone-salmeterol 230-21 mcg/dose (ADVAIR HFA) 230-21 mcg/actuation HFAA inhaler Inhale 2 puffs into the lungs 2 (two) times daily. Controller    [DISCONTINUED] furosemide (LASIX) 40 MG tablet Take 1 tablet (40 mg total) by mouth 2 (two) times a day. for 5 days    [DISCONTINUED] mirtazapine (REMERON) 45 MG tablet Take 1 tablet (45 mg total) by mouth every evening.    [DISCONTINUED] nystatin (MYCOSTATIN) cream Apply topically 2 (two) times daily.    [DISCONTINUED] traZODone (DESYREL) 100 MG tablet Take 200 mg by mouth nightly.    [DISCONTINUED] TRELEGY ELLIPTA 100-62.5-25 mcg DsDv INHALE 1 PUFF BY MOUTH ONCE DAILY     Family History       Problem Relation (Age of Onset)    COPD Mother    Heart disease Father    No Known Problems Sister, Brother          Tobacco Use    Smoking status: Current Every Day Smoker     Packs/day: 2.00     Years: 35.00     Pack years: 70.00     Types: Cigarettes     Start date: 12/1/1983    Smokeless tobacco: Current User   Substance and Sexual Activity    Alcohol use: No     Alcohol/week: 0.0 standard drinks    Drug use: No    Sexual activity: Yes     Partners: Male     Review of Systems   Constitutional:  Negative for chills and fever.   HENT:  Positive for congestion. Negative for nosebleeds and tinnitus.    Eyes:   Negative for photophobia and visual disturbance.   Respiratory:  Positive for cough, shortness of breath and wheezing.    Cardiovascular:  Negative for chest pain, palpitations and leg swelling.   Gastrointestinal:  Negative for abdominal distention, nausea and vomiting.   Genitourinary:  Negative for dysuria, flank pain and hematuria.   Musculoskeletal:  Negative for gait problem and joint swelling.   Skin:  Negative for rash and wound.   Neurological:  Negative for seizures and syncope.   Objective:     Vital Signs (Most Recent):  Temp: 98.8 °F (37.1 °C) (06/28/22 0928)  Pulse: 81 (06/28/22 1301)  Resp: 20 (06/28/22 1136)  BP: (!) 129/94 (06/28/22 1301)  SpO2: (!) 92 % (06/28/22 1301)   Vital Signs (24h Range):  Temp:  [98.8 °F (37.1 °C)] 98.8 °F (37.1 °C)  Pulse:  [] 81  Resp:  [19-20] 20  SpO2:  [92 %-97 %] 92 %  BP: (120-148)/(65-94) 129/94     Weight: 90.7 kg (200 lb)  Body mass index is 35.43 kg/m².    Physical Exam  Vitals and nursing note reviewed.   Constitutional:       General: She is not in acute distress.     Appearance: She is well-developed.   HENT:      Head: Normocephalic and atraumatic.      Right Ear: External ear normal.      Left Ear: External ear normal.   Eyes:      General:         Right eye: No discharge.         Left eye: No discharge.      Conjunctiva/sclera: Conjunctivae normal.   Neck:      Thyroid: No thyromegaly.   Cardiovascular:      Rate and Rhythm: Normal rate and regular rhythm.      Heart sounds: No murmur heard.  Pulmonary:      Effort: Pulmonary effort is normal. No respiratory distress.      Breath sounds: Wheezing present.      Comments: On supplemental oxygen 3L speak in full sentences  Abdominal:      General: Bowel sounds are normal. There is no distension.      Palpations: Abdomen is soft. There is no mass.      Tenderness: There is no abdominal tenderness.   Musculoskeletal:         General: No deformity.      Cervical back: Normal range of motion.      Right  lower leg: Edema present.      Left lower leg: Edema present.      Comments: Symmetric bilateral lower extremity edema chronic per patient   Skin:     General: Skin is warm and dry.   Neurological:      Mental Status: She is alert and oriented to person, place, and time.   Psychiatric:         Mood and Affect: Mood normal.         Behavior: Behavior normal.           Significant Labs: CBC:   Recent Labs   Lab 06/28/22  1037   WBC 10.20   HGB 12.5   HCT 40.5        CMP:   Recent Labs   Lab 06/28/22  1037      K 4.3   CL 96   CO2 36*   *   BUN 10   CREATININE 0.6   CALCIUM 9.2   PROT 6.9   ALBUMIN 3.6   BILITOT 0.5   ALKPHOS 108   AST 17   ALT 12   ANIONGAP 7*   EGFRNONAA >60     Cardiac Markers:   Recent Labs   Lab 06/28/22  1037   BNP <10     Troponin:   Recent Labs   Lab 06/28/22  1037   TROPONINI <0.006       Significant Imaging:   Imaging Results              X-Ray Chest AP Portable (Final result)  Result time 06/28/22 10:46:16      Final result by Navid Lloyd MD (06/28/22 10:46:16)                   Impression:      No acute findings      Electronically signed by: Navid Lloyd MD  Date:    06/28/2022  Time:    10:46               Narrative:    EXAMINATION:  XR CHEST AP PORTABLE    CLINICAL HISTORY:  Chest Pain;    TECHNIQUE:  Single frontal view of the chest was performed.    COMPARISON:  05/04/2022    FINDINGS:  Cardiac size is within normal limits.  Lungs are clear.  No infiltrate or pleural effusion is seen.

## 2022-06-28 NOTE — ASSESSMENT & PLAN NOTE
Smokes 1/2ppd. Greater than 3 minutes spent counseling patient on dangers of continued tobacco abuse. Will provide tobacco cessation education prior to discharge

## 2022-06-28 NOTE — NURSING
[facility] Nurses Note -- 4 Eyes      6/28/2022   3:30 PM      Skin assessed during: Admission from ED      [x] No Pressure Injuries Present    []Prevention Measures Documented    [] Yes- Altered Skin Integrity Present or Discovered   [] LDA Added if Not in Epic (Describe Wound)   [] New Altered Skin Integrity was Present on Admit and Documented in LDA   [] Wound Image Taken      Attending RN:  Peggy Tomlin RN     Second RN/Staff Member:  JUAN Leyva

## 2022-06-29 ENCOUNTER — TELEPHONE (OUTPATIENT)
Dept: PULMONOLOGY | Facility: CLINIC | Age: 52
End: 2022-06-29
Payer: MEDICAID

## 2022-06-29 VITALS
HEIGHT: 63 IN | SYSTOLIC BLOOD PRESSURE: 143 MMHG | OXYGEN SATURATION: 98 % | HEART RATE: 96 BPM | RESPIRATION RATE: 18 BRPM | DIASTOLIC BLOOD PRESSURE: 73 MMHG | WEIGHT: 200 LBS | TEMPERATURE: 98 F | BODY MASS INDEX: 35.44 KG/M2

## 2022-06-29 LAB
ANION GAP SERPL CALC-SCNC: 9 MMOL/L (ref 8–16)
BASOPHILS # BLD AUTO: 0.01 K/UL (ref 0–0.2)
BASOPHILS NFR BLD: 0.1 % (ref 0–1.9)
BUN SERPL-MCNC: 16 MG/DL (ref 6–20)
CALCIUM SERPL-MCNC: 9.4 MG/DL (ref 8.7–10.5)
CHLORIDE SERPL-SCNC: 96 MMOL/L (ref 95–110)
CO2 SERPL-SCNC: 36 MMOL/L (ref 23–29)
CREAT SERPL-MCNC: 0.6 MG/DL (ref 0.5–1.4)
DIFFERENTIAL METHOD: ABNORMAL
EOSINOPHIL # BLD AUTO: 0 K/UL (ref 0–0.5)
EOSINOPHIL NFR BLD: 0 % (ref 0–8)
ERYTHROCYTE [DISTWIDTH] IN BLOOD BY AUTOMATED COUNT: 13.2 % (ref 11.5–14.5)
EST. GFR  (AFRICAN AMERICAN): >60 ML/MIN/1.73 M^2
EST. GFR  (NON AFRICAN AMERICAN): >60 ML/MIN/1.73 M^2
GLUCOSE SERPL-MCNC: 146 MG/DL (ref 70–110)
HCT VFR BLD AUTO: 44 % (ref 37–48.5)
HGB BLD-MCNC: 13.7 G/DL (ref 12–16)
IMM GRANULOCYTES # BLD AUTO: 0.07 K/UL (ref 0–0.04)
IMM GRANULOCYTES NFR BLD AUTO: 0.6 % (ref 0–0.5)
LYMPHOCYTES # BLD AUTO: 1 K/UL (ref 1–4.8)
LYMPHOCYTES NFR BLD: 9 % (ref 18–48)
MAGNESIUM SERPL-MCNC: 2.2 MG/DL (ref 1.6–2.6)
MCH RBC QN AUTO: 30.2 PG (ref 27–31)
MCHC RBC AUTO-ENTMCNC: 31.1 G/DL (ref 32–36)
MCV RBC AUTO: 97 FL (ref 82–98)
MONOCYTES # BLD AUTO: 0.1 K/UL (ref 0.3–1)
MONOCYTES NFR BLD: 1.2 % (ref 4–15)
NEUTROPHILS # BLD AUTO: 10 K/UL (ref 1.8–7.7)
NEUTROPHILS NFR BLD: 89.1 % (ref 38–73)
NRBC BLD-RTO: 0 /100 WBC
PHOSPHATE SERPL-MCNC: 3.6 MG/DL (ref 2.7–4.5)
PLATELET # BLD AUTO: 295 K/UL (ref 150–450)
PMV BLD AUTO: 10.8 FL (ref 9.2–12.9)
POTASSIUM SERPL-SCNC: 4.4 MMOL/L (ref 3.5–5.1)
RBC # BLD AUTO: 4.53 M/UL (ref 4–5.4)
SODIUM SERPL-SCNC: 141 MMOL/L (ref 136–145)
WBC # BLD AUTO: 11.28 K/UL (ref 3.9–12.7)

## 2022-06-29 PROCEDURE — G0378 HOSPITAL OBSERVATION PER HR: HCPCS

## 2022-06-29 PROCEDURE — 94640 AIRWAY INHALATION TREATMENT: CPT | Mod: XB

## 2022-06-29 PROCEDURE — 25000003 PHARM REV CODE 250: Performed by: PHYSICIAN ASSISTANT

## 2022-06-29 PROCEDURE — 63600175 PHARM REV CODE 636 W HCPCS: Performed by: NURSE PRACTITIONER

## 2022-06-29 PROCEDURE — 96376 TX/PRO/DX INJ SAME DRUG ADON: CPT | Performed by: EMERGENCY MEDICINE

## 2022-06-29 PROCEDURE — 94760 N-INVAS EAR/PLS OXIMETRY 1: CPT

## 2022-06-29 PROCEDURE — 84100 ASSAY OF PHOSPHORUS: CPT | Performed by: PHYSICIAN ASSISTANT

## 2022-06-29 PROCEDURE — 83735 ASSAY OF MAGNESIUM: CPT | Performed by: PHYSICIAN ASSISTANT

## 2022-06-29 PROCEDURE — 36415 COLL VENOUS BLD VENIPUNCTURE: CPT | Performed by: PHYSICIAN ASSISTANT

## 2022-06-29 PROCEDURE — 85025 COMPLETE CBC W/AUTO DIFF WBC: CPT | Performed by: PHYSICIAN ASSISTANT

## 2022-06-29 PROCEDURE — 63600175 PHARM REV CODE 636 W HCPCS: Performed by: PHYSICIAN ASSISTANT

## 2022-06-29 PROCEDURE — 27000221 HC OXYGEN, UP TO 24 HOURS

## 2022-06-29 PROCEDURE — 80048 BASIC METABOLIC PNL TOTAL CA: CPT | Performed by: PHYSICIAN ASSISTANT

## 2022-06-29 PROCEDURE — 25000242 PHARM REV CODE 250 ALT 637 W/ HCPCS: Performed by: NURSE PRACTITIONER

## 2022-06-29 RX ORDER — DOXYCYCLINE HYCLATE 100 MG
100 TABLET ORAL EVERY 12 HOURS
Qty: 12 TABLET | Refills: 0 | Status: SHIPPED | OUTPATIENT
Start: 2022-06-29 | End: 2022-07-05

## 2022-06-29 RX ORDER — GUAIFENESIN 600 MG/1
600 TABLET, EXTENDED RELEASE ORAL 2 TIMES DAILY
Qty: 20 TABLET | Refills: 0 | Status: SHIPPED | OUTPATIENT
Start: 2022-06-29 | End: 2022-07-09

## 2022-06-29 RX ORDER — PREDNISONE 20 MG/1
40 TABLET ORAL DAILY
Qty: 10 TABLET | Refills: 0 | Status: SHIPPED | OUTPATIENT
Start: 2022-06-29 | End: 2022-07-04

## 2022-06-29 RX ADMIN — LIDOCAINE HYDROCHLORIDE 10 ML: 20 SOLUTION ORAL; TOPICAL at 06:06

## 2022-06-29 RX ADMIN — FUROSEMIDE 40 MG: 40 TABLET ORAL at 08:06

## 2022-06-29 RX ADMIN — IPRATROPIUM BROMIDE AND ALBUTEROL SULFATE 3 ML: 2.5; .5 SOLUTION RESPIRATORY (INHALATION) at 12:06

## 2022-06-29 RX ADMIN — BUPRENORPHINE AND NALOXONE 4 FILM: 2; .5 FILM BUCCAL; SUBLINGUAL at 08:06

## 2022-06-29 RX ADMIN — DOXYCYCLINE HYCLATE 100 MG: 100 TABLET, COATED ORAL at 08:06

## 2022-06-29 RX ADMIN — LIDOCAINE HYDROCHLORIDE 10 ML: 20 SOLUTION ORAL; TOPICAL at 03:06

## 2022-06-29 RX ADMIN — GUAIFENESIN 600 MG: 600 TABLET, EXTENDED RELEASE ORAL at 08:06

## 2022-06-29 RX ADMIN — METHYLPREDNISOLONE SODIUM SUCCINATE 80 MG: 125 INJECTION, POWDER, FOR SOLUTION INTRAMUSCULAR; INTRAVENOUS at 03:06

## 2022-06-29 RX ADMIN — IPRATROPIUM BROMIDE AND ALBUTEROL SULFATE 3 ML: 2.5; .5 SOLUTION RESPIRATORY (INHALATION) at 04:06

## 2022-06-29 RX ADMIN — IPRATROPIUM BROMIDE AND ALBUTEROL SULFATE 3 ML: 2.5; .5 SOLUTION RESPIRATORY (INHALATION) at 08:06

## 2022-06-29 NOTE — TELEPHONE ENCOUNTER
Told patient I started a PA on medication, once I hear something from the insurance company I will let her know.              ----- Message from Karen Ramon MA sent at 6/29/2022 12:54 PM CDT -----  [facility] Staff  Caller: Unspecified (Today, 12:27 PM)  Type: Patient Call Back     Who called:  Patient     What is the request in detail:  Patient stated her medicaid does not cover medications that were called in for her on yesterday.  Pharmacy stated a PA is needed.  Thank you     Would the patient rather a call back or a response via [facility]?   Call back     Best call back number:  [phone] (home)

## 2022-06-29 NOTE — HOSPITAL COURSE
Admitted for COPD exacerbation. SOB and wheezing improved with scheduled duoneb , doxycycline and IV steroid. This am, patient states breathing back to baseline and ready for home. Baseline patient able to walk short distances due to COPD (not able to walk to mailbox). Lung exam mild expiratory wheezes on RUL,RML otherwise diminished. O2 sat 96% on home 3 L NC oxygen. Encourage smoking cessation (smokes 1.5 pack a day). Primary Pulmonologist referred to smoking cessation, pulmonary rehab, and sleep study. Continue steroid burst and doxycyline. See below for discharge home medication.

## 2022-06-29 NOTE — PROGRESS NOTES
Call placed to pts spouse.  No answer at this time.  TN left message requesting call back.  TN to follow for call back.

## 2022-06-29 NOTE — PLAN OF CARE
06/29/22 0905   Final Note   Assessment Type Final Discharge Note   Anticipated Discharge Disposition Home   Hospital Resources/Appts/Education Provided Post-Acute resouces added to AVS   Post-Acute Status   Post-Acute Authorization HME   HME Status Set-up Complete/Auth obtained   Pts nurse Peggy notified that the pt can d/c from CM standpoint and spouse advised pt ready for

## 2022-06-29 NOTE — DISCHARGE SUMMARY
[facility] Medicine  Discharge Summary      Patient Name: Yasmeen Valderrama  MRN: [id number]  Patient Class: OP- Observation  Admission Date: 6/28/2022  Hospital Length of Stay: 0 days  Discharge Date and Time:  06/29/2022 7:13 AM  Attending Physician: Rambo Rojo MD   Discharging Provider: Ella Winston NP  Primary Care Provider: Clovis Beauchamp MD      HPI:   Yasmeen Valderrama 51 y.o. female with chronic hypoxemic respiratory failure on home oxygen, COPD, tobacco abuse, and obesity presents to the hospital chief complaint shortness of breath.  She reports chronic and progressive shortness of breath has acutely worsened the last 2 days.  She was seen in our outpatient pulmonary appointment today sent to the emergency room.  She describes the shortness of breath is worse with ambulation and improved with home albuterol inhaler.  She denies any other attempted treatment.  She has had congestion and cough productive of sputum.  She reports her  notices she has been wheezing.  She is on home oxygen.  He states she has chronic baseline lower extremity edema that she attributes to sitting with her legs dependent.  There has been no recent hospitalizations travel or antibiotic use.  She denies fever nausea vomiting chest pain abdominal pain syncope dizziness dysuria melena hematuria hematemesis.    In the ED, chest x-ray without acute process COVID negative pro count negative troponin negative BNP negative afebrile without leukocytosis VBG with pH 7.3 pCO2 79 PO2 56 and bicarb 39.       * No surgery found *      Hospital Course:   Admitted for COPD exacerbation. SOB and wheezing improved with scheduled duoneb , doxycycline and IV steroid. This am, patient states breathing back to baseline and ready for home. Baseline patient able to walk short distances due to COPD (not able to walk to mailbox). Lung exam mild expiratory wheezes on RUL,RML otherwise diminished. O2 sat 96% on home 3 L  "NC oxygen. Encourage smoking cessation (smokes 1.5 pack a day). Primary Pulmonologist referred to smoking cessation, pulmonary rehab, and sleep study. Continue steroid burst and doxycyline. See below for discharge home medication.        Goals of Care Treatment Preferences:  Code Status: Full Code      Consults:     No new Assessment & Plan notes have been filed under this hospital service since the last note was generated.  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    Specialty: [facility] Provider  Why: DME: Please contact [facility] for any questions or concerns regarding hospital bed.  Contact information:  [address]  [facility] [address] [phone]             Clovis Beauchamp MD Follow up.    Specialty: Family Medicine  Contact information:  [address] [place] [id number] [phone]                       Patient Instructions:      HOSPITAL BED FOR HOME USE     Order Specific Question Answer Comments   Type: Semi-electric    Length of need (1-99 months): 99    Height: 5' 3" (1.6 m)    Weight: 90.7 kg (200 lb)    Please check all that apply: Patient requires positioning of the body in ways not feasible in an ordinary bed due to a medical condition which is expected to last at least one month.      Diet Cardiac     Notify your health care provider if you experience any of the " following:  temperature >100.4     Notify your health care provider if you experience any of the following:  redness, tenderness, or signs of infection (pain, swelling, redness, odor or green/yellow discharge around incision site)     Notify your health care provider if you experience any of the following:  persistent dizziness, light-headedness, or visual disturbances     Activity as tolerated       Significant Diagnostic Studies: Labs: All labs within the past 24 hours have been reviewed    Pending Diagnostic Studies:     None         Medications:  Reconciled Home Medications:      Medication List      START taking these medications    doxycycline 100 MG tablet  Commonly known as: VIBRA-TABS  Take 1 tablet (100 mg total) by mouth every 12 (twelve) hours. for 6 days     guaiFENesin 600 mg 12 hr tablet  Commonly known as: MUCINEX  Take 1 tablet (600 mg total) by mouth 2 (two) times daily. for 10 days     predniSONE 20 MG tablet  Commonly known as: DELTASONE  Take 2 tablets (40 mg total) by mouth once daily. for 5 days        CHANGE how you take these medications    gabapentin 600 MG tablet  Commonly known as: NEURONTIN  TAKE TWO TABLETS BY MOUTH THREE TIMES DAILY for chronic pain  What changed:   · how much to take  · how to take this  · when to take this  · reasons to take this        CONTINUE taking these medications    albuterol-ipratropium 2.5 mg-0.5 mg/3 mL nebulizer solution  Commonly known as: DUO-NEB  ipratropium 0.5 mg-albuterol 3 mg (2.5 mg base)/3 mL nebulization soln   nebulize 1 vial FOUR TIMES DAILY     BREZTRI AEROSPHERE 160-9-4.8 mcg/actuation [facility]  Generic drug: budesonide-glycopyr-formoterol  Inhale 2 puffs into the lungs 2 (two) times daily.     furosemide 40 MG tablet  Commonly known as: LASIX  2 (two) times daily.     inhalation spacing device  Use as directed for inhalation.     LIDOcaine VISCOUS 2 % Soln  Generic drug: LIDOcaine HCl 2%  Can switch to equivalent, 10 ml swish and gargle 4 x  daily     nicotine 21 mg/24 hr  Commonly known as: NICODERM CQ  nicotine 21 mg/24 hr daily transdermal patch   apply 1 patch onto the skin daily     nystatin 100,000 unit/mL suspension  Commonly known as: MYCOSTATIN  nystatin 100,000 unit/mL oral suspension   SHAKE WELL. TAKE 5 ml (1 teaspoon) BY MOUTH FOUR TIMES DAILY FOR 14 DAYS. DO NOT REFRIGERATE     potassium chloride 10 MEQ Cpsr  Commonly known as: MICRO-K  TAKE 1 CAPSULE BY MOUTH TWICE DAILY FOR POTASSIUM     SUBOXONE 8-2 mg  Generic drug: buprenorphine-naloxone 8-2 mg  PLACE 1 FILM UNDER TONGUE TWICE DAILY            Indwelling Lines/Drains at time of discharge:   Lines/Drains/Airways     None                 Time spent on the discharge of patient: 30 minutes         Ella Winston NP  Department of Hospital Medicine  [facility] [place] [facility]

## 2022-06-29 NOTE — PROGRESS NOTES
Call received from pts spouse, [place] advised that the pt is medically ready for discharge and can come to hospital to pick pt up.

## 2022-08-02 ENCOUNTER — TELEPHONE (OUTPATIENT)
Dept: PULMONOLOGY | Facility: CLINIC | Age: 52
End: 2022-08-02
Payer: MEDICAID

## 2022-09-06 ENCOUNTER — TELEPHONE (OUTPATIENT)
Dept: PULMONOLOGY | Facility: CLINIC | Age: 52
End: 2022-09-06
Payer: MEDICAID

## 2022-09-06 NOTE — TELEPHONE ENCOUNTER
Spoke with patient scheduled an appointment to have PFT and ABG done.    ALY Jones                 ----- Message from Kinza Garcia sent at 9/6/2022 10:22 AM CDT -----  Type: Patient Call Back    Who called: self     What is the request in detail: Patient spoke with the DME and was told they needed a new order in order for them to fill her O2 tanks. Also would like to speak with someone about scheduling a PFT in order to increase her O2 level to 3.    Can the clinic reply by [facility]? No     Would the patient rather a call back or a response via [facility]? Call back     Best call back number: [phone]

## 2022-09-12 ENCOUNTER — HOSPITAL ENCOUNTER (OUTPATIENT)
Dept: RESPIRATORY THERAPY | Facility: HOSPITAL | Age: 52
Discharge: HOME | End: 2022-09-12
Attending: NURSE PRACTITIONER
Payer: MEDICAID

## 2022-09-12 ENCOUNTER — TELEPHONE (OUTPATIENT)
Dept: PULMONOLOGY | Facility: CLINIC | Age: 52
End: 2022-09-12
Payer: MEDICAID

## 2022-09-12 VITALS — RESPIRATION RATE: 22 BRPM | HEART RATE: 85 BPM | OXYGEN SATURATION: 85 %

## 2022-09-12 DIAGNOSIS — J44.89 COPD WITH CHRONIC BRONCHITIS AND EMPHYSEMA: ICD-10-CM

## 2022-09-12 DIAGNOSIS — J43.9 COPD WITH CHRONIC BRONCHITIS AND EMPHYSEMA: ICD-10-CM

## 2022-09-12 DIAGNOSIS — J96.11 CHRONIC RESPIRATORY FAILURE WITH HYPOXIA, ON HOME O2 THERAPY: ICD-10-CM

## 2022-09-12 DIAGNOSIS — Z99.81 CHRONIC RESPIRATORY FAILURE WITH HYPOXIA, ON HOME O2 THERAPY: ICD-10-CM

## 2022-09-12 LAB
ALLENS TEST: ABNORMAL
DELSYS: ABNORMAL
FIO2: 21
HCO3 UR-SCNC: 39.5 MMOL/L (ref 24–28)
MODE: ABNORMAL
PCO2 BLDA: 70.4 MMHG (ref 35–45)
PH SMN: 7.36 [PH] (ref 7.35–7.45)
PO2 BLDA: 45 MMHG (ref 80–100)
POC BE: 11 MMOL/L
POC SATURATED O2: 77 % (ref 95–100)
POC TCO2: 42 MMOL/L (ref 23–27)
SAMPLE: ABNORMAL
SITE: ABNORMAL

## 2022-09-12 PROCEDURE — 94010 BREATHING CAPACITY TEST: CPT

## 2022-09-12 PROCEDURE — 82803 BLOOD GASES ANY COMBINATION: CPT

## 2022-09-12 PROCEDURE — 94727 GAS DIL/WSHOT DETER LNG VOL: CPT | Mod: 26,,, | Performed by: INTERNAL MEDICINE

## 2022-09-12 PROCEDURE — 94060 EVALUATION OF WHEEZING: CPT | Mod: 26,,, | Performed by: INTERNAL MEDICINE

## 2022-09-12 PROCEDURE — 94729 DIFFUSING CAPACITY: CPT

## 2022-09-12 PROCEDURE — 36600 WITHDRAWAL OF ARTERIAL BLOOD: CPT

## 2022-09-12 PROCEDURE — 94727 PR PULM FUNCTION TEST BY GAS: ICD-10-PCS | Mod: 26,,, | Performed by: INTERNAL MEDICINE

## 2022-09-12 PROCEDURE — 94729 DIFFUSING CAPACITY: CPT | Mod: 26,,, | Performed by: INTERNAL MEDICINE

## 2022-09-12 PROCEDURE — 94729 PR C02/MEMBANE DIFFUSE CAPACITY: ICD-10-PCS | Mod: 26,,, | Performed by: INTERNAL MEDICINE

## 2022-09-12 PROCEDURE — 94060 PR EVAL OF BRONCHOSPASM: ICD-10-PCS | Mod: 26,,, | Performed by: INTERNAL MEDICINE

## 2022-09-12 PROCEDURE — 94727 GAS DIL/WSHOT DETER LNG VOL: CPT

## 2022-09-12 PROCEDURE — 99900035 HC TECH TIME PER 15 MIN (STAT)

## 2022-09-12 PROCEDURE — 25000242 PHARM REV CODE 250 ALT 637 W/ HCPCS: Performed by: NURSE PRACTITIONER

## 2022-09-12 RX ORDER — ALBUTEROL SULFATE 2.5 MG/.5ML
2.5 SOLUTION RESPIRATORY (INHALATION) ONCE
Status: COMPLETED | OUTPATIENT
Start: 2022-09-12 | End: 2022-09-12

## 2022-09-12 RX ADMIN — ALBUTEROL SULFATE 2.5 MG: 2.5 SOLUTION RESPIRATORY (INHALATION) at 10:09

## 2022-09-12 NOTE — TELEPHONE ENCOUNTER
----- Message from Malena Singh, RRT sent at 9/12/2022 10:12 AM CDT -----  09/12/22 09:49  POC PH: 7.357  POC PCO2: 70.4 (HH)  POC PO2: 45 (LL)  POC HCO3: 39.5 (H)  POC SATURATED O2: 77 (L)  POC BE: 11  POC TCO2: 42 (H)  FiO2: 21  Sample: ARTERIAL  DelSys: Room Air  Allens Test: Pass  Site: LR  Mode: SPONT      (HH): Data is critically high  (LL): Data is critically low  (H): Data is abnormally high  (L): Data is abnormally low

## 2022-09-12 NOTE — TELEPHONE ENCOUNTER
Pt with very severe hypercapnia, [place]. Please have her schedule for 4 pm 9/13/2022. Thank you. Trisha

## 2022-09-13 ENCOUNTER — LAB VISIT (OUTPATIENT)
Dept: LAB | Facility: HOSPITAL | Age: 52
End: 2022-09-13
Attending: NURSE PRACTITIONER
Payer: MEDICAID

## 2022-09-13 ENCOUNTER — OFFICE VISIT (OUTPATIENT)
Dept: PULMONOLOGY | Facility: CLINIC | Age: 52
End: 2022-09-13
Payer: MEDICAID

## 2022-09-13 VITALS
HEART RATE: 109 BPM | WEIGHT: 207.44 LBS | DIASTOLIC BLOOD PRESSURE: 79 MMHG | OXYGEN SATURATION: 94 % | HEIGHT: 63 IN | SYSTOLIC BLOOD PRESSURE: 128 MMHG | BODY MASS INDEX: 36.75 KG/M2

## 2022-09-13 DIAGNOSIS — J44.9 STAGE 4 VERY SEVERE COPD BY GOLD CLASSIFICATION: Primary | ICD-10-CM

## 2022-09-13 DIAGNOSIS — F17.200 NICOTINE DEPENDENCE, UNCOMPLICATED, UNSPECIFIED NICOTINE PRODUCT TYPE: ICD-10-CM

## 2022-09-13 DIAGNOSIS — R25.2 LEG CRAMPS: ICD-10-CM

## 2022-09-13 DIAGNOSIS — J96.11 CHRONIC RESPIRATORY FAILURE WITH HYPOXIA AND HYPERCAPNIA: ICD-10-CM

## 2022-09-13 DIAGNOSIS — R06.02 SOB (SHORTNESS OF BREATH): ICD-10-CM

## 2022-09-13 DIAGNOSIS — I27.20 PULMONARY HYPERTENSION: ICD-10-CM

## 2022-09-13 DIAGNOSIS — J96.12 CHRONIC RESPIRATORY FAILURE WITH HYPOXIA AND HYPERCAPNIA: ICD-10-CM

## 2022-09-13 LAB
ANION GAP SERPL CALC-SCNC: 9 MMOL/L (ref 8–16)
BNP SERPL-MCNC: 11 PG/ML (ref 0–99)
BUN SERPL-MCNC: 14 MG/DL (ref 6–20)
CALCIUM SERPL-MCNC: 10.1 MG/DL (ref 8.7–10.5)
CHLORIDE SERPL-SCNC: 94 MMOL/L (ref 95–110)
CO2 SERPL-SCNC: 39 MMOL/L (ref 23–29)
CREAT SERPL-MCNC: 0.7 MG/DL (ref 0.5–1.4)
D DIMER PPP IA.FEU-MCNC: 0.36 MG/L FEU
EST. GFR  (NO RACE VARIABLE): >60 ML/MIN/1.73 M^2
GLUCOSE SERPL-MCNC: 95 MG/DL (ref 70–110)
POTASSIUM SERPL-SCNC: 5.1 MMOL/L (ref 3.5–5.1)
SODIUM SERPL-SCNC: 142 MMOL/L (ref 136–145)

## 2022-09-13 PROCEDURE — 3074F SYST BP LT 130 MM HG: CPT | Mod: CPTII,,, | Performed by: NURSE PRACTITIONER

## 2022-09-13 PROCEDURE — 85379 FIBRIN DEGRADATION QUANT: CPT | Performed by: NURSE PRACTITIONER

## 2022-09-13 PROCEDURE — 1159F PR MEDICATION LIST DOCUMENTED IN MEDICAL RECORD: ICD-10-PCS | Mod: CPTII,,, | Performed by: NURSE PRACTITIONER

## 2022-09-13 PROCEDURE — 99999 PR PBB SHADOW E&M-EST. PATIENT-LVL IV: ICD-10-PCS | Mod: PBBFAC,,, | Performed by: NURSE PRACTITIONER

## 2022-09-13 PROCEDURE — 99999 PR PBB SHADOW E&M-EST. PATIENT-LVL IV: CPT | Mod: PBBFAC,,, | Performed by: NURSE PRACTITIONER

## 2022-09-13 PROCEDURE — 99214 PR OFFICE/OUTPT VISIT, EST, LEVL IV, 30-39 MIN: ICD-10-PCS | Mod: S$PBB,,, | Performed by: NURSE PRACTITIONER

## 2022-09-13 PROCEDURE — 3008F BODY MASS INDEX DOCD: CPT | Mod: CPTII,,, | Performed by: NURSE PRACTITIONER

## 2022-09-13 PROCEDURE — 83880 ASSAY OF NATRIURETIC PEPTIDE: CPT | Performed by: NURSE PRACTITIONER

## 2022-09-13 PROCEDURE — 99214 OFFICE O/P EST MOD 30 MIN: CPT | Mod: PBBFAC | Performed by: NURSE PRACTITIONER

## 2022-09-13 PROCEDURE — 3008F PR BODY MASS INDEX (BMI) DOCUMENTED: ICD-10-PCS | Mod: CPTII,,, | Performed by: NURSE PRACTITIONER

## 2022-09-13 PROCEDURE — 3078F DIAST BP <80 MM HG: CPT | Mod: CPTII,,, | Performed by: NURSE PRACTITIONER

## 2022-09-13 PROCEDURE — 80048 BASIC METABOLIC PNL TOTAL CA: CPT | Performed by: NURSE PRACTITIONER

## 2022-09-13 PROCEDURE — 1159F MED LIST DOCD IN RCRD: CPT | Mod: CPTII,,, | Performed by: NURSE PRACTITIONER

## 2022-09-13 PROCEDURE — 99214 OFFICE O/P EST MOD 30 MIN: CPT | Mod: S$PBB,,, | Performed by: NURSE PRACTITIONER

## 2022-09-13 PROCEDURE — 3078F PR MOST RECENT DIASTOLIC BLOOD PRESSURE < 80 MM HG: ICD-10-PCS | Mod: CPTII,,, | Performed by: NURSE PRACTITIONER

## 2022-09-13 PROCEDURE — 36415 COLL VENOUS BLD VENIPUNCTURE: CPT | Performed by: NURSE PRACTITIONER

## 2022-09-13 PROCEDURE — 3074F PR MOST RECENT SYSTOLIC BLOOD PRESSURE < 130 MM HG: ICD-10-PCS | Mod: CPTII,,, | Performed by: NURSE PRACTITIONER

## 2022-09-13 RX ORDER — IPRATROPIUM BROMIDE AND ALBUTEROL SULFATE 2.5; .5 MG/3ML; MG/3ML
3 SOLUTION RESPIRATORY (INHALATION) EVERY 4 HOURS PRN
Qty: 1620 ML | Refills: 5 | Status: SHIPPED | OUTPATIENT
Start: 2022-09-13

## 2022-09-13 RX ORDER — DOXYCYCLINE 100 MG/1
100 CAPSULE ORAL EVERY 12 HOURS
Qty: 20 CAPSULE | Refills: 0 | Status: SHIPPED | OUTPATIENT
Start: 2022-09-13 | End: 2022-09-23

## 2022-09-13 RX ORDER — ALBUTEROL SULFATE 90 UG/1
2 AEROSOL, METERED RESPIRATORY (INHALATION) EVERY 6 HOURS PRN
COMMUNITY
Start: 2022-08-18

## 2022-09-13 RX ORDER — BUPRENORPHINE HYDROCHLORIDE, NALOXONE HYDROCHLORIDE 12; 3 MG/1; MG/1
FILM, SOLUBLE BUCCAL; SUBLINGUAL
Status: ON HOLD | COMMUNITY
Start: 2022-09-07 | End: 2023-11-10 | Stop reason: HOSPADM

## 2022-09-13 RX ORDER — PREDNISONE 20 MG/1
40 TABLET ORAL DAILY
Qty: 10 TABLET | Refills: 0 | Status: SHIPPED | OUTPATIENT
Start: 2022-09-13 | End: 2022-09-18

## 2022-09-13 NOTE — PROGRESS NOTES
CHIEF COMPLAINT:    Chief Complaint   Patient presents with    COPD         HISTORY OF PRESENT ILLNESS: Yasmeen Valderrama is a 52 y.o. 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 is here for COPD follow up. Patient with symptoms of cough, chest tightness, shortness of breath, orthopnea, wheezing. Sleeps in chair for 1 hour episodically. Pt was admitted 6/28/2022-6/29/2022 following LOV 6/28/2022 for COPD exacerbation. She reports no improvement on interim. She has failed to follow up following admission due to dependence on oxygen tanks which she does not find allows her enough just for travel per patient.     Patient has very severe COPD fev1 0.46 (17%) with symptoms of  SOB x months. Patient reports reliant on nebulizers following any activity every 15-20 min.   Patient is on home oxygen but states it is not enough. Has difficulty traveling with this because she is on oxygen tanks order at 2 L NC and they will not allow her more allotment when she needs more.    Oxygen at visit SpO2 85% at rest on RA after couple minutes corrected to 92% on 3 L NC at rest.    Seen in ED and decline hospitalization 5/4/2022  At last office visit, pt reported painful mouth ulcers on tongue x several weeks prevent utilization of trelegy powder and she stopped treatment prior to LOV 6/28/2022 but finds that she can utilize her MDI so we switch her to trial breztri which she finds less helpful now that her aphthous ulcers have healed.    Pt reports severe HA, inability to maintain sleep due to breathing problems. Can only sleep for 1 hour at a time. States she cannot lay down because unable to breath  Fallen several times while she was dozing off while standing and hit her left knee. She missed her sleep study  because of not feeling well and lack of oxygen for traveling to appointments.    States she is exhausted but sleep worsens her breathing and she wakes up more short of breath and with HA. Sleep study was recommended in 2020 but covid broke and study was canceled and she was lost to follow up.    She has declined CT chest in the past because she states she was unable to lay flat for the test.    Significant family history: 1st maternal aunt  Metastasized cancer involving lung  Pets: dog and cat  Occupational exposures: shrimp boat ()  Triggers: exertion, laying down     Pulmonary studies 9/12/2022:  ABG pH 7.357 pCO2 70 pO2 45 pHCO3 39 SpO2 77  PFT:  Ratio 36 FEV1 0.46 (17) FVC 1.27 (39)TLC 68 DLCO 48    REVIEW OF SYSTEMS:   Review of Systems   Constitutional:  Positive for weight gain (65 lb last 2 years) and fatigue. Negative for fever, chills, weight loss, activity change, appetite change and night sweats.   HENT:  Positive for congestion (blocked up nasally, hard time blowing, using saline mist).    Eyes: Negative.    Respiratory:  Positive for cough, sputum production (brown, dark green), chest tightness, wheezing, orthopnea, previous hospitalization due to pulmonary problems, dyspnea on extertion, use of rescue inhaler, somnolence and Paroxysmal Nocturnal Dyspnea. Negative for snoring (no per ).         Reports difficulty clearing lungs, feels like secretions are stuck per pt and mucinex helps   Cardiovascular:  Positive for leg swelling. Negative for chest pain and palpitations.   Genitourinary: Negative.    Endocrine: endocrine negative    Musculoskeletal:  Positive for arthralgias (left pain fall 1-2 weeks ago, dozing off standing up) and gait problem (sob).        Cramps toes and fingers   Skin: Negative.    Gastrointestinal:  Negative for nausea, vomiting and acid reflux.   Neurological:  Positive for headaches (every other day, wake up with HA).   Psychiatric/Behavioral:  Positive for sleep  disturbance (wake up short winded after napping over 1 year).    DATA  PERSONALLY REVIEWED:    PFT 2019: ratio 59 fev1 0.99 (37%) fvc 1.69 (51%) tlc 86% dlco 69.9%    CXR 5/4/2022: The heart and mediastinal structures are unremarkable.  There is mild prominence of the central pulmonary vasculature, similar to the prior examination.  The lungs are free of focal consolidations.  There is no evidence for pneumothorax or large pleural effusions.  Bony structures are grossly intact    CT chest 4/12/2016:IMPRESSION:     Atelectasis or parenchymal scarring in the right lower lobe laterally and anteriorly and in the inferior lingular portion of the left upper lobe.     Paraseptal emphysematous changes in the medial aspect of the right lung apex.    Sleep study:NA    ECHO 2/10/2022:  The estimated ejection fraction is 55%.  The left ventricle is normal in size with normal systolic function.  Normal left ventricular diastolic function.  Moderate right ventricular enlargement with mildly reduced right ventricular systolic function.  Moderate left atrial enlargement.  Mild tricuspid regurgitation.  Mild right atrial enlargement.  Intermediate central venous pressure (8 mmHg).  The estimated PA systolic pressure is 52 mmHg.  There is pulmonary hypertension.      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    Dysphonia 05/14/2018    Intractable chronic cluster headache 11/25/2019    KATHERINE (obstructive sleep apnea) 03/04/2020    Aortic atherosclerosis 03/19/2021    Sleep-related breathing disorder  06/28/2022    Chronic respiratory failure with hypoxia and hypercapnia 06/28/2022    Stomatitis and mucositis 06/28/2022    Pulmonary hypertension 06/28/2022    COPD exacerbation 06/28/2022    Leg cramps 09/14/2022    SOB (shortness of breath) 09/14/2022     Resolved Ambulatory Problems     Diagnosis Date Noted    Abnormal PFTs (pulmonary function tests) 12/01/2014    Carpal tunnel syndrome on both sides 02/27/2015    CTS (carpal tunnel syndrome) 06/09/2015    Edema 09/03/2015    Screening 09/09/2015    Chronic obstructive pulmonary disease 01/04/2017    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    Candida infection, oral 06/02/2017     Past Medical History:   Diagnosis Date    Asthma     Carpal tunnel syndrome, bilateral     COPD (chronic obstructive pulmonary disease)                 PAST SURGICAL HISTORY:    Past Surgical History:   Procedure Laterality Date    CARPAL TUNNEL RELEASE      FINGER SURGERY      HYSTERECTOMY      OOPHORECTOMY  1996    Hysterectomy         FAMILY HISTORY:                Family History   Problem Relation Age of Onset    COPD Mother     Heart disease Father     No Known Problems Sister     No Known Problems Brother        SOCIAL HISTORY:          Tobacco:   Social History     Tobacco Use   Smoking Status Every Day    Packs/day: 2.00    Years: 35.00    Pack years: 70.00    Types: Cigarettes    Start date: 12/1/1983   Smokeless Tobacco Current       alcohol use:    Social History     Substance and Sexual Activity   Alcohol Use No    Alcohol/week: 0.0 standard drinks                   ALLERGIES:    Review of patient's allergies indicates:   Allergen Reactions    Antivert [meclizine] Other (See Comments)     Behavioral changes       CURRENT MEDICATIONS:    Current Outpatient Medications   Medication Sig Dispense Refill    furosemide (LASIX) 40 MG tablet 2 (two) times daily.      gabapentin (NEURONTIN) 600 MG tablet TAKE TWO TABLETS BY MOUTH THREE TIMES DAILY for chronic pain (Patient taking differently:  "Take 600 mg by mouth as needed. TAKE TWO TABLETS BY MOUTH THREE TIMES DAILY for chronic pain) 180 tablet 11    inhalation spacing device Use as directed for inhalation. 1 each 0    LIDOCAINE VISCOUS 2 % solution Can switch to equivalent, 10 ml swish and gargle 4 x daily 100 mL 11    nicotine (NICODERM CQ) 21 mg/24 hr nicotine 21 mg/24 hr daily transdermal patch   apply 1 patch onto the skin daily      nystatin (MYCOSTATIN) 100,000 unit/mL suspension nystatin 100,000 unit/mL oral suspension   SHAKE WELL. TAKE 5 ml (1 teaspoon) BY MOUTH FOUR TIMES DAILY FOR 14 DAYS. DO NOT REFRIGERATE      potassium chloride (MICRO-K) 10 MEQ CpSR TAKE 1 CAPSULE BY MOUTH TWICE DAILY FOR POTASSIUM 60 capsule 11    albuterol (PROVENTIL/VENTOLIN HFA) 90 mcg/actuation inhaler Inhale 2 puffs into the lungs every 6 (six) hours as needed.      albuterol-ipratropium (DUO-NEB) 2.5 mg-0.5 mg/3 mL nebulizer solution Take 3 mLs by nebulization every 4 (four) hours as needed for Wheezing. Rescue 1620 mL 5    doxycycline (MONODOX) 100 MG capsule Take 1 capsule (100 mg total) by mouth every 12 (twelve) hours. for 10 days 20 capsule 0    fluticasone-umeclidin-vilanter (TRELEGY ELLIPTA) 100-62.5-25 mcg DsDv Inhale 1 puff into the lungs once daily. 60 each 11    predniSONE (DELTASONE) 20 MG tablet Take 2 tablets (40 mg total) by mouth once daily. for 5 days 10 tablet 0    SUBOXONE 12-3 mg Film PLACE 1 FILM UNDER THE TONGUE TWICE DAILY.       No current facility-administered medications for this visit.                       PHYSICAL EXAM:  Vitals:    09/13/22 1542   BP: 128/79   Pulse: 109   SpO2: (!) 94%   Weight: 94.1 kg (207 lb 7.3 oz)   Height: 5' 3" (1.6 m)   PainSc:   4       Body mass index is 36.75 kg/m².   Physical Exam   Constitutional: She is oriented to person, place, and time. She appears well-developed. No distress. She is obese.   HENT:   Head: Normocephalic.   Nasal congestion   Neck:   18 in   Cardiovascular: Regular rhythm and normal " heart sounds.   tachy   Pulmonary/Chest: Normal expansion and effort normal. She has wheezes. She has rhonchi.   Musculoskeletal:         General: Edema present.      Cervical back: Neck supple.   Neurological: She is alert and oriented to person, place, and time.   ambulatory   Skin: Skin is warm. She is not diaphoretic.   Psychiatric: She has a normal mood and affect. Her behavior is normal. Judgment and thought content normal.        Lab Results   Component Value Date    TSH 2.702 09/29/2020      Lab Results   Component Value Date    WBC 11.28 06/29/2022    HGB 13.7 06/29/2022    HCT 44.0 06/29/2022    MCV 97 06/29/2022     06/29/2022     BMP  Lab Results   Component Value Date     09/13/2022    K 5.1 09/13/2022    CL 94 (L) 09/13/2022    CO2 39 (H) 09/13/2022    BUN 14 09/13/2022    CREATININE 0.7 09/13/2022    CALCIUM 10.1 09/13/2022    ANIONGAP 9 09/13/2022    ESTGFRAFRICA >60 06/29/2022    EGFRNONAA >60 06/29/2022     Lab Results   Component Value Date    HGBA1C 5.4 03/08/2016        ASSESSMENT    ICD-10-CM ICD-9-CM    1. Stage 4 very severe COPD by GOLD classification  J44.9 496 fluticasone-umeclidin-vilanter (TRELEGY ELLIPTA) 100-62.5-25 mcg DsDv      albuterol-ipratropium (DUO-NEB) 2.5 mg-0.5 mg/3 mL nebulizer solution      Ambulatory referral/consult to Pulmonary Rehab      doxycycline (MONODOX) 100 MG capsule      predniSONE (DELTASONE) 20 MG tablet      OXYGEN FOR HOME USE      CANCELED: OXYGEN FOR HOME USE    airway clearance- duonebs , saline nebs, acapella or aerobika, 3 garrett or cough 2-3 x day       2. Chronic respiratory failure with hypoxia and hypercapnia  J96.11 518.83 BIPAP FOR HOME USE    J96.12 799.02 Ambulatory referral/consult to Cardiology     786.09 OXYGEN FOR HOME USE      CANCELED: OXYGEN FOR HOME USE      3. Pulmonary hypertension  I27.20 416.8 Echo      D-Dimer, Quantitative      Ambulatory referral/consult to Cardiology      OXYGEN FOR HOME USE      CANCELED: OXYGEN FOR  HOME USE      4. Nicotine dependence, uncomplicated, unspecified nicotine product type  F17.200 305.1       5. Leg cramps  R25.2 729.82 BNP      Basic Metabolic Panel      6. SOB (shortness of breath)  R06.02 786.05 Ambulatory referral/consult to Cardiology            PLAN:    Problem List Items Addressed This Visit          Unprioritized    Chronic respiratory failure with hypoxia and hypercapnia    Overview     home portable 2-3 years  Oxygen at visit SpO2 85% at rest on RA after couple minutes corrected to 92% on 3 L NC at rest.  Will need continuous mini-portable  Concentrator because her oxygen requirement will increase on exertion.  History of hospitalization and very severe COPD.  Ideally NIV but since she has not tried and fail therapy,  will try BIPAP with AVAPS so we can expedite treatment  FEV1 only 0.46 L and 17% of predicted, she would not likely  tolerate CPAP or simple BiPAP without algorithm to adjust to changing respiratory needs.         Current Assessment & Plan     Addendum: Oxygen concentrator not a cover benefit. Oxygen change to portable tanks         Relevant Orders    BIPAP FOR HOME USE    Ambulatory referral/consult to Cardiology    OXYGEN FOR HOME USE    Leg cramps    Overview     Secondary to insomnia and hypoxia.  Labs unremarkable         Relevant Orders    BNP (Completed)    Basic Metabolic Panel (Completed)    Nicotine dependence, uncomplicated    Overview     Declined smoking cessation referral.  Counseled that she must stop smoking         Pulmonary hypertension    Overview     Likely secondary hypoxia and pulmonary disease but also at high risk cardiac problems, recommend cardiology evaluation    ECHO 2/10/2022:  The estimated ejection fraction is 55%.  The left ventricle is normal in size with normal systolic function.  Normal left ventricular diastolic function.  Moderate right ventricular enlargement with mildly reduced right ventricular systolic function.  Moderate left atrial  enlargement.  Mild tricuspid regurgitation.  Mild right atrial enlargement.  Intermediate central venous pressure (8 mmHg).  The estimated PA systolic pressure is 52 mmHg.  There is pulmonary hypertension.         Relevant Orders    Echo    D-Dimer, Quantitative (Completed)    Ambulatory referral/consult to Cardiology    OXYGEN FOR HOME USE    SOB (shortness of breath)    Relevant Orders    Ambulatory referral/consult to Cardiology    Stage 4 very severe COPD by GOLD classification - Primary    Overview     CT chest 4/12/2016:IMPRESSION:     Atelectasis or parenchymal scarring in the right lower lobe laterally and anteriorly and in the inferior lingular portion of the left upper lobe.   Paraseptal emphysematous changes in the medial aspect of the right lung apex.  Will need updated imaging. High risk of cancer.   Pulmonary studies 9/12/2022:  ABG pH 7.357 pCO2 70 pO2 45 pHCO3 39 SpO2 77  PFT:  Ratio 36 FEV1 0.46 (17) FVC 1.27 (39)TLC 68 DLCO 48    Plan: BIPAP with AVAPS  Continuous home oxygen concentrator   Resume trelegy   Pulmonary rehab  Smoking cessation (need 6 months cessation before transplant consideration if she does not have cancer)  Need updated CT chest  Airway clearance regimen -duoneb, saline, aerobika, follow by cough or huffing             Current Assessment & Plan     Tx'd for exacerbation with prednisone and doxycycline; but on further reconsideration, advised pt to go to ED. Her hypoxia and copd is too severe for outpatient treatment which will not be timely enough.         Relevant Medications    fluticasone-umeclidin-vilanter (TRELEGY ELLIPTA) 100-62.5-25 mcg DsDv    albuterol-ipratropium (DUO-NEB) 2.5 mg-0.5 mg/3 mL nebulizer solution    doxycycline (MONODOX) 100 MG capsule    predniSONE (DELTASONE) 20 MG tablet    Other Relevant Orders    Ambulatory referral/consult to Pulmonary Rehab    OXYGEN FOR HOME USE         Patient will Follow up in about 2 months (around 11/13/2022), or if symptoms  worsen or fail to improve.   ED for exacerbations.

## 2022-09-13 NOTE — PATIENT INSTRUCTIONS
[email or web address]      A[employer] OPEP Oscillating Positive Expiratory Pressure Therapy System

## 2022-09-13 NOTE — TELEPHONE ENCOUNTER
Spoke with patient she will be here today at 4pm.                ----- Message from Philly Wilson sent at 9/13/2022  2:41 PM CDT -----  Type:  Patient Returning Call    Who Called: self    Who Left Message for Patient: unknown    Does the patient know what this is regarding?:no    Would the patient rather a call back or a response via [facility]? call    Best Call Back Number:[phone] (home)

## 2022-09-14 ENCOUNTER — TELEPHONE (OUTPATIENT)
Dept: PULMONOLOGY | Facility: CLINIC | Age: 52
End: 2022-09-14
Payer: MEDICAID

## 2022-09-14 ENCOUNTER — HOSPITAL ENCOUNTER (OUTPATIENT)
Facility: HOSPITAL | Age: 52
Discharge: HOME | End: 2022-09-16
Attending: EMERGENCY MEDICINE | Admitting: STUDENT IN AN ORGANIZED HEALTH CARE EDUCATION/TRAINING PROGRAM
Payer: MEDICAID

## 2022-09-14 DIAGNOSIS — R06.02 SHORTNESS OF BREATH: ICD-10-CM

## 2022-09-14 DIAGNOSIS — R60.1 ANASARCA: ICD-10-CM

## 2022-09-14 DIAGNOSIS — J44.1 ACUTE EXACERBATION OF CHRONIC OBSTRUCTIVE PULMONARY DISEASE (COPD): Primary | ICD-10-CM

## 2022-09-14 PROBLEM — J96.12 CHRONIC RESPIRATORY FAILURE WITH HYPOXIA AND HYPERCAPNIA: Chronic | Status: ACTIVE | Noted: 2022-06-28

## 2022-09-14 PROBLEM — R25.2 LEG CRAMPS: Status: ACTIVE | Noted: 2022-09-14

## 2022-09-14 PROBLEM — J96.11 CHRONIC RESPIRATORY FAILURE WITH HYPOXIA AND HYPERCAPNIA: Chronic | Status: ACTIVE | Noted: 2022-06-28

## 2022-09-14 PROBLEM — I27.20 PULMONARY HYPERTENSION: Chronic | Status: ACTIVE | Noted: 2022-06-28

## 2022-09-14 LAB
ALBUMIN SERPL BCP-MCNC: 4.1 G/DL (ref 3.5–5.2)
ALP SERPL-CCNC: 123 U/L (ref 55–135)
ALT SERPL W/O P-5'-P-CCNC: 14 U/L (ref 10–44)
ANION GAP SERPL CALC-SCNC: 8 MMOL/L (ref 8–16)
AST SERPL-CCNC: 15 U/L (ref 10–40)
BASOPHILS # BLD AUTO: 0.05 K/UL (ref 0–0.2)
BASOPHILS NFR BLD: 0.3 % (ref 0–1.9)
BILIRUB SERPL-MCNC: 0.5 MG/DL (ref 0.1–1)
BNP SERPL-MCNC: 14 PG/ML (ref 0–99)
BRPFT: NORMAL
BUN SERPL-MCNC: 15 MG/DL (ref 6–20)
CALCIUM SERPL-MCNC: 10.2 MG/DL (ref 8.7–10.5)
CHLORIDE SERPL-SCNC: 95 MMOL/L (ref 95–110)
CO2 SERPL-SCNC: 37 MMOL/L (ref 23–29)
CREAT SERPL-MCNC: 0.7 MG/DL (ref 0.5–1.4)
CTP QC/QA: YES
DIFFERENTIAL METHOD: ABNORMAL
DLCO ADJ PRE: 11.14 ML/(MIN*MMHG)
DLCO SINGLE BREATH LLN: 17.27
DLCO SINGLE BREATH PRE REF: 48.4 %
DLCO SINGLE BREATH REF: 23
DLCOC SBVA LLN: 3.26
DLCOC SBVA PRE REF: 88.7 %
DLCOC SBVA REF: 4.82
DLCOC SINGLE BREATH LLN: 17.27
DLCOC SINGLE BREATH PRE REF: 48.4 %
DLCOC SINGLE BREATH REF: 23
DLCOVA LLN: 3.26
DLCOVA PRE REF: 88.7 %
DLCOVA PRE: 4.28 ML/(MIN*MMHG*L)
DLCOVA REF: 4.82
DLVAADJ PRE: 4.28 ML/(MIN*MMHG*L)
EOSINOPHIL # BLD AUTO: 0 K/UL (ref 0–0.5)
EOSINOPHIL NFR BLD: 0 % (ref 0–8)
ERVN2 LLN: -16449.12
ERVN2 PRE REF: 43.3 %
ERVN2 PRE: 0.38 L
ERVN2 REF: 0.88
ERYTHROCYTE [DISTWIDTH] IN BLOOD BY AUTOMATED COUNT: 13.4 % (ref 11.5–14.5)
EST. GFR  (NO RACE VARIABLE): >60 ML/MIN/1.73 M^2
FEF 25 75 CHG: -2.9 %
FEF 25 75 LLN: 1.34
FEF 25 75 POST REF: 6.7 %
FEF 25 75 PRE REF: 6.9 %
FEF 25 75 REF: 2.48
FET100 CHG: 26.6 %
FEV1 CHG: 9.4 %
FEV1 FVC CHG: -1.9 %
FEV1 FVC LLN: 69
FEV1 FVC POST REF: 43.9 %
FEV1 FVC PRE REF: 44.8 %
FEV1 FVC REF: 80
FEV1 LLN: 1.98
FEV1 POST REF: 19.4 %
FEV1 PRE REF: 17.7 %
FEV1 REF: 2.57
FRCN2 LLN: 1.82
FRCN2 PRE REF: 87.2 %
FRCN2 REF: 2.64
FVC CHG: 11.6 %
FVC LLN: 2.49
FVC POST REF: 44 %
FVC PRE REF: 39.4 %
FVC REF: 3.22
GLUCOSE SERPL-MCNC: 135 MG/DL (ref 70–110)
HCT VFR BLD AUTO: 42.7 % (ref 37–48.5)
HGB BLD-MCNC: 13.4 G/DL (ref 12–16)
IMM GRANULOCYTES # BLD AUTO: 0.13 K/UL (ref 0–0.04)
IMM GRANULOCYTES NFR BLD AUTO: 0.7 % (ref 0–0.5)
IVC PRE: 1.37 L
IVC SINGLE BREATH LLN: 2.49
IVC SINGLE BREATH PRE REF: 42.4 %
IVC SINGLE BREATH REF: 3.22
LYMPHOCYTES # BLD AUTO: 1.5 K/UL (ref 1–4.8)
LYMPHOCYTES NFR BLD: 8.5 % (ref 18–48)
MCH RBC QN AUTO: 28.6 PG (ref 27–31)
MCHC RBC AUTO-ENTMCNC: 31.4 G/DL (ref 32–36)
MCV RBC AUTO: 91 FL (ref 82–98)
MONOCYTES # BLD AUTO: 0.4 K/UL (ref 0.3–1)
MONOCYTES NFR BLD: 2.4 % (ref 4–15)
NEUTROPHILS # BLD AUTO: 15.5 K/UL (ref 1.8–7.7)
NEUTROPHILS NFR BLD: 88.1 % (ref 38–73)
NRBC BLD-RTO: 0 /100 WBC
PEF CHG: 26.5 %
PEF LLN: 4.77
PEF POST REF: 45 %
PEF PRE REF: 35.6 %
PEF REF: 6.43
PLATELET # BLD AUTO: 278 K/UL (ref 150–450)
PMV BLD AUTO: 10.5 FL (ref 9.2–12.9)
POST FEF 25 75: 0.17 L/S
POST FET 100: 10.62 SEC
POST FEV1 FVC: 35.19 %
POST FEV1: 0.5 L
POST FVC: 1.42 L
POST PEF: 2.89 L/S
POTASSIUM SERPL-SCNC: 4.7 MMOL/L (ref 3.5–5.1)
PRE DLCO: 11.14 ML/(MIN*MMHG)
PRE FEF 25 75: 0.17 L/S
PRE FET 100: 8.38 SEC
PRE FEV1 FVC: 35.88 %
PRE FEV1: 0.46 L
PRE FRC N2: 2.3 L
PRE FVC: 1.27 L
PRE PEF: 2.29 L/S
PROT SERPL-MCNC: 7.7 G/DL (ref 6–8.4)
RBC # BLD AUTO: 4.68 M/UL (ref 4–5.4)
RVN2 LLN: 1.18
RVN2 PRE REF: 109.1 %
RVN2 PRE: 1.92 L
RVN2 REF: 1.76
RVN2TLCN2 LLN: 27.73
RVN2TLCN2 PRE REF: 157.8 %
RVN2TLCN2 PRE: 58.9 %
RVN2TLCN2 REF: 37.32
SARS-COV-2 RDRP RESP QL NAA+PROBE: NEGATIVE
SODIUM SERPL-SCNC: 140 MMOL/L (ref 136–145)
TLCN2 LLN: 3.78
TLCN2 PRE REF: 68.3 %
TLCN2 PRE: 3.26 L
TLCN2 REF: 4.77
TROPONIN I SERPL DL<=0.01 NG/ML-MCNC: <0.006 NG/ML (ref 0–0.03)
VA PRE: 2.6 L
VA SINGLE BREATH LLN: 4.62
VA SINGLE BREATH PRE REF: 56.3 %
VA SINGLE BREATH REF: 4.62
VCMAXN2 LLN: 2.49
VCMAXN2 PRE REF: 41.6 %
VCMAXN2 PRE: 1.34 L
VCMAXN2 REF: 3.22
WBC # BLD AUTO: 17.59 K/UL (ref 3.9–12.7)

## 2022-09-14 PROCEDURE — G0378 HOSPITAL OBSERVATION PER HR: HCPCS

## 2022-09-14 PROCEDURE — 80053 COMPREHEN METABOLIC PANEL: CPT | Performed by: PHYSICIAN ASSISTANT

## 2022-09-14 PROCEDURE — 94640 AIRWAY INHALATION TREATMENT: CPT

## 2022-09-14 PROCEDURE — 93010 ELECTROCARDIOGRAM REPORT: CPT | Mod: ,,, | Performed by: INTERNAL MEDICINE

## 2022-09-14 PROCEDURE — 25000003 PHARM REV CODE 250: Performed by: HOSPITALIST

## 2022-09-14 PROCEDURE — 85025 COMPLETE CBC W/AUTO DIFF WBC: CPT | Performed by: PHYSICIAN ASSISTANT

## 2022-09-14 PROCEDURE — 93010 EKG 12-LEAD: ICD-10-PCS | Mod: ,,, | Performed by: INTERNAL MEDICINE

## 2022-09-14 PROCEDURE — 25000242 PHARM REV CODE 250 ALT 637 W/ HCPCS: Performed by: HOSPITALIST

## 2022-09-14 PROCEDURE — 83880 ASSAY OF NATRIURETIC PEPTIDE: CPT | Performed by: PHYSICIAN ASSISTANT

## 2022-09-14 PROCEDURE — 27000221 HC OXYGEN, UP TO 24 HOURS

## 2022-09-14 PROCEDURE — 63600175 PHARM REV CODE 636 W HCPCS: Performed by: HOSPITALIST

## 2022-09-14 PROCEDURE — 94640 AIRWAY INHALATION TREATMENT: CPT | Mod: XB

## 2022-09-14 PROCEDURE — 96372 THER/PROPH/DIAG INJ SC/IM: CPT | Mod: 59 | Performed by: HOSPITALIST

## 2022-09-14 PROCEDURE — 84484 ASSAY OF TROPONIN QUANT: CPT | Performed by: PHYSICIAN ASSISTANT

## 2022-09-14 PROCEDURE — U0002 COVID-19 LAB TEST NON-CDC: HCPCS | Performed by: EMERGENCY MEDICINE

## 2022-09-14 PROCEDURE — 93005 ELECTROCARDIOGRAM TRACING: CPT

## 2022-09-14 PROCEDURE — 99285 EMERGENCY DEPT VISIT HI MDM: CPT | Mod: 25

## 2022-09-14 PROCEDURE — 25000242 PHARM REV CODE 250 ALT 637 W/ HCPCS: Performed by: EMERGENCY MEDICINE

## 2022-09-14 PROCEDURE — 63600175 PHARM REV CODE 636 W HCPCS: Performed by: EMERGENCY MEDICINE

## 2022-09-14 PROCEDURE — 94761 N-INVAS EAR/PLS OXIMETRY MLT: CPT

## 2022-09-14 PROCEDURE — 96374 THER/PROPH/DIAG INJ IV PUSH: CPT

## 2022-09-14 RX ORDER — IPRATROPIUM BROMIDE AND ALBUTEROL SULFATE 2.5; .5 MG/3ML; MG/3ML
3 SOLUTION RESPIRATORY (INHALATION)
Status: COMPLETED | OUTPATIENT
Start: 2022-09-14 | End: 2022-09-14

## 2022-09-14 RX ORDER — PREDNISONE 20 MG/1
40 TABLET ORAL DAILY
Status: DISCONTINUED | OUTPATIENT
Start: 2022-09-15 | End: 2022-09-15

## 2022-09-14 RX ORDER — FUROSEMIDE 40 MG/1
40 TABLET ORAL 2 TIMES DAILY
Status: DISCONTINUED | OUTPATIENT
Start: 2022-09-15 | End: 2022-09-15

## 2022-09-14 RX ORDER — POLYETHYLENE GLYCOL 3350 17 G/17G
17 POWDER, FOR SOLUTION ORAL DAILY
Status: DISCONTINUED | OUTPATIENT
Start: 2022-09-15 | End: 2022-09-16 | Stop reason: HOSPADM

## 2022-09-14 RX ORDER — PROCHLORPERAZINE EDISYLATE 5 MG/ML
5 INJECTION INTRAMUSCULAR; INTRAVENOUS EVERY 6 HOURS PRN
Status: DISCONTINUED | OUTPATIENT
Start: 2022-09-14 | End: 2022-09-16 | Stop reason: HOSPADM

## 2022-09-14 RX ORDER — DOXYCYCLINE HYCLATE 100 MG
100 TABLET ORAL EVERY 12 HOURS
Status: DISCONTINUED | OUTPATIENT
Start: 2022-09-14 | End: 2022-09-16 | Stop reason: HOSPADM

## 2022-09-14 RX ORDER — FUROSEMIDE 10 MG/ML
80 INJECTION INTRAMUSCULAR; INTRAVENOUS
Status: COMPLETED | OUTPATIENT
Start: 2022-09-14 | End: 2022-09-14

## 2022-09-14 RX ORDER — ENOXAPARIN SODIUM 100 MG/ML
40 INJECTION SUBCUTANEOUS EVERY 24 HOURS
Status: DISCONTINUED | OUTPATIENT
Start: 2022-09-14 | End: 2022-09-16 | Stop reason: HOSPADM

## 2022-09-14 RX ORDER — GABAPENTIN 300 MG/1
600 CAPSULE ORAL NIGHTLY
Refills: 11 | Status: DISCONTINUED | OUTPATIENT
Start: 2022-09-14 | End: 2022-09-16 | Stop reason: HOSPADM

## 2022-09-14 RX ORDER — TALC
6 POWDER (GRAM) TOPICAL NIGHTLY PRN
Status: DISCONTINUED | OUTPATIENT
Start: 2022-09-14 | End: 2022-09-16 | Stop reason: HOSPADM

## 2022-09-14 RX ORDER — ACETAMINOPHEN 325 MG/1
650 TABLET ORAL EVERY 8 HOURS PRN
Status: DISCONTINUED | OUTPATIENT
Start: 2022-09-14 | End: 2022-09-16 | Stop reason: HOSPADM

## 2022-09-14 RX ORDER — ONDANSETRON 2 MG/ML
4 INJECTION INTRAMUSCULAR; INTRAVENOUS EVERY 12 HOURS PRN
Status: DISCONTINUED | OUTPATIENT
Start: 2022-09-14 | End: 2022-09-16 | Stop reason: HOSPADM

## 2022-09-14 RX ORDER — IPRATROPIUM BROMIDE AND ALBUTEROL SULFATE 2.5; .5 MG/3ML; MG/3ML
3 SOLUTION RESPIRATORY (INHALATION)
Status: DISCONTINUED | OUTPATIENT
Start: 2022-09-14 | End: 2022-09-16 | Stop reason: HOSPADM

## 2022-09-14 RX ORDER — SODIUM CHLORIDE 0.9 % (FLUSH) 0.9 %
3 SYRINGE (ML) INJECTION
Status: DISCONTINUED | OUTPATIENT
Start: 2022-09-14 | End: 2022-09-16 | Stop reason: HOSPADM

## 2022-09-14 RX ADMIN — GABAPENTIN 600 MG: 300 CAPSULE ORAL at 09:09

## 2022-09-14 RX ADMIN — DOXYCYCLINE HYCLATE 100 MG: 100 TABLET, COATED ORAL at 09:09

## 2022-09-14 RX ADMIN — ENOXAPARIN SODIUM 40 MG: 100 INJECTION SUBCUTANEOUS at 09:09

## 2022-09-14 RX ADMIN — IPRATROPIUM BROMIDE AND ALBUTEROL SULFATE 3 ML: 2.5; .5 SOLUTION RESPIRATORY (INHALATION) at 03:09

## 2022-09-14 RX ADMIN — FUROSEMIDE 80 MG: 10 INJECTION, SOLUTION INTRAMUSCULAR; INTRAVENOUS at 02:09

## 2022-09-14 RX ADMIN — IPRATROPIUM BROMIDE AND ALBUTEROL SULFATE 3 ML: 2.5; .5 SOLUTION RESPIRATORY (INHALATION) at 07:09

## 2022-09-14 NOTE — ED PROVIDER NOTES
Encounter Date: 9/14/2022       History     Chief Complaint   Patient presents with    Shortness of Breath     51 yo female to triage for SOB, HA, Weakness. Pt was seen by here PCP on yesterday. PCP called her today after reviewing her labs and instructed her to come to ED and be admitted.     Headache    Weakness    Leg Swelling     HPI  This 52-year-old white female presents emergency room complaining of a shortness of breath severe frontal headache with nausea and generalized weakness.  Patient's heart primary care doctor yesterday.  The patient has a long history of COPD.  She complains that she is retaining fluid but denies a history of congestive heart failure.  She is on diuretics, Lasix, 40 mg twice a day.  She reports she has swelling all over.  She denies chest pain pressure tightness.  She was started on prednisone 40 mg a day yesterday.  She took a dose yesterday and a dose this morning.  Has a home nebulizer at home that she is using more often than every 4 hours.  She cannot lay down to go to sleep.  She denies any history of new trauma, neurologic deficits, fever, purulent sputum, abdominal complaints.  She smokes cigarettes.   Review of patient's allergies indicates:   Allergen Reactions    Antivert [meclizine] Other (See Comments)     Behavioral changes     Past Medical History:   Diagnosis Date    Anxiety     Asthma     Carpal tunnel syndrome, bilateral     COPD (chronic obstructive pulmonary disease)     Heavy cigarette smoker     9/14/22 2PPD    Laryngeal papilloma     On home oxygen therapy     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    Right side with CT scan Referred to ENT for visualization     Past Surgical History:   Procedure Laterality Date    CARPAL TUNNEL RELEASE Bilateral     FINGER SURGERY      HYSTERECTOMY      OOPHORECTOMY  1996    Hysterectomy     Family History   Problem Relation Age of Onset    COPD Mother     Heart disease Father     No Known Problems Sister     No Known Problems Brother       Social History     Tobacco Use    Smoking status: Every Day     Packs/day: 2.00     Years: 35.00     Pack years: 70.00     Types: Cigarettes     Start date: 12/1/1983    Smokeless tobacco: Current   Substance Use Topics    Alcohol use: No     Alcohol/week: 0.0 standard drinks    Drug use: No     Review of Systems  The patient was questioned specifically with regard to the following.  General: Fever, chills, sweats. Neuro: Headache. Eyes: eye problems. ENT: Ear pain, sore throat. Cardiovascular: Chest pain. Respiratory: Cough, shortness of breath. Gastrointestinal: Abdominal pain, vomiting, diarrhea. Genitourinary: Painful urination.  Musculoskeletal: Arm and leg problems. Skin: Rash.  The review of systems was negative except for the following:  Shortness of breath and a swelling of the lower extremities.  There is no chest pain pressure tightness fever.  The patient has a frontal headache.  There are no neurologic deficits there is no history of head trauma or neck pain.  Patient is on home oxygen at 3 L nasal cannula.   Physical Exam     Initial Vitals [09/14/22 1255]   BP Pulse Resp Temp SpO2   (!) 174/89 97 20 98.1 °F (36.7 °C) 99 %      MAP       --         Physical Exam  The patient was examined specifically for the following:   General:No significant distress, Good color, Warm and dry. Head and neck:Scalp atraumatic, Neck supple. Neurological:Appropriate conversation, Gross motor deficits. Eyes:Conjugate gaze, Clear corneas. ENT: No epistaxis. Cardiac: Regular rate and rhythm, Grossly normal heart tones. Pulmonary: Wheezing, Rales. Gastrointestinal: Abdominal tenderness, Abdominal distention. Musculoskeletal: Extremity deformity, Apparent pain with range of motion of the joints. Skin: Rash.   The findings on examination were normal except for the following:  Patient's blood pressure is 174/89.  The patient is afebrile.  Oxygen saturations are 99% on 3 L nasal cannula, her home oxygen.  The patient has  bilateral wheezing.  ED Course   Procedures  Labs Reviewed   CBC W/ AUTO DIFFERENTIAL - Abnormal; Notable for the following components:       Result Value    WBC 17.59 (*)     MCHC 31.4 (*)     Immature Granulocytes 0.7 (*)     Gran # (ANC) 15.5 (*)     Immature Grans (Abs) 0.13 (*)     Gran % 88.1 (*)     Lymph % 8.5 (*)     Mono % 2.4 (*)     All other components within normal limits   COMPREHENSIVE METABOLIC PANEL - Abnormal; Notable for the following components:    CO2 37 (*)     Glucose 135 (*)     All other components within normal limits   TROPONIN I   B-TYPE NATRIURETIC PEPTIDE   SARS-COV-2 RNA AMPLIFICATION, QUAL   SARS-COV-2 RDRP GENE     EKG Readings: (Independently Interpreted)   This patient is in a sinus rhythm with a heart rate of 79 with incomplete right bundle branch block.  The axis is normal.  Intervals are normal.  There is no evidence of injury or ischemia.  I find no malignant arrhythmia.     Imaging Results              X-Ray Chest AP Portable (Final result)  Result time 09/14/22 15:03:59      Final result by Khang Lopez III, MD (09/14/22 15:03:59)                   Impression:      Possible mild CHF versus mild interstitial pneumonia.      Electronically signed by: Khang Lopez MD  Date:    09/14/2022  Time:    15:03               Narrative:    EXAMINATION:  XR CHEST AP PORTABLE    CLINICAL HISTORY:  shortness of breath;    FINDINGS:  Chest one view AP portable.    There is mild cardiomegaly.  This is similar to 06/28/2022.  There is mild interstitial prominence.                                       Medications   doxycycline tablet 100 mg (100 mg Oral Given 9/14/22 2137)   furosemide tablet 40 mg (has no administration in time range)   gabapentin capsule 600 mg (600 mg Oral Given 9/14/22 2137)   predniSONE tablet 40 mg (has no administration in time range)   sodium chloride 0.9% flush 3 mL (has no administration in time range)   albuterol-ipratropium 2.5 mg-0.5 mg/3 mL nebulizer  solution 3 mL (3 mLs Nebulization Given 9/14/22 1910)   enoxaparin injection 40 mg (40 mg Subcutaneous Given 9/14/22 2136)   melatonin tablet 6 mg (has no administration in time range)   ondansetron injection 4 mg (has no administration in time range)   prochlorperazine injection Soln 5 mg (has no administration in time range)   polyethylene glycol packet 17 g (has no administration in time range)   acetaminophen tablet 650 mg (has no administration in time range)   furosemide injection 80 mg (80 mg Intravenous Given 9/14/22 1439)   albuterol-ipratropium 2.5 mg-0.5 mg/3 mL nebulizer solution 3 mL (3 mLs Nebulization Given 9/14/22 1500)                              Clinical Impression:   Final diagnoses:  [R06.02] Shortness of breath  [J44.1] Acute exacerbation of chronic obstructive pulmonary disease (COPD) (Primary)  [R60.1] Anasarca        ED Disposition Condition    Observation Stable                Dano Hayward MD  09/14/22 2093

## 2022-09-14 NOTE — TELEPHONE ENCOUNTER
Call to check on pt and advised pt to go to ED for copd exacerbation as I do not think we can complete outpatient care in a timely manner with the severity of her copd. Pt verbalized agreement

## 2022-09-14 NOTE — ED TRIAGE NOTES
Pt comes in w/complaint of SOB r/t COPD. States it started yesterday and she saw her PCP and was told to follow up here. Pt wears continuous O2, has noticeable SOB, skin w/d/I. A&Ox4.

## 2022-09-14 NOTE — Clinical Note
Diagnosis: Shortness of breath [786.05.ICD-9-CM]   Future Attending Provider: TORRES BLAIR [[id number]]   Admitting Provider:: TORRES BLAIR [[id number]]   Special Needs:: No Special Needs [1]

## 2022-09-14 NOTE — FIRST PROVIDER EVALUATION
Emergency Department TeleTriage Encounter Note      CHIEF COMPLAINT    Chief Complaint   Patient presents with    Shortness of Breath     53 yo female to triage for SOB, HA, Weakness. Pt was seen by here PCP on yesterday. PCP called her today after reviewing her labs and instructed her to come to ED and be admitted.     Headache    Weakness    Leg Swelling       VITAL SIGNS   Initial Vitals [09/14/22 1255]   BP Pulse Resp Temp SpO2   (!) 174/89 97 20 98.1 °F (36.7 °C) 99 %      MAP       --            ALLERGIES    Review of patient's allergies indicates:   Allergen Reactions    Antivert [meclizine] Other (See Comments)     Behavioral changes       PROVIDER TRIAGE NOTE  Patient with history of severe copd (uses home 02) presenting with increased shortness of breath and now also having edema. She is on antibiotics and steroids for COPD exacerbation but feels like she is getting worse and was advised by her pulmonologist to come to the ED for admission.       ORDERS  Labs Reviewed - No data to display    ED Orders (720h ago, onward)      None              Virtual Visit Note: The provider triage portion of this emergency department evaluation and documentation was performed via [employer], a HIPAA-compliant telemedicine application, in concert with a tele-presenter in the room. A face to face patient evaluation with one of my colleagues will occur once the patient is placed in an emergency department room.      DISCLAIMER: This note was prepared with [employer] voice recognition transcription software. Garbled syntax, mangled pronouns, and other bizarre constructions may be attributed to that software system.

## 2022-09-14 NOTE — ASSESSMENT & PLAN NOTE
Tx'd for exacerbation with prednisone and doxycycline; but on further reconsideration, advised pt to go to ED. Her hypoxia and copd is too severe for outpatient treatment which will not be timely enough.

## 2022-09-15 ENCOUNTER — PATIENT MESSAGE (OUTPATIENT)
Dept: PULMONOLOGY | Facility: CLINIC | Age: 52
End: 2022-09-15
Payer: MEDICAID

## 2022-09-15 DIAGNOSIS — Z87.891 PERSONAL HISTORY OF NICOTINE DEPENDENCE: Primary | ICD-10-CM

## 2022-09-15 LAB
ANION GAP SERPL CALC-SCNC: 10 MMOL/L (ref 8–16)
BASOPHILS # BLD AUTO: 0.06 K/UL (ref 0–0.2)
BASOPHILS NFR BLD: 0.4 % (ref 0–1.9)
BUN SERPL-MCNC: 20 MG/DL (ref 6–20)
CALCIUM SERPL-MCNC: 9.9 MG/DL (ref 8.7–10.5)
CHLORIDE SERPL-SCNC: 94 MMOL/L (ref 95–110)
CO2 SERPL-SCNC: 40 MMOL/L (ref 23–29)
CREAT SERPL-MCNC: 0.7 MG/DL (ref 0.5–1.4)
DIFFERENTIAL METHOD: ABNORMAL
EOSINOPHIL # BLD AUTO: 0.2 K/UL (ref 0–0.5)
EOSINOPHIL NFR BLD: 1.5 % (ref 0–8)
ERYTHROCYTE [DISTWIDTH] IN BLOOD BY AUTOMATED COUNT: 13.5 % (ref 11.5–14.5)
EST. GFR  (NO RACE VARIABLE): >60 ML/MIN/1.73 M^2
GLUCOSE SERPL-MCNC: 108 MG/DL (ref 70–110)
HCT VFR BLD AUTO: 44 % (ref 37–48.5)
HGB BLD-MCNC: 13.2 G/DL (ref 12–16)
IMM GRANULOCYTES # BLD AUTO: 0.13 K/UL (ref 0–0.04)
IMM GRANULOCYTES NFR BLD AUTO: 0.9 % (ref 0–0.5)
LYMPHOCYTES # BLD AUTO: 4.2 K/UL (ref 1–4.8)
LYMPHOCYTES NFR BLD: 29.2 % (ref 18–48)
MAGNESIUM SERPL-MCNC: 2.2 MG/DL (ref 1.6–2.6)
MCH RBC QN AUTO: 27.8 PG (ref 27–31)
MCHC RBC AUTO-ENTMCNC: 30 G/DL (ref 32–36)
MCV RBC AUTO: 93 FL (ref 82–98)
MONOCYTES # BLD AUTO: 1.1 K/UL (ref 0.3–1)
MONOCYTES NFR BLD: 7.6 % (ref 4–15)
NEUTROPHILS # BLD AUTO: 8.7 K/UL (ref 1.8–7.7)
NEUTROPHILS NFR BLD: 60.4 % (ref 38–73)
NRBC BLD-RTO: 0 /100 WBC
PHOSPHATE SERPL-MCNC: 4.6 MG/DL (ref 2.7–4.5)
PLATELET # BLD AUTO: 264 K/UL (ref 150–450)
PMV BLD AUTO: 10.5 FL (ref 9.2–12.9)
POTASSIUM SERPL-SCNC: 3.8 MMOL/L (ref 3.5–5.1)
RBC # BLD AUTO: 4.74 M/UL (ref 4–5.4)
SODIUM SERPL-SCNC: 144 MMOL/L (ref 136–145)
WBC # BLD AUTO: 14.43 K/UL (ref 3.9–12.7)

## 2022-09-15 PROCEDURE — 25000242 PHARM REV CODE 250 ALT 637 W/ HCPCS: Performed by: HOSPITALIST

## 2022-09-15 PROCEDURE — 63600175 PHARM REV CODE 636 W HCPCS: Performed by: HOSPITALIST

## 2022-09-15 PROCEDURE — 83735 ASSAY OF MAGNESIUM: CPT | Performed by: HOSPITALIST

## 2022-09-15 PROCEDURE — 94640 AIRWAY INHALATION TREATMENT: CPT

## 2022-09-15 PROCEDURE — G0378 HOSPITAL OBSERVATION PER HR: HCPCS

## 2022-09-15 PROCEDURE — 99900035 HC TECH TIME PER 15 MIN (STAT)

## 2022-09-15 PROCEDURE — 80048 BASIC METABOLIC PNL TOTAL CA: CPT | Performed by: HOSPITALIST

## 2022-09-15 PROCEDURE — 97165 OT EVAL LOW COMPLEX 30 MIN: CPT

## 2022-09-15 PROCEDURE — 85025 COMPLETE CBC W/AUTO DIFF WBC: CPT | Performed by: HOSPITALIST

## 2022-09-15 PROCEDURE — 25000003 PHARM REV CODE 250: Performed by: HOSPITALIST

## 2022-09-15 PROCEDURE — S4991 NICOTINE PATCH NONLEGEND: HCPCS | Performed by: HOSPITALIST

## 2022-09-15 PROCEDURE — 97161 PT EVAL LOW COMPLEX 20 MIN: CPT

## 2022-09-15 PROCEDURE — 27000190 HC CPAP FULL FACE MASK W/VALVE

## 2022-09-15 PROCEDURE — 94660 CPAP INITIATION&MGMT: CPT

## 2022-09-15 PROCEDURE — 96372 THER/PROPH/DIAG INJ SC/IM: CPT | Performed by: HOSPITALIST

## 2022-09-15 PROCEDURE — 84100 ASSAY OF PHOSPHORUS: CPT | Performed by: HOSPITALIST

## 2022-09-15 PROCEDURE — 36415 COLL VENOUS BLD VENIPUNCTURE: CPT | Performed by: HOSPITALIST

## 2022-09-15 PROCEDURE — 96375 TX/PRO/DX INJ NEW DRUG ADDON: CPT

## 2022-09-15 PROCEDURE — 97535 SELF CARE MNGMENT TRAINING: CPT

## 2022-09-15 PROCEDURE — 96376 TX/PRO/DX INJ SAME DRUG ADON: CPT

## 2022-09-15 PROCEDURE — 27000221 HC OXYGEN, UP TO 24 HOURS

## 2022-09-15 RX ORDER — IBUPROFEN 200 MG
1 TABLET ORAL DAILY
Status: DISCONTINUED | OUTPATIENT
Start: 2022-09-15 | End: 2022-09-16 | Stop reason: HOSPADM

## 2022-09-15 RX ORDER — FAMOTIDINE 20 MG/1
20 TABLET, FILM COATED ORAL 2 TIMES DAILY
Status: DISCONTINUED | OUTPATIENT
Start: 2022-09-15 | End: 2022-09-16 | Stop reason: HOSPADM

## 2022-09-15 RX ORDER — METHYLPREDNISOLONE SOD SUCC 125 MG
125 VIAL (EA) INJECTION 3 TIMES DAILY
Status: DISCONTINUED | OUTPATIENT
Start: 2022-09-15 | End: 2022-09-16 | Stop reason: HOSPADM

## 2022-09-15 RX ORDER — BUPRENORPHINE AND NALOXONE 2; .5 MG/1; MG/1
6 FILM, SOLUBLE BUCCAL; SUBLINGUAL 2 TIMES DAILY
Status: DISCONTINUED | OUTPATIENT
Start: 2022-09-15 | End: 2022-09-16 | Stop reason: HOSPADM

## 2022-09-15 RX ADMIN — DOXYCYCLINE HYCLATE 100 MG: 100 TABLET, COATED ORAL at 08:09

## 2022-09-15 RX ADMIN — BUPRENORPHINE AND NALOXONE 6 FILM: 2; .5 FILM BUCCAL; SUBLINGUAL at 09:09

## 2022-09-15 RX ADMIN — Medication 1 PATCH: at 10:09

## 2022-09-15 RX ADMIN — GABAPENTIN 600 MG: 300 CAPSULE ORAL at 08:09

## 2022-09-15 RX ADMIN — PREDNISONE 40 MG: 20 TABLET ORAL at 08:09

## 2022-09-15 RX ADMIN — METHYLPREDNISOLONE SODIUM SUCCINATE 125 MG: 125 INJECTION, POWDER, FOR SOLUTION INTRAMUSCULAR; INTRAVENOUS at 09:09

## 2022-09-15 RX ADMIN — BUPRENORPHINE AND NALOXONE 6 FILM: 2; .5 FILM BUCCAL; SUBLINGUAL at 08:09

## 2022-09-15 RX ADMIN — ENOXAPARIN SODIUM 40 MG: 100 INJECTION SUBCUTANEOUS at 08:09

## 2022-09-15 RX ADMIN — FAMOTIDINE 20 MG: 20 TABLET ORAL at 08:09

## 2022-09-15 RX ADMIN — IPRATROPIUM BROMIDE AND ALBUTEROL SULFATE 3 ML: 2.5; .5 SOLUTION RESPIRATORY (INHALATION) at 07:09

## 2022-09-15 RX ADMIN — METHYLPREDNISOLONE SODIUM SUCCINATE 125 MG: 125 INJECTION, POWDER, FOR SOLUTION INTRAMUSCULAR; INTRAVENOUS at 08:09

## 2022-09-15 RX ADMIN — METHYLPREDNISOLONE SODIUM SUCCINATE 125 MG: 125 INJECTION, POWDER, FOR SOLUTION INTRAMUSCULAR; INTRAVENOUS at 03:09

## 2022-09-15 RX ADMIN — IPRATROPIUM BROMIDE AND ALBUTEROL SULFATE 3 ML: 2.5; .5 SOLUTION RESPIRATORY (INHALATION) at 02:09

## 2022-09-15 NOTE — PLAN OF CARE
Problem: Occupational Therapy  Goal: Occupational Therapy Goal  Outcome: Adequate for Care Transition     Patient evaluated and discharged by OT today.  Patient requires no further acute OT services.  Patient to D/C to home; patient may benefit from CP rehab.  Patient to continue O2 use at home (now at 3 lpm).  PATIENT REQUIRES A BSC FOR HOME USE.  PATIENT IS INTERESTED IN A POWER WHEELCHAIR/SCOOTER FOR COMMUNITY OUTINGS.

## 2022-09-15 NOTE — ASSESSMENT & PLAN NOTE
Increased dyspnea and cough, continue nebs, corticosteroids, antibiotics.  Home oxygen.  Encourage smoking cessation.  On COPD pathway,wheezing did not improved,switched prednisone to IV Solumedrol.

## 2022-09-15 NOTE — HPI
52 y.o. female with stage IV very severe COPD by gold classification, nicotine dependence chronic hoarseness, anxiety, chronic pain syndrome laryngeal papilloma, KATHERINE, chronic respiratory failure with hypoxia and hypercapnia, pulmonary hypertension presents with a complaint of shortness of breath.  Acutely worse than baseline, duration 2-3 days, associated with increased cough, has been taking DuoNebs and prednisone without much improvement.  On home oxygen at baseline.  Denies fever, chills, chest pain, palpitations, dizziness, syncope, nausea, vomiting, diarrhea, abdominal pain, or dysuria.  In the ED, she was found to be wheezing.  Stable on her usual home oxygen dose.  Labs show leukocytosis.  Otherwise unremarkable for any acute abnormality.  Placed in observation.

## 2022-09-15 NOTE — SUBJECTIVE & OBJECTIVE
Past Medical History:   Diagnosis Date    Anxiety     Asthma     Carpal tunnel syndrome, bilateral     COPD (chronic obstructive pulmonary disease)     Heavy cigarette smoker     9/14/22 2PPD    Laryngeal papilloma     On home oxygen therapy     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    Right side with CT scan Referred to ENT for visualization       Past Surgical History:   Procedure Laterality Date    CARPAL TUNNEL RELEASE Bilateral     FINGER SURGERY      HYSTERECTOMY      OOPHORECTOMY  1996    Hysterectomy       Review of patient's allergies indicates:   Allergen Reactions    Antivert [meclizine] Other (See Comments)     Behavioral changes       No current facility-administered medications on file prior to encounter.     Current Outpatient Medications on File Prior to Encounter   Medication Sig    albuterol (PROVENTIL/VENTOLIN HFA) 90 mcg/actuation inhaler Inhale 2 puffs into the lungs every 6 (six) hours as needed.    albuterol-ipratropium (DUO-NEB) 2.5 mg-0.5 mg/3 mL nebulizer solution Take 3 mLs by nebulization every 4 (four) hours as needed for Wheezing. Rescue    doxycycline (MONODOX) 100 MG capsule Take 1 capsule (100 mg total) by mouth every 12 (twelve) hours. for 10 days    furosemide (LASIX) 40 MG tablet 2 (two) times daily.    gabapentin (NEURONTIN) 600 MG tablet TAKE TWO TABLETS BY MOUTH THREE TIMES DAILY for chronic pain (Patient taking differently: Take 600 mg by mouth as needed. TAKE TWO TABLETS BY MOUTH THREE TIMES DAILY for chronic pain)    inhalation spacing device Use as directed for inhalation.    LIDOCAINE VISCOUS 2 % solution Can switch to equivalent, 10 ml swish and gargle 4 x daily    nicotine (NICODERM CQ) 21 mg/24 hr nicotine 21 mg/24 hr daily transdermal patch   apply 1 patch onto the skin daily    nystatin (MYCOSTATIN) 100,000 unit/mL suspension nystatin 100,000 unit/mL oral suspension   SHAKE WELL. TAKE 5 ml (1 teaspoon) BY MOUTH FOUR TIMES DAILY FOR 14 DAYS. DO NOT REFRIGERATE    potassium  chloride (MICRO-K) 10 MEQ CpSR TAKE 1 CAPSULE BY MOUTH TWICE DAILY FOR POTASSIUM    predniSONE (DELTASONE) 20 MG tablet Take 2 tablets (40 mg total) by mouth once daily. for 5 days    SUBOXONE 12-3 mg Film PLACE 1 FILM UNDER THE TONGUE TWICE DAILY.    [DISCONTINUED] fluticasone-umeclidin-vilanter (TRELEGY ELLIPTA) 100-62.5-25 mcg DsDv Inhale 1 puff into the lungs once daily.     Family History       Problem Relation (Age of Onset)    COPD Mother    Heart disease Father    No Known Problems Sister, Brother          Tobacco Use    Smoking status: Every Day     Packs/day: 2.00     Years: 35.00     Pack years: 70.00     Types: Cigarettes     Start date: 12/1/1983    Smokeless tobacco: Current   Substance and Sexual Activity    Alcohol use: No     Alcohol/week: 0.0 standard drinks    Drug use: No    Sexual activity: Yes     Partners: Male     Review of Systems   Constitutional:  Negative for chills, fatigue and fever.   Eyes:  Negative for photophobia and visual disturbance.   Respiratory:  Positive for cough, shortness of breath and wheezing.    Cardiovascular:  Negative for chest pain, palpitations and leg swelling.   Gastrointestinal:  Negative for abdominal pain, diarrhea, nausea and vomiting.   Genitourinary:  Negative for dysuria, frequency and urgency.   Skin:  Negative for pallor, rash and wound.   Neurological:  Positive for headaches. Negative for light-headedness.   Psychiatric/Behavioral:  Negative for confusion and decreased concentration.    Objective:     Vital Signs (Most Recent):  Temp: 98.1 °F (36.7 °C) (09/14/22 2027)  Pulse: 82 (09/14/22 2027)  Resp: 18 (09/14/22 2027)  BP: 133/70 (09/14/22 2027)  SpO2: (!) 94 % (09/14/22 2027)   Vital Signs (24h Range):  Temp:  [98.1 °F (36.7 °C)] 98.1 °F (36.7 °C)  Pulse:  [69-97] 82  Resp:  [18-24] 18  SpO2:  [93 %-99 %] 94 %  BP: (109-174)/(65-89) 133/70     Weight: 91.1 kg (200 lb 13.4 oz)  Body mass index is 35.58 kg/m².    Physical Exam  Vitals and nursing note  reviewed.   Constitutional:       General: She is not in acute distress.     Appearance: She is well-developed.   HENT:      Head: Normocephalic and atraumatic.      Right Ear: External ear normal.      Left Ear: External ear normal.      Nose: Nose normal.   Eyes:      Conjunctiva/sclera: Conjunctivae normal.      Pupils: Pupils are equal, round, and reactive to light.   Cardiovascular:      Rate and Rhythm: Normal rate and regular rhythm.   Pulmonary:      Effort: Pulmonary effort is normal. No respiratory distress.      Breath sounds: Wheezing present.   Abdominal:      General: Bowel sounds are normal. There is no distension.      Palpations: Abdomen is soft.      Tenderness: There is no abdominal tenderness.      Comments: No palpable hepatomegaly or splenomegaly    Musculoskeletal:         General: No tenderness. Normal range of motion.      Cervical back: Normal range of motion and neck supple.   Skin:     General: Skin is warm and dry.   Neurological:      Mental Status: She is alert and oriented to person, place, and time.   Psychiatric:         Thought Content: Thought content normal.         CRANIAL NERVES     CN III, IV, VI   Pupils are equal, round, and reactive to light.     Significant Labs: All pertinent labs within the past 24 hours have been reviewed.    Significant Imaging: I have reviewed all pertinent imaging results/findings within the past 24 hours.

## 2022-09-15 NOTE — ASSESSMENT & PLAN NOTE
5 minutes spent counseling the patient on smoking cessation and she is not currently ready to stop smoking. She will be offereded a nicotine transdermal patch while hospitalized and monitored for withdrawal.  Will provide additional smoking cessation counseling prior to discharge.   Spent more than 6 minutes consulting need quit smoking,started on nicotine patch.

## 2022-09-15 NOTE — PROGRESS NOTES
[facility] Medicine  Progress Note    Patient Name: Yasmeen Valderrama  MRN: [id number]  Patient Class: OP- Observation   Admission Date: 9/14/2022  Length of Stay: 0 days  Attending Physician: Peri Guo MD  Primary Care Provider: Leeanna Stuart MD        Subjective:     Principal Problem:COPD exacerbation        HPI:  52 y.o. female with stage IV very severe COPD by gold classification, nicotine dependence chronic hoarseness, anxiety, chronic pain syndrome laryngeal papilloma, KATHERINE, chronic respiratory failure with hypoxia and hypercapnia, pulmonary hypertension presents with a complaint of shortness of breath.  Acutely worse than baseline, duration 2-3 days, associated with increased cough, has been taking DuoNebs and prednisone without much improvement.  On home oxygen at baseline.  Denies fever, chills, chest pain, palpitations, dizziness, syncope, nausea, vomiting, diarrhea, abdominal pain, or dysuria.  In the ED, she was found to be wheezing.  Stable on her usual home oxygen dose.  Labs show leukocytosis.  Otherwise unremarkable for any acute abnormality.  Placed in observation.      Overview/Hospital Course:  52 y.o. female with stage IV very severe COPD by gold classification, nicotine dependence chronic hoarseness, anxiety, chronic pain syndrome laryngeal papilloma, KATHERINE, chronic respiratory failure with hypoxia and hypercapnia, pulmonary hypertension presents with a complaint of shortness of breath.  Acutely worse than baseline, duration 2-3 days, associated with increased cough, has been taking DuoNebs and prednisone without much improvement.  On home oxygen at baseline.  Denies fever, chills, chest pain, palpitations, dizziness, syncope, nausea, vomiting, diarrhea, abdominal pain, or dysuria.  In the ED, she was found to be wheezing.  Stable on her usual home oxygen dose.  Labs show leukocytosis.  Otherwise unremarkable for any acute abnormality.  Placed in  observation.  On COPD pathway,wheezing did not improved,switched prednisone to IV Solumedrol.  CC is wheezing.      Past Medical History:   Diagnosis Date    Anxiety     Asthma     Carpal tunnel syndrome, bilateral     COPD (chronic obstructive pulmonary disease)     Heavy cigarette smoker     9/14/22 2PPD    Laryngeal papilloma     On home oxygen therapy     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    Right side with CT scan Referred to ENT for visualization       Past Surgical History:   Procedure Laterality Date    CARPAL TUNNEL RELEASE Bilateral     FINGER SURGERY      HYSTERECTOMY      OOPHORECTOMY  1996    Hysterectomy       Review of patient's allergies indicates:   Allergen Reactions    Antivert [meclizine] Other (See Comments)     Behavioral changes       No current facility-administered medications on file prior to encounter.     Current Outpatient Medications on File Prior to Encounter   Medication Sig    albuterol (PROVENTIL/VENTOLIN HFA) 90 mcg/actuation inhaler Inhale 2 puffs into the lungs every 6 (six) hours as needed.    doxycycline (MONODOX) 100 MG capsule Take 1 capsule (100 mg total) by mouth every 12 (twelve) hours. for 10 days    potassium chloride (MICRO-K) 10 MEQ CpSR TAKE 1 CAPSULE BY MOUTH TWICE DAILY FOR POTASSIUM    SUBOXONE 12-3 mg Film PLACE 1 FILM UNDER THE TONGUE TWICE DAILY.    albuterol-ipratropium (DUO-NEB) 2.5 mg-0.5 mg/3 mL nebulizer solution Take 3 mLs by nebulization every 4 (four) hours as needed for Wheezing. Rescue    furosemide (LASIX) 40 MG tablet 2 (two) times daily.    gabapentin (NEURONTIN) 600 MG tablet TAKE TWO TABLETS BY MOUTH THREE TIMES DAILY for chronic pain (Patient taking differently: Take 600 mg by mouth as needed. TAKE TWO TABLETS BY MOUTH THREE TIMES DAILY for chronic pain)    inhalation spacing device Use as directed for inhalation.    LIDOCAINE VISCOUS 2 % solution Can switch to equivalent, 10 ml swish and gargle 4 x daily    nicotine (NICODERM CQ)  21 mg/24 hr nicotine 21 mg/24 hr daily transdermal patch   apply 1 patch onto the skin daily    nystatin (MYCOSTATIN) 100,000 unit/mL suspension nystatin 100,000 unit/mL oral suspension   SHAKE WELL. TAKE 5 ml (1 teaspoon) BY MOUTH FOUR TIMES DAILY FOR 14 DAYS. DO NOT REFRIGERATE    predniSONE (DELTASONE) 20 MG tablet Take 2 tablets (40 mg total) by mouth once daily. for 5 days     Family History       Problem Relation (Age of Onset)    COPD Mother    Heart disease Father    No Known Problems Sister, Brother          Tobacco Use    Smoking status: Every Day     Packs/day: 2.00     Years: 35.00     Pack years: 70.00     Types: Cigarettes     Start date: 12/1/1983    Smokeless tobacco: Current   Substance and Sexual Activity    Alcohol use: No     Alcohol/week: 0.0 standard drinks    Drug use: No    Sexual activity: Yes     Partners: Male     Review of Systems   Constitutional:  Negative for chills, fatigue and fever.   Eyes:  Negative for photophobia and visual disturbance.   Respiratory:  Positive for cough, shortness of breath and wheezing.    Cardiovascular:  Negative for chest pain, palpitations and leg swelling.   Gastrointestinal:  Negative for abdominal pain, diarrhea, nausea and vomiting.   Genitourinary:  Negative for dysuria, frequency and urgency.   Skin:  Negative for pallor, rash and wound.   Neurological:  Positive for headaches. Negative for light-headedness.   Psychiatric/Behavioral:  Negative for confusion and decreased concentration.    Objective:     Vital Signs (Most Recent):  Temp: 98.3 °F (36.8 °C) (09/15/22 0756)  Pulse: 96 (09/15/22 0756)  Resp: 18 (09/15/22 0756)  BP: (!) 155/81 (09/15/22 0756)  SpO2: (!) 93 % (09/15/22 0756)   Vital Signs (24h Range):  Temp:  [98.1 °F (36.7 °C)-98.3 °F (36.8 °C)] 98.3 °F (36.8 °C)  Pulse:  [69-97] 96  Resp:  [18-24] 18  SpO2:  [93 %-99 %] 93 %  BP: (109-174)/(65-89) 155/81     Weight: 91.1 kg (200 lb 13.4 oz)  Body mass index is 35.58  kg/m².    Physical Exam  Vitals and nursing note reviewed.   Constitutional:       General: She is not in acute distress.     Appearance: She is well-developed.   HENT:      Head: Normocephalic and atraumatic.      Right Ear: External ear normal.      Left Ear: External ear normal.      Nose: Nose normal.   Eyes:      Conjunctiva/sclera: Conjunctivae normal.      Pupils: Pupils are equal, round, and reactive to light.   Cardiovascular:      Rate and Rhythm: Normal rate and regular rhythm.   Pulmonary:      Effort: Pulmonary effort is normal. No respiratory distress.      Breath sounds: Wheezing present.   Abdominal:      General: Bowel sounds are normal. There is no distension.      Palpations: Abdomen is soft.      Tenderness: There is no abdominal tenderness.      Comments: No palpable hepatomegaly or splenomegaly    Musculoskeletal:         General: No tenderness. Normal range of motion.      Cervical back: Normal range of motion and neck supple.   Skin:     General: Skin is warm and dry.   Neurological:      Mental Status: She is alert and oriented to person, place, and time.   Psychiatric:         Thought Content: Thought content normal.         CRANIAL NERVES     CN III, IV, VI   Pupils are equal, round, and reactive to light.     Significant Labs: All pertinent labs within the past 24 hours have been reviewed.    Significant Imaging: I have reviewed all pertinent imaging results/findings within the past 24 hours.    CC is wheezing,  Assessment/Plan:      * COPD exacerbation  Increased dyspnea and cough, continue nebs, corticosteroids, antibiotics.  Home oxygen.  Encourage smoking cessation.  On COPD pathway,wheezing did not improved,switched prednisone to IV Solumedrol.    Pulmonary hypertension  Secondary to chronic lung disease    Chronic respiratory failure with hypoxia and hypercapnia  Patient with Hypercapnic and Hypoxic Respiratory failure which is Chronic.  she is on home oxygen at 3 LPM. Supplemental  oxygen was provided and noted-  .   Signs/symptoms of respiratory failure include- wheezing. Contributing diagnoses includes - COPD Labs and images were reviewed. Patient Has not had a recent ABG. Will treat underlying causes and adjust management of respiratory failure as follows- continue home oxygen    Aortic atherosclerosis  On medical treatment.      KATHERINE (obstructive sleep apnea)  On nightly bipap.      Laryngeal papilloma  Need out patient follow up,per record.      Lumbar radiculopathy, chronic  On home subaxone.      Nicotine dependence, uncomplicated  5 minutes spent counseling the patient on smoking cessation and she is not currently ready to stop smoking. She will be offereded a nicotine transdermal patch while hospitalized and monitored for withdrawal.  Will provide additional smoking cessation counseling prior to discharge.   Spent more than 6 minutes consulting need quit smoking,started on nicotine patch.    Stage 4 very severe COPD by GOLD classification  As above      VTE Risk Mitigation (From admission, onward)         Ordered     enoxaparin injection 40 mg  Daily         09/14/22 1901     IP VTE HIGH RISK PATIENT  Once         09/14/22 1901     Place sequential compression device  Until discontinued         09/14/22 1901                Discharge Planning   MONICO:      Code Status: Full Code   Is the patient medically ready for discharge?:     Reason for patient still in hospital (select all that apply): Patient trending condition  Discharge Plan A: Home                  Peri Guo MD  Department of Hospital Medicine   [facility] - Telemetry

## 2022-09-15 NOTE — PLAN OF CARE
[facility] - Telemetry  Discharge Assessment    Primary Care Provider: Leeanna Stuart MD     Discharge Assessment (most recent)       BRIEF DISCHARGE ASSESSMENT - 09/15/22 0908          Discharge Planning    Assessment Type Discharge Planning Brief Assessment     Resource/Environmental Concerns none     Support Systems Family members     Equipment Currently Used at Home hospital bed;nebulizer;oxygen;shower chair     Current Living Arrangements home/apartment/condo     Patient/Family Anticipates Transition to home     Patient/Family Anticipated Services at Transition none     DME Needed Upon Discharge  none     Discharge Plan A Home     Discharge Plan B Home        Physical Activity    On average, how many days per week do you engage in moderate to strenuous exercise (like a brisk walk)? 0 days     On average, how many minutes do you engage in exercise at this level? 0 min        Financial Resource Strain    How hard is it for you to pay for the very basics like food, housing, medical care, and heating? Very hard        Housing Stability    In the last 12 months, was there a time when you were not able to pay the mortgage or rent on time? No     In the last 12 months, how many places have you lived? 1     In the last 12 months, was there a time when you did not have a steady place to sleep or slept in a shelter (including now)? No        Transportation Needs    In the past 12 months, has lack of transportation kept you from medical appointments or from getting medications? No        Food Insecurity    Within the past 12 months, you worried that your food would run out before you got the money to buy more. Never true     Within the past 12 months, the food you bought just didn't last and you didn't have money to get more. Never true        Stress    Do you feel stress - tense, restless, nervous, or anxious, or unable to sleep at night because your mind is troubled all the time - these days? Very much         Social Connections    In a typical week, how many times do you talk on the phone with family, friends, or neighbors? More than three times a week     How often do you get together with friends or relatives? Never     Do you belong to any clubs or organizations such as Rastafari groups, unions, fraternal or athletic groups, or school groups? No     How often do you attend meetings of the clubs or organizations you belong to? Never     Are you , , , , never , or living with a partner?         Alcohol Use    Q1: How often do you have a drink containing alcohol? Never     Q2: How many drinks containing alcohol do you have on a typical day when you are drinking? Patient refused     Q3: How often do you have six or more drinks on one occasion? Never

## 2022-09-15 NOTE — PLAN OF CARE
Problem: Adult Inpatient Plan of Care  Goal: Plan of Care Review  Outcome: Ongoing, Progressing  Goal: Patient-Specific Goal (Individualized)  Outcome: Ongoing, Progressing  Goal: Absence of Hospital-Acquired Illness or Injury  Outcome: Ongoing, Progressing  Goal: Optimal Comfort and Wellbeing  Outcome: Ongoing, Progressing  Goal: Readiness for Transition of Care  Outcome: Ongoing, Progressing     Problem: Functional Ability Impaired COPD (Chronic Obstructive Pulmonary Disease)  Goal: Optimal Level of Functional [place]  Outcome: Ongoing, Progressing

## 2022-09-15 NOTE — PLAN OF CARE
Problem: Functional Ability Impaired COPD (Chronic Obstructive Pulmonary Disease)  Goal: Optimal Level of Functional [place]  Outcome: Ongoing, Progressing

## 2022-09-15 NOTE — SUBJECTIVE & OBJECTIVE
Past Medical History:   Diagnosis Date    Anxiety     Asthma     Carpal tunnel syndrome, bilateral     COPD (chronic obstructive pulmonary disease)     Heavy cigarette smoker     9/14/22 2PPD    Laryngeal papilloma     On home oxygen therapy     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    Right side with CT scan Referred to ENT for visualization       Past Surgical History:   Procedure Laterality Date    CARPAL TUNNEL RELEASE Bilateral     FINGER SURGERY      HYSTERECTOMY      OOPHORECTOMY  1996    Hysterectomy       Review of patient's allergies indicates:   Allergen Reactions    Antivert [meclizine] Other (See Comments)     Behavioral changes       No current facility-administered medications on file prior to encounter.     Current Outpatient Medications on File Prior to Encounter   Medication Sig    albuterol (PROVENTIL/VENTOLIN HFA) 90 mcg/actuation inhaler Inhale 2 puffs into the lungs every 6 (six) hours as needed.    doxycycline (MONODOX) 100 MG capsule Take 1 capsule (100 mg total) by mouth every 12 (twelve) hours. for 10 days    potassium chloride (MICRO-K) 10 MEQ CpSR TAKE 1 CAPSULE BY MOUTH TWICE DAILY FOR POTASSIUM    SUBOXONE 12-3 mg Film PLACE 1 FILM UNDER THE TONGUE TWICE DAILY.    albuterol-ipratropium (DUO-NEB) 2.5 mg-0.5 mg/3 mL nebulizer solution Take 3 mLs by nebulization every 4 (four) hours as needed for Wheezing. Rescue    furosemide (LASIX) 40 MG tablet 2 (two) times daily.    gabapentin (NEURONTIN) 600 MG tablet TAKE TWO TABLETS BY MOUTH THREE TIMES DAILY for chronic pain (Patient taking differently: Take 600 mg by mouth as needed. TAKE TWO TABLETS BY MOUTH THREE TIMES DAILY for chronic pain)    inhalation spacing device Use as directed for inhalation.    LIDOCAINE VISCOUS 2 % solution Can switch to equivalent, 10 ml swish and gargle 4 x daily    nicotine (NICODERM CQ) 21 mg/24 hr nicotine 21 mg/24 hr daily transdermal patch   apply 1 patch onto the skin daily    nystatin (MYCOSTATIN) 100,000 unit/mL  suspension nystatin 100,000 unit/mL oral suspension   SHAKE WELL. TAKE 5 ml (1 teaspoon) BY MOUTH FOUR TIMES DAILY FOR 14 DAYS. DO NOT REFRIGERATE    predniSONE (DELTASONE) 20 MG tablet Take 2 tablets (40 mg total) by mouth once daily. for 5 days     Family History       Problem Relation (Age of Onset)    COPD Mother    Heart disease Father    No Known Problems Sister, Brother          Tobacco Use    Smoking status: Every Day     Packs/day: 2.00     Years: 35.00     Pack years: 70.00     Types: Cigarettes     Start date: 12/1/1983    Smokeless tobacco: Current   Substance and Sexual Activity    Alcohol use: No     Alcohol/week: 0.0 standard drinks    Drug use: No    Sexual activity: Yes     Partners: Male     Review of Systems   Constitutional:  Negative for chills, fatigue and fever.   Eyes:  Negative for photophobia and visual disturbance.   Respiratory:  Positive for cough, shortness of breath and wheezing.    Cardiovascular:  Negative for chest pain, palpitations and leg swelling.   Gastrointestinal:  Negative for abdominal pain, diarrhea, nausea and vomiting.   Genitourinary:  Negative for dysuria, frequency and urgency.   Skin:  Negative for pallor, rash and wound.   Neurological:  Positive for headaches. Negative for light-headedness.   Psychiatric/Behavioral:  Negative for confusion and decreased concentration.    Objective:     Vital Signs (Most Recent):  Temp: 98.3 °F (36.8 °C) (09/15/22 0756)  Pulse: 96 (09/15/22 0756)  Resp: 18 (09/15/22 0756)  BP: (!) 155/81 (09/15/22 0756)  SpO2: (!) 93 % (09/15/22 0756)   Vital Signs (24h Range):  Temp:  [98.1 °F (36.7 °C)-98.3 °F (36.8 °C)] 98.3 °F (36.8 °C)  Pulse:  [69-97] 96  Resp:  [18-24] 18  SpO2:  [93 %-99 %] 93 %  BP: (109-174)/(65-89) 155/81     Weight: 91.1 kg (200 lb 13.4 oz)  Body mass index is 35.58 kg/m².    Physical Exam  Vitals and nursing note reviewed.   Constitutional:       General: She is not in acute distress.     Appearance: She is  well-developed.   HENT:      Head: Normocephalic and atraumatic.      Right Ear: External ear normal.      Left Ear: External ear normal.      Nose: Nose normal.   Eyes:      Conjunctiva/sclera: Conjunctivae normal.      Pupils: Pupils are equal, round, and reactive to light.   Cardiovascular:      Rate and Rhythm: Normal rate and regular rhythm.   Pulmonary:      Effort: Pulmonary effort is normal. No respiratory distress.      Breath sounds: Wheezing present.   Abdominal:      General: Bowel sounds are normal. There is no distension.      Palpations: Abdomen is soft.      Tenderness: There is no abdominal tenderness.      Comments: No palpable hepatomegaly or splenomegaly    Musculoskeletal:         General: No tenderness. Normal range of motion.      Cervical back: Normal range of motion and neck supple.   Skin:     General: Skin is warm and dry.   Neurological:      Mental Status: She is alert and oriented to person, place, and time.   Psychiatric:         Thought Content: Thought content normal.         CRANIAL NERVES     CN III, IV, VI   Pupils are equal, round, and reactive to light.     Significant Labs: All pertinent labs within the past 24 hours have been reviewed.    Significant Imaging: I have reviewed all pertinent imaging results/findings within the past 24 hours.

## 2022-09-15 NOTE — PT/OT/SLP EVAL
Occupational Therapy   Evaluation and Discharge Note    Name: Yasmeen Valderrama  MRN: [id number]  Admitting Diagnosis:  COPD exacerbation   Recent Surgery: * No surgery found *      Recommendations:     Discharge Recommendations: other (see comments) (Patient may benefit from cardiopulmonary rehab.)  Discharge Equipment Recommendations: bedside commode, other (see comments) (Patient is interested in obtaining a power chair/scooter for community outings.)  Barriers to Discharge: None    Assessment:     Yasmeen Valderrama is a 52 y.o. female with a medical diagnosis of COPD exacerbation.  At this time, patient is functioning at her prior level of function and does not require further acute OT services.     Plan:     During this hospitalization, patient does not require further acute OT services.  Please re-consult if situation changes.    Plan of Care Reviewed with: patient, other (see comments) (guest)    Subjective     Chief Complaint: Pain at feet as fluid drains/swelling resolves   Patient/Family Comments/Goals: To return to home with increased oxygen supply     Occupational Profile:  Living Environment: The patient lives at home with her  (mobile home with 3 steps to enter).   Previous Level of Function: Patient was Mod I for functional mob's and ADL's; she had her  supervise/stay in bathroom while she showered for safety reasons.   Roles and Routines: Patient was still driving and enjoyed being active (as able).   Equipment Used at Home: Hospital bed, oxygen, shower chair, and nebulizer  Assistance upon Discharge: Patient's  will be available to assist her upon D/C.     Pain/Comfort:  Pain Rating 1: (Patient reported some mild/moderate pain at bilateral feet; she states that this normally happens when she is swollen and the swelling starts to subside.)  Location - Side 1: Bilateral  Location - Orientation 1: Distal  Location 1: Feet  Pain Addressed 1: Reposition, Distraction  Pain Rating  Post-Intervention 1: (Not taken.)    Patients cultural, spiritual, Amish conflicts discussed given the current situation: Yes     Objective:     Communicated with: The evaluating PT prior to session.  Patient found in bed with HOB elevated with oxygen, pulse ox (continuous), and peripheral IV upon OT entry to room.    General Precautions: Standard, respiratory   Orthopedic Precautions: N/A   Braces: N/A  Respiratory Status: Nasal cannula, flow 3 L/min     Occupational Performance:    Bed Mobility:    Patient completed all bed mob's, including supine <---> sitting at EOB and all scooting, with Mod I.     Functional Mobility/Transfers:  Transfers: Mod I to stand from EOB with no AD; Mod I to transfer from bed to BSC with no AD   Functional Mobility: Mod I for 5-10 ft of functional ambulation in room (to/from bed/BSC) with no AD    Activities of Daily Living:  Lower-Body Dressing: Mod I from EOB to aidan/doff socks and slippers  Toileting: Mod I for all tasks with use of BSC    Cognitive/Visual Perceptual:  Cognitive/Psychosocial Skills:     -       Oriented to: Person, Place, Time, and Situation   -       Follows Commands/Attention: Follows multi-step commands  -       Communication: Clear/Fluent  -       Memory: No deficits noted  -       Safety Awareness: Intact   -       Mood/Affect/Coping Skills/Emotional Control: Appropriate to Situation, Cooperative, Happy, and Pleasant  Visual/Perceptual:      -Intact     Physical Exam:  Balance:    -       Sitting and standing balance are intact.  Skin Integrity: Visible skin is intact.  Edema: Mild (resolving) edema was noted at distal BLE's.  Sensation:    -       Impaired (per patient's verbal report at bilateral hands and BLE's)  Upper Extremity Range of Motion:     -       Right Upper Extremity: WNL  -       Left Upper Extremity: WNL  Upper Extremity Strength:    -       Right Upper Extremity: 4-/5  -       Left Upper Extremity: 4-/5    AMPAC 6 Click ADL:  [facility] Total  Score: 23    Treatment & Education:  Patient evaluated and discharged by OT today.  Patient requires no further acute OT services.  Patient to D/C to home; patient may benefit from CP rehab.  Patient to continue O2 use at home (now at 3 lpm).  PATIENT REQUIRES A BSC FOR HOME USE.  PATIENT IS INTERESTED IN A POWER WHEELCHAIR/SCOOTER FOR COMMUNITY OUTINGS.     Please see per above for further info.       Patient left seated on BSC with guest/spouse present and call button within reach.     GOALS:   Multidisciplinary Problems       Occupational Therapy Goals          Problem: Occupational Therapy    Goal Priority Disciplines Outcome Interventions   Occupational Therapy Goal     OT, PT/OT Adequate for Care Transition                        History:     Past Medical History:   Diagnosis Date    Anxiety     Asthma     Carpal tunnel syndrome, bilateral     COPD (chronic obstructive pulmonary disease)     Heavy cigarette smoker     9/14/22 2PPD    Laryngeal papilloma     On home oxygen therapy     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    Right side with CT scan Referred to ENT for visualization         Past Surgical History:   Procedure Laterality Date    CARPAL TUNNEL RELEASE Bilateral     FINGER SURGERY      HYSTERECTOMY      OOPHORECTOMY  1996    Hysterectomy       Time Tracking:     OT Date of Treatment: 09/15/22  OT Start Time: 1515  OT Stop Time: 1544  OT Total Time (min): 29 min    Billable Minutes:Evaluation 15 mins  Self Care/Home Management 14 mins    9/15/2022

## 2022-09-15 NOTE — PLAN OF CARE
Problem: Physical Therapy  Goal: Physical Therapy Goal  Description: Goals to be met by: 22     Patient will increase functional independence with mobility by performin. Gait >500 feet with Modified [place] using No Assistive Device and on 3L O2 NC with spO2 >90%  2. Ascend/descend 1 flight of stair with right Handrail Modified [place] using No Assistive Device and on 3L O2 NC with spO2 >90%  3. Upper/Lower extremity exercise program 2 sets x15 reps per handout, with independence and on 3L O2 NC with spO2 >90%    Outcome: Ongoing, Progressing     Pt ambulated ~500 ft with S using no AD and on 3L O2 NC, spO2 ~87% and HR ~106 bpm.

## 2022-09-15 NOTE — ASSESSMENT & PLAN NOTE
Increased dyspnea and cough, continue nebs, corticosteroids, antibiotics.  Home oxygen.  Encourage smoking cessation.

## 2022-09-15 NOTE — ASSESSMENT & PLAN NOTE
Patient with Hypercapnic and Hypoxic Respiratory failure which is Chronic.  she is on home oxygen at 3 LPM. Supplemental oxygen was provided and noted-  .   Signs/symptoms of respiratory failure include- wheezing. Contributing diagnoses includes - COPD Labs and images were reviewed. Patient Has not had a recent ABG. Will treat underlying causes and adjust management of respiratory failure as follows- continue home oxygen

## 2022-09-15 NOTE — H&P
[facility] Medicine  History & Physical    Patient Name: Yasmeen Valderrama  MRN: [id number]  Patient Class: OP- Observation  Admission Date: 9/14/2022  Attending Physician: Matt Norris MD   Primary Care Provider: Leeanna Stuart MD         Patient information was obtained from patient, past medical records and ER records.     Subjective:     Principal Problem:COPD exacerbation    Chief Complaint:   Chief Complaint   Patient presents with    Shortness of Breath     51 yo female to triage for SOB, HA, Weakness. Pt was seen by here PCP on yesterday. PCP called her today after reviewing her labs and instructed her to come to ED and be admitted.     Headache    Weakness    Leg Swelling        HPI: 52 y.o. female with stage IV very severe COPD by gold classification, nicotine dependence chronic hoarseness, anxiety, chronic pain syndrome laryngeal papilloma, KATHERINE, chronic respiratory failure with hypoxia and hypercapnia, pulmonary hypertension presents with a complaint of shortness of breath.  Acutely worse than baseline, duration 2-3 days, associated with increased cough, has been taking DuoNebs and prednisone without much improvement.  On home oxygen at baseline.  Denies fever, chills, chest pain, palpitations, dizziness, syncope, nausea, vomiting, diarrhea, abdominal pain, or dysuria.  In the ED, she was found to be wheezing.  Stable on her usual home oxygen dose.  Labs show leukocytosis.  Otherwise unremarkable for any acute abnormality.  Placed in observation.      Past Medical History:   Diagnosis Date    Anxiety     Asthma     Carpal tunnel syndrome, bilateral     COPD (chronic obstructive pulmonary disease)     Heavy cigarette smoker     9/14/22 2PPD    Laryngeal papilloma     On home oxygen therapy     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    Right side with CT scan Referred to ENT for visualization       Past Surgical History:   Procedure Laterality Date    CARPAL TUNNEL  RELEASE Bilateral     FINGER SURGERY      HYSTERECTOMY      OOPHORECTOMY  1996    Hysterectomy       Review of patient's allergies indicates:   Allergen Reactions    Antivert [meclizine] Other (See Comments)     Behavioral changes       No current facility-administered medications on file prior to encounter.     Current Outpatient Medications on File Prior to Encounter   Medication Sig    albuterol (PROVENTIL/VENTOLIN HFA) 90 mcg/actuation inhaler Inhale 2 puffs into the lungs every 6 (six) hours as needed.    albuterol-ipratropium (DUO-NEB) 2.5 mg-0.5 mg/3 mL nebulizer solution Take 3 mLs by nebulization every 4 (four) hours as needed for Wheezing. Rescue    doxycycline (MONODOX) 100 MG capsule Take 1 capsule (100 mg total) by mouth every 12 (twelve) hours. for 10 days    furosemide (LASIX) 40 MG tablet 2 (two) times daily.    gabapentin (NEURONTIN) 600 MG tablet TAKE TWO TABLETS BY MOUTH THREE TIMES DAILY for chronic pain (Patient taking differently: Take 600 mg by mouth as needed. TAKE TWO TABLETS BY MOUTH THREE TIMES DAILY for chronic pain)    inhalation spacing device Use as directed for inhalation.    LIDOCAINE VISCOUS 2 % solution Can switch to equivalent, 10 ml swish and gargle 4 x daily    nicotine (NICODERM CQ) 21 mg/24 hr nicotine 21 mg/24 hr daily transdermal patch   apply 1 patch onto the skin daily    nystatin (MYCOSTATIN) 100,000 unit/mL suspension nystatin 100,000 unit/mL oral suspension   SHAKE WELL. TAKE 5 ml (1 teaspoon) BY MOUTH FOUR TIMES DAILY FOR 14 DAYS. DO NOT REFRIGERATE    potassium chloride (MICRO-K) 10 MEQ CpSR TAKE 1 CAPSULE BY MOUTH TWICE DAILY FOR POTASSIUM    predniSONE (DELTASONE) 20 MG tablet Take 2 tablets (40 mg total) by mouth once daily. for 5 days    SUBOXONE 12-3 mg Film PLACE 1 FILM UNDER THE TONGUE TWICE DAILY.    [DISCONTINUED] fluticasone-umeclidin-vilanter (TRELEGY ELLIPTA) 100-62.5-25 mcg DsDv Inhale 1 puff into the lungs once daily.     Family History        Problem Relation (Age of Onset)    COPD Mother    Heart disease Father    No Known Problems Sister, Brother          Tobacco Use    Smoking status: Every Day     Packs/day: 2.00     Years: 35.00     Pack years: 70.00     Types: Cigarettes     Start date: 12/1/1983    Smokeless tobacco: Current   Substance and Sexual Activity    Alcohol use: No     Alcohol/week: 0.0 standard drinks    Drug use: No    Sexual activity: Yes     Partners: Male     Review of Systems   Constitutional:  Negative for chills, fatigue and fever.   Eyes:  Negative for photophobia and visual disturbance.   Respiratory:  Positive for cough, shortness of breath and wheezing.    Cardiovascular:  Negative for chest pain, palpitations and leg swelling.   Gastrointestinal:  Negative for abdominal pain, diarrhea, nausea and vomiting.   Genitourinary:  Negative for dysuria, frequency and urgency.   Skin:  Negative for pallor, rash and wound.   Neurological:  Positive for headaches. Negative for light-headedness.   Psychiatric/Behavioral:  Negative for confusion and decreased concentration.    Objective:     Vital Signs (Most Recent):  Temp: 98.1 °F (36.7 °C) (09/14/22 2027)  Pulse: 82 (09/14/22 2027)  Resp: 18 (09/14/22 2027)  BP: 133/70 (09/14/22 2027)  SpO2: (!) 94 % (09/14/22 2027)   Vital Signs (24h Range):  Temp:  [98.1 °F (36.7 °C)] 98.1 °F (36.7 °C)  Pulse:  [69-97] 82  Resp:  [18-24] 18  SpO2:  [93 %-99 %] 94 %  BP: (109-174)/(65-89) 133/70     Weight: 91.1 kg (200 lb 13.4 oz)  Body mass index is 35.58 kg/m².    Physical Exam  Vitals and nursing note reviewed.   Constitutional:       General: She is not in acute distress.     Appearance: She is well-developed.   HENT:      Head: Normocephalic and atraumatic.      Right Ear: External ear normal.      Left Ear: External ear normal.      Nose: Nose normal.   Eyes:      Conjunctiva/sclera: Conjunctivae normal.      Pupils: Pupils are equal, round, and reactive to light.   Cardiovascular:       Rate and Rhythm: Normal rate and regular rhythm.   Pulmonary:      Effort: Pulmonary effort is normal. No respiratory distress.      Breath sounds: Wheezing present.   Abdominal:      General: Bowel sounds are normal. There is no distension.      Palpations: Abdomen is soft.      Tenderness: There is no abdominal tenderness.      Comments: No palpable hepatomegaly or splenomegaly    Musculoskeletal:         General: No tenderness. Normal range of motion.      Cervical back: Normal range of motion and neck supple.   Skin:     General: Skin is warm and dry.   Neurological:      Mental Status: She is alert and oriented to person, place, and time.   Psychiatric:         Thought Content: Thought content normal.         CRANIAL NERVES     CN III, IV, VI   Pupils are equal, round, and reactive to light.     Significant Labs: All pertinent labs within the past 24 hours have been reviewed.    Significant Imaging: I have reviewed all pertinent imaging results/findings within the past 24 hours.    Assessment/Plan:     * COPD exacerbation  Increased dyspnea and cough, continue nebs, corticosteroids, antibiotics.  Home oxygen.  Encourage smoking cessation.    Pulmonary hypertension  Secondary to chronic lung disease    Chronic respiratory failure with hypoxia and hypercapnia  Patient with Hypercapnic and Hypoxic Respiratory failure which is Chronic.  she is on home oxygen at 3 LPM. Supplemental oxygen was provided and noted-  .   Signs/symptoms of respiratory failure include- wheezing. Contributing diagnoses includes - COPD Labs and images were reviewed. Patient Has not had a recent ABG. Will treat underlying causes and adjust management of respiratory failure as follows- continue home oxygen    Nicotine dependence, uncomplicated  5 minutes spent counseling the patient on smoking cessation and she is not currently ready to stop smoking. She will be offereded a nicotine transdermal patch while hospitalized and monitored for  withdrawal.  Will provide additional smoking cessation counseling prior to discharge.     Stage 4 very severe COPD by GOLD classification  As above      VTE Risk Mitigation (From admission, onward)         Ordered     enoxaparin injection 40 mg  Daily         09/14/22 1901     IP VTE HIGH RISK PATIENT  Once         09/14/22 1901     Place sequential compression device  Until discontinued         09/14/22 1901               As clarification, on 09/14/2022, patient should be placed in hospital observation services under my care in collaboration with MD Bishnu Fraser Jr., APRN, [facility]  Hospitalist - Department of Hospital Medicine  [facility] [address] [place] [id number]  Office #: [phone]; Pager #: [phone]

## 2022-09-15 NOTE — PT/OT/SLP EVAL
Physical Therapy Evaluation    Patient Name:  Yasmeen Valderrama   MRN:  [id number]    Recommendations:     Discharge Recommendations:  outpatient PT   Discharge Equipment Recommendations: none   Barriers to discharge: None    Assessment:     Yasmeen Valderrama is a 52 y.o. female admitted with a medical diagnosis of COPD exacerbation.  She presents with the following impairments/functional limitations:  impaired endurance, impaired cardiopulmonary response to activity.    Rehab Prognosis: Good; patient would benefit from acute skilled PT services to address these deficits and reach maximum level of function.    Recent Surgery: * No surgery found *      Plan:     During this hospitalization, patient to be seen 2 x/week to address the identified rehab impairments via gait training, therapeutic activities, therapeutic exercises and progress toward the following goals:    Plan of Care Expires:  09/29/22    Subjective     Chief Complaint: N/A  Patient/Family Comments/goals: Pt agreeable to therapy.   Pain/Comfort:  Pain Rating 1: 0/10      Living Environment:  Pt lives with spouse in a mobile home with ~3 CALEB at entry.   Prior to admission, patients level of function was mod I with ambulation using no AD but on home O2 @3L.  Pt was driving PTA.  Equipment at home: hospital bed, oxygen, shower chair.  Upon discharge, patient will have assistance from spouse and adult children.    Objective:     Patient found HOB elevated with oxygen, peripheral IV, telemetry  upon PT entry to room.    General Precautions: Standard, respiratory   Orthopedic Precautions:N/A   Braces: N/A  Respiratory Status: Nasal cannula, flow 3 L/min    Exams:  Cognitive Exam:  Patient was able to follow multiple commands.   Gross Motor Coordination:  WFL  Postural Exam:  Patient presented with the following abnormalities:    -       Rounded shoulders  -       Forward head  Sensation:    -       Intact  light/touch BLE  Skin Integrity/Edema:      -        Skin integrity: Visible skin intact  -       Edema: None noted BLE  BLE ROM: WFL  BLE Strength: WFL    Functional Mobility:  Bed Mobility:     Scooting: modified independence  Supine to Sit: modified independence  Transfers:     Sit to Stand:  modified independence with no AD  Bed to Chair: modified independence with  no AD  using  Step Transfer  Gait: Pt ambulated ~500 ft with S using no AD and on 3L O2 NC.  Pt with no major gait deviations.  spO2 ~87% and HR ~106 bpm during gait.  Once seated in chair with education on pursed lip breathing, spO2 on 3L ~93% and HR ~90's bpm.   Balance: Pt with good dynamic standing balance.     Therapeutic Activities and Exercises:  Pt educated on acute skilled PT services and goals.  Pt encouraged OOB>chair and ambulation in the hallway with nursing supervision while in the hospital.  Pt reported that her pulmonologist had mention therapy prior to pt's admit to hospital.  It may be pulmonary rehab, which would be beneficial to pt along with smoking cessation.  Pt verbalized good understanding.     AM-PAC 6 CLICK MOBILITY  Total Score:23     Patient left up in chair with all lines intact, call button in reach, and nurse April notified.  Lunch tray set-up.      GOALS:   Multidisciplinary Problems       Physical Therapy Goals          Problem: Physical Therapy    Goal Priority Disciplines Outcome Goal Variances Interventions   Physical Therapy Goal     PT, PT/OT Ongoing, Progressing     Description: Goals to be met by: 22     Patient will increase functional independence with mobility by performin. Gait >500 feet with Modified [place] using No Assistive Device and on 3L O2 NC with spO2 >90%  2. Ascend/descend 1 flight of stair with right Handrail Modified [place] using No Assistive Device and on 3L O2 NC with spO2 >90%  3. Upper/Lower extremity exercise program 2 sets x15 reps per handout, with independence and on 3L O2 NC with spO2 >90%                          History:     Past Medical History:   Diagnosis Date    Anxiety     Asthma     Carpal tunnel syndrome, bilateral     COPD (chronic obstructive pulmonary disease)     Heavy cigarette smoker     9/14/22 2PPD    Laryngeal papilloma     On home oxygen therapy     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    Right side with CT scan Referred to ENT for visualization       Past Surgical History:   Procedure Laterality Date    CARPAL TUNNEL RELEASE Bilateral     FINGER SURGERY      HYSTERECTOMY      OOPHORECTOMY  1996    Hysterectomy       Time Tracking:     PT Received On: 09/15/22  PT Start Time: 1133     PT Stop Time: 1146  PT Total Time (min): 13 min     Billable Minutes:   Evaluation 13    09/15/2022

## 2022-09-16 VITALS
OXYGEN SATURATION: 93 % | HEART RATE: 88 BPM | SYSTOLIC BLOOD PRESSURE: 173 MMHG | HEIGHT: 63 IN | TEMPERATURE: 98 F | RESPIRATION RATE: 18 BRPM | BODY MASS INDEX: 35.58 KG/M2 | WEIGHT: 200.81 LBS | DIASTOLIC BLOOD PRESSURE: 81 MMHG

## 2022-09-16 LAB
ANION GAP SERPL CALC-SCNC: 9 MMOL/L (ref 8–16)
BASOPHILS # BLD AUTO: 0.02 K/UL (ref 0–0.2)
BASOPHILS NFR BLD: 0.2 % (ref 0–1.9)
BUN SERPL-MCNC: 20 MG/DL (ref 6–20)
CALCIUM SERPL-MCNC: 9.5 MG/DL (ref 8.7–10.5)
CHLORIDE SERPL-SCNC: 97 MMOL/L (ref 95–110)
CO2 SERPL-SCNC: 32 MMOL/L (ref 23–29)
CREAT SERPL-MCNC: 0.6 MG/DL (ref 0.5–1.4)
DIFFERENTIAL METHOD: ABNORMAL
EOSINOPHIL # BLD AUTO: 0 K/UL (ref 0–0.5)
EOSINOPHIL NFR BLD: 0 % (ref 0–8)
ERYTHROCYTE [DISTWIDTH] IN BLOOD BY AUTOMATED COUNT: 13.2 % (ref 11.5–14.5)
EST. GFR  (NO RACE VARIABLE): >60 ML/MIN/1.73 M^2
GLUCOSE SERPL-MCNC: 181 MG/DL (ref 70–110)
HCT VFR BLD AUTO: 43.8 % (ref 37–48.5)
HGB BLD-MCNC: 13.4 G/DL (ref 12–16)
IMM GRANULOCYTES # BLD AUTO: 0.14 K/UL (ref 0–0.04)
IMM GRANULOCYTES NFR BLD AUTO: 1.1 % (ref 0–0.5)
LYMPHOCYTES # BLD AUTO: 1.4 K/UL (ref 1–4.8)
LYMPHOCYTES NFR BLD: 10.8 % (ref 18–48)
MAGNESIUM SERPL-MCNC: 2.2 MG/DL (ref 1.6–2.6)
MCH RBC QN AUTO: 28.5 PG (ref 27–31)
MCHC RBC AUTO-ENTMCNC: 30.6 G/DL (ref 32–36)
MCV RBC AUTO: 93 FL (ref 82–98)
MONOCYTES # BLD AUTO: 0.2 K/UL (ref 0.3–1)
MONOCYTES NFR BLD: 1.7 % (ref 4–15)
NEUTROPHILS # BLD AUTO: 11 K/UL (ref 1.8–7.7)
NEUTROPHILS NFR BLD: 86.2 % (ref 38–73)
NRBC BLD-RTO: 0 /100 WBC
PHOSPHATE SERPL-MCNC: 3.3 MG/DL (ref 2.7–4.5)
PLATELET # BLD AUTO: 263 K/UL (ref 150–450)
PMV BLD AUTO: 11 FL (ref 9.2–12.9)
POTASSIUM SERPL-SCNC: 4.3 MMOL/L (ref 3.5–5.1)
RBC # BLD AUTO: 4.71 M/UL (ref 4–5.4)
SODIUM SERPL-SCNC: 138 MMOL/L (ref 136–145)
WBC # BLD AUTO: 12.74 K/UL (ref 3.9–12.7)

## 2022-09-16 PROCEDURE — 94760 N-INVAS EAR/PLS OXIMETRY 1: CPT

## 2022-09-16 PROCEDURE — 84100 ASSAY OF PHOSPHORUS: CPT | Performed by: HOSPITALIST

## 2022-09-16 PROCEDURE — 25000003 PHARM REV CODE 250: Performed by: HOSPITALIST

## 2022-09-16 PROCEDURE — 85025 COMPLETE CBC W/AUTO DIFF WBC: CPT | Performed by: HOSPITALIST

## 2022-09-16 PROCEDURE — 80048 BASIC METABOLIC PNL TOTAL CA: CPT | Performed by: HOSPITALIST

## 2022-09-16 PROCEDURE — 63600175 PHARM REV CODE 636 W HCPCS: Performed by: HOSPITALIST

## 2022-09-16 PROCEDURE — 83735 ASSAY OF MAGNESIUM: CPT | Performed by: HOSPITALIST

## 2022-09-16 PROCEDURE — 36415 COLL VENOUS BLD VENIPUNCTURE: CPT | Performed by: HOSPITALIST

## 2022-09-16 PROCEDURE — S4991 NICOTINE PATCH NONLEGEND: HCPCS | Performed by: HOSPITALIST

## 2022-09-16 PROCEDURE — 25000242 PHARM REV CODE 250 ALT 637 W/ HCPCS: Performed by: HOSPITALIST

## 2022-09-16 PROCEDURE — 94640 AIRWAY INHALATION TREATMENT: CPT | Mod: XB

## 2022-09-16 PROCEDURE — 27000221 HC OXYGEN, UP TO 24 HOURS

## 2022-09-16 PROCEDURE — 96376 TX/PRO/DX INJ SAME DRUG ADON: CPT

## 2022-09-16 PROCEDURE — G0378 HOSPITAL OBSERVATION PER HR: HCPCS

## 2022-09-16 RX ORDER — AMLODIPINE BESYLATE 5 MG/1
10 TABLET ORAL DAILY
Status: DISCONTINUED | OUTPATIENT
Start: 2022-09-16 | End: 2022-09-16 | Stop reason: HOSPADM

## 2022-09-16 RX ORDER — AMLODIPINE BESYLATE 10 MG/1
10 TABLET ORAL DAILY
Qty: 30 TABLET | Refills: 0 | Status: SHIPPED | OUTPATIENT
Start: 2022-09-16 | End: 2022-11-15

## 2022-09-16 RX ADMIN — FAMOTIDINE 20 MG: 20 TABLET ORAL at 09:09

## 2022-09-16 RX ADMIN — AMLODIPINE BESYLATE 10 MG: 5 TABLET ORAL at 09:09

## 2022-09-16 RX ADMIN — BUPRENORPHINE AND NALOXONE 6 FILM: 2; .5 FILM BUCCAL; SUBLINGUAL at 09:09

## 2022-09-16 RX ADMIN — IPRATROPIUM BROMIDE AND ALBUTEROL SULFATE 3 ML: 2.5; .5 SOLUTION RESPIRATORY (INHALATION) at 07:09

## 2022-09-16 RX ADMIN — METHYLPREDNISOLONE SODIUM SUCCINATE 125 MG: 125 INJECTION, POWDER, FOR SOLUTION INTRAMUSCULAR; INTRAVENOUS at 09:09

## 2022-09-16 RX ADMIN — Medication 1 PATCH: at 09:09

## 2022-09-16 RX ADMIN — DOXYCYCLINE HYCLATE 100 MG: 100 TABLET, COATED ORAL at 09:09

## 2022-09-16 RX ADMIN — ACETAMINOPHEN 650 MG: 325 TABLET ORAL at 09:09

## 2022-09-16 RX ADMIN — IPRATROPIUM BROMIDE AND ALBUTEROL SULFATE 3 ML: 2.5; .5 SOLUTION RESPIRATORY (INHALATION) at 01:09

## 2022-09-16 NOTE — PLAN OF CARE
SW called PCP to schedule appointment. No answer. SW left a message to call patient to schedule appointment.

## 2022-09-16 NOTE — PLAN OF CARE
Problem: Adult Inpatient Plan of Care  Goal: Plan of Care Review  Outcome: Met  Goal: Patient-Specific Goal (Individualized)  Outcome: Met  Goal: Absence of Hospital-Acquired Illness or Injury  Outcome: Met  Goal: Optimal Comfort and Wellbeing  Outcome: Met  Goal: Readiness for Transition of Care  Outcome: Met     Problem: Functional Ability Impaired COPD (Chronic Obstructive Pulmonary Disease)  Goal: Optimal Level of Functional [place]  Outcome: Met

## 2022-09-16 NOTE — DISCHARGE SUMMARY
[facility]  Discharge Summary      Patient Name: Yasmeen Valderrama  MRN: [id number]  Patient Class: OP- Observation  Admission Date: 9/14/2022  Hospital Length of Stay:2 days   Discharge Date and Time:  09/16/2022 11:22 AM  Attending Physician: Peri Guo MD   Discharging Provider: Peri Guo MD  Primary Care Provider: Leeanna Stuart MD      HPI:   52 y.o. female with stage IV very severe COPD by gold classification, nicotine dependence chronic hoarseness, anxiety, chronic pain syndrome laryngeal papilloma, KATHERINE, chronic respiratory failure with hypoxia and hypercapnia, pulmonary hypertension presents with a complaint of shortness of breath.  Acutely worse than baseline, duration 2-3 days, associated with increased cough, has been taking DuoNebs and prednisone without much improvement.  On home oxygen at baseline.  Denies fever, chills, chest pain, palpitations, dizziness, syncope, nausea, vomiting, diarrhea, abdominal pain, or dysuria.  In the ED, she was found to be wheezing.  Stable on her usual home oxygen dose.  Labs show leukocytosis.  Otherwise unremarkable for any acute abnormality.  Placed in observation.      * No surgery found *      Hospital Course:   52 y.o. female with stage IV very severe COPD by gold classification, nicotine dependence chronic hoarseness, anxiety, chronic pain syndrome laryngeal papilloma, KATHERINE, chronic respiratory failure with hypoxia and hypercapnia, pulmonary hypertension presents with a complaint of shortness of breath.  Acutely worse than baseline, duration 2-3 days, associated with increased cough, has been taking DuoNebs and prednisone without much improvement.  On home oxygen at baseline.  Denies fever, chills, chest pain, palpitations, dizziness, syncope, nausea, vomiting, diarrhea, abdominal pain, or dysuria.  In the ED, she was found to be wheezing.  Stable on her usual home oxygen dose.  Labs show  "leukocytosis.  Otherwise unremarkable for any acute abnormality.  Placed in observation.  Was on COPD pathway,wheezing did not improved,switched prednisone to IV Solumedrol with improvement,did well with PT,OT and DME and out patient PT,OT art DC time is arranged,patient was discharged home with po prednisone and follow up with PCP as out patient,.         Goals of Care Treatment Preferences:  Code Status: Full Code      Consults:     No new Assessment & Plan notes have been filed under this hospital service since the last note was generated.  Service: Hospital Medicine    Final Active Diagnoses:    Diagnosis Date Noted POA    PRINCIPAL PROBLEM:  COPD exacerbation [J44.1] 06/28/2022 Yes    Chronic respiratory failure with hypoxia and hypercapnia [J96.11, J96.12] 06/28/2022 Yes     Chronic    Pulmonary hypertension [I27.20] 06/28/2022 Yes     Chronic    Aortic atherosclerosis [I70.0] 03/19/2021 Yes    KATHERINE (obstructive sleep apnea) [G47.33] 03/04/2020 Yes    Laryngeal papilloma [D14.1] 06/13/2018 Yes    Lumbar radiculopathy, chronic [M54.16] 09/09/2015 Yes    Stage 4 very severe COPD by GOLD classification [J44.9] 12/01/2014 Yes     Chronic    Nicotine dependence, uncomplicated [F17.200] 12/01/2014 Yes     Chronic      Problems Resolved During this Admission:       Discharged Condition: stable    Disposition: Home or Self Care    Follow Up:   Follow-up Information     Leeanna Stuart MD Follow up in 1 week(s).    Specialty: Family Medicine  Why: Please call PCP to schedule appointment.  Contact information:  [address] [address]  [facility] [address] [phone]                       Patient Instructions:      COMMODE FOR HOME USE     Order Specific Question Answer Comments   Type: Standard    Height: 5' 3" (1.6 m)    Weight: 91.1 kg (200 lb 13.4 oz)    Does patient have medical equipment at home? hospital bed    Does patient have medical equipment at home? oxygen    Does patient have " medical equipment at home? shower chair    Does patient have medical equipment at home? nebulizer    Length of need (1-99 months): 99      Ambulatory referral/consult to Physical/Occupational Therapy   Standing Status: Future   Referral Priority: Routine Referral Type: Physical Medicine   Referral Reason: Specialty Services Required   Number of Visits Requested: 1     Activity as tolerated       Significant Diagnostic Studies: Labs:   BMP:   Recent Labs   Lab 09/14/22  1411 09/15/22  0555 09/16/22  0408   * 108 181*    144 138   K 4.7 3.8 4.3   CL 95 94* 97   CO2 37* 40* 32*   BUN 15 20 20   CREATININE 0.7 0.7 0.6   CALCIUM 10.2 9.9 9.5   MG  --  2.2 2.2   , CMP   Recent Labs   Lab 09/14/22  1411 09/15/22  0555 09/16/22  0408    144 138   K 4.7 3.8 4.3   CL 95 94* 97   CO2 37* 40* 32*   * 108 181*   BUN 15 20 20   CREATININE 0.7 0.7 0.6   CALCIUM 10.2 9.9 9.5   PROT 7.7  --   --    ALBUMIN 4.1  --   --    BILITOT 0.5  --   --    ALKPHOS 123  --   --    AST 15  --   --    ALT 14  --   --    ANIONGAP 8 10 9    and CBC   Recent Labs   Lab 09/14/22  1411 09/15/22  0555 09/16/22  0408   WBC 17.59* 14.43* 12.74*   HGB 13.4 13.2 13.4   HCT 42.7 44.0 43.8    264 263     Radiology: X-Ray: CXR: X-Ray Chest 1 View (CXR): No results found for this visit on 09/14/22. and X-Ray Chest PA and Lateral (CXR): No results found for this visit on 09/14/22.    Pending Diagnostic Studies:     None         Medications:  Reconciled Home Medications:      Medication List      START taking these medications    amLODIPine 10 MG tablet  Commonly known as: NORVASC  Take 1 tablet (10 mg total) by mouth once daily.        CHANGE how you take these medications    gabapentin 600 MG tablet  Commonly known as: NEURONTIN  TAKE TWO TABLETS BY MOUTH THREE TIMES DAILY for chronic pain  What changed:   · how much to take  · how to take this  · when to take this  · reasons to take this        CONTINUE taking these  medications    albuterol 90 mcg/actuation inhaler  Commonly known as: PROVENTIL/VENTOLIN HFA  Inhale 2 puffs into the lungs every 6 (six) hours as needed.     albuterol-ipratropium 2.5 mg-0.5 mg/3 mL nebulizer solution  Commonly known as: DUO-NEB  Take 3 mLs by nebulization every 4 (four) hours as needed for Wheezing. Rescue     doxycycline 100 MG capsule  Commonly known as: MONODOX  Take 1 capsule (100 mg total) by mouth every 12 (twelve) hours. for 10 days     furosemide 40 MG tablet  Commonly known as: LASIX  2 (two) times daily.     inhalation spacing device  Use as directed for inhalation.     LIDOcaine VISCOUS 2 % Soln  Generic drug: LIDOcaine HCl 2%  Can switch to equivalent, 10 ml swish and gargle 4 x daily     nicotine 21 mg/24 hr  Commonly known as: NICODERM CQ  nicotine 21 mg/24 hr daily transdermal patch   apply 1 patch onto the skin daily     nystatin 100,000 unit/mL suspension  Commonly known as: MYCOSTATIN  nystatin 100,000 unit/mL oral suspension   SHAKE WELL. TAKE 5 ml (1 teaspoon) BY MOUTH FOUR TIMES DAILY FOR 14 DAYS. DO NOT REFRIGERATE     predniSONE 20 MG tablet  Commonly known as: DELTASONE  Take 2 tablets (40 mg total) by mouth once daily. for 5 days     SUBOXONE 12-3 mg Film  Generic drug: buprenorphine-naloxone  PLACE 1 FILM UNDER THE TONGUE TWICE DAILY.        STOP taking these medications    potassium chloride 10 MEQ Cpsr  Commonly known as: MICRO-K            Indwelling Lines/Drains at time of discharge:   Lines/Drains/Airways     None                 Time spent on the discharge of patient: less than 30  minutes         Peri Guo MD  Department of Hospital Medicine  [facility] [place] [facility]

## 2022-09-16 NOTE — NURSING
Discharge instructions explained to the pt. All questions answered. Pt aware of all follow up appts. Discharge medication delivered to the bedside. Portable oxygen tank brought to the bedside by family member. Pt stable and cooperative. Discharge off floor pending arrival of transport with wheelchair.

## 2022-09-16 NOTE — PLAN OF CARE
No significant events overnight.    Problem: Adult Inpatient Plan of Care  Goal: Plan of Care Review  Outcome: Ongoing, Progressing  Goal: Patient-Specific Goal (Individualized)  Outcome: Ongoing, Progressing  Goal: Absence of Hospital-Acquired Illness or Injury  Outcome: Ongoing, Progressing  Goal: Optimal Comfort and Wellbeing  Outcome: Ongoing, Progressing  Goal: Readiness for Transition of Care  Outcome: Ongoing, Progressing     Problem: Functional Ability Impaired COPD (Chronic Obstructive Pulmonary Disease)  Goal: Optimal Level of Functional [place]  Outcome: Ongoing, Progressing

## 2022-09-16 NOTE — PLAN OF CARE
Patient approved for BSC by Keely with [facility].        09/16/22 0958   Post-Acute Status   Post-Acute Authorization HME   E Status Set-up Complete/Auth obtained   Discharge Delays None known at this time   Discharge Plan   Discharge Plan A Home   Discharge Plan B Home

## 2022-09-16 NOTE — PLAN OF CARE
[facility] - Telemetry  Discharge Final Note    Primary Care Provider: Leeanna Stuart MD    Expected Discharge Date: 9/16/2022    All needs met. PCP to call patient to schedule appointment. [facility] to provide BSC.  notified nurse April that patient is ready for discharge from case management standpoint.     Final Discharge Note (most recent)       Final Note - 09/16/22 0959          Final Note    Assessment Type Final Discharge Note     Anticipated Discharge Disposition Home or Self Care     What phone number can be called within the next 1-3 days to see how you are doing after discharge? [phone]     Hospital Resources/Appts/Education Provided --   Clinic to call patient to schedule appointment.       Post-Acute Status    Post-Acute Authorization E     HME Status Set-up Complete/Auth obtained     Discharge Delays None known at this time                     Important Message from Medicare             Contact Info       Leeanna Stuart MD   Specialty: Family Medicine   Relationship: PCP - General    [address] [place] [id number]   Phone: [phone]       Next Steps: Follow up in 1 week(s)    Instructions: Please call PCP to schedule appointment.

## 2022-09-19 ENCOUNTER — TELEPHONE (OUTPATIENT)
Dept: PULMONOLOGY | Facility: CLINIC | Age: 52
End: 2022-09-19
Payer: MEDICAID

## 2022-09-19 NOTE — TELEPHONE ENCOUNTER
Spoke with patient scheduled an appointment to see provider.    ALY Jones                     ----- Message from Karen Ramon MA sent at 9/19/2022  7:32 AM CDT -----  Please schedule pt for CT chest and a follow up to hospitalization and to review CT chest. Thank you Trisha

## 2022-10-03 ENCOUNTER — OFFICE VISIT (OUTPATIENT)
Dept: CARDIOLOGY | Facility: CLINIC | Age: 52
End: 2022-10-03
Payer: MEDICAID

## 2022-10-03 ENCOUNTER — HOSPITAL ENCOUNTER (OUTPATIENT)
Dept: RADIOLOGY | Facility: HOSPITAL | Age: 52
Discharge: HOME | End: 2022-10-03
Attending: NURSE PRACTITIONER
Payer: MEDICAID

## 2022-10-03 ENCOUNTER — TELEPHONE (OUTPATIENT)
Dept: CARDIOLOGY | Facility: CLINIC | Age: 52
End: 2022-10-03

## 2022-10-03 VITALS
SYSTOLIC BLOOD PRESSURE: 130 MMHG | RESPIRATION RATE: 18 BRPM | OXYGEN SATURATION: 94 % | BODY MASS INDEX: 37.91 KG/M2 | DIASTOLIC BLOOD PRESSURE: 70 MMHG | WEIGHT: 213.94 LBS | HEIGHT: 63 IN | HEART RATE: 87 BPM

## 2022-10-03 DIAGNOSIS — J43.9 COPD WITH CHRONIC BRONCHITIS AND EMPHYSEMA: ICD-10-CM

## 2022-10-03 DIAGNOSIS — Z99.81 ON HOME OXYGEN THERAPY: ICD-10-CM

## 2022-10-03 DIAGNOSIS — I10 PRIMARY HYPERTENSION: ICD-10-CM

## 2022-10-03 DIAGNOSIS — I70.0 AORTIC ATHEROSCLEROSIS: ICD-10-CM

## 2022-10-03 DIAGNOSIS — J96.12 CHRONIC RESPIRATORY FAILURE WITH HYPOXIA AND HYPERCAPNIA: ICD-10-CM

## 2022-10-03 DIAGNOSIS — F17.200 TOBACCO USE DISORDER: ICD-10-CM

## 2022-10-03 DIAGNOSIS — I27.20 PULMONARY HYPERTENSION: ICD-10-CM

## 2022-10-03 DIAGNOSIS — Z87.891 PERSONAL HISTORY OF NICOTINE DEPENDENCE: ICD-10-CM

## 2022-10-03 DIAGNOSIS — R06.02 SOB (SHORTNESS OF BREATH): ICD-10-CM

## 2022-10-03 DIAGNOSIS — J44.89 COPD WITH CHRONIC BRONCHITIS AND EMPHYSEMA: ICD-10-CM

## 2022-10-03 DIAGNOSIS — R60.0 BILATERAL LOWER EXTREMITY EDEMA: Primary | ICD-10-CM

## 2022-10-03 DIAGNOSIS — J96.11 CHRONIC RESPIRATORY FAILURE WITH HYPOXIA AND HYPERCAPNIA: ICD-10-CM

## 2022-10-03 PROCEDURE — 1159F MED LIST DOCD IN RCRD: CPT | Mod: CPTII,,, | Performed by: INTERNAL MEDICINE

## 2022-10-03 PROCEDURE — 71271 CT THORAX LUNG CANCER SCR C-: CPT | Mod: TC

## 2022-10-03 PROCEDURE — 99214 OFFICE O/P EST MOD 30 MIN: CPT | Mod: S$PBB,,, | Performed by: INTERNAL MEDICINE

## 2022-10-03 PROCEDURE — 3008F BODY MASS INDEX DOCD: CPT | Mod: CPTII,,, | Performed by: INTERNAL MEDICINE

## 2022-10-03 PROCEDURE — 1159F PR MEDICATION LIST DOCUMENTED IN MEDICAL RECORD: ICD-10-PCS | Mod: CPTII,,, | Performed by: INTERNAL MEDICINE

## 2022-10-03 PROCEDURE — 3075F PR MOST RECENT SYSTOLIC BLOOD PRESS GE 130-139MM HG: ICD-10-PCS | Mod: CPTII,,, | Performed by: INTERNAL MEDICINE

## 2022-10-03 PROCEDURE — 99214 OFFICE O/P EST MOD 30 MIN: CPT | Mod: PBBFAC | Performed by: INTERNAL MEDICINE

## 2022-10-03 PROCEDURE — 71271 CT THORAX LUNG CANCER SCR C-: CPT | Mod: 26,,, | Performed by: RADIOLOGY

## 2022-10-03 PROCEDURE — 99214 PR OFFICE/OUTPT VISIT, EST, LEVL IV, 30-39 MIN: ICD-10-PCS | Mod: S$PBB,,, | Performed by: INTERNAL MEDICINE

## 2022-10-03 PROCEDURE — 3078F PR MOST RECENT DIASTOLIC BLOOD PRESSURE < 80 MM HG: ICD-10-PCS | Mod: CPTII,,, | Performed by: INTERNAL MEDICINE

## 2022-10-03 PROCEDURE — 99999 PR PBB SHADOW E&M-EST. PATIENT-LVL IV: CPT | Mod: PBBFAC,,, | Performed by: INTERNAL MEDICINE

## 2022-10-03 PROCEDURE — 3078F DIAST BP <80 MM HG: CPT | Mod: CPTII,,, | Performed by: INTERNAL MEDICINE

## 2022-10-03 PROCEDURE — 99999 PR PBB SHADOW E&M-EST. PATIENT-LVL IV: ICD-10-PCS | Mod: PBBFAC,,, | Performed by: INTERNAL MEDICINE

## 2022-10-03 PROCEDURE — 71271 CT CHEST LUNG SCREENING LOW DOSE: ICD-10-PCS | Mod: 26,,, | Performed by: RADIOLOGY

## 2022-10-03 PROCEDURE — 3008F PR BODY MASS INDEX (BMI) DOCUMENTED: ICD-10-PCS | Mod: CPTII,,, | Performed by: INTERNAL MEDICINE

## 2022-10-03 PROCEDURE — 3075F SYST BP GE 130 - 139MM HG: CPT | Mod: CPTII,,, | Performed by: INTERNAL MEDICINE

## 2022-10-03 NOTE — TELEPHONE ENCOUNTER
Left pt a call back message.      ----- Message from Jefferson Parker sent at 10/3/2022  2:44 PM CDT -----  Type: Patient Call Back    Who called: Self     What is the request in detail: PT states she called about the compression stockings and was told a order was never put in     Can the clinic reply by [facility]? No     Would the patient rather a call back or a response via [facility]? Call     Best call back number: [phone] (home)

## 2022-10-03 NOTE — PROGRESS NOTES
CARDIOLOGY CLINIC VISIT        HISTORY OF PRESENT ILLNESS:     Rickey Valderrama presents for continued care. Recent hospitalization with COPD exacerbation. Symptoms improved with IV solumedrol. Hx of COPD. Hypertension. Pulmonary Hypertension. Echocardiogram from 02/10/2022 sowed normal LVEF of 55%. Normal diastolic function. RVE with reduced function. PASP 52mmHg. In 2018 32mmHg. She has not smoked in three weeks. On discharge she was given a prescription for Amlodipine. She briefly took the medication but has stopped because it made her nauseous. LE edema is not resolving with leg elevation. She is on lasix. No compression hose. Her bnp was normal.    CARDIOVASCULAR HISTORY:     Pulmonary Hypertension    PAST MEDICAL HISTORY:     Past Medical History:   Diagnosis Date    Anxiety     Asthma     Carpal tunnel syndrome, bilateral     COPD (chronic obstructive pulmonary disease)     Heavy cigarette smoker     9/14/22 2PPD    Laryngeal papilloma     On home oxygen therapy     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    Right side with CT scan Referred to ENT for visualization       PAST SURGICAL HISTORY:     Past Surgical History:   Procedure Laterality Date    CARPAL TUNNEL RELEASE Bilateral     FINGER SURGERY      HYSTERECTOMY      OOPHORECTOMY  1996    Hysterectomy       ALLERGIES AND MEDICATION:     Review of patient's allergies indicates:   Allergen Reactions    Antivert [meclizine] Other (See Comments)     Behavioral changes        Medication List            Accurate as of October 3, 2022 11:29 AM. If you have any questions, ask your nurse or doctor.                CHANGE how you take these medications      gabapentin 600 MG tablet  Commonly known as: NEURONTIN  TAKE TWO TABLETS BY MOUTH THREE TIMES DAILY for chronic pain  What changed:   how much to take  how to take this  when to take this  reasons to take this            CONTINUE taking these medications      albuterol 90 mcg/actuation inhaler  Commonly known as:  PROVENTIL/VENTOLIN HFA     albuterol-ipratropium 2.5 mg-0.5 mg/3 mL nebulizer solution  Commonly known as: DUO-NEB  Take 3 mLs by nebulization every 4 (four) hours as needed for Wheezing. Rescue     amLODIPine 10 MG tablet  Commonly known as: NORVASC  Take 1 tablet (10 mg total) by mouth once daily.     furosemide 40 MG tablet  Commonly known as: LASIX     inhalation spacing device  Use as directed for inhalation.     LIDOcaine VISCOUS 2 % Soln  Generic drug: LIDOcaine HCl 2%  Can switch to equivalent, 10 ml swish and gargle 4 x daily     nicotine 21 mg/24 hr  Commonly known as: NICODERM CQ     nystatin 100,000 unit/mL suspension  Commonly known as: MYCOSTATIN     SUBOXONE 12-3 mg Film  Generic drug: buprenorphine-naloxone              SOCIAL HISTORY:     Social History     Socioeconomic History    Marital status:    Tobacco Use    Smoking status: Every Day     Packs/day: 2.00     Years: 35.00     Pack years: 70.00     Types: Cigarettes     Start date: 12/1/1983    Smokeless tobacco: Current   Substance and Sexual Activity    Alcohol use: No     Alcohol/week: 0.0 standard drinks    Drug use: No    Sexual activity: Yes     Partners: Male     Social Determinants of Health     Financial Resource Strain: Medium Risk    Difficulty of Paying Living Expenses: Somewhat hard   Food Insecurity: No Food Insecurity    Worried About Running Out of Food in the Last Year: Never true    Ran Out of Food in the Last Year: Never true   Transportation Needs: Unmet Transportation Needs    Lack of Transportation (Medical): Yes    Lack of Transportation (Non-Medical): Yes   Physical Activity: Inactive    Days of Exercise per Week: 0 days    Minutes of Exercise per Session: 0 min   Stress: Stress Concern Present    Feeling of Stress : Very much   Social Connections: Unknown    Frequency of Communication with Friends and Family: More than three times a week    Frequency of Social Gatherings with Friends and Family: Never    Active  "Member of Clubs or Organizations: No    Attends Club or Organization Meetings: Never    Marital Status:    Housing Stability: Low Risk     Unable to Pay for Housing in the Last Year: No    Number of Places Lived in the Last Year: 1    Unstable Housing in the Last Year: No       FAMILY HISTORY:     Family History   Problem Relation Age of Onset    COPD Mother     Heart disease Father     No Known Problems Sister     No Known Problems Brother        REVIEW OF SYSTEMS:   Review of Systems   Constitutional:  Negative for chills, diaphoresis, fever, malaise/fatigue and weight loss.   Eyes:  Negative for blurred vision and pain.   Respiratory:  Positive for shortness of breath. Negative for sputum production and wheezing.    Cardiovascular:  Positive for leg swelling. Negative for chest pain, palpitations, orthopnea, claudication and PND.   Gastrointestinal:  Negative for abdominal pain, heartburn, nausea and vomiting.   Musculoskeletal:  Negative for back pain, falls, joint pain, myalgias and neck pain.   Neurological:  Negative for dizziness, speech change, focal weakness, loss of consciousness, weakness and headaches.   Endo/Heme/Allergies:  Does not bruise/bleed easily.   Psychiatric/Behavioral:  Negative for depression, memory loss and substance abuse. The patient is not nervous/anxious.      PHYSICAL EXAM:     Vitals:    10/03/22 1056   BP: 130/70   Pulse: 87   Resp: 18    Body mass index is 37.9 kg/m².  Weight: 97 kg (213 lb 15.3 oz)   Height: 5' 3" (160 cm)     Physical Exam  Constitutional:       General: She is not in acute distress.     Appearance: She is well-developed. She is not diaphoretic.   HENT:      Head: Normocephalic.   Neck:      Vascular: No carotid bruit or JVD.   Cardiovascular:      Rate and Rhythm: Normal rate and regular rhythm.      Pulses: Normal pulses.      Heart sounds: Normal heart sounds.   Pulmonary:      Effort: Pulmonary effort is normal.      Breath sounds: Normal breath " sounds.   Abdominal:      General: Bowel sounds are normal.      Palpations: Abdomen is soft.      Tenderness: There is no abdominal tenderness.   Musculoskeletal:      Right lower le+ Edema present.      Left lower le+ Edema present.   Skin:     General: Skin is warm and dry.   Neurological:      Mental Status: She is alert and oriented to person, place, and time.   Psychiatric:         Speech: Speech normal.         Behavior: Behavior normal.         Thought Content: Thought content normal.       DATA:   EKG: (personally reviewed tracing)  2022: NSR, incomplete RBBB  Laboratory:  CBC:  Recent Labs   Lab 22  1411 09/15/22  0555 22  0408   WBC 17.59 H 14.43 H 12.74 H   Hemoglobin 13.4 13.2 13.4   Hematocrit 42.7 44.0 43.8   Platelets 278 264 263       CHEMISTRIES:  Recent Labs   Lab 22  1433 22  1037 22  0503 22  1652 22  1411 09/15/22  0555 22  0408   Glucose 92 119 H 146 H   < > 135 H 108 181 H   Sodium 142 139 141   < > 140 144 138   Potassium 4.5 4.3 4.4   < > 4.7 3.8 4.3   BUN 9 10 16   < > 15 20 20   Creatinine 0.6 0.6 0.6   < > 0.7 0.7 0.6   eGFR if African American >60 >60 >60  --   --   --   --    eGFR if non African American >60 >60 >60  --   --   --   --    Calcium 9.2 9.2 9.4   < > 10.2 9.9 9.5   Magnesium  --   --  2.2  --   --  2.2 2.2    < > = values in this interval not displayed.       CARDIAC BIOMARKERS:  Recent Labs   Lab 22  1037 22  1503 22  1411   Troponin I <0.006 0.020 <0.006       COAGS:        LIPIDS/LFTS:  Recent Labs   Lab 20  1139 11/15/21  1312 22  1433 22  1037 22  1411   Cholesterol 173  --   --   --   --    Triglycerides 85  --   --   --   --    HDL 64  --   --   --   --    LDL Cholesterol 92.0  --   --   --   --    Non-HDL Cholesterol 109  --   --   --   --    AST 14   < > 17 17 15   ALT 8 L   < > 14 12 14    < > = values in this interval not displayed.       Cardiovascular  Testing:    Echocardiogram 02/10/2022:    The estimated ejection fraction is 55%.  The left ventricle is normal in size with normal systolic function.  Normal left ventricular diastolic function.  Moderate right ventricular enlargement with mildly reduced right ventricular systolic function.  Moderate left atrial enlargement.  Mild tricuspid regurgitation.  Mild right atrial enlargement.  Intermediate central venous pressure (8 mmHg).  The estimated PA systolic pressure is 52 mmHg.  There is pulmonary hypertension.    Echocardiogram 03/14/2018:      1 - Normal left ventricular systolic function (EF 55-60%).     2 - No wall motion abnormalities.     3 - Trivial mitral regurgitation.     4 - Trivial tricuspid regurgitation.     5 - The estimated PA systolic pressure is 32 mmHg.     ASSESSMENT:     Localized edema  Hypertension  Pulmonary hypertension  COPD    PLAN:     Localized edema: compression hose. LE venous. Continue lasix.  Hypertension: monitor.  Pulmonary hypertension: FU echocardiogram.  Return to clinic one month.           Selvin Olvera MD, MPH, FACC, [facility]

## 2022-10-12 DIAGNOSIS — R60.0 PERIPHERAL EDEMA: Primary | ICD-10-CM

## 2022-10-27 ENCOUNTER — TELEPHONE (OUTPATIENT)
Dept: PULMONOLOGY | Facility: CLINIC | Age: 52
End: 2022-10-27
Payer: MEDICAID

## 2022-10-27 NOTE — TELEPHONE ENCOUNTER
Valerie Collins will call patient to R/s.                    ----- Message from Kinza Garcia sent at 10/27/2022  8:20 AM CDT -----  Type: Patient Call Back    Who called: self     What is the request in detail: Patient would like to reschedule her CPAP fitting.     Can the clinic reply by [facility]? no    Would the patient rather a call back or a response via [facility]? Call back     Best call back number: [phone]

## 2022-11-07 ENCOUNTER — HOSPITAL ENCOUNTER (OUTPATIENT)
Dept: CARDIOLOGY | Facility: HOSPITAL | Age: 52
Discharge: HOME | End: 2022-11-07
Attending: INTERNAL MEDICINE
Payer: MEDICAID

## 2022-11-07 DIAGNOSIS — I10 PRIMARY HYPERTENSION: ICD-10-CM

## 2022-11-07 DIAGNOSIS — I70.0 AORTIC ATHEROSCLEROSIS: ICD-10-CM

## 2022-11-07 DIAGNOSIS — R60.0 BILATERAL LOWER EXTREMITY EDEMA: ICD-10-CM

## 2022-11-07 DIAGNOSIS — I27.20 PULMONARY HYPERTENSION: ICD-10-CM

## 2022-11-07 LAB
AORTIC ROOT ANNULUS: 2.66 CM
AORTIC VALVE CUSP SEPERATION: 1.84 CM
AV PEAK GRADIENT: 11 MMHG
AV VELOCITY RATIO: 0.68
CV ECHO LV RWT: 0.55 CM
DOP CALC AO PEAK VEL: 1.64 M/S
DOP CALC LVOT AREA: 3.5 CM2
DOP CALC LVOT DIAMETER: 2.12 CM
DOP CALC LVOT PEAK VEL: 1.12 M/S
DOP CALC LVOT STROKE VOLUME: 91.03 CM3
DOP CALCLVOT PEAK VEL VTI: 25.8 CM
E WAVE DECELERATION TIME: 184.27 MSEC
E/A RATIO: 1.09
E/E' RATIO: 8 M/S
ECHO LV POSTERIOR WALL: 1.24 CM (ref 0.6–1.1)
EJECTION FRACTION: 55 %
FRACTIONAL SHORTENING: 26 % (ref 28–44)
INTERVENTRICULAR SEPTUM: 1.17 CM (ref 0.6–1.1)
IVC DIAMETER: 1.92 CM
LA MAJOR: 4.81 CM
LA MINOR: 5.6 CM
LA WIDTH: 3.9 CM
LEFT ATRIUM SIZE: 4.25 CM
LEFT ATRIUM VOLUME: 72.91 CM3
LEFT INTERNAL DIMENSION IN SYSTOLE: 3.36 CM (ref 2.1–4)
LEFT VENTRICLE DIASTOLIC VOLUME: 94.62 ML
LEFT VENTRICLE SYSTOLIC VOLUME: 46.11 ML
LEFT VENTRICULAR INTERNAL DIMENSION IN DIASTOLE: 4.54 CM (ref 3.5–6)
LEFT VENTRICULAR MASS: 202.05 G
LV LATERAL E/E' RATIO: 6.77 M/S
LV SEPTAL E/E' RATIO: 9.78 M/S
LVOT MG: 2.87 MMHG
LVOT MV: 0.78 CM/S
MV PEAK A VEL: 0.81 M/S
MV PEAK E VEL: 0.88 M/S
MV STENOSIS PRESSURE HALF TIME: 52.68 MS
MV VALVE AREA P 1/2 METHOD: 4.18 CM2
PISA TR MAX VEL: 3.1 M/S
PV PEAK VELOCITY: 1.2 CM/S
RA MAJOR: 5.66 CM
RA PRESSURE: 8 MMHG
RA WIDTH: 5.2 CM
RIGHT VENTRICULAR END-DIASTOLIC DIMENSION: 5.12 CM
SINUS: 2.9 CM
TDI LATERAL: 0.13 M/S
TDI SEPTAL: 0.09 M/S
TDI: 0.11 M/S
TR MAX PG: 38 MMHG
TRICUSPID ANNULAR PLANE SYSTOLIC EXCURSION: 3 CM
TV REST PULMONARY ARTERY PRESSURE: 46 MMHG

## 2022-11-07 PROCEDURE — 93306 ECHO (CUPID ONLY): ICD-10-PCS | Mod: 26,,, | Performed by: INTERNAL MEDICINE

## 2022-11-07 PROCEDURE — 93970 EXTREMITY STUDY: CPT | Mod: 50

## 2022-11-07 PROCEDURE — 93970 EXTREMITY STUDY: CPT | Mod: 26,,, | Performed by: INTERNAL MEDICINE

## 2022-11-07 PROCEDURE — 93306 TTE W/DOPPLER COMPLETE: CPT

## 2022-11-07 PROCEDURE — 93306 TTE W/DOPPLER COMPLETE: CPT | Mod: 26,,, | Performed by: INTERNAL MEDICINE

## 2022-11-07 PROCEDURE — 93970 CV US DOPPLER VENOUS LEGS BILATERAL (CUPID ONLY): ICD-10-PCS | Mod: 26,,, | Performed by: INTERNAL MEDICINE

## 2022-11-15 ENCOUNTER — OFFICE VISIT (OUTPATIENT)
Dept: CARDIOLOGY | Facility: CLINIC | Age: 52
End: 2022-11-15
Payer: MEDICAID

## 2022-11-15 ENCOUNTER — OFFICE VISIT (OUTPATIENT)
Dept: PULMONOLOGY | Facility: CLINIC | Age: 52
End: 2022-11-15
Payer: MEDICAID

## 2022-11-15 ENCOUNTER — LAB VISIT (OUTPATIENT)
Dept: LAB | Facility: HOSPITAL | Age: 52
End: 2022-11-15
Attending: NURSE PRACTITIONER
Payer: MEDICAID

## 2022-11-15 VITALS
DIASTOLIC BLOOD PRESSURE: 58 MMHG | BODY MASS INDEX: 38.78 KG/M2 | SYSTOLIC BLOOD PRESSURE: 129 MMHG | HEART RATE: 82 BPM | WEIGHT: 218.94 LBS | OXYGEN SATURATION: 100 %

## 2022-11-15 VITALS
BODY MASS INDEX: 38.54 KG/M2 | DIASTOLIC BLOOD PRESSURE: 75 MMHG | WEIGHT: 217.5 LBS | HEIGHT: 63 IN | SYSTOLIC BLOOD PRESSURE: 137 MMHG | OXYGEN SATURATION: 96 % | HEART RATE: 83 BPM

## 2022-11-15 DIAGNOSIS — F17.210 CIGARETTE NICOTINE DEPENDENCE WITHOUT COMPLICATION: Chronic | ICD-10-CM

## 2022-11-15 DIAGNOSIS — R60.1 GENERALIZED EDEMA: ICD-10-CM

## 2022-11-15 DIAGNOSIS — J96.12 CHRONIC RESPIRATORY FAILURE WITH HYPOXIA AND HYPERCAPNIA: ICD-10-CM

## 2022-11-15 DIAGNOSIS — J43.9 COPD WITH CHRONIC BRONCHITIS AND EMPHYSEMA: ICD-10-CM

## 2022-11-15 DIAGNOSIS — I27.20 PULMONARY HYPERTENSION: ICD-10-CM

## 2022-11-15 DIAGNOSIS — I70.0 AORTIC ATHEROSCLEROSIS: ICD-10-CM

## 2022-11-15 DIAGNOSIS — J96.12 CHRONIC RESPIRATORY FAILURE WITH HYPOXIA AND HYPERCAPNIA: Chronic | ICD-10-CM

## 2022-11-15 DIAGNOSIS — J96.11 CHRONIC RESPIRATORY FAILURE WITH HYPOXIA AND HYPERCAPNIA: ICD-10-CM

## 2022-11-15 DIAGNOSIS — R06.02 SOB (SHORTNESS OF BREATH): Primary | ICD-10-CM

## 2022-11-15 DIAGNOSIS — J44.89 COPD WITH CHRONIC BRONCHITIS AND EMPHYSEMA: ICD-10-CM

## 2022-11-15 DIAGNOSIS — R06.02 SOB (SHORTNESS OF BREATH): ICD-10-CM

## 2022-11-15 DIAGNOSIS — I25.10 CORONARY ARTERY CALCIFICATION SEEN ON CAT SCAN: ICD-10-CM

## 2022-11-15 DIAGNOSIS — R60.0 PERIPHERAL EDEMA: ICD-10-CM

## 2022-11-15 DIAGNOSIS — I27.20 PULMONARY HYPERTENSION: Chronic | ICD-10-CM

## 2022-11-15 DIAGNOSIS — Z99.81 ON HOME OXYGEN THERAPY: ICD-10-CM

## 2022-11-15 DIAGNOSIS — J96.11 CHRONIC RESPIRATORY FAILURE WITH HYPOXIA AND HYPERCAPNIA: Chronic | ICD-10-CM

## 2022-11-15 DIAGNOSIS — J44.9 STAGE 4 VERY SEVERE COPD BY GOLD CLASSIFICATION: Chronic | ICD-10-CM

## 2022-11-15 DIAGNOSIS — I10 PRIMARY HYPERTENSION: ICD-10-CM

## 2022-11-15 LAB
ANION GAP SERPL CALC-SCNC: 8 MMOL/L (ref 8–16)
BNP SERPL-MCNC: 17 PG/ML (ref 0–99)
BUN SERPL-MCNC: 10 MG/DL (ref 6–20)
CALCIUM SERPL-MCNC: 9.7 MG/DL (ref 8.7–10.5)
CHLORIDE SERPL-SCNC: 98 MMOL/L (ref 95–110)
CO2 SERPL-SCNC: 37 MMOL/L (ref 23–29)
CREAT SERPL-MCNC: 0.7 MG/DL (ref 0.5–1.4)
EST. GFR  (NO RACE VARIABLE): >60 ML/MIN/1.73 M^2
GLUCOSE SERPL-MCNC: 64 MG/DL (ref 70–110)
POTASSIUM SERPL-SCNC: 4.1 MMOL/L (ref 3.5–5.1)
SODIUM SERPL-SCNC: 143 MMOL/L (ref 136–145)

## 2022-11-15 PROCEDURE — 3078F DIAST BP <80 MM HG: CPT | Mod: CPTII,,, | Performed by: NURSE PRACTITIONER

## 2022-11-15 PROCEDURE — 3078F PR MOST RECENT DIASTOLIC BLOOD PRESSURE < 80 MM HG: ICD-10-PCS | Mod: CPTII,,, | Performed by: INTERNAL MEDICINE

## 2022-11-15 PROCEDURE — 80048 BASIC METABOLIC PNL TOTAL CA: CPT | Performed by: NURSE PRACTITIONER

## 2022-11-15 PROCEDURE — 99999 PR PBB SHADOW E&M-EST. PATIENT-LVL IV: CPT | Mod: PBBFAC,,, | Performed by: INTERNAL MEDICINE

## 2022-11-15 PROCEDURE — 3078F PR MOST RECENT DIASTOLIC BLOOD PRESSURE < 80 MM HG: ICD-10-PCS | Mod: CPTII,,, | Performed by: NURSE PRACTITIONER

## 2022-11-15 PROCEDURE — 1159F PR MEDICATION LIST DOCUMENTED IN MEDICAL RECORD: ICD-10-PCS | Mod: CPTII,,, | Performed by: INTERNAL MEDICINE

## 2022-11-15 PROCEDURE — 3008F BODY MASS INDEX DOCD: CPT | Mod: CPTII,,, | Performed by: NURSE PRACTITIONER

## 2022-11-15 PROCEDURE — 99999 PR PBB SHADOW E&M-EST. PATIENT-LVL III: ICD-10-PCS | Mod: PBBFAC,,, | Performed by: NURSE PRACTITIONER

## 2022-11-15 PROCEDURE — 99214 OFFICE O/P EST MOD 30 MIN: CPT | Mod: S$PBB,,, | Performed by: NURSE PRACTITIONER

## 2022-11-15 PROCEDURE — 3075F PR MOST RECENT SYSTOLIC BLOOD PRESS GE 130-139MM HG: ICD-10-PCS | Mod: CPTII,,, | Performed by: NURSE PRACTITIONER

## 2022-11-15 PROCEDURE — 36415 COLL VENOUS BLD VENIPUNCTURE: CPT | Performed by: NURSE PRACTITIONER

## 2022-11-15 PROCEDURE — 3078F DIAST BP <80 MM HG: CPT | Mod: CPTII,,, | Performed by: INTERNAL MEDICINE

## 2022-11-15 PROCEDURE — 99213 OFFICE O/P EST LOW 20 MIN: CPT | Mod: PBBFAC,27 | Performed by: NURSE PRACTITIONER

## 2022-11-15 PROCEDURE — 99999 PR PBB SHADOW E&M-EST. PATIENT-LVL III: CPT | Mod: PBBFAC,,, | Performed by: NURSE PRACTITIONER

## 2022-11-15 PROCEDURE — 3074F SYST BP LT 130 MM HG: CPT | Mod: CPTII,,, | Performed by: INTERNAL MEDICINE

## 2022-11-15 PROCEDURE — 3008F PR BODY MASS INDEX (BMI) DOCUMENTED: ICD-10-PCS | Mod: CPTII,,, | Performed by: INTERNAL MEDICINE

## 2022-11-15 PROCEDURE — 3008F BODY MASS INDEX DOCD: CPT | Mod: CPTII,,, | Performed by: INTERNAL MEDICINE

## 2022-11-15 PROCEDURE — 99214 PR OFFICE/OUTPT VISIT, EST, LEVL IV, 30-39 MIN: ICD-10-PCS | Mod: S$PBB,,, | Performed by: NURSE PRACTITIONER

## 2022-11-15 PROCEDURE — 3075F SYST BP GE 130 - 139MM HG: CPT | Mod: CPTII,,, | Performed by: NURSE PRACTITIONER

## 2022-11-15 PROCEDURE — 99214 OFFICE O/P EST MOD 30 MIN: CPT | Mod: S$PBB,,, | Performed by: INTERNAL MEDICINE

## 2022-11-15 PROCEDURE — 99214 OFFICE O/P EST MOD 30 MIN: CPT | Mod: PBBFAC | Performed by: INTERNAL MEDICINE

## 2022-11-15 PROCEDURE — 99214 PR OFFICE/OUTPT VISIT, EST, LEVL IV, 30-39 MIN: ICD-10-PCS | Mod: S$PBB,,, | Performed by: INTERNAL MEDICINE

## 2022-11-15 PROCEDURE — 83880 ASSAY OF NATRIURETIC PEPTIDE: CPT | Performed by: NURSE PRACTITIONER

## 2022-11-15 PROCEDURE — 3074F PR MOST RECENT SYSTOLIC BLOOD PRESSURE < 130 MM HG: ICD-10-PCS | Mod: CPTII,,, | Performed by: INTERNAL MEDICINE

## 2022-11-15 PROCEDURE — 3008F PR BODY MASS INDEX (BMI) DOCUMENTED: ICD-10-PCS | Mod: CPTII,,, | Performed by: NURSE PRACTITIONER

## 2022-11-15 PROCEDURE — 99999 PR PBB SHADOW E&M-EST. PATIENT-LVL IV: ICD-10-PCS | Mod: PBBFAC,,, | Performed by: INTERNAL MEDICINE

## 2022-11-15 PROCEDURE — 1159F MED LIST DOCD IN RCRD: CPT | Mod: CPTII,,, | Performed by: INTERNAL MEDICINE

## 2022-11-15 RX ORDER — DOXYCYCLINE 100 MG/1
100 CAPSULE ORAL EVERY 12 HOURS
Qty: 20 CAPSULE | Refills: 1 | Status: SHIPPED | OUTPATIENT
Start: 2022-11-15 | End: 2022-11-25

## 2022-11-15 RX ORDER — METOLAZONE 2.5 MG/1
2.5 TABLET ORAL EVERY OTHER DAY
Qty: 15 TABLET | Refills: 11 | Status: SHIPPED | OUTPATIENT
Start: 2022-11-15 | End: 2022-12-09

## 2022-11-15 RX ORDER — PREDNISONE 20 MG/1
40 TABLET ORAL DAILY
Qty: 10 TABLET | Refills: 1 | Status: SHIPPED | OUTPATIENT
Start: 2022-11-15 | End: 2022-12-20

## 2022-11-15 RX ORDER — TRIAMCINOLONE ACETONIDE 1 MG/G
CREAM TOPICAL
COMMUNITY
Start: 2022-07-19 | End: 2023-09-26 | Stop reason: CLARIF

## 2022-11-15 NOTE — PROGRESS NOTES
CHIEF COMPLAINT:    Chief Complaint   Patient presents with    COPD           HISTORY OF PRESENT ILLNESS: Yasmeen Valderrama is a 52 y.o. female current smoker currently 1 pack per day previously 2 PPD started age 11 yo with  has a past medical history of Anxiety, Asthma, Carpal tunnel syndrome, bilateral, COPD (chronic obstructive pulmonary disease), Heavy cigarette smoker, Laryngeal papilloma, On home oxygen therapy, and Vocal cord mass (06/02/2017). is here for COPD follow up. Patient with symptoms of cough, chest tightness, shortness of breath, orthopnea, wheezing. Sleeps in chair for 1 hour episodically. Pt was admitted 6/28/2022-6/29/2022 following LOV 6/28/2022 for COPD exacerbation. Readmitted 9/14/2022-9/16/2022 for COPD exacerbation.   Mostly reports very painful leg swelling at visit, completed bumex has f/u with cardiology later during the day    Hospital Course 9/14/2022-9/16/2022:   52 y.o. female with stage IV very severe COPD by gold classification, nicotine dependence chronic hoarseness, anxiety, chronic pain syndrome laryngeal papilloma, KATHERINE, chronic respiratory failure with hypoxia and hypercapnia, pulmonary hypertension presents with a complaint of shortness of breath.  Acutely worse than baseline, duration 2-3 days, associated with increased cough, has been taking DuoNebs and prednisone without much improvement.  On home oxygen at baseline.  Denies fever, chills, chest pain, palpitations, dizziness, syncope, nausea, vomiting, diarrhea, abdominal pain, or dysuria.  In the ED, she was found to be wheezing.  Stable on her usual home oxygen dose.  Labs show leukocytosis.  Otherwise unremarkable for any acute abnormality.  Placed in observation.  Was on COPD pathway,wheezing did not improved,switched prednisone to IV Solumedrol with improvement,did well with PT,OT and DME and out patient PT,OT art DC time is arranged,patient was discharged home with po prednisone and follow up with PCP as out  patient,.        Patient has very severe COPD fev1 0.46 (17%) with symptoms of  SOB x months. Patient reports reliant on nebulizers following any activity every 15-20 min.   Patient is on home oxygen at 3 L NC  Doing better on trelegy following healing of aphthous ulcers      Pt reports severe HA, inability to maintain sleep due to breathing problems. Can only sleep for 1 hour at a time. States she cannot lay down because unable to breath  Fallen several times while she was dozing off while standing and hit her left knee. She missed her sleep study because of not feeling well and lack of oxygen for traveling to appointments.    States she is exhausted but sleep worsens her breathing and she wakes up more short of breath and with HA. Sleep study was recommended in 2020 but covid broke and study was canceled and she was lost to follow up. She does qualify for bipap due to hypercapnia and scheduled to set this up.    Significant family history: 1st maternal aunt  Metastasized cancer involving lung  Pets: dog and cat  Occupational exposures: shrimp boat ()  Triggers: exertion, laying down     Pulmonary studies 9/12/2022:  ABG pH 7.357 pCO2 70 pO2 45 pHCO3 39 SpO2 77  PFT 9/12/2022:  Ratio 36 FEV1 0.46 (17) FVC 1.27 (39)TLC 68 DLCO 48  LDCT 10/3/2022: No mediastinal, hilar or axillary adenopathy is seen.  There are coronary artery calcifications.  Upper abdominal organs show nothing unusual.  The trachea and bronchi are patent.  There is no evidence of bronchiectasis interstitial lung disease.  There is mild scar at the lung bases and there may be mild emphysema at the upper lungs.  No suspicious nodules are seen.  Bones showed DJD.  ECHo 11/7/2022:   The left ventricle is normal in size with mild concentric hypertrophy and normal systolic function.  The estimated ejection fraction is 55%.  Moderate right ventricular enlargement with low normal right ventricular systolic function.  There is right ventricular  hypertrophy.  The estimated PA systolic pressure is 46 mmHg.  There is pulmonary hypertension.    REVIEW OF SYSTEMS:   Review of Systems   Constitutional:  Positive for weight gain (65 lb last 2 years) and fatigue. Negative for fever, chills, weight loss, activity change, appetite change and night sweats.   HENT:  Positive for congestion (blocked up nasally, hard time blowing, using saline mist).    Eyes: Negative.    Respiratory:  Positive for cough, sputum production (brown, dark green), chest tightness, wheezing, orthopnea, previous hospitalization due to pulmonary problems, dyspnea on extertion, use of rescue inhaler, somnolence and Paroxysmal Nocturnal Dyspnea. Negative for snoring (no per ).         Reports difficulty clearing lungs, feels like secretions are stuck per pt and mucinex helps   Cardiovascular:  Positive for leg swelling. Negative for chest pain and palpitations.   Genitourinary: Negative.    Endocrine: endocrine negative    Musculoskeletal:  Positive for arthralgias (left pain fall 1-2 weeks ago, dozing off standing up) and gait problem (sob).        Cramps toes and fingers   Skin: Negative.    Gastrointestinal:  Negative for nausea, vomiting and acid reflux.   Neurological:  Positive for headaches (every other day, wake up with HA).   Psychiatric/Behavioral:  Positive for sleep disturbance (wake up short winded after napping over 1 year).          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    Dysphonia 05/14/2018     Intractable chronic cluster headache 11/25/2019    KATHERINE (obstructive sleep apnea) 03/04/2020    Aortic atherosclerosis 03/19/2021    Sleep-related breathing disorder 06/28/2022    Chronic respiratory failure with hypoxia and hypercapnia 06/28/2022    Stomatitis and mucositis 06/28/2022    Pulmonary hypertension 06/28/2022    COPD exacerbation 06/28/2022    Leg cramps 09/14/2022    SOB (shortness of breath) 09/14/2022     Resolved Ambulatory Problems     Diagnosis Date Noted    Abnormal PFTs (pulmonary function tests) 12/01/2014    Carpal tunnel syndrome on both sides 02/27/2015    CTS (carpal tunnel syndrome) 06/09/2015    Screening 09/09/2015    Chronic obstructive pulmonary disease 01/04/2017    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    Candida infection, oral 06/02/2017     Past Medical History:   Diagnosis Date    Asthma     Carpal tunnel syndrome, bilateral     COPD (chronic obstructive pulmonary disease)     Heavy cigarette smoker     On home oxygen therapy                 PAST SURGICAL HISTORY:    Past Surgical History:   Procedure Laterality Date    CARPAL TUNNEL RELEASE Bilateral     FINGER SURGERY      HYSTERECTOMY      OOPHORECTOMY  1996    Hysterectomy         FAMILY HISTORY:                Family History   Problem Relation Age of Onset    COPD Mother     Heart disease Father     No Known Problems Sister     No Known Problems Brother        SOCIAL HISTORY:          Tobacco:   Social History     Tobacco Use   Smoking Status Every Day    Packs/day: 2.00    Years: 35.00    Pack years: 70.00    Types: Cigarettes    Start date: 12/1/1983   Smokeless Tobacco Current       alcohol use:    Social History     Substance and Sexual Activity   Alcohol Use No    Alcohol/week: 0.0 standard drinks                   ALLERGIES:    Review of patient's allergies indicates:   Allergen Reactions    Antivert [meclizine] Other (See Comments)     Behavioral changes       CURRENT MEDICATIONS:    Current Outpatient Medications   Medication  "Sig Dispense Refill    albuterol (PROVENTIL/VENTOLIN HFA) 90 mcg/actuation inhaler Inhale 2 puffs into the lungs every 6 (six) hours as needed.      albuterol-ipratropium (DUO-NEB) 2.5 mg-0.5 mg/3 mL nebulizer solution Take 3 mLs by nebulization every 4 (four) hours as needed for Wheezing. Rescue 1620 mL 5    furosemide (LASIX) 40 MG tablet 2 (two) times daily.      gabapentin (NEURONTIN) 600 MG tablet TAKE TWO TABLETS BY MOUTH THREE TIMES DAILY for chronic pain (Patient taking differently: Take 600 mg by mouth as needed. TAKE TWO TABLETS BY MOUTH THREE TIMES DAILY for chronic pain) 180 tablet 11    inhalation spacing device Use as directed for inhalation. 1 each 0    LIDOCAINE VISCOUS 2 % solution Can switch to equivalent, 10 ml swish and gargle 4 x daily 100 mL 11    nicotine (NICODERM CQ) 21 mg/24 hr nicotine 21 mg/24 hr daily transdermal patch   apply 1 patch onto the skin daily      nystatin (MYCOSTATIN) 100,000 unit/mL suspension nystatin 100,000 unit/mL oral suspension   SHAKE WELL. TAKE 5 ml (1 teaspoon) BY MOUTH FOUR TIMES DAILY FOR 14 DAYS. DO NOT REFRIGERATE      SUBOXONE 12-3 mg Film PLACE 1 FILM UNDER THE TONGUE TWICE DAILY.      fluticasone-umeclidin-vilanter (TRELEGY ELLIPTA) 200-62.5-25 mcg inhaler Trelegy Ellipta 200 mcg-62.5 mcg-25 mcg powder for inhalation   INHALE 1 PUFF(S) BY MOUTH into the lungs ONCE A DAY      metOLazone (ZAROXOLYN) 2.5 MG tablet Take 1 tablet (2.5 mg total) by mouth every other day. 15 tablet 11    triamcinolone acetonide 0.1% (KENALOG) 0.1 % cream SMARTSIG:Sparingly Topical Twice Daily       No current facility-administered medications for this visit.                       PHYSICAL EXAM:  Vitals:    11/15/22 1302   BP: 137/75   Pulse: 83   SpO2: 96%   Weight: 98.6 kg (217 lb 7.7 oz)   Height: 5' 3" (1.6 m)   PainSc: 0-No pain         Body mass index is 38.53 kg/m².   Physical Exam   Constitutional: She is oriented to person, place, and time. She appears well-developed. No " distress. She is obese.   HENT:   Head: Normocephalic.   Nasal congestion   Neck:   18 in   Cardiovascular: Regular rhythm and normal heart sounds.   tachy   Pulmonary/Chest: Normal expansion and effort normal. She has wheezes. She has rhonchi.   Musculoskeletal:         General: Edema present.      Cervical back: Neck supple.   Neurological: She is alert and oriented to person, place, and time.   ambulatory   Skin: Skin is warm. She is not diaphoretic.   Psychiatric: She has a normal mood and affect. Her behavior is normal. Judgment and thought content normal.        Lab Results   Component Value Date    TSH 2.702 09/29/2020      Lab Results   Component Value Date    WBC 12.74 (H) 09/16/2022    HGB 13.4 09/16/2022    HCT 43.8 09/16/2022    MCV 93 09/16/2022     09/16/2022     BMP  Lab Results   Component Value Date     11/15/2022    K 4.1 11/15/2022    CL 98 11/15/2022    CO2 37 (H) 11/15/2022    BUN 10 11/15/2022    CREATININE 0.7 11/15/2022    CALCIUM 9.7 11/15/2022    ANIONGAP 8 11/15/2022    ESTGFRAFRICA >60 06/29/2022    EGFRNONAA >60 06/29/2022     Lab Results   Component Value Date    HGBA1C 5.4 03/08/2016        ASSESSMENT    ICD-10-CM ICD-9-CM    1. SOB (shortness of breath)  R06.02 786.05 BNP      Basic Metabolic Panel      2. Stage 4 very severe COPD by GOLD classification  J44.9 496 predniSONE (DELTASONE) 20 MG tablet      doxycycline (VIBRAMYCIN) 100 MG Cap      3. Pulmonary hypertension  I27.20 416.8       4. Chronic respiratory failure with hypoxia and hypercapnia  J96.11 518.83     J96.12 799.02      786.09     scheduled for bipap      5. Cigarette nicotine dependence without complication  F17.210 305.1     smoking cessation advised      6. Generalized edema  R60.1 782.3 BNP      Basic Metabolic Panel              PLAN:    Problem List Items Addressed This Visit          Unprioritized    Chronic respiratory failure with hypoxia and hypercapnia (Chronic)    Overview     home portable 2-3  years  Has continuous mini-portable  History of hospitalization and very severe COPD.  Ideally NIV but since she has not tried and fail therapy,  will try BIPAP with AVAPS so we can expedite treatment  FEV1 only 0.46 L and 17% of predicted, she would not likely  tolerate CPAP or simple BiPAP without algorithm to adjust to changing respiratory needs.         Nicotine dependence, uncomplicated (Chronic)    Overview     Declined smoking cessation referral.  Counseled that she must stop smoking         Pulmonary hypertension (Chronic)    Overview     Likely secondary hypoxia and pulmonary disease but also at high risk cardiac problems, follow by cardiology as well    ECHO 2/10/2022:  The estimated ejection fraction is 55%.  The left ventricle is normal in size with normal systolic function.  Normal left ventricular diastolic function.  Moderate right ventricular enlargement with mildly reduced right ventricular systolic function.  Moderate left atrial enlargement.  Mild tricuspid regurgitation.  Mild right atrial enlargement.  Intermediate central venous pressure (8 mmHg).  The estimated PA systolic pressure is 52 mmHg.  There is pulmonary hypertension.         Stage 4 very severe COPD by GOLD classification (Chronic)    Overview     CT chest 4/12/2016:IMPRESSION:     Atelectasis or parenchymal scarring in the right lower lobe laterally and anteriorly and in the inferior lingular portion of the left upper lobe.   Paraseptal emphysematous changes in the medial aspect of the right lung apex.  Will need updated imaging. High risk of cancer.   Pulmonary studies 9/12/2022:  ABG pH 7.357 pCO2 70 pO2 45 pHCO3 39 SpO2 77  PFT:  Ratio 36 FEV1 0.46 (17) FVC 1.27 (39)TLC 68 DLCO 48    Plan: BIPAP with AVAPS  Continuous home oxygen concentrator   Resume trelegy   Pulmonary rehab  Smoking cessation (need 6 months cessation before transplant consideration if she does not have cancer)  Airway clearance regimen -duoneb, saline,  aerobika, follow by cough or huffing             Generalized edema    Relevant Orders    BNP (Completed)    Basic Metabolic Panel (Completed)    SOB (shortness of breath) - Primary    Overview     Pt reports painful edema, albumin 4.1, possibly secondary steroids and/or cardiac problem         Relevant Orders    BNP (Completed)    Basic Metabolic Panel (Completed)         Patient will Follow up in about 6 weeks (around 12/27/2022), or ER if worsening.

## 2022-11-15 NOTE — PROGRESS NOTES
CARDIOLOGY CLINIC VISIT        HISTORY OF PRESENT ILLNESS:     Rickey Valderrama presents for continued care. Recent hospitalization with COPD exacerbation. Symptoms improved with IV solumedrol. Hx of COPD. Hypertension. Pulmonary Hypertension. Echocardiogram from 02/10/2022 sowed normal LVEF of 55%. Normal diastolic function. RVE with reduced function. PASP 52mmHg. In 2018 32mmHg. She has not smoked in three weeks. On discharge she was given a prescription for Amlodipine. She briefly took the medication but has stopped because it made her nauseous. LE edema is not resolving with leg elevation. She is on lasix. No compression hose. Her bnp was normal.    11/15/2022: Echocardiogram showed normal function with an estimated ejection fraction of 55%.  Mildly elevated pulmonary pressure, 46 mmHg.  Venous ultrasound negative. Persistent LE edema. Normal albumin. No proteinuria. Normal bnp.    CARDIOVASCULAR HISTORY:     Pulmonary Hypertension    PAST MEDICAL HISTORY:     Past Medical History:   Diagnosis Date    Anxiety     Asthma     Carpal tunnel syndrome, bilateral     COPD (chronic obstructive pulmonary disease)     Heavy cigarette smoker     9/14/22 2PPD    Laryngeal papilloma     On home oxygen therapy     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    Right side with CT scan Referred to ENT for visualization       PAST SURGICAL HISTORY:     Past Surgical History:   Procedure Laterality Date    CARPAL TUNNEL RELEASE Bilateral     FINGER SURGERY      HYSTERECTOMY      OOPHORECTOMY  1996    Hysterectomy       ALLERGIES AND MEDICATION:     Review of patient's allergies indicates:   Allergen Reactions    Antivert [meclizine] Other (See Comments)     Behavioral changes        Medication List            Accurate as of November 15, 2022  3:30 PM. If you have any questions, ask your nurse or doctor.                START taking these medications      doxycycline 100 MG Cap  Commonly known as: VIBRAMYCIN  Take 1 capsule (100 mg total) by mouth  every 12 (twelve) hours. as needed for flareup for 10 days  Started by: Trisha Figueroa NP     metOLazone 2.5 MG tablet  Commonly known as: ZAROXOLYN  Take 1 tablet (2.5 mg total) by mouth every other day.  Started by: Selvin Olvera MD     predniSONE 20 MG tablet  Commonly known as: DELTASONE  Take 2 tablets (40 mg total) by mouth once daily. for 5 days  Started by: Trisha Figueroa NP            CHANGE how you take these medications      gabapentin 600 MG tablet  Commonly known as: NEURONTIN  TAKE TWO TABLETS BY MOUTH THREE TIMES DAILY for chronic pain  What changed:   how much to take  how to take this  when to take this  reasons to take this            CONTINUE taking these medications      albuterol 90 mcg/actuation inhaler  Commonly known as: PROVENTIL/VENTOLIN HFA     albuterol-ipratropium 2.5 mg-0.5 mg/3 mL nebulizer solution  Commonly known as: DUO-NEB  Take 3 mLs by nebulization every 4 (four) hours as needed for Wheezing. Rescue     fluticasone-umeclidin-vilanter 200-62.5-25 mcg inhaler  Commonly known as: TRELEGY ELLIPTA     furosemide 40 MG tablet  Commonly known as: LASIX     inhalation spacing device  Use as directed for inhalation.     LIDOcaine VISCOUS 2 % Soln  Generic drug: LIDOcaine HCl 2%  Can switch to equivalent, 10 ml swish and gargle 4 x daily     nicotine 21 mg/24 hr  Commonly known as: NICODERM CQ     nystatin 100,000 unit/mL suspension  Commonly known as: MYCOSTATIN     SUBOXONE 12-3 mg Film  Generic drug: buprenorphine-naloxone     triamcinolone acetonide 0.1% 0.1 % cream  Commonly known as: KENALOG               Where to Get Your Medications        These medications were sent to [facility] [place] [id number] [place] [id number] [address]      Phone: [phone]   doxycycline 100 MG Cap  metOLazone 2.5 MG tablet  predniSONE 20 MG tablet         SOCIAL HISTORY:     Social History     Socioeconomic History    Marital status:    Tobacco Use     Smoking status: Every Day     Packs/day: 2.00     Years: 35.00     Pack years: 70.00     Types: Cigarettes     Start date: 12/1/1983    Smokeless tobacco: Current   Substance and Sexual Activity    Alcohol use: No     Alcohol/week: 0.0 standard drinks    Drug use: No    Sexual activity: Yes     Partners: Male     Social Determinants of Health     Financial Resource Strain: Medium Risk    Difficulty of Paying Living Expenses: Somewhat hard   Food Insecurity: No Food Insecurity    Worried About Running Out of Food in the Last Year: Never true    Ran Out of Food in the Last Year: Never true   Transportation Needs: Unmet Transportation Needs    Lack of Transportation (Medical): Yes    Lack of Transportation (Non-Medical): Yes   Physical Activity: Inactive    Days of Exercise per Week: 0 days    Minutes of Exercise per Session: 0 min   Stress: Stress Concern Present    Feeling of Stress : Very much   Social Connections: Unknown    Frequency of Communication with Friends and Family: More than three times a week    Frequency of Social Gatherings with Friends and Family: Never    Active Member of Clubs or Organizations: No    Attends Club or Organization Meetings: Never    Marital Status:    Housing Stability: Low Risk     Unable to Pay for Housing in the Last Year: No    Number of Places Lived in the Last Year: 1    Unstable Housing in the Last Year: No       FAMILY HISTORY:     Family History   Problem Relation Age of Onset    COPD Mother     Heart disease Father     No Known Problems Sister     No Known Problems Brother        REVIEW OF SYSTEMS:   Review of Systems   Constitutional:  Negative for chills, diaphoresis, fever, malaise/fatigue and weight loss.   Eyes:  Negative for blurred vision and pain.   Respiratory:  Positive for shortness of breath. Negative for sputum production and wheezing.    Cardiovascular:  Positive for leg swelling. Negative for chest pain, palpitations, orthopnea, claudication and PND.    Gastrointestinal:  Negative for abdominal pain, heartburn, nausea and vomiting.   Musculoskeletal:  Negative for back pain, falls, joint pain, myalgias and neck pain.   Neurological:  Negative for dizziness, speech change, focal weakness, loss of consciousness, weakness and headaches.   Endo/Heme/Allergies:  Does not bruise/bleed easily.   Psychiatric/Behavioral:  Negative for depression, memory loss and substance abuse. The patient is not nervous/anxious.      PHYSICAL EXAM:     Vitals:    11/15/22 1503   BP: (!) 129/58   Pulse: 82    Body mass index is 38.78 kg/m².  Weight: 99.3 kg (218 lb 14.7 oz)         Physical Exam  Constitutional:       General: She is not in acute distress.     Appearance: She is well-developed. She is not diaphoretic.   HENT:      Head: Normocephalic.   Neck:      Vascular: No carotid bruit or JVD.   Cardiovascular:      Rate and Rhythm: Normal rate and regular rhythm.      Pulses: Normal pulses.      Heart sounds: Normal heart sounds.   Pulmonary:      Effort: Pulmonary effort is normal.      Breath sounds: Normal breath sounds.   Abdominal:      General: Bowel sounds are normal.      Palpations: Abdomen is soft.      Tenderness: There is no abdominal tenderness.   Musculoskeletal:      Right lower le+ Edema present.      Left lower le+ Edema present.   Skin:     General: Skin is warm and dry.   Neurological:      Mental Status: She is alert and oriented to person, place, and time.   Psychiatric:         Speech: Speech normal.         Behavior: Behavior normal.         Thought Content: Thought content normal.       DATA:   EKG: (personally reviewed tracing)  2022: NSR, incomplete RBBB  Laboratory:  CBC:  Recent Labs   Lab 22  1411 09/15/22  0555 22  0408   WBC 17.59 H 14.43 H 12.74 H   Hemoglobin 13.4 13.2 13.4   Hematocrit 42.7 44.0 43.8   Platelets 278 264 263       CHEMISTRIES:  Recent Labs   Lab 22  1433 22  1037 22  0503 22  1652  09/15/22  0555 09/16/22  0408 11/15/22  1358   Glucose 92 119 H 146 H   < > 108 181 H 64 L   Sodium 142 139 141   < > 144 138 143   Potassium 4.5 4.3 4.4   < > 3.8 4.3 4.1   BUN 9 10 16   < > 20 20 10   Creatinine 0.6 0.6 0.6   < > 0.7 0.6 0.7   eGFR if  >60 >60 >60  --   --   --   --    eGFR if non African American >60 >60 >60  --   --   --   --    Calcium 9.2 9.2 9.4   < > 9.9 9.5 9.7   Magnesium  --   --  2.2  --  2.2 2.2  --     < > = values in this interval not displayed.       CARDIAC BIOMARKERS:  Recent Labs   Lab 06/28/22  1037 06/28/22  1503 09/14/22  1411   Troponin I <0.006 0.020 <0.006       COAGS:        LIPIDS/LFTS:  Recent Labs   Lab 09/29/20  1139 11/15/21  1312 05/04/22  1433 06/28/22  1037 09/14/22  1411   Cholesterol 173  --   --   --   --    Triglycerides 85  --   --   --   --    HDL 64  --   --   --   --    LDL Cholesterol 92.0  --   --   --   --    Non-HDL Cholesterol 109  --   --   --   --    AST 14   < > 17 17 15   ALT 8 L   < > 14 12 14    < > = values in this interval not displayed.       Cardiovascular Testing:    Echocardiogram 11/07/2022:    The left ventricle is normal in size with mild concentric hypertrophy and normal systolic function.  The estimated ejection fraction is 55%.  Moderate right ventricular enlargement with low normal right ventricular systolic function.  There is right ventricular hypertrophy.  The estimated PA systolic pressure is 46 mmHg.  There is pulmonary hypertension.       Venous US 11/07/2022:    There is no evidence of a right lower extremity DVT.  The right superficial femoral middle vein is normal.  There is no evidence of a left lower extremity DVT.    Echocardiogram 02/10/2022:    The estimated ejection fraction is 55%.  The left ventricle is normal in size with normal systolic function.  Normal left ventricular diastolic function.  Moderate right ventricular enlargement with mildly reduced right ventricular systolic function.  Moderate left  atrial enlargement.  Mild tricuspid regurgitation.  Mild right atrial enlargement.  Intermediate central venous pressure (8 mmHg).  The estimated PA systolic pressure is 52 mmHg.  There is pulmonary hypertension.    Echocardiogram 03/14/2018:      1 - Normal left ventricular systolic function (EF 55-60%).     2 - No wall motion abnormalities.     3 - Trivial mitral regurgitation.     4 - Trivial tricuspid regurgitation.     5 - The estimated PA systolic pressure is 32 mmHg.     ASSESSMENT:     Localized edema  Hypertension  Pulmonary hypertension  COPD  Coronary calcifications on CT scan    PLAN:     Localized edema: compression hose. LE venous. Continue lasix. Add zaroxolyn.  Hypertension: monitor.  Pulmonary hypertension: referral to HTS/Pulmonary HTN. Feel class IV.  SOB/coronary calcifications: ischemic evaluation. DSE.  Return to clinic one month.           Selvin Olvera MD, MPH, FACC, [facility]

## 2022-11-16 ENCOUNTER — TELEPHONE (OUTPATIENT)
Dept: TRANSPLANT | Facility: CLINIC | Age: 52
End: 2022-11-16

## 2022-11-16 DIAGNOSIS — R06.82 TACHYPNEA: Primary | ICD-10-CM

## 2022-11-16 DIAGNOSIS — I27.9 CHRONIC PULMONARY HEART DISEASE: ICD-10-CM

## 2022-11-16 DIAGNOSIS — Z79.899 POLYPHARMACY: ICD-10-CM

## 2022-12-05 ENCOUNTER — HOSPITAL ENCOUNTER (OUTPATIENT)
Dept: CARDIOLOGY | Facility: HOSPITAL | Age: 52
Discharge: HOME | End: 2022-12-05
Attending: INTERNAL MEDICINE
Payer: MEDICAID

## 2022-12-05 VITALS — WEIGHT: 218 LBS | BODY MASS INDEX: 38.62 KG/M2

## 2022-12-05 DIAGNOSIS — I25.10 CORONARY ARTERY CALCIFICATION SEEN ON CAT SCAN: ICD-10-CM

## 2022-12-05 DIAGNOSIS — I10 PRIMARY HYPERTENSION: ICD-10-CM

## 2022-12-05 DIAGNOSIS — I70.0 AORTIC ATHEROSCLEROSIS: ICD-10-CM

## 2022-12-05 DIAGNOSIS — I27.20 PULMONARY HYPERTENSION: ICD-10-CM

## 2022-12-05 DIAGNOSIS — R06.02 SOB (SHORTNESS OF BREATH): ICD-10-CM

## 2022-12-05 LAB
AORTIC ROOT ANNULUS: 1.8 CM
AV PEAK GRADIENT: 11 MMHG
AV VELOCITY RATIO: 0.81
CV ECHO LV RWT: 0.42 CM
CV STRESS BASE HR: 86 BPM
DIASTOLIC BLOOD PRESSURE: 76 MMHG
DOP CALC AO PEAK VEL: 1.67 M/S
DOP CALC LVOT AREA: 2.7 CM2
DOP CALC LVOT DIAMETER: 1.85 CM
DOP CALC LVOT PEAK VEL: 1.36 M/S
DOP CALC LVOT STROKE VOLUME: 76.57 CM3
DOP CALCLVOT PEAK VEL VTI: 28.5 CM
E WAVE DECELERATION TIME: 187.88 MSEC
E/A RATIO: 0.95
E/E' RATIO: 6.5 M/S
ECHO LV POSTERIOR WALL: 1.09 CM (ref 0.6–1.1)
EJECTION FRACTION: 55 %
FRACTIONAL SHORTENING: 27 % (ref 28–44)
INTERVENTRICULAR SEPTUM: 1.11 CM (ref 0.6–1.1)
IVRT: 89.97 MSEC
LA MAJOR: 5.85 CM
LEFT ATRIUM SIZE: 5 CM
LEFT INTERNAL DIMENSION IN SYSTOLE: 3.8 CM (ref 2.1–4)
LEFT VENTRICLE DIASTOLIC VOLUME: 129.05 ML
LEFT VENTRICLE SYSTOLIC VOLUME: 61.91 ML
LEFT VENTRICULAR INTERNAL DIMENSION IN DIASTOLE: 5.19 CM (ref 3.5–6)
LEFT VENTRICULAR MASS: 220.07 G
LV LATERAL E/E' RATIO: 5.2 M/S
LV SEPTAL E/E' RATIO: 8.67 M/S
LVOT MG: 5.11 MMHG
LVOT MV: 1.11 CM/S
MV PEAK A VEL: 0.82 M/S
MV PEAK E VEL: 0.78 M/S
OHS CV CPX 85 PERCENT MAX PREDICTED HEART RATE MALE: 135
OHS CV CPX MAX PREDICTED HEART RATE: 158
OHS CV CPX PATIENT IS FEMALE: 1
OHS CV CPX PATIENT IS MALE: 0
OHS CV CPX PEAK DIASTOLIC BLOOD PRESSURE: 114 MMHG
OHS CV CPX PEAK HEAR RATE: 141 BPM
OHS CV CPX PEAK RATE PRESSURE PRODUCT: ABNORMAL
OHS CV CPX PEAK SYSTOLIC BLOOD PRESSURE: 211 MMHG
OHS CV CPX PERCENT MAX PREDICTED HEART RATE ACHIEVED: 89
OHS CV CPX RATE PRESSURE PRODUCT PRESENTING: ABNORMAL
PISA TR MAX VEL: 2.93 M/S
PV PEAK VELOCITY: 1.32 CM/S
RA MAJOR: 5.51 CM
RA PRESSURE: 3 MMHG
RA WIDTH: 5.3 CM
RIGHT VENTRICULAR END-DIASTOLIC DIMENSION: 5.45 CM
SINUS: 3.1 CM
STJ: 2.49 CM
STRESS ECHO POST EXERCISE DUR MIN: 8 MINUTES
STRESS ECHO POST EXERCISE DUR SEC: 12 SECONDS
SYSTOLIC BLOOD PRESSURE: 128 MMHG
TDI LATERAL: 0.15 M/S
TDI SEPTAL: 0.09 M/S
TDI: 0.12 M/S
TR MAX PG: 34 MMHG
TRICUSPID ANNULAR PLANE SYSTOLIC EXCURSION: 2.1 CM
TV REST PULMONARY ARTERY PRESSURE: 37 MMHG

## 2022-12-05 PROCEDURE — 93351 STRESS TTE COMPLETE: CPT | Mod: 26,,, | Performed by: INTERNAL MEDICINE

## 2022-12-05 PROCEDURE — 63600175 PHARM REV CODE 636 W HCPCS: Performed by: INTERNAL MEDICINE

## 2022-12-05 PROCEDURE — 93325 STRESS ECHO (CUPID ONLY): ICD-10-PCS | Mod: 26,,, | Performed by: INTERNAL MEDICINE

## 2022-12-05 PROCEDURE — 93351 STRESS ECHO (CUPID ONLY): ICD-10-PCS | Mod: 26,,, | Performed by: INTERNAL MEDICINE

## 2022-12-05 PROCEDURE — 93320 STRESS ECHO (CUPID ONLY): ICD-10-PCS | Mod: 26,,, | Performed by: INTERNAL MEDICINE

## 2022-12-05 PROCEDURE — 93320 DOPPLER ECHO COMPLETE: CPT

## 2022-12-05 PROCEDURE — 93320 DOPPLER ECHO COMPLETE: CPT | Mod: 26,,, | Performed by: INTERNAL MEDICINE

## 2022-12-05 PROCEDURE — 93325 DOPPLER ECHO COLOR FLOW MAPG: CPT | Mod: 26,,, | Performed by: INTERNAL MEDICINE

## 2022-12-05 RX ORDER — DOBUTAMINE HYDROCHLORIDE 400 MG/100ML
0-20 INJECTION INTRAVENOUS ONCE
Status: COMPLETED | OUTPATIENT
Start: 2022-12-05 | End: 2022-12-05

## 2022-12-05 RX ADMIN — DOBUTAMINE HYDROCHLORIDE 30 MCG/KG/MIN: 400 INJECTION INTRAVENOUS at 02:12

## 2022-12-09 ENCOUNTER — HOSPITAL ENCOUNTER (OUTPATIENT)
Dept: PULMONOLOGY | Facility: CLINIC | Age: 52
Discharge: HOME | End: 2022-12-09
Payer: MEDICAID

## 2022-12-09 ENCOUNTER — OFFICE VISIT (OUTPATIENT)
Dept: TRANSPLANT | Facility: CLINIC | Age: 52
End: 2022-12-09
Payer: MEDICAID

## 2022-12-09 VITALS
BODY MASS INDEX: 38.13 KG/M2 | DIASTOLIC BLOOD PRESSURE: 93 MMHG | SYSTOLIC BLOOD PRESSURE: 153 MMHG | WEIGHT: 215.19 LBS | HEART RATE: 92 BPM | HEIGHT: 63 IN

## 2022-12-09 VITALS — BODY MASS INDEX: 38.09 KG/M2 | WEIGHT: 215 LBS | HEIGHT: 63 IN

## 2022-12-09 DIAGNOSIS — G47.30 SLEEP-RELATED BREATHING DISORDER: ICD-10-CM

## 2022-12-09 DIAGNOSIS — I27.20 PULMONARY HYPERTENSION: Primary | ICD-10-CM

## 2022-12-09 DIAGNOSIS — I27.9 CHRONIC PULMONARY HEART DISEASE: ICD-10-CM

## 2022-12-09 DIAGNOSIS — R06.02 SOB (SHORTNESS OF BREATH): ICD-10-CM

## 2022-12-09 DIAGNOSIS — J44.9 STAGE 4 VERY SEVERE COPD BY GOLD CLASSIFICATION: Chronic | ICD-10-CM

## 2022-12-09 DIAGNOSIS — F17.210 CIGARETTE NICOTINE DEPENDENCE WITHOUT COMPLICATION: ICD-10-CM

## 2022-12-09 PROCEDURE — 3008F BODY MASS INDEX DOCD: CPT | Mod: CPTII,,,

## 2022-12-09 PROCEDURE — 3080F PR MOST RECENT DIASTOLIC BLOOD PRESSURE >= 90 MM HG: ICD-10-PCS | Mod: CPTII,,,

## 2022-12-09 PROCEDURE — 94618 PULMONARY STRESS TESTING: ICD-10-PCS | Mod: 26,S$PBB,, | Performed by: INTERNAL MEDICINE

## 2022-12-09 PROCEDURE — 1159F MED LIST DOCD IN RCRD: CPT | Mod: CPTII,,,

## 2022-12-09 PROCEDURE — 1160F RVW MEDS BY RX/DR IN RCRD: CPT | Mod: CPTII,,,

## 2022-12-09 PROCEDURE — 3077F PR MOST RECENT SYSTOLIC BLOOD PRESSURE >= 140 MM HG: ICD-10-PCS | Mod: CPTII,,,

## 2022-12-09 PROCEDURE — 99214 OFFICE O/P EST MOD 30 MIN: CPT | Mod: S$PBB,,,

## 2022-12-09 PROCEDURE — 99214 OFFICE O/P EST MOD 30 MIN: CPT | Mod: PBBFAC,25

## 2022-12-09 PROCEDURE — 94618 PULMONARY STRESS TESTING: CPT | Mod: PBBFAC | Performed by: INTERNAL MEDICINE

## 2022-12-09 PROCEDURE — 3077F SYST BP >= 140 MM HG: CPT | Mod: CPTII,,,

## 2022-12-09 PROCEDURE — 1159F PR MEDICATION LIST DOCUMENTED IN MEDICAL RECORD: ICD-10-PCS | Mod: CPTII,,,

## 2022-12-09 PROCEDURE — 3080F DIAST BP >= 90 MM HG: CPT | Mod: CPTII,,,

## 2022-12-09 PROCEDURE — 3008F PR BODY MASS INDEX (BMI) DOCUMENTED: ICD-10-PCS | Mod: CPTII,,,

## 2022-12-09 PROCEDURE — 94618 PULMONARY STRESS TESTING: CPT | Mod: 26,S$PBB,, | Performed by: INTERNAL MEDICINE

## 2022-12-09 PROCEDURE — 99999 PR PBB SHADOW E&M-EST. PATIENT-LVL IV: ICD-10-PCS | Mod: PBBFAC,,,

## 2022-12-09 PROCEDURE — 99214 PR OFFICE/OUTPT VISIT, EST, LEVL IV, 30-39 MIN: ICD-10-PCS | Mod: S$PBB,,,

## 2022-12-09 PROCEDURE — 1160F PR REVIEW ALL MEDS BY PRESCRIBER/CLIN PHARMACIST DOCUMENTED: ICD-10-PCS | Mod: CPTII,,,

## 2022-12-09 PROCEDURE — 99999 PR PBB SHADOW E&M-EST. PATIENT-LVL IV: CPT | Mod: PBBFAC,,,

## 2022-12-09 RX ORDER — ZOLPIDEM TARTRATE 10 MG/1
10 TABLET ORAL
COMMUNITY
Start: 2022-12-01 | End: 2023-09-26 | Stop reason: CLARIF

## 2022-12-09 RX ORDER — METOLAZONE 2.5 MG/1
2.5 TABLET ORAL EVERY OTHER DAY
Qty: 15 TABLET | Refills: 11
Start: 2022-12-09 | End: 2023-11-07 | Stop reason: DRUGHIGH

## 2022-12-09 RX ORDER — NALOXONE HYDROCHLORIDE 4 MG/.1ML
SPRAY NASAL
COMMUNITY
Start: 2022-12-09 | End: 2023-09-26 | Stop reason: CLARIF

## 2022-12-09 NOTE — PATIENT INSTRUCTIONS
Please follow-up with pulmonology and cardiology.    Continue efforts to quit smoking.    Wear oxygen as required.    Talk to your pulmonologist about ways to facilitate BiPAP use.    Recommend 2 gram sodium restriction and 1500cc fluid restriction.  Encourage physical activity with graded exercise program.  Requested patient to weigh themselves daily, and to notify us if their weight increases by more than 3 lbs in 1 day or 5 lbs in 1 week.

## 2022-12-09 NOTE — PROGRESS NOTES
Subjective:    Patient ID:  Yasmeen Valderrama is a 52 y.o. female who presents for evaluation of Pulmonary Hypertension.    HPI   Mrs. Valderrama was referred by Dr. Olvera. She has stage IV, severe COPD by gold classification, current smoke - 1 pack a day, KATHERINE, chronic respiratory failure with hypoxia and hypercapnia, pulmonary hypertensionchronic hoarseness, anxiety, chronic pain syndrome laryngeal papilloma, Patient is on home oxygen at 3 L NC.     Mrs. Valderrama reports symptoms of chronic SOB, GARDNER, cough. She has had COPD diagnosis for a long time, but had 3 admissions for COPD exacerbations this year. The most recent was in September. She follows with pulmonary. Using nebulizers after activity greater than 15-20 minutes. Walked 182 m, on 3L NC, but maintained sats in the 90's. She does not sleep well. Sits close to straight up and only gets about an hour of sleep at a time. She is not able to lie flat. Has not been able to complete a sleep study. Did try BiPAP but could not tolerate the pressure.  She usually feels fluid  in her back and legs. Her cardiologist increased her lasix to 40 mg, BID, plus metolazone 2.5 mg every other day which has helped. Her weight used to be around 170-180. She reports gaining a lot of weight over the last year, mostly feels like fluid.    Discussed the pathophysiology of pulmonary hypertension, different groups, associated conditions, and diagnostic testing. Specifically addressed the association between severe COPD and pulmonary hypertension. Discussed with patient the relationship between severe COPD and PH. Explained that mild-moderated PH is a frequent complication of COPD due to damage and changes to the lung vessels. Noted that PH medications do not often help in these situations and reviewed the things that will help.  Explained the purpose and steps of the RHC but feel it is not useful in her situation.    6MWT:  6MW 12/9/2022   6MWT Status completed with stops   Patient  Reported Dyspnea   Was O2 used? Yes   Delivery Method Cannula;Pull Tank;Continuous Flow   6MW Distance walked (feet) 600   Distance walked (meters) 182.88   Did patient stop? Yes   Oxygen Saturation 97   Supplemental Oxygen 3 L/M   Heart Rate 97   Blood Pressure 136/67   German Dyspnea Rating  moderate   Oxygen Saturation 92   Supplemental Oxygen 3 L/M   Heart Rate 111   Blood Pressure 135/81   German Dyspnea Rating  very heavy   Recovery Time (seconds) 69   Oxygen Saturation 94   Supplemental Oxygen 3 L/M   Heart Rate 99     ECHO: 2022  Technically poor study.  The stress echo portion of this study is negative for myocardial ischemia.  The ECG portion of this study is negative for myocardial ischemia.  There were no arrhythmias during stress.  The left ventricle is normal in size with eccentric hypertrophy and normal systolic function.  The patient reached the end of the protocol.  The estimated ejection fraction is 55%.  Normal left ventricular diastolic function.  Normal right ventricular size with normal right ventricular systolic function.  Mild left atrial enlargement.  Mild right atrial enlargement.  Mild to moderate tricuspid regurgitation.  Normal central venous pressure (3 mmHg).  The estimated PA systolic pressure is 37 mmHg.  There is mild pulmonary hypertension.    EK2022  Vent. Rate : 079 BPM     Atrial Rate : 079 BPM      P-R Int : 156 ms          QRS Dur : 102 ms       QT Int : 364 ms       P-R-T Axes : 064 070 038 degrees      QTc Int : 417 ms     Normal sinus rhythm   Incomplete right bundle branch block   Possible Lateral infarct ,age undetermined   Cannot rule out Inferior infarct ,age undetermined   Abnormal ECG   When compared with ECG of 2022 09:21,   Significant changes have occurred   Confirmed by Yuliet OG, Jodie CHEEK ([id number]) on 2022 10:24:52 PM     PFTs: 2022  Spirometry shows significant obstruction. Lung volume determination shows mild restriction is also present,  "although the incrased RV/TLC ratio suggests superimposed air trapping. Overall there is very severe ventilatory impairment. Spirometry remains   unimproved following bronchodilator. DLCO is moderately decreased    CT CHEST: 9/14/2022  Narrative & Impression  EXAMINATION:  XR CHEST AP PORTABLE     CLINICAL HISTORY:  shortness of breath;     FINDINGS:  Chest one view AP portable.     There is mild cardiomegaly.  This is similar to 06/28/2022.  There is mild interstitial prominence.     Impression:     Possible mild CHF versus mild interstitial pneumonia.    Review of Systems   Constitutional: Positive for weight gain. Negative for fever.   HENT:  Positive for hoarse voice (chronic). Negative for congestion.    Eyes: Negative.    Cardiovascular:  Positive for dyspnea on exertion, leg swelling and orthopnea. Negative for chest pain, near-syncope, palpitations and syncope.   Respiratory:  Positive for cough, shortness of breath, sleep disturbances due to breathing and wheezing.    Endocrine: Negative.    Hematologic/Lymphatic: Negative.    Skin:  Positive for dry skin.   Musculoskeletal: Negative.    Gastrointestinal:  Positive for bloating. Negative for abdominal pain, nausea and vomiting.   Genitourinary: Negative.    Neurological: Negative.    Psychiatric/Behavioral: Negative.        Objective: BP (!) 153/93 (BP Location: Right arm, Patient Position: Sitting, BP Method: Medium (Automatic))   Pulse 92   Ht 5' 3" (1.6 m)   Wt 97.6 kg (215 lb 2.7 oz)   BMI 38.12 kg/m²     Physical Exam  Constitutional:       General: She is not in acute distress.     Appearance: Normal appearance. She is not ill-appearing.   HENT:      Head: Normocephalic.   Cardiovascular:      Rate and Rhythm: Normal rate and regular rhythm.      Pulses: Normal pulses.      Heart sounds: Normal heart sounds.   Pulmonary:      Effort: No respiratory distress.      Breath sounds: Wheezing and rhonchi present.   Abdominal:      Palpations: Abdomen is " soft.   Musculoskeletal:         General: Normal range of motion.      Cervical back: Normal range of motion.      Right lower leg: Edema (2+) present.      Left lower leg: Edema (2+) present.   Skin:     General: Skin is warm and dry.      Capillary Refill: Capillary refill takes less than 2 seconds.   Neurological:      Mental Status: She is oriented to person, place, and time.   Psychiatric:         Mood and Affect: Mood normal.         Behavior: Behavior normal.         Lab Results   Component Value Date    BNP 17 12/09/2022     12/09/2022    K 4.7 12/09/2022    MG 2.0 12/09/2022    CL 92 (L) 12/09/2022    CO2 38 (H) 12/09/2022    BUN 7 12/09/2022    CREATININE 0.6 12/09/2022    GLU 94 12/09/2022    HGBA1C 5.4 03/08/2016    AST 15 12/09/2022    ALT 11 12/09/2022    ALBUMIN 3.8 12/09/2022    PROT 7.1 12/09/2022    BILITOT 1.0 12/09/2022    CHOL 173 09/29/2020    HDL 64 09/29/2020    LDLCALC 92.0 09/29/2020    TRIG 85 09/29/2020       Magnesium   Date Value Ref Range Status   12/09/2022 2.0 1.6 - 2.6 mg/dL Final       Lab Results   Component Value Date    WBC 13.65 (H) 12/09/2022    HGB 12.8 12/09/2022    HCT 43.5 12/09/2022    MCV 95 12/09/2022     12/09/2022       No results found for: INR, PROTIME    BNP   Date Value Ref Range Status   12/09/2022 17 0 - 99 pg/mL Final     Comment:     Values of less than 100 pg/ml are consistent with non-CHF populations.   11/15/2022 17 0 - 99 pg/mL Final     Comment:     Values of less than 100 pg/ml are consistent with non-CHF populations.   09/14/2022 14 0 - 99 pg/mL Final     Comment:     Values of less than 100 pg/ml are consistent with non-CHF populations.       No results found for: LDH    Assessment:       1. Pulmonary hypertension    2. Stage 4 very severe COPD by GOLD classification    3. SOB (shortness of breath)    4. Sleep-related breathing disorder    5. Cigarette nicotine dependence without complication         Plan:       Mrs. Valderrama's ECHO on  12/5 shows mild PH with normal RV size and function and normal CVP. Her fluid seems better controlled with increased diuretics, as well.     Had a long discussion with patient about he relationship between severe COPD and PH. PAH medications have not been shown to be very effective in cases of severe COPD, though there have also not been many studies. Optimizing COPD treatment is key.   Also she is not able to lie flat for a RHC, at this time, so I recommend continuing other medical and supportive measures for now.    Would recommend the focus to be on   efforts to quit smoking  fluid management with diuretics, diet and exercise;  Using supplemental oxygen as directed.  Using optimal bronchodilator inhalers.  Treating sleep apnea. Treating sleep apnea and finding a way for her to get adequate sleep is a priority.    Assistance with smoking cessation was offered, including:  []  Medications  [x]  Counseling  []  Printed Information on Smoking Cessation  []  Referral to a Smoking Cessation Program    Patient was counseled regarding smoking for 3-10 minutes.       Thank you for allowing us to participate in the cardiopulmonary management of this patient. We look forward to partnering in their care. Please contact us with any questions or concerns you may have regarding this patient.

## 2022-12-12 NOTE — PROCEDURES
Yasmeen Valderrama is a 52 y.o.  female patient, who presents for a 6 minute walk test ordered by BREA Ruggiero.  The diagnosis is Qualify for Oxygen; Pulmonary Hypertension.  The patient's BMI is 38.1 kg/m2. Predicted distance (lower limit of normal) is 325.44 meters.    Test Results:    The test was completed with stops.  The patient stopped 3 times for a total of 111 seconds.  The total time walked was 249 seconds.  During walking, the patient reported:  Dyspnea.  The patient used supplemental oxygen during testing.     12/09/2022---------Distance: 182.88 meters (600 feet)     O2 Sat % Supplemental Oxygen Heart Rate Blood Pressure German Scale   Pre-exercise  (Resting) 97 % 3 L/M 97 bpm 136/67 mmHg 3   During Exercise 92 % 3 L/M 111 bpm 135/81 mmHg 7-8   Post-exercise   94 % 3 L/M  99 bpm         Recovery Time: 69 seconds    Oxygen Qualification:     O2 Sat % Supplemental Oxygen Heart Rate Blood Pressure German Scale   Pre-exercise  (Resting) 92 % Room Air  92 bpm  140/81 mmHg  3    During Exercise   85 %  Room Air  112 bpm  174/96 mmHg  9    Post-exercise   90 %  Room Air  99 bpm  140/71 mmHg       Performing nurse/tech: Sarina JEFFRIES      PREVIOUS STUDY at [facility] on 11/08/2019:  Six minute walk distance is 285 meters (934 feet) with maximal heavy dyspnea. During exercise, there was significant desaturation from 90% and 85% while breathing room air. Oxygen saturation did not improve while using supplemental oxygen at 2 lpm.      CLINICAL INTERPRETATION:  Six minute walk distance is 182.88 meters (600 feet) with very heavy dyspnea.  During exercise, there was significant desaturation while breathing supplemental oxygen.  Both blood pressure and heart rate remained stable with walking.  The patient did not report non-pulmonary symptoms during exercise.  Significant exercise impairment is likely due to desaturation and subjective symptoms.  The patient did complete the study, walking 249 seconds of the 360  second test.  The patient may benefit from using supplemental oxygen during exertion.  Since the previous study in November 2019, exercise capacity is significantly worse.  Based upon age and body mass index, exercise capacity is less than predicted.   Oxygen saturation did improve while breathing supplemental oxygen.

## 2023-03-06 PROBLEM — J96.12 CHRONIC RESPIRATORY FAILURE WITH HYPOXIA AND HYPERCAPNIA: Chronic | Status: RESOLVED | Noted: 2022-06-28 | Resolved: 2023-03-06

## 2023-03-06 PROBLEM — J96.11 CHRONIC RESPIRATORY FAILURE WITH HYPOXIA AND HYPERCAPNIA: Chronic | Status: RESOLVED | Noted: 2022-06-28 | Resolved: 2023-03-06

## 2023-09-26 ENCOUNTER — HOSPITAL ENCOUNTER (OUTPATIENT)
Facility: HOSPITAL | Age: 53
Discharge: HOME | End: 2023-09-27
Attending: STUDENT IN AN ORGANIZED HEALTH CARE EDUCATION/TRAINING PROGRAM | Admitting: STUDENT IN AN ORGANIZED HEALTH CARE EDUCATION/TRAINING PROGRAM
Payer: MEDICAID

## 2023-09-26 DIAGNOSIS — R00.0 TACHYCARDIA: ICD-10-CM

## 2023-09-26 DIAGNOSIS — K92.2 GASTROINTESTINAL HEMORRHAGE, UNSPECIFIED GASTROINTESTINAL HEMORRHAGE TYPE: Primary | ICD-10-CM

## 2023-09-26 DIAGNOSIS — J44.9 COPD (CHRONIC OBSTRUCTIVE PULMONARY DISEASE): ICD-10-CM

## 2023-09-26 PROBLEM — D72.829 LEUKOCYTOSIS: Status: ACTIVE | Noted: 2023-09-26

## 2023-09-26 PROBLEM — E87.6 HYPOKALEMIA: Status: ACTIVE | Noted: 2023-09-26

## 2023-09-26 LAB
ABO + RH BLD: NORMAL
ALBUMIN SERPL BCP-MCNC: 3.7 G/DL (ref 3.5–5.2)
ALP SERPL-CCNC: 96 U/L (ref 55–135)
ALT SERPL W/O P-5'-P-CCNC: 9 U/L (ref 10–44)
ANION GAP SERPL CALC-SCNC: 11 MMOL/L (ref 8–16)
AST SERPL-CCNC: 13 U/L (ref 10–40)
BACTERIA #/AREA URNS HPF: ABNORMAL /HPF
BASOPHILS # BLD AUTO: 0.05 K/UL (ref 0–0.2)
BASOPHILS # BLD AUTO: 0.08 K/UL (ref 0–0.2)
BASOPHILS NFR BLD: 0.3 % (ref 0–1.9)
BASOPHILS NFR BLD: 0.3 % (ref 0–1.9)
BILIRUB SERPL-MCNC: 0.8 MG/DL (ref 0.1–1)
BILIRUB UR QL STRIP: NEGATIVE
BLD GP AB SCN CELLS X3 SERPL QL: NORMAL
BNP SERPL-MCNC: 21 PG/ML (ref 0–99)
BUN SERPL-MCNC: 19 MG/DL (ref 6–20)
CALCIUM SERPL-MCNC: 9 MG/DL (ref 8.7–10.5)
CHLORIDE SERPL-SCNC: 86 MMOL/L (ref 95–110)
CLARITY UR: CLEAR
CO2 SERPL-SCNC: 39 MMOL/L (ref 23–29)
COLOR UR: YELLOW
CREAT SERPL-MCNC: 0.6 MG/DL (ref 0.5–1.4)
DIFFERENTIAL METHOD: ABNORMAL
DIFFERENTIAL METHOD: ABNORMAL
EOSINOPHIL # BLD AUTO: 0.1 K/UL (ref 0–0.5)
EOSINOPHIL # BLD AUTO: 0.2 K/UL (ref 0–0.5)
EOSINOPHIL NFR BLD: 0.5 % (ref 0–8)
EOSINOPHIL NFR BLD: 1 % (ref 0–8)
ERYTHROCYTE [DISTWIDTH] IN BLOOD BY AUTOMATED COUNT: 14 % (ref 11.5–14.5)
ERYTHROCYTE [DISTWIDTH] IN BLOOD BY AUTOMATED COUNT: 14.2 % (ref 11.5–14.5)
EST. GFR  (NO RACE VARIABLE): >60 ML/MIN/1.73 M^2
FERRITIN SERPL-MCNC: 35 NG/ML (ref 20–300)
GLUCOSE SERPL-MCNC: 121 MG/DL (ref 70–110)
GLUCOSE UR QL STRIP: NEGATIVE
HCT VFR BLD AUTO: 27.1 % (ref 37–48.5)
HCT VFR BLD AUTO: 32 % (ref 37–48.5)
HGB BLD-MCNC: 8.5 G/DL (ref 12–16)
HGB BLD-MCNC: 9.9 G/DL (ref 12–16)
HGB UR QL STRIP: NEGATIVE
IMM GRANULOCYTES # BLD AUTO: 0.2 K/UL (ref 0–0.04)
IMM GRANULOCYTES # BLD AUTO: 0.27 K/UL (ref 0–0.04)
IMM GRANULOCYTES NFR BLD AUTO: 1 % (ref 0–0.5)
IMM GRANULOCYTES NFR BLD AUTO: 1.2 % (ref 0–0.5)
INR PPP: 1 (ref 0.8–1.2)
IRON SERPL-MCNC: 61 UG/DL (ref 30–160)
KETONES UR QL STRIP: NEGATIVE
LACTATE SERPL-SCNC: 1.1 MMOL/L (ref 0.5–2.2)
LEUKOCYTE ESTERASE UR QL STRIP: NEGATIVE
LIPASE SERPL-CCNC: 10 U/L (ref 4–60)
LYMPHOCYTES # BLD AUTO: 4.1 K/UL (ref 1–4.8)
LYMPHOCYTES # BLD AUTO: 4.6 K/UL (ref 1–4.8)
LYMPHOCYTES NFR BLD: 17.7 % (ref 18–48)
LYMPHOCYTES NFR BLD: 23.3 % (ref 18–48)
MAGNESIUM SERPL-MCNC: 2.3 MG/DL (ref 1.6–2.6)
MCH RBC QN AUTO: 27.5 PG (ref 27–31)
MCH RBC QN AUTO: 28.1 PG (ref 27–31)
MCHC RBC AUTO-ENTMCNC: 30.9 G/DL (ref 32–36)
MCHC RBC AUTO-ENTMCNC: 31.4 G/DL (ref 32–36)
MCV RBC AUTO: 89 FL (ref 82–98)
MCV RBC AUTO: 89 FL (ref 82–98)
MICROSCOPIC COMMENT: ABNORMAL
MONOCYTES # BLD AUTO: 1.3 K/UL (ref 0.3–1)
MONOCYTES # BLD AUTO: 1.3 K/UL (ref 0.3–1)
MONOCYTES NFR BLD: 5.5 % (ref 4–15)
MONOCYTES NFR BLD: 6.7 % (ref 4–15)
NEUTROPHILS # BLD AUTO: 13.4 K/UL (ref 1.8–7.7)
NEUTROPHILS # BLD AUTO: 17.3 K/UL (ref 1.8–7.7)
NEUTROPHILS NFR BLD: 67.7 % (ref 38–73)
NEUTROPHILS NFR BLD: 74.8 % (ref 38–73)
NITRITE UR QL STRIP: NEGATIVE
NRBC BLD-RTO: 0 /100 WBC
NRBC BLD-RTO: 0 /100 WBC
PH UR STRIP: 7 [PH] (ref 5–8)
PLATELET # BLD AUTO: 306 K/UL (ref 150–450)
PLATELET # BLD AUTO: 337 K/UL (ref 150–450)
PMV BLD AUTO: 10.7 FL (ref 9.2–12.9)
PMV BLD AUTO: 10.9 FL (ref 9.2–12.9)
POTASSIUM SERPL-SCNC: 2.8 MMOL/L (ref 3.5–5.1)
PROT SERPL-MCNC: 6.8 G/DL (ref 6–8.4)
PROT UR QL STRIP: ABNORMAL
PROTHROMBIN TIME: 10.7 SEC (ref 9–12.5)
RBC # BLD AUTO: 3.03 M/UL (ref 4–5.4)
RBC # BLD AUTO: 3.6 M/UL (ref 4–5.4)
RBC #/AREA URNS HPF: 2 /HPF (ref 0–4)
SATURATED IRON: 14 % (ref 20–50)
SODIUM SERPL-SCNC: 136 MMOL/L (ref 136–145)
SP GR UR STRIP: >1.03 (ref 1–1.03)
SPECIMEN OUTDATE: NORMAL
SQUAMOUS #/AREA URNS HPF: 3 /HPF
TOTAL IRON BINDING CAPACITY: 425 UG/DL (ref 250–450)
TRANSFERRIN SERPL-MCNC: 287 MG/DL (ref 200–375)
TROPONIN I SERPL DL<=0.01 NG/ML-MCNC: <0.006 NG/ML (ref 0–0.03)
URN SPEC COLLECT METH UR: ABNORMAL
UROBILINOGEN UR STRIP-ACNC: NEGATIVE EU/DL
WBC # BLD AUTO: 19.82 K/UL (ref 3.9–12.7)
WBC # BLD AUTO: 23.09 K/UL (ref 3.9–12.7)
WBC #/AREA URNS HPF: 3 /HPF (ref 0–5)

## 2023-09-26 PROCEDURE — 25000003 PHARM REV CODE 250: Performed by: PHYSICIAN ASSISTANT

## 2023-09-26 PROCEDURE — 82728 ASSAY OF FERRITIN: CPT | Performed by: STUDENT IN AN ORGANIZED HEALTH CARE EDUCATION/TRAINING PROGRAM

## 2023-09-26 PROCEDURE — G0378 HOSPITAL OBSERVATION PER HR: HCPCS

## 2023-09-26 PROCEDURE — 83735 ASSAY OF MAGNESIUM: CPT | Performed by: STUDENT IN AN ORGANIZED HEALTH CARE EDUCATION/TRAINING PROGRAM

## 2023-09-26 PROCEDURE — 25000003 PHARM REV CODE 250: Performed by: STUDENT IN AN ORGANIZED HEALTH CARE EDUCATION/TRAINING PROGRAM

## 2023-09-26 PROCEDURE — 25000242 PHARM REV CODE 250 ALT 637 W/ HCPCS: Performed by: PHYSICIAN ASSISTANT

## 2023-09-26 PROCEDURE — 25500020 PHARM REV CODE 255: Performed by: STUDENT IN AN ORGANIZED HEALTH CARE EDUCATION/TRAINING PROGRAM

## 2023-09-26 PROCEDURE — 27000221 HC OXYGEN, UP TO 24 HOURS

## 2023-09-26 PROCEDURE — 83690 ASSAY OF LIPASE: CPT | Performed by: STUDENT IN AN ORGANIZED HEALTH CARE EDUCATION/TRAINING PROGRAM

## 2023-09-26 PROCEDURE — 85025 COMPLETE CBC W/AUTO DIFF WBC: CPT | Mod: 91 | Performed by: PHYSICIAN ASSISTANT

## 2023-09-26 PROCEDURE — 96375 TX/PRO/DX INJ NEW DRUG ADDON: CPT

## 2023-09-26 PROCEDURE — 85610 PROTHROMBIN TIME: CPT | Performed by: STUDENT IN AN ORGANIZED HEALTH CARE EDUCATION/TRAINING PROGRAM

## 2023-09-26 PROCEDURE — 83605 ASSAY OF LACTIC ACID: CPT | Performed by: STUDENT IN AN ORGANIZED HEALTH CARE EDUCATION/TRAINING PROGRAM

## 2023-09-26 PROCEDURE — 94761 N-INVAS EAR/PLS OXIMETRY MLT: CPT

## 2023-09-26 PROCEDURE — 86900 BLOOD TYPING SEROLOGIC ABO: CPT | Performed by: STUDENT IN AN ORGANIZED HEALTH CARE EDUCATION/TRAINING PROGRAM

## 2023-09-26 PROCEDURE — C9113 INJ PANTOPRAZOLE SODIUM, VIA: HCPCS | Performed by: PHYSICIAN ASSISTANT

## 2023-09-26 PROCEDURE — 94640 AIRWAY INHALATION TREATMENT: CPT

## 2023-09-26 PROCEDURE — C9113 INJ PANTOPRAZOLE SODIUM, VIA: HCPCS | Performed by: STUDENT IN AN ORGANIZED HEALTH CARE EDUCATION/TRAINING PROGRAM

## 2023-09-26 PROCEDURE — 84484 ASSAY OF TROPONIN QUANT: CPT | Performed by: STUDENT IN AN ORGANIZED HEALTH CARE EDUCATION/TRAINING PROGRAM

## 2023-09-26 PROCEDURE — 84466 ASSAY OF TRANSFERRIN: CPT | Performed by: STUDENT IN AN ORGANIZED HEALTH CARE EDUCATION/TRAINING PROGRAM

## 2023-09-26 PROCEDURE — 63600175 PHARM REV CODE 636 W HCPCS: Performed by: PHYSICIAN ASSISTANT

## 2023-09-26 PROCEDURE — 99285 EMERGENCY DEPT VISIT HI MDM: CPT | Mod: 25

## 2023-09-26 PROCEDURE — 83880 ASSAY OF NATRIURETIC PEPTIDE: CPT | Performed by: STUDENT IN AN ORGANIZED HEALTH CARE EDUCATION/TRAINING PROGRAM

## 2023-09-26 PROCEDURE — 96376 TX/PRO/DX INJ SAME DRUG ADON: CPT

## 2023-09-26 PROCEDURE — 81000 URINALYSIS NONAUTO W/SCOPE: CPT | Performed by: STUDENT IN AN ORGANIZED HEALTH CARE EDUCATION/TRAINING PROGRAM

## 2023-09-26 PROCEDURE — 80053 COMPREHEN METABOLIC PANEL: CPT | Performed by: STUDENT IN AN ORGANIZED HEALTH CARE EDUCATION/TRAINING PROGRAM

## 2023-09-26 PROCEDURE — 96366 THER/PROPH/DIAG IV INF ADDON: CPT

## 2023-09-26 PROCEDURE — 93010 EKG 12-LEAD: ICD-10-PCS | Mod: ,,, | Performed by: INTERNAL MEDICINE

## 2023-09-26 PROCEDURE — 63600175 PHARM REV CODE 636 W HCPCS: Performed by: STUDENT IN AN ORGANIZED HEALTH CARE EDUCATION/TRAINING PROGRAM

## 2023-09-26 PROCEDURE — 93010 ELECTROCARDIOGRAM REPORT: CPT | Mod: ,,, | Performed by: INTERNAL MEDICINE

## 2023-09-26 PROCEDURE — S4991 NICOTINE PATCH NONLEGEND: HCPCS | Performed by: PHYSICIAN ASSISTANT

## 2023-09-26 PROCEDURE — 96361 HYDRATE IV INFUSION ADD-ON: CPT

## 2023-09-26 PROCEDURE — 93005 ELECTROCARDIOGRAM TRACING: CPT

## 2023-09-26 PROCEDURE — 96365 THER/PROPH/DIAG IV INF INIT: CPT | Mod: 59

## 2023-09-26 PROCEDURE — 85025 COMPLETE CBC W/AUTO DIFF WBC: CPT | Performed by: STUDENT IN AN ORGANIZED HEALTH CARE EDUCATION/TRAINING PROGRAM

## 2023-09-26 RX ORDER — ACETAMINOPHEN 325 MG/1
650 TABLET ORAL EVERY 6 HOURS PRN
Status: DISCONTINUED | OUTPATIENT
Start: 2023-09-26 | End: 2023-09-27 | Stop reason: HOSPADM

## 2023-09-26 RX ORDER — IPRATROPIUM BROMIDE AND ALBUTEROL SULFATE 2.5; .5 MG/3ML; MG/3ML
3 SOLUTION RESPIRATORY (INHALATION) EVERY 6 HOURS PRN
Status: DISCONTINUED | OUTPATIENT
Start: 2023-09-26 | End: 2023-09-27 | Stop reason: HOSPADM

## 2023-09-26 RX ORDER — BUPRENORPHINE AND NALOXONE 2; .5 MG/1; MG/1
6 FILM, SOLUBLE BUCCAL; SUBLINGUAL DAILY
Status: DISCONTINUED | OUTPATIENT
Start: 2023-09-26 | End: 2023-09-27 | Stop reason: HOSPADM

## 2023-09-26 RX ORDER — PANTOPRAZOLE SODIUM 40 MG/10ML
80 INJECTION, POWDER, LYOPHILIZED, FOR SOLUTION INTRAVENOUS
Status: COMPLETED | OUTPATIENT
Start: 2023-09-26 | End: 2023-09-26

## 2023-09-26 RX ORDER — FAMOTIDINE 10 MG/ML
20 INJECTION INTRAVENOUS
Status: COMPLETED | OUTPATIENT
Start: 2023-09-26 | End: 2023-09-26

## 2023-09-26 RX ORDER — PANTOPRAZOLE SODIUM 40 MG/10ML
40 INJECTION, POWDER, LYOPHILIZED, FOR SOLUTION INTRAVENOUS 2 TIMES DAILY
Status: DISCONTINUED | OUTPATIENT
Start: 2023-09-26 | End: 2023-09-27 | Stop reason: HOSPADM

## 2023-09-26 RX ORDER — TALC
6 POWDER (GRAM) TOPICAL NIGHTLY PRN
Status: DISCONTINUED | OUTPATIENT
Start: 2023-09-26 | End: 2023-09-27 | Stop reason: HOSPADM

## 2023-09-26 RX ORDER — AMOXICILLIN 250 MG
1 CAPSULE ORAL DAILY PRN
Status: DISCONTINUED | OUTPATIENT
Start: 2023-09-26 | End: 2023-09-27 | Stop reason: HOSPADM

## 2023-09-26 RX ORDER — POTASSIUM CHLORIDE 14.9 MG/ML
40 INJECTION INTRAVENOUS
Status: DISCONTINUED | OUTPATIENT
Start: 2023-09-26 | End: 2023-09-26

## 2023-09-26 RX ORDER — POTASSIUM CHLORIDE 7.45 MG/ML
10 INJECTION INTRAVENOUS
Status: COMPLETED | OUTPATIENT
Start: 2023-09-26 | End: 2023-09-26

## 2023-09-26 RX ORDER — BUPRENORPHINE AND NALOXONE 2; .5 MG/1; MG/1
6 FILM, SOLUBLE BUCCAL; SUBLINGUAL DAILY
Status: DISCONTINUED | OUTPATIENT
Start: 2023-09-27 | End: 2023-09-26

## 2023-09-26 RX ORDER — LINACLOTIDE 72 UG/1
72 CAPSULE, GELATIN COATED ORAL EVERY MORNING
COMMUNITY
Start: 2023-09-08

## 2023-09-26 RX ORDER — IBUPROFEN 200 MG
1 TABLET ORAL DAILY
Status: DISCONTINUED | OUTPATIENT
Start: 2023-09-26 | End: 2023-09-27 | Stop reason: HOSPADM

## 2023-09-26 RX ADMIN — IOHEXOL 100 ML: 350 INJECTION, SOLUTION INTRAVENOUS at 02:09

## 2023-09-26 RX ADMIN — IPRATROPIUM BROMIDE AND ALBUTEROL SULFATE 3 ML: 2.5; .5 SOLUTION RESPIRATORY (INHALATION) at 07:09

## 2023-09-26 RX ADMIN — POTASSIUM CHLORIDE 10 MEQ: 7.46 INJECTION, SOLUTION INTRAVENOUS at 05:09

## 2023-09-26 RX ADMIN — SODIUM CHLORIDE, POTASSIUM CHLORIDE, SODIUM LACTATE AND CALCIUM CHLORIDE 1000 ML: 600; 310; 30; 20 INJECTION, SOLUTION INTRAVENOUS at 01:09

## 2023-09-26 RX ADMIN — Medication 1 PATCH: at 08:09

## 2023-09-26 RX ADMIN — BUPRENORPHINE AND NALOXONE 6 FILM: 2; .5 FILM BUCCAL; SUBLINGUAL at 09:09

## 2023-09-26 RX ADMIN — SODIUM CHLORIDE, POTASSIUM CHLORIDE, SODIUM LACTATE AND CALCIUM CHLORIDE 1000 ML: 600; 310; 30; 20 INJECTION, SOLUTION INTRAVENOUS at 11:09

## 2023-09-26 RX ADMIN — POTASSIUM CHLORIDE 10 MEQ: 7.46 INJECTION, SOLUTION INTRAVENOUS at 01:09

## 2023-09-26 RX ADMIN — FAMOTIDINE 20 MG: 10 INJECTION INTRAVENOUS at 12:09

## 2023-09-26 RX ADMIN — POTASSIUM CHLORIDE 10 MEQ: 7.46 INJECTION, SOLUTION INTRAVENOUS at 12:09

## 2023-09-26 RX ADMIN — PANTOPRAZOLE SODIUM 80 MG: 40 INJECTION, POWDER, FOR SOLUTION INTRAVENOUS at 12:09

## 2023-09-26 RX ADMIN — POTASSIUM CHLORIDE 10 MEQ: 7.46 INJECTION, SOLUTION INTRAVENOUS at 03:09

## 2023-09-26 RX ADMIN — PANTOPRAZOLE SODIUM 40 MG: 40 INJECTION, POWDER, FOR SOLUTION INTRAVENOUS at 05:09

## 2023-09-26 NOTE — PHARMACY MED REC
"Admission Medication History     The home medication history was taken by Sol Rodriguez.    You may go to "Admission" then "Reconcile Home Medications" tabs to review and/or act upon these items.     The home medication list has been updated by the Pharmacy department.   Please read ALL comments highlighted in yellow.   Please address this information as you see fit.    Feel free to contact us if you have any questions or require assistance.    Medications listed below were obtained from: Patient/family and Analytic software- [employer]  (Not in a hospital admission)          Sol Rodriguez  [phone]                 .          " 31-Jan-2020 07:50

## 2023-09-26 NOTE — ASSESSMENT & PLAN NOTE
Presents with potassium of 2.8, suspect related to lasix/zaroxlyn use. Will replete with IV given GI bleeding and check Mg

## 2023-09-26 NOTE — HPI
Yasmeen Hitchcockjoie 53 y.o. female with COPD on home oxygen, chronic pain, tobacco abuse presents to the hospital with a chief complaint of abdominal pain.  She reports 2 days ago she experienced sudden onset epigastric abdominal pain described as cramping which was then followed by dark black stools.  She attempted no treatment home.  She denies any aggravating or alleviating factors.  She reports her pain has resolved she is not had any further tarry stools.  She denies any blood thinner or NSAID use.  She completed a steroid burst for COPD exacerbation 2 days ago with associated antibiotic use.  She is on 3 L of oxygen outpatient.  She smokes 2 packs per day.  She denies fever chest shortness of breath vomiting syncope leg swelling.    In the ED, white blood cell count 20 hemoglobin 9.9 potassium 2.8 chloride 86 BNP negative troponin negative lactic acid negative CT abdomen pelvis with contrast with cholelithiasis without evidence of acute cholecystitis diverticulosis coli without evidence diverticulitis bibasilar airspace opacities findings may reflect evolving pneumonia.

## 2023-09-26 NOTE — SUBJECTIVE & OBJECTIVE
Past Medical History:   Diagnosis Date    Anxiety     Asthma     Carpal tunnel syndrome, bilateral     COPD (chronic obstructive pulmonary disease)     Heavy cigarette smoker     9/14/22 2PPD    Laryngeal papilloma     On home oxygen therapy     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    Right side with CT scan Referred to ENT for visualization       Past Surgical History:   Procedure Laterality Date    CARPAL TUNNEL RELEASE Bilateral     FINGER SURGERY      HYSTERECTOMY      OOPHORECTOMY  1996    Hysterectomy       Review of patient's allergies indicates:   Allergen Reactions    Antivert [meclizine] Other (See Comments)     Behavioral changes       No current facility-administered medications on file prior to encounter.     Current Outpatient Medications on File Prior to Encounter   Medication Sig    albuterol (PROVENTIL/VENTOLIN HFA) 90 mcg/actuation inhaler Inhale 2 puffs into the lungs every 6 (six) hours as needed.    albuterol-ipratropium (DUO-NEB) 2.5 mg-0.5 mg/3 mL nebulizer solution Take 3 mLs by nebulization every 4 (four) hours as needed for Wheezing. Rescue    fluticasone-umeclidin-vilanter (TRELEGY ELLIPTA) 200-62.5-25 mcg inhaler Trelegy Ellipta 200 mcg-62.5 mcg-25 mcg powder for inhalation   INHALE 1 PUFF(S) BY MOUTH into the lungs ONCE A DAY    furosemide (LASIX) 40 MG tablet Take 40 mg by mouth 2 (two) times daily as needed.    LINZESS 72 mcg Cap capsule Take 72 mcg by mouth every morning.    metOLazone (ZAROXOLYN) 2.5 MG tablet Take 1 tablet (2.5 mg total) by mouth every other day.    SUBOXONE 12-3 mg Film PLACE 1 FILM UNDER THE TONGUE TWICE DAILY.    [DISCONTINUED] naloxone (NARCAN) 4 mg/actuation Spry     [DISCONTINUED] nicotine (NICODERM CQ) 21 mg/24 hr nicotine 21 mg/24 hr daily transdermal patch   apply 1 patch onto the skin daily    [DISCONTINUED] nystatin (MYCOSTATIN) 100,000 unit/mL suspension nystatin 100,000 unit/mL oral suspension   SHAKE WELL. TAKE 5 ml (1 teaspoon) BY MOUTH FOUR TIMES DAILY FOR 14  DAYS. DO NOT REFRIGERATE    [DISCONTINUED] predniSONE (DELTASONE) 20 MG tablet TAKE TWO TABLETS BY MOUTH ONCE DAILY FOR 5 DAYS.    [DISCONTINUED] triamcinolone acetonide 0.1% (KENALOG) 0.1 % cream SMARTSIG:Sparingly Topical Twice Daily    [DISCONTINUED] zolpidem (AMBIEN) 10 mg Tab Take 10 mg by mouth.     Family History       Problem Relation (Age of Onset)    COPD Mother    Heart disease Father    No Known Problems Sister, Brother          Tobacco Use    Smoking status: Every Day     Current packs/day: 2.00     Average packs/day: 2.0 packs/day for 39.8 years (79.6 ttl pk-yrs)     Types: Cigarettes     Start date: 12/1/1983    Smokeless tobacco: Current   Substance and Sexual Activity    Alcohol use: No     Alcohol/week: 0.0 standard drinks of alcohol    Drug use: No    Sexual activity: Yes     Partners: Male     Review of Systems   Constitutional:  Negative for chills and fever.   HENT:  Negative for nosebleeds and tinnitus.    Eyes:  Negative for photophobia and visual disturbance.   Respiratory:  Negative for shortness of breath and wheezing.    Cardiovascular:  Negative for chest pain, palpitations and leg swelling.   Gastrointestinal:  Positive for abdominal pain and blood in stool. Negative for abdominal distention, nausea and vomiting.   Genitourinary:  Negative for dysuria, flank pain and hematuria.   Musculoskeletal:  Negative for gait problem and joint swelling.   Skin:  Negative for rash and wound.   Neurological:  Negative for seizures and syncope.     Objective:     Vital Signs (Most Recent):  Temp: 98.7 °F (37.1 °C) (09/26/23 1558)  Pulse: 102 (09/26/23 1558)  Resp: 18 (09/26/23 1558)  BP: 139/88 (09/26/23 1558)  SpO2: 100 % (09/26/23 1558) Vital Signs (24h Range):  Temp:  [98.6 °F (37 °C)-98.7 °F (37.1 °C)] 98.7 °F (37.1 °C)  Pulse:  [] 102  Resp:  [12-22] 18  SpO2:  [96 %-100 %] 100 %  BP: (103-139)/(61-92) 139/88     Weight: 88.9 kg (196 lb)  Body mass index is 35.85 kg/m².     Physical  Exam  Vitals and nursing note reviewed.   Constitutional:       General: She is not in acute distress.     Appearance: She is well-developed. She is not diaphoretic.   HENT:      Head: Normocephalic and atraumatic.      Right Ear: External ear normal.      Left Ear: External ear normal.   Eyes:      General:         Right eye: No discharge.         Left eye: No discharge.      Conjunctiva/sclera: Conjunctivae normal.   Neck:      Thyroid: No thyromegaly.   Cardiovascular:      Rate and Rhythm: Normal rate and regular rhythm.      Heart sounds: No murmur heard.  Pulmonary:      Effort: Pulmonary effort is normal. No respiratory distress.      Breath sounds: Normal breath sounds. No wheezing.      Comments: On supplemental oxygen speaking in full sentences  Abdominal:      General: Bowel sounds are normal. There is no distension.      Palpations: Abdomen is soft. There is no mass.      Tenderness: There is no abdominal tenderness.   Musculoskeletal:         General: No deformity.      Cervical back: Normal range of motion and neck supple.      Right lower leg: No edema.      Left lower leg: No edema.   Skin:     General: Skin is warm and dry.   Neurological:      Mental Status: She is alert and oriented to person, place, and time.      Sensory: No sensory deficit.   Psychiatric:         Mood and Affect: Mood normal.         Behavior: Behavior normal.                Significant Labs: CBC:   Recent Labs   Lab 09/26/23  1131   WBC 23.09*   HGB 9.9*   HCT 32.0*        CMP:   Recent Labs   Lab 09/26/23  1131      K 2.8*   CL 86*   CO2 39*   *   BUN 19   CREATININE 0.6   CALCIUM 9.0   PROT 6.8   ALBUMIN 3.7   BILITOT 0.8   ALKPHOS 96   AST 13   ALT 9*   ANIONGAP 11     Cardiac Markers:   Recent Labs   Lab 09/26/23  1131   BNP 21     Coagulation:   Recent Labs   Lab 09/26/23  1131   INR 1.0     Lactic Acid:   Recent Labs   Lab 09/26/23  1131   LACTATE 1.1     Lipase:   Recent Labs   Lab 09/26/23  1131    LIPASE 10     Troponin:   Recent Labs   Lab 09/26/23  1131   TROPONINI <0.006       Significant Imaging:   Imaging Results              CT Abdomen Pelvis With Contrast (Final result)  Result time 09/26/23 15:09:18      Final result by Ed Mancia MD (09/26/23 15:09:18)                   Impression:      Cholelithiasis without evidence of acute cholecystitis.  Right upper quadrant ultrasound may be obtained further evaluation.    Diverticulosis coli without evidence of acute diverticulitis.    Bibasilar airspace opacities.  The findings may reflect evolving multifocal pneumonia.    Additional findings as above.      Electronically signed by: Ed Mancia MD  Date:    09/26/2023  Time:    15:09               Narrative:    EXAMINATION:  CT ABDOMEN PELVIS WITH CONTRAST    CLINICAL HISTORY:  Abdominal pain, acute, nonlocalized;    TECHNIQUE:  Low dose axial images, sagittal and coronal reformations were obtained from the lung bases to the pubic symphysis following the IV administration of 100 mL of Omnipaque 350 .  Oral contrast was not given.    COMPARISON:  CT dated 11/26/2016.    FINDINGS:  There are no pleural effusions.  There is no evidence of a pneumothorax.  There are bibasilar airspace opacities.  There is subsegmental atelectasis in the lung bases.    The heart is unremarkable.  There is normal tapering of the abdominal aorta.  There are calcifications along the course of the abdominal aorta and its branch vessels.  The celiac trunk, SMA, and KEISHA are within normal limits.  The portal veins and mesenteric veins are within normal limits.  The IVC and the remainder of the venous structures are unremarkable.    There is no evidence of lymphadenopathy in the abdomen or pelvis.    The esophagus, stomach, and duodenum are within normal limits.  The small bowel loops are unremarkable.  The appendix is within normal limits.  There is colonic diverticula without evidence of acute diverticulitis.    There is a focus  of calcification in the right hepatic lobe.  The liver is otherwise.  There is cholelithiasis.  No gallbladder wall thickening is identified.  The biliary tree is unremarkable.  The spleen is unremarkable.  The pancreas is within normal limits.  The adrenal glands are unremarkable.    The kidneys are unremarkable.  The ureters and urinary bladder are unremarkable.  The patient is status post hysterectomy.    There is no evidence of free fluid in the abdomen or pelvis.  There is no evidence of free air.  There is no evidence of pneumatosis.  No portal venous air is identified.    The psoas margins are unremarkable.  There is an umbilical hernia containing omental fat.  The remainder of the abdominal wall is unremarkable.  There are degenerative changes in the osseous structures.  There is no evidence of fracture.                                       X-Ray Chest AP Portable (Final result)  Result time 09/26/23 12:56:39      Final result by Navid Lloyd MD (09/26/23 12:56:39)                   Impression:      See above      Electronically signed by: Navid Lloyd MD  Date:    09/26/2023  Time:    12:56               Narrative:    EXAMINATION:  XR CHEST AP PORTABLE    CLINICAL HISTORY:  Chronic obstructive pulmonary disease, unspecified    TECHNIQUE:  Single frontal view of the chest was performed.    COMPARISON:  09/14/2022    FINDINGS:  Cardiac size is normal.  Lungs are clear and well aerated.  No infiltrate or cardiac failure.

## 2023-09-26 NOTE — ASSESSMENT & PLAN NOTE
No wheezing/SOB. Reports recently completed treatment for exacerbation  Continue home oxygen and PRN duo-nebs

## 2023-09-26 NOTE — ED TRIAGE NOTES
Pt presents to ED with complaint of abdominal pain and one ep of black tarry stool yesterday. Pt was seen at pcp today and tested positive for blood in stool and was instructed to come to ED. Pt rates abdominal pain 10/10. Pt has history of COPD and uses home oxygen. Pt denies chest pain, n/v, or headache,

## 2023-09-26 NOTE — H&P
[facility]  [facility]  History & Physical    Patient Name: Yasmeen Valderrama  MRN: [id number]  Patient Class: OP- Observation  Admission Date: 9/26/2023  Attending Physician: Rambo Rojo MD   Primary Care Provider: Leeanna Stuart MD         Patient information was obtained from patient, past medical records and ER records.     Subjective:     Principal Problem:GI bleed    Chief Complaint:   Chief Complaint   Patient presents with    Rectal Bleeding     Pt reports yesterday started with cramping abdominal pain and had one episode of black tarry stool. Was seen at her DR today and tested + for blood in the stool. Pt still having upper abdominal cramping 10/10         HPI: Yasmeen Valderrama 53 y.o. female with COPD on home oxygen, chronic pain, tobacco abuse presents to the hospital with a chief complaint of abdominal pain.  She reports 2 days ago she experienced sudden onset epigastric abdominal pain described as cramping which was then followed by dark black stools.  She attempted no treatment home.  She denies any aggravating or alleviating factors.  She reports her pain has resolved she is not had any further tarry stools.  She denies any blood thinner or NSAID use.  She completed a steroid burst for COPD exacerbation 2 days ago with associated antibiotic use.  She is on 3 L of oxygen outpatient.  She smokes 2 packs per day.  She denies fever chest shortness of breath vomiting syncope leg swelling.    In the ED, white blood cell count 20 hemoglobin 9.9 potassium 2.8 chloride 86 BNP negative troponin negative lactic acid negative CT abdomen pelvis with contrast with cholelithiasis without evidence of acute cholecystitis diverticulosis coli without evidence diverticulitis bibasilar airspace opacities findings may reflect evolving pneumonia.      Past Medical History:   Diagnosis Date    Anxiety     Asthma     Carpal tunnel syndrome, bilateral     COPD (chronic  obstructive pulmonary disease)     Heavy cigarette smoker     9/14/22 2PPD    Laryngeal papilloma     On home oxygen therapy     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    Right side with CT scan Referred to ENT for visualization       Past Surgical History:   Procedure Laterality Date    CARPAL TUNNEL RELEASE Bilateral     FINGER SURGERY      HYSTERECTOMY      OOPHORECTOMY  1996    Hysterectomy       Review of patient's allergies indicates:   Allergen Reactions    Antivert [meclizine] Other (See Comments)     Behavioral changes       No current facility-administered medications on file prior to encounter.     Current Outpatient Medications on File Prior to Encounter   Medication Sig    albuterol (PROVENTIL/VENTOLIN HFA) 90 mcg/actuation inhaler Inhale 2 puffs into the lungs every 6 (six) hours as needed.    albuterol-ipratropium (DUO-NEB) 2.5 mg-0.5 mg/3 mL nebulizer solution Take 3 mLs by nebulization every 4 (four) hours as needed for Wheezing. Rescue    fluticasone-umeclidin-vilanter (TRELEGY ELLIPTA) 200-62.5-25 mcg inhaler Trelegy Ellipta 200 mcg-62.5 mcg-25 mcg powder for inhalation   INHALE 1 PUFF(S) BY MOUTH into the lungs ONCE A DAY    furosemide (LASIX) 40 MG tablet Take 40 mg by mouth 2 (two) times daily as needed.    LINZESS 72 mcg Cap capsule Take 72 mcg by mouth every morning.    metOLazone (ZAROXOLYN) 2.5 MG tablet Take 1 tablet (2.5 mg total) by mouth every other day.    SUBOXONE 12-3 mg Film PLACE 1 FILM UNDER THE TONGUE TWICE DAILY.    [DISCONTINUED] naloxone (NARCAN) 4 mg/actuation Spry     [DISCONTINUED] nicotine (NICODERM CQ) 21 mg/24 hr nicotine 21 mg/24 hr daily transdermal patch   apply 1 patch onto the skin daily    [DISCONTINUED] nystatin (MYCOSTATIN) 100,000 unit/mL suspension nystatin 100,000 unit/mL oral suspension   SHAKE WELL. TAKE 5 ml (1 teaspoon) BY MOUTH FOUR TIMES DAILY FOR 14 DAYS. DO NOT REFRIGERATE    [DISCONTINUED] predniSONE (DELTASONE) 20 MG tablet TAKE TWO TABLETS  BY MOUTH ONCE DAILY FOR 5 DAYS.    [DISCONTINUED] triamcinolone acetonide 0.1% (KENALOG) 0.1 % cream SMARTSIG:Sparingly Topical Twice Daily    [DISCONTINUED] zolpidem (AMBIEN) 10 mg Tab Take 10 mg by mouth.     Family History       Problem Relation (Age of Onset)    COPD Mother    Heart disease Father    No Known Problems Sister, Brother          Tobacco Use    Smoking status: Every Day     Current packs/day: 2.00     Average packs/day: 2.0 packs/day for 39.8 years (79.6 ttl pk-yrs)     Types: Cigarettes     Start date: 12/1/1983    Smokeless tobacco: Current   Substance and Sexual Activity    Alcohol use: No     Alcohol/week: 0.0 standard drinks of alcohol    Drug use: No    Sexual activity: Yes     Partners: Male     Review of Systems   Constitutional:  Negative for chills and fever.   HENT:  Negative for nosebleeds and tinnitus.    Eyes:  Negative for photophobia and visual disturbance.   Respiratory:  Negative for shortness of breath and wheezing.    Cardiovascular:  Negative for chest pain, palpitations and leg swelling.   Gastrointestinal:  Positive for abdominal pain and blood in stool. Negative for abdominal distention, nausea and vomiting.   Genitourinary:  Negative for dysuria, flank pain and hematuria.   Musculoskeletal:  Negative for gait problem and joint swelling.   Skin:  Negative for rash and wound.   Neurological:  Negative for seizures and syncope.     Objective:     Vital Signs (Most Recent):  Temp: 98.7 °F (37.1 °C) (09/26/23 1558)  Pulse: 102 (09/26/23 1558)  Resp: 18 (09/26/23 1558)  BP: 139/88 (09/26/23 1558)  SpO2: 100 % (09/26/23 1558) Vital Signs (24h Range):  Temp:  [98.6 °F (37 °C)-98.7 °F (37.1 °C)] 98.7 °F (37.1 °C)  Pulse:  [] 102  Resp:  [12-22] 18  SpO2:  [96 %-100 %] 100 %  BP: (103-139)/(61-92) 139/88     Weight: 88.9 kg (196 lb)  Body mass index is 35.85 kg/m².     Physical Exam  Vitals and nursing note reviewed.   Constitutional:       General: She is not in acute  distress.     Appearance: She is well-developed. She is not diaphoretic.   HENT:      Head: Normocephalic and atraumatic.      Right Ear: External ear normal.      Left Ear: External ear normal.   Eyes:      General:         Right eye: No discharge.         Left eye: No discharge.      Conjunctiva/sclera: Conjunctivae normal.   Neck:      Thyroid: No thyromegaly.   Cardiovascular:      Rate and Rhythm: Normal rate and regular rhythm.      Heart sounds: No murmur heard.  Pulmonary:      Effort: Pulmonary effort is normal. No respiratory distress.      Breath sounds: Normal breath sounds. No wheezing.      Comments: On supplemental oxygen speaking in full sentences  Abdominal:      General: Bowel sounds are normal. There is no distension.      Palpations: Abdomen is soft. There is no mass.      Tenderness: There is no abdominal tenderness.   Musculoskeletal:         General: No deformity.      Cervical back: Normal range of motion and neck supple.      Right lower leg: No edema.      Left lower leg: No edema.   Skin:     General: Skin is warm and dry.   Neurological:      Mental Status: She is alert and oriented to person, place, and time.      Sensory: No sensory deficit.   Psychiatric:         Mood and Affect: Mood normal.         Behavior: Behavior normal.                Significant Labs: CBC:   Recent Labs   Lab 09/26/23  1131   WBC 23.09*   HGB 9.9*   HCT 32.0*        CMP:   Recent Labs   Lab 09/26/23  1131      K 2.8*   CL 86*   CO2 39*   *   BUN 19   CREATININE 0.6   CALCIUM 9.0   PROT 6.8   ALBUMIN 3.7   BILITOT 0.8   ALKPHOS 96   AST 13   ALT 9*   ANIONGAP 11     Cardiac Markers:   Recent Labs   Lab 09/26/23  1131   BNP 21     Coagulation:   Recent Labs   Lab 09/26/23  1131   INR 1.0     Lactic Acid:   Recent Labs   Lab 09/26/23  1131   LACTATE 1.1     Lipase:   Recent Labs   Lab 09/26/23  1131   LIPASE 10     Troponin:   Recent Labs   Lab 09/26/23  1131   TROPONINI <0.006        Significant Imaging:   Imaging Results              CT Abdomen Pelvis With Contrast (Final result)  Result time 09/26/23 15:09:18      Final result by Ed Mancia MD (09/26/23 15:09:18)                   Impression:      Cholelithiasis without evidence of acute cholecystitis.  Right upper quadrant ultrasound may be obtained further evaluation.    Diverticulosis coli without evidence of acute diverticulitis.    Bibasilar airspace opacities.  The findings may reflect evolving multifocal pneumonia.    Additional findings as above.      Electronically signed by: Ed Mancia MD  Date:    09/26/2023  Time:    15:09               Narrative:    EXAMINATION:  CT ABDOMEN PELVIS WITH CONTRAST    CLINICAL HISTORY:  Abdominal pain, acute, nonlocalized;    TECHNIQUE:  Low dose axial images, sagittal and coronal reformations were obtained from the lung bases to the pubic symphysis following the IV administration of 100 mL of Omnipaque 350 .  Oral contrast was not given.    COMPARISON:  CT dated 11/26/2016.    FINDINGS:  There are no pleural effusions.  There is no evidence of a pneumothorax.  There are bibasilar airspace opacities.  There is subsegmental atelectasis in the lung bases.    The heart is unremarkable.  There is normal tapering of the abdominal aorta.  There are calcifications along the course of the abdominal aorta and its branch vessels.  The celiac trunk, SMA, and KEISHA are within normal limits.  The portal veins and mesenteric veins are within normal limits.  The IVC and the remainder of the venous structures are unremarkable.    There is no evidence of lymphadenopathy in the abdomen or pelvis.    The esophagus, stomach, and duodenum are within normal limits.  The small bowel loops are unremarkable.  The appendix is within normal limits.  There is colonic diverticula without evidence of acute diverticulitis.    There is a focus of calcification in the right hepatic lobe.  The liver is otherwise.  There is  cholelithiasis.  No gallbladder wall thickening is identified.  The biliary tree is unremarkable.  The spleen is unremarkable.  The pancreas is within normal limits.  The adrenal glands are unremarkable.    The kidneys are unremarkable.  The ureters and urinary bladder are unremarkable.  The patient is status post hysterectomy.    There is no evidence of free fluid in the abdomen or pelvis.  There is no evidence of free air.  There is no evidence of pneumatosis.  No portal venous air is identified.    The psoas margins are unremarkable.  There is an umbilical hernia containing omental fat.  The remainder of the abdominal wall is unremarkable.  There are degenerative changes in the osseous structures.  There is no evidence of fracture.                                       X-Ray Chest AP Portable (Final result)  Result time 09/26/23 12:56:39      Final result by Navid Lloyd MD (09/26/23 12:56:39)                   Impression:      See above      Electronically signed by: Navid Lloyd MD  Date:    09/26/2023  Time:    12:56               Narrative:    EXAMINATION:  XR CHEST AP PORTABLE    CLINICAL HISTORY:  Chronic obstructive pulmonary disease, unspecified    TECHNIQUE:  Single frontal view of the chest was performed.    COMPARISON:  09/14/2022    FINDINGS:  Cardiac size is normal.  Lungs are clear and well aerated.  No infiltrate or cardiac failure.                                        Assessment/Plan:     * GI bleed  Presents with abdominal cramping, and black tarry stools. Denies NSAID/blood thinner use, has been on steroids from COPD. Hemoglobin 9.9 decreased from 12.8.   Continue IV protonix  H&H trend  Telemetry/orthostatic BP/maintain 2 IVs  GI consulted  Check Iron studies    Hypokalemia  Presents with potassium of 2.8, suspect related to lasix/zaroxlyn use. Will replete with IV given GI bleeding and check Mg    Leukocytosis  Present with WBC of 23, with associated questionable infiltrates on  CT. Recently completed steroid taper 2 days ago for COPD exacerbation with antibitoics. Reports respiratory symptoms have improved. Suspect WBC related to steroid burst from outpatient with non-resolved infiltrates on CT  Check UA    Pulmonary hypertension  Will hold home lasix/zaroxolyn given hypokalemia    Chronic pain syndrome  Continue home suboxone. Confirmed on  last filled 12-3 on 9/2023    Nicotine dependence, uncomplicated  Smokes 2ppd. Dangers of cigarette smoking were reviewed with patient in detail. Patient was Counseled for 3-10 minutes. Nicotine replacement options were discussed. Nicotine replacement was discussed- prescribed PRN    Stage 4 very severe COPD by GOLD classification  No wheezing/SOB. Reports recently completed treatment for exacerbation  Continue home oxygen and PRN duo-nebs      VTE Risk Mitigation (From admission, onward)         Ordered     IP VTE HIGH RISK PATIENT  Once         09/26/23 1546     Place sequential compression device  Until discontinued         09/26/23 1546                 Discussed with ED physician.    VTE: SCD  Code: Full  Diet: CLD  Dispo: pending GI eval    On 09/26/2023, patient should be placed in hospital observation services under my care in collaboration with Rambo Rojo MD.      Damien Howard PA-C  Department of Hospital Medicine  [facility] - Emergency Dept

## 2023-09-26 NOTE — NURSING
Pt reports feeling anxious, pt is a 2 pack/ day smoker and hasn't had a cigarette since she arrived to the hospital, also severe copd and messaged RRT to come give breathing treatment, otherwise just waiting for food but explained that GI still needs to see her

## 2023-09-26 NOTE — ED PROVIDER NOTES
"Encounter Date: 9/26/2023    SCRIBE #1 NOTE: I, Karina Ambrose, am scribing for, and in the presence of,  Angie Hopper MD.       History     Chief Complaint   Patient presents with    Rectal Bleeding     Pt reports yesterday started with cramping abdominal pain and had one episode of black tarry stool. Was seen at her DR today and tested + for blood in the stool. Pt still having upper abdominal cramping 10/10      54 yo F with PMHx significant for Asthma, COPD on 3L home oxygen, anxiety, gallstones, presents to the ED for melena. She was in her normal state of health yesterday, when she started experiencing sudden onset of diffuse abdominal "cramping" pain to the epigastrium, wrapping around to her back, with some worsening dyspnea secondary to her pain. She also reports dry heaving, denies vomiting or any coffee ground emesis. She experienced an episode of tarry and black melenic stool yesterday, but noted her LBM this morning was "brown". She saw her PCP this morning, had digital rectal exam that was reported to be guiaic+ per pt. She denies any hematochezia, bright red rectal bleeding. She denies any hx of diagnosed GERD but suffers from chronic acid reflux. No recent PPI, pepto bismol use. Did consume rollaids 4 days ago.     Per , she has been more tired and fatigued the last few days, noting "she has not been in the right mind". She has chronic GARDNER and orthopnea. He states she had been more dyspneic than her usual baseline. She's had persistent severe bl leg swelling for the last 2 years, followed up with Dr Olvera from Cardiology for this concern. She states her leg swelling has alleviated recently but does note a residual large blister to her R shin. She has no previous diagnosis of CHF or any heart failure, per chart review on 11/15/22 she had TTE done showing EF of 55%.  She is on Lasix 40 mg, BID, plus metolazone 2.5 mg every other day. Per paperwork at bedside she had imaging of her abdomen recently " that did not show AAA.     The history is provided by the patient, medical records and the spouse.     Review of patient's allergies indicates:   Allergen Reactions    Antivert [meclizine] Other (See Comments)     Behavioral changes     Past Medical History:   Diagnosis Date    Anxiety     Asthma     Carpal tunnel syndrome, bilateral     COPD (chronic obstructive pulmonary disease)     Heavy cigarette smoker     9/14/22 2PPD    Laryngeal papilloma     On home oxygen therapy     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    Right side with CT scan Referred to ENT for visualization     Past Surgical History:   Procedure Laterality Date    CARPAL TUNNEL RELEASE Bilateral     FINGER SURGERY      HYSTERECTOMY      OOPHORECTOMY  1996    Hysterectomy     Family History   Problem Relation Age of Onset    COPD Mother     Heart disease Father     No Known Problems Sister     No Known Problems Brother      Social History     Tobacco Use    Smoking status: Every Day     Current packs/day: 2.00     Average packs/day: 2.0 packs/day for 39.8 years (79.6 ttl pk-yrs)     Types: Cigarettes     Start date: 12/1/1983    Smokeless tobacco: Current   Substance Use Topics    Alcohol use: No     Alcohol/week: 0.0 standard drinks of alcohol    Drug use: No     Review of Systems   Constitutional:  Positive for fatigue. Negative for fever.   HENT:  Negative for sore throat.    Respiratory:  Positive for shortness of breath. Negative for cough.    Cardiovascular:  Negative for chest pain and leg swelling.   Gastrointestinal:  Positive for abdominal pain, blood in stool and nausea. Negative for anal bleeding, diarrhea and vomiting.   Genitourinary:  Negative for dysuria and hematuria.   Musculoskeletal:  Negative for back pain and neck pain.   Skin:  Negative for rash.   Neurological:  Negative for syncope.       Physical Exam     Initial Vitals [09/26/23 1033]   BP Pulse Resp Temp SpO2   139/82 (!) 114 (!) 22 98.6 °F (37 °C) 96 %      MAP       --          Physical Exam    Nursing note and vitals reviewed.  Constitutional:  Non-toxic appearance. No distress.   HENT:   Head: Atraumatic.   Mouth/Throat: Mucous membranes are normal.   Eyes: Conjunctivae and EOM are normal.   Cardiovascular:  Normal rate, regular rhythm, normal heart sounds and intact distal pulses.           No murmur heard.  Pulmonary/Chest: No respiratory distress. She has wheezes (bilateral). She has no rhonchi.   Abdominal: Abdomen is soft. She exhibits no distension. There is abdominal tenderness (epigastric).   Epigastric tenderness to palpation   No right CVA tenderness.  No left CVA tenderness. There is no guarding.   Genitourinary:    Genitourinary Comments: Rectal exam chaperoned by RN.   External hemorrhoid to 12 o clock position. Dark black stool, small volume on digital exam. Guaiac+     Musculoskeletal:      Comments: Mild pitting edema to BLE with clear blister to R medial shin     Neurological: She is alert and oriented to person, place, and time. No cranial nerve deficit or sensory deficit.         ED Course   Procedures  Labs Reviewed   CBC W/ AUTO DIFFERENTIAL - Abnormal; Notable for the following components:       Result Value    WBC 23.09 (*)     RBC 3.60 (*)     Hemoglobin 9.9 (*)     Hematocrit 32.0 (*)     MCHC 30.9 (*)     Immature Granulocytes 1.2 (*)     Gran # (ANC) 17.3 (*)     Immature Grans (Abs) 0.27 (*)     Mono # 1.3 (*)     Gran % 74.8 (*)     Lymph % 17.7 (*)     All other components within normal limits   COMPREHENSIVE METABOLIC PANEL   URINALYSIS, REFLEX TO URINE CULTURE   PROTIME-INR   LACTIC ACID, PLASMA   TROPONIN I   LIPASE   TYPE & SCREEN          Imaging Results    None          Medications   lactated ringers bolus 1,000 mL (1,000 mLs Intravenous New Bag 9/26/23 1133)     Medical Decision Making  54 yo F with PMHx significant for Asthma, COPD on 3L home oxygen, anxiety, gallstones, presents to the ED for melena x 2 days and epigastric pain  Differential  includes upper GI bleed, biliary colic/acute mariam, ACS, pancreatitis    Given history of severe pain after eating w/dark tarry stool, likely duodenal ulcer.  No history of EtOH abuse, no n/v, less concern for varices.  Given PPI high dose in ED for UGI bleed    CT AP done with no intra-abdominal infectious source, with gallstones apparent, but no inflammation or pericholecystic fluid, does not have right upper quadrant tenderness on exam so less concerned for acute mariam at this time.    Given active dark tarry stool in ED (on rectal exam, no episodes of george melenic output), we will admit observation for GI workup of upper GI bleed, patient has had normal blood pressures throughout ED stay with elevated heart rate to 102-106    Amount and/or Complexity of Data Reviewed  Independent Historian: spouse     Details: See HPI  External Data Reviewed: labs, radiology, ECG and notes.  Labs: ordered. Decision-making details documented in ED Course.  Radiology: ordered.  ECG/medicine tests: ordered and independent interpretation performed. Decision-making details documented in ED Course.    Risk  OTC drugs.  Prescription drug management.            Scribe Attestation:   Scribe #1: I performed the above scribed service and the documentation accurately describes the services I performed. I attest to the accuracy of the note.        ED Course as of 09/26/23 1654   Tue Sep 26, 2023   1222 K 2.8 - albuterol frequently, several times before arrival may be hypokalemia due to intracellular shunting, the cause of her tachycardia to 109, though acute blood loss hemoglobin 9.9 (baseline of 12) contributory as well, scant dark black guaiac-positive stool on rectal exam, no large volume melena, last episode per patient was yesterday [LF]   1229 Sinus tachycardia to rate of 105, T-wave flattening in V2/V3, may be due to incomplete right bundle-branch block [LF]   1333 WBC(!): 23.09  Pt reports she finished prednisone pack on Saturday,  likely leukocytosis is partially due to this [LF]      ED Course User Index  [LF] Angie Hopper MD                    I, Angie Hopper, personally performed the services described in this documentation. All medical record entries made by the scribe were at my direction and in my presence. I have reviewed the chart and agree that the record reflects my personal performance and is accurate and complete.    Clinical Impression:   Final diagnoses:  [R00.0] Tachycardia               Angie Hopper MD  09/26/23 5181

## 2023-09-26 NOTE — ASSESSMENT & PLAN NOTE
Presents with abdominal cramping, and black tarry stools. Denies NSAID/blood thinner use, has been on steroids from COPD. Hemoglobin 9.9 decreased from 12.8.   Continue IV protonix  H&H trend  Telemetry/orthostatic BP/maintain 2 IVs  GI consulted  Check Iron studies

## 2023-09-26 NOTE — ASSESSMENT & PLAN NOTE
Smokes 2ppd. Dangers of cigarette smoking were reviewed with patient in detail. Patient was Counseled for 3-10 minutes. Nicotine replacement options were discussed. Nicotine replacement was discussed- prescribed PRN

## 2023-09-26 NOTE — ASSESSMENT & PLAN NOTE
Present with WBC of 23, with associated questionable infiltrates on CT. Recently completed steroid taper 2 days ago for COPD exacerbation with antibitoics. Reports respiratory symptoms have improved. Suspect WBC related to steroid burst from outpatient with non-resolved infiltrates on CT  Check UA

## 2023-09-26 NOTE — ED NOTES
Pt states she can not tolerate CT due to being claustrophobic and not being able to lay on back. MD notified.

## 2023-09-27 ENCOUNTER — ANESTHESIA EVENT (OUTPATIENT)
Dept: ENDOSCOPY | Facility: HOSPITAL | Age: 53
End: 2023-09-27
Payer: MEDICAID

## 2023-09-27 ENCOUNTER — ANESTHESIA (OUTPATIENT)
Dept: ENDOSCOPY | Facility: HOSPITAL | Age: 53
End: 2023-09-27
Payer: MEDICAID

## 2023-09-27 VITALS
DIASTOLIC BLOOD PRESSURE: 57 MMHG | TEMPERATURE: 98 F | HEIGHT: 63 IN | HEART RATE: 86 BPM | RESPIRATION RATE: 14 BRPM | BODY MASS INDEX: 34.84 KG/M2 | SYSTOLIC BLOOD PRESSURE: 104 MMHG | WEIGHT: 196.63 LBS | OXYGEN SATURATION: 98 %

## 2023-09-27 PROBLEM — K25.3 ACUTE GASTRIC ULCER WITHOUT HEMORRHAGE OR PERFORATION: Status: ACTIVE | Noted: 2023-09-27

## 2023-09-27 LAB
ALBUMIN SERPL BCP-MCNC: 3.5 G/DL (ref 3.5–5.2)
ALP SERPL-CCNC: 93 U/L (ref 55–135)
ALT SERPL W/O P-5'-P-CCNC: 9 U/L (ref 10–44)
ANION GAP SERPL CALC-SCNC: 9 MMOL/L (ref 8–16)
AST SERPL-CCNC: 14 U/L (ref 10–40)
BASOPHILS # BLD AUTO: 0.07 K/UL (ref 0–0.2)
BASOPHILS # BLD AUTO: 0.09 K/UL (ref 0–0.2)
BASOPHILS NFR BLD: 0.4 % (ref 0–1.9)
BASOPHILS NFR BLD: 0.6 % (ref 0–1.9)
BILIRUB SERPL-MCNC: 1 MG/DL (ref 0.1–1)
BUN SERPL-MCNC: 12 MG/DL (ref 6–20)
CALCIUM SERPL-MCNC: 9.1 MG/DL (ref 8.7–10.5)
CHLORIDE SERPL-SCNC: 91 MMOL/L (ref 95–110)
CO2 SERPL-SCNC: 38 MMOL/L (ref 23–29)
CREAT SERPL-MCNC: 0.6 MG/DL (ref 0.5–1.4)
DIFFERENTIAL METHOD: ABNORMAL
DIFFERENTIAL METHOD: ABNORMAL
EOSINOPHIL # BLD AUTO: 0.4 K/UL (ref 0–0.5)
EOSINOPHIL # BLD AUTO: 0.4 K/UL (ref 0–0.5)
EOSINOPHIL NFR BLD: 2.2 % (ref 0–8)
EOSINOPHIL NFR BLD: 2.4 % (ref 0–8)
ERYTHROCYTE [DISTWIDTH] IN BLOOD BY AUTOMATED COUNT: 14.4 % (ref 11.5–14.5)
ERYTHROCYTE [DISTWIDTH] IN BLOOD BY AUTOMATED COUNT: 14.5 % (ref 11.5–14.5)
EST. GFR  (NO RACE VARIABLE): >60 ML/MIN/1.73 M^2
GLUCOSE SERPL-MCNC: 113 MG/DL (ref 70–110)
HCT VFR BLD AUTO: 27.6 % (ref 37–48.5)
HCT VFR BLD AUTO: 28.5 % (ref 37–48.5)
HGB BLD-MCNC: 8.5 G/DL (ref 12–16)
HGB BLD-MCNC: 8.7 G/DL (ref 12–16)
IMM GRANULOCYTES # BLD AUTO: 0.16 K/UL (ref 0–0.04)
IMM GRANULOCYTES # BLD AUTO: 0.19 K/UL (ref 0–0.04)
IMM GRANULOCYTES NFR BLD AUTO: 1 % (ref 0–0.5)
IMM GRANULOCYTES NFR BLD AUTO: 1.2 % (ref 0–0.5)
LYMPHOCYTES # BLD AUTO: 4 K/UL (ref 1–4.8)
LYMPHOCYTES # BLD AUTO: 5 K/UL (ref 1–4.8)
LYMPHOCYTES NFR BLD: 25.8 % (ref 18–48)
LYMPHOCYTES NFR BLD: 32.5 % (ref 18–48)
MCH RBC QN AUTO: 27.3 PG (ref 27–31)
MCH RBC QN AUTO: 27.5 PG (ref 27–31)
MCHC RBC AUTO-ENTMCNC: 30.5 G/DL (ref 32–36)
MCHC RBC AUTO-ENTMCNC: 30.8 G/DL (ref 32–36)
MCV RBC AUTO: 89 FL (ref 82–98)
MCV RBC AUTO: 89 FL (ref 82–98)
MONOCYTES # BLD AUTO: 1.1 K/UL (ref 0.3–1)
MONOCYTES # BLD AUTO: 1.1 K/UL (ref 0.3–1)
MONOCYTES NFR BLD: 7 % (ref 4–15)
MONOCYTES NFR BLD: 7.2 % (ref 4–15)
NEUTROPHILS # BLD AUTO: 8.6 K/UL (ref 1.8–7.7)
NEUTROPHILS # BLD AUTO: 9.9 K/UL (ref 1.8–7.7)
NEUTROPHILS NFR BLD: 56.3 % (ref 38–73)
NEUTROPHILS NFR BLD: 63.4 % (ref 38–73)
NRBC BLD-RTO: 0 /100 WBC
NRBC BLD-RTO: 0 /100 WBC
PLATELET # BLD AUTO: 298 K/UL (ref 150–450)
PLATELET # BLD AUTO: 316 K/UL (ref 150–450)
PMV BLD AUTO: 10.4 FL (ref 9.2–12.9)
PMV BLD AUTO: 10.7 FL (ref 9.2–12.9)
POTASSIUM SERPL-SCNC: 3.1 MMOL/L (ref 3.5–5.1)
PROT SERPL-MCNC: 6.3 G/DL (ref 6–8.4)
RBC # BLD AUTO: 3.09 M/UL (ref 4–5.4)
RBC # BLD AUTO: 3.19 M/UL (ref 4–5.4)
SODIUM SERPL-SCNC: 138 MMOL/L (ref 136–145)
WBC # BLD AUTO: 15.28 K/UL (ref 3.9–12.7)
WBC # BLD AUTO: 15.57 K/UL (ref 3.9–12.7)

## 2023-09-27 PROCEDURE — 99205 OFFICE O/P NEW HI 60 MIN: CPT | Mod: 25,,, | Performed by: NURSE PRACTITIONER

## 2023-09-27 PROCEDURE — 36415 COLL VENOUS BLD VENIPUNCTURE: CPT | Performed by: PHYSICIAN ASSISTANT

## 2023-09-27 PROCEDURE — 25000003 PHARM REV CODE 250: Performed by: PHYSICIAN ASSISTANT

## 2023-09-27 PROCEDURE — C9113 INJ PANTOPRAZOLE SODIUM, VIA: HCPCS | Performed by: PHYSICIAN ASSISTANT

## 2023-09-27 PROCEDURE — 37000009 HC ANESTHESIA EA ADD 15 MINS: Performed by: INTERNAL MEDICINE

## 2023-09-27 PROCEDURE — 43235 PR EGD, FLEX, DIAGNOSTIC: ICD-10-PCS | Mod: ,,, | Performed by: INTERNAL MEDICINE

## 2023-09-27 PROCEDURE — 63600175 PHARM REV CODE 636 W HCPCS: Performed by: PHYSICIAN ASSISTANT

## 2023-09-27 PROCEDURE — G0378 HOSPITAL OBSERVATION PER HR: HCPCS

## 2023-09-27 PROCEDURE — 85025 COMPLETE CBC W/AUTO DIFF WBC: CPT | Mod: 91 | Performed by: PHYSICIAN ASSISTANT

## 2023-09-27 PROCEDURE — 99205 PR OFFICE/OUTPT VISIT, NEW, LEVL V, 60-74 MIN: ICD-10-PCS | Mod: 25,,, | Performed by: NURSE PRACTITIONER

## 2023-09-27 PROCEDURE — 63600175 PHARM REV CODE 636 W HCPCS: Performed by: NURSE ANESTHETIST, CERTIFIED REGISTERED

## 2023-09-27 PROCEDURE — 80053 COMPREHEN METABOLIC PANEL: CPT | Performed by: PHYSICIAN ASSISTANT

## 2023-09-27 PROCEDURE — 27000221 HC OXYGEN, UP TO 24 HOURS

## 2023-09-27 PROCEDURE — 25000003 PHARM REV CODE 250: Performed by: NURSE ANESTHETIST, CERTIFIED REGISTERED

## 2023-09-27 PROCEDURE — 94761 N-INVAS EAR/PLS OXIMETRY MLT: CPT

## 2023-09-27 PROCEDURE — S4991 NICOTINE PATCH NONLEGEND: HCPCS | Performed by: PHYSICIAN ASSISTANT

## 2023-09-27 PROCEDURE — D9220A PRA ANESTHESIA: ICD-10-PCS | Mod: ANES,,, | Performed by: ANESTHESIOLOGY

## 2023-09-27 PROCEDURE — 63600175 PHARM REV CODE 636 W HCPCS: Performed by: HOSPITALIST

## 2023-09-27 PROCEDURE — 96367 TX/PROPH/DG ADDL SEQ IV INF: CPT

## 2023-09-27 PROCEDURE — 43235 EGD DIAGNOSTIC BRUSH WASH: CPT | Performed by: INTERNAL MEDICINE

## 2023-09-27 PROCEDURE — 25000242 PHARM REV CODE 250 ALT 637 W/ HCPCS: Performed by: PHYSICIAN ASSISTANT

## 2023-09-27 PROCEDURE — D9220A PRA ANESTHESIA: ICD-10-PCS | Mod: CRNA,,, | Performed by: NURSE ANESTHETIST, CERTIFIED REGISTERED

## 2023-09-27 PROCEDURE — D9220A PRA ANESTHESIA: Mod: CRNA,,, | Performed by: NURSE ANESTHETIST, CERTIFIED REGISTERED

## 2023-09-27 PROCEDURE — 25000003 PHARM REV CODE 250: Performed by: HOSPITALIST

## 2023-09-27 PROCEDURE — 96376 TX/PRO/DX INJ SAME DRUG ADON: CPT | Mod: 59

## 2023-09-27 PROCEDURE — 43235 EGD DIAGNOSTIC BRUSH WASH: CPT | Mod: ,,, | Performed by: INTERNAL MEDICINE

## 2023-09-27 PROCEDURE — 94640 AIRWAY INHALATION TREATMENT: CPT

## 2023-09-27 PROCEDURE — D9220A PRA ANESTHESIA: Mod: ANES,,, | Performed by: ANESTHESIOLOGY

## 2023-09-27 PROCEDURE — 37000008 HC ANESTHESIA 1ST 15 MINUTES: Performed by: INTERNAL MEDICINE

## 2023-09-27 RX ORDER — PANTOPRAZOLE SODIUM 40 MG/1
40 TABLET, DELAYED RELEASE ORAL 2 TIMES DAILY
Qty: 60 TABLET | Refills: 1 | Status: SHIPPED | OUTPATIENT
Start: 2023-09-27 | End: 2023-11-26

## 2023-09-27 RX ORDER — LIDOCAINE HYDROCHLORIDE 20 MG/ML
INJECTION INTRAVENOUS
Status: DISCONTINUED | OUTPATIENT
Start: 2023-09-27 | End: 2023-09-27

## 2023-09-27 RX ORDER — PROPOFOL 10 MG/ML
VIAL (ML) INTRAVENOUS
Status: DISCONTINUED | OUTPATIENT
Start: 2023-09-27 | End: 2023-09-27

## 2023-09-27 RX ORDER — POTASSIUM CHLORIDE 7.45 MG/ML
10 INJECTION INTRAVENOUS
Status: COMPLETED | OUTPATIENT
Start: 2023-09-27 | End: 2023-09-27

## 2023-09-27 RX ORDER — LIDOCAINE HYDROCHLORIDE 20 MG/ML
INJECTION, SOLUTION EPIDURAL; INFILTRATION; INTRACAUDAL; PERINEURAL
Status: DISCONTINUED
Start: 2023-09-27 | End: 2023-09-27 | Stop reason: HOSPADM

## 2023-09-27 RX ORDER — SODIUM CHLORIDE 9 MG/ML
INJECTION, SOLUTION INTRAVENOUS CONTINUOUS
Status: DISCONTINUED | OUTPATIENT
Start: 2023-09-27 | End: 2023-09-27 | Stop reason: HOSPADM

## 2023-09-27 RX ORDER — PROPOFOL 10 MG/ML
INJECTION, EMULSION INTRAVENOUS
Status: DISCONTINUED
Start: 2023-09-27 | End: 2023-09-27 | Stop reason: HOSPADM

## 2023-09-27 RX ADMIN — Medication 1 PATCH: at 08:09

## 2023-09-27 RX ADMIN — BUPRENORPHINE AND NALOXONE 6 FILM: 2; .5 FILM BUCCAL; SUBLINGUAL at 08:09

## 2023-09-27 RX ADMIN — PROPOFOL 50 MG: 10 INJECTION, EMULSION INTRAVENOUS at 12:09

## 2023-09-27 RX ADMIN — PANTOPRAZOLE SODIUM 40 MG: 40 INJECTION, POWDER, FOR SOLUTION INTRAVENOUS at 08:09

## 2023-09-27 RX ADMIN — SODIUM CHLORIDE: 0.9 INJECTION, SOLUTION INTRAVENOUS at 12:09

## 2023-09-27 RX ADMIN — POTASSIUM CHLORIDE 10 MEQ: 7.46 INJECTION, SOLUTION INTRAVENOUS at 07:09

## 2023-09-27 RX ADMIN — POTASSIUM CHLORIDE 10 MEQ: 7.46 INJECTION, SOLUTION INTRAVENOUS at 09:09

## 2023-09-27 RX ADMIN — IPRATROPIUM BROMIDE AND ALBUTEROL SULFATE 3 ML: 2.5; .5 SOLUTION RESPIRATORY (INHALATION) at 11:09

## 2023-09-27 RX ADMIN — POTASSIUM CHLORIDE 10 MEQ: 7.46 INJECTION, SOLUTION INTRAVENOUS at 08:09

## 2023-09-27 RX ADMIN — GLYCOPYRROLATE 0.2 MG: 0.2 INJECTION, SOLUTION INTRAMUSCULAR; INTRAVITREAL at 12:09

## 2023-09-27 RX ADMIN — PROPOFOL 120 MG: 10 INJECTION, EMULSION INTRAVENOUS at 12:09

## 2023-09-27 RX ADMIN — LIDOCAINE HYDROCHLORIDE 100 MG: 20 INJECTION, SOLUTION INTRAVENOUS at 12:09

## 2023-09-27 RX ADMIN — CEFTRIAXONE 1 G: 1 INJECTION, POWDER, FOR SOLUTION INTRAMUSCULAR; INTRAVENOUS at 10:09

## 2023-09-27 RX ADMIN — POTASSIUM CHLORIDE 10 MEQ: 7.46 INJECTION, SOLUTION INTRAVENOUS at 10:09

## 2023-09-27 NOTE — PLAN OF CARE
09/27/23 1128   Discharge Planning   Assessment Type Discharge Planning Brief Assessment   Resource/Environmental Concerns none   Support Systems Spouse/significant other;Children   Equipment Currently Used at Home bedside commode;oxygen   Current Living Arrangements home   Patient/Family Anticipates Transition to home   Patient/Family Anticipated Services at Transition none   DME Needed Upon Discharge  none   Discharge Plan A Home with family       [facility] - [facility] [address] [facility] [address]  [address] [address]  Phone: [phone] Fax: [phone]    [facility] - [facility] [address] [address]  [address] [address] [address]  Phone: [phone] Fax: [phone]

## 2023-09-27 NOTE — DISCHARGE SUMMARY
[facility] Medicine  Discharge Summary      Patient Name: Yasmeen Valderrama  MRN: [id number]  JAMAAL: [id number]  Patient Class: OP- Observation  Admission Date: 9/26/2023  Hospital Length of Stay: 0 days  Discharge Date and Time:  09/27/2023 2:19 PM  Attending Physician: Magdalene Lam MD   Discharging Provider: Magdalene Lam MD  Primary Care Provider: Leeanna Stuart MD    Primary Care Team: Networked reference to record PCT     HPI:   Yasmeen Valderrama 53 y.o. female with COPD on home oxygen, chronic pain, tobacco abuse presents to the hospital with a chief complaint of abdominal pain.  She reports 2 days ago she experienced sudden onset epigastric abdominal pain described as cramping which was then followed by dark black stools.  She attempted no treatment home.  She denies any aggravating or alleviating factors.  She reports her pain has resolved she is not had any further tarry stools.  She denies any blood thinner or NSAID use.  She completed a steroid burst for COPD exacerbation 2 days ago with associated antibiotic use.  She is on 3 L of oxygen outpatient.  She smokes 2 packs per day.  She denies fever chest shortness of breath vomiting syncope leg swelling.    In the ED, white blood cell count 20 hemoglobin 9.9 potassium 2.8 chloride 86 BNP negative troponin negative lactic acid negative CT abdomen pelvis with contrast with cholelithiasis without evidence of acute cholecystitis diverticulosis coli without evidence diverticulitis bibasilar airspace opacities findings may reflect evolving pneumonia.      Procedure(s) (LRB):  EGD (ESOPHAGOGASTRODUODENOSCOPY) (N/A)      Hospital Course:   Placed in observation for melena. Hgb stable. EGD 9/27 with nonbleeding gastric ulcer. Hpylori labs pending. Plan pantoprazole 40mg BID. Discussed leukocytosis- recently finished round of steroids. No signs of infection. Stable for discharge to home.        Goals of Care Treatment  Preferences:  Code Status: Full Code      Consults:   Consults (From admission, onward)        Status Ordering Provider     Inpatient consult to Gastroenterology  Once        Provider:  Jarad Cabezas MD    Completed MILAGROS CRAFT          No new Assessment & Plan notes have been filed under this hospital service since the last note was generated.  Service: Hospital Medicine    Final Active Diagnoses:    Diagnosis Date Noted POA    PRINCIPAL PROBLEM:  GI bleed [K92.2] 09/26/2023 Yes    Acute gastric ulcer without hemorrhage or perforation [K25.3] 09/27/2023 Yes    Leukocytosis [D72.829] 09/26/2023 Yes    Hypokalemia [E87.6] 09/26/2023 Yes    Pulmonary hypertension [I27.20] 06/28/2022 Yes     Chronic    Chronic respiratory failure with hypoxia and hypercapnia [J96.11, J96.12] 06/28/2022 Yes     Chronic    Chronic pain syndrome [G89.4] 05/03/2017 Yes    Stage 4 very severe COPD by GOLD classification [J44.9] 12/01/2014 Yes     Chronic    Nicotine dependence, uncomplicated [F17.200] 12/01/2014 Yes      Problems Resolved During this Admission:       Discharged Condition: good    Disposition: Home or Self Care    Follow Up: with PCP    Patient Instructions:      Diet Adult Regular     Notify your health care provider if you experience any of the following:  temperature >100.4     Notify your health care provider if you experience any of the following:  persistent nausea and vomiting or diarrhea     Notify your health care provider if you experience any of the following:  severe uncontrolled pain     Notify your health care provider if you experience any of the following:  redness, tenderness, or signs of infection (pain, swelling, redness, odor or green/yellow discharge around incision site)     Notify your health care provider if you experience any of the following:  difficulty breathing or increased cough     Notify your health care provider if you experience any of the following:  severe persistent headache      Notify your health care provider if you experience any of the following:  worsening rash     Notify your health care provider if you experience any of the following:  persistent dizziness, light-headedness, or visual disturbances     Notify your health care provider if you experience any of the following:  increased confusion or weakness     Activity as tolerated       Significant Diagnostic Studies: N/A    Pending Diagnostic Studies:     Procedure Component Value Units Date/Time    CBC auto differential [[id number]]     Order Status: Sent Lab Status: No result     Specimen: Blood     H.Pylori Antibody IgG [[id number]]     Order Status: Sent Lab Status: No result     Specimen: Blood          Medications:  Reconciled Home Medications:      Medication List      START taking these medications    pantoprazole 40 MG tablet  Commonly known as: PROTONIX  Take 1 tablet (40 mg total) by mouth 2 (two) times daily.        CONTINUE taking these medications    albuterol 90 mcg/actuation inhaler  Commonly known as: PROVENTIL/VENTOLIN HFA  Inhale 2 puffs into the lungs every 6 (six) hours as needed.     albuterol-ipratropium 2.5 mg-0.5 mg/3 mL nebulizer solution  Commonly known as: DUO-NEB  Take 3 mLs by nebulization every 4 (four) hours as needed for Wheezing. Rescue     fluticasone-umeclidin-vilanter 200-62.5-25 mcg inhaler  Commonly known as: TRELEGY ELLIPTA  Trelegy Ellipta 200 mcg-62.5 mcg-25 mcg powder for inhalation   INHALE 1 PUFF(S) BY MOUTH into the lungs ONCE A DAY     furosemide 40 MG tablet  Commonly known as: LASIX  Take 40 mg by mouth 2 (two) times daily as needed.     LINZESS 72 mcg Cap capsule  Generic drug: linaCLOtide  Take 72 mcg by mouth every morning.     metOLazone 2.5 MG tablet  Commonly known as: ZAROXOLYN  Take 1 tablet (2.5 mg total) by mouth every other day.     SUBOXONE 12-3 mg Film  Generic drug: buprenorphine-naloxone  PLACE 1 FILM UNDER THE TONGUE TWICE DAILY.            Indwelling Lines/Drains  at time of discharge:   Lines/Drains/Airways     None                 Time spent on the discharge of patient: 35 minutes         Magdalene Lam MD  Department of Hospital Medicine  [facility] - Telemetry

## 2023-09-27 NOTE — CONSULTS
[facility] Consultation Note    Patient Complaint: black stools, epigastric pain    PCP:   Leeanna Stuart       LOS: 0        Initial History of Present Illness (HPI):  This is a 53 y.o. female consulted to GI service for GI Bleed. PMH COPD on home oxygen, chronic pain, tobacco abuse. Patient complaint of acute onset of severe abdominal cramping with associated symptoms of black stools, fatigue, confusion that began on Saturday. She reports epigastric pain has been ongoing for 1 month and had seen provider and informed of gallstones and given outpt f/u for eval outside of [facility] system. Denies n/v, hematemesis, BRBPR, or constipation. Denies oac, NSAIDs. Reports 1-2 pk a day smoker and on 3lpm of home oxygen. Reports having colonoscopy in the past but unable to recall when and findings. Denies ever having EGD.    Admit labs wbc 23, hgb 9.9, k 3.1, +UTI        Medical History:  has a past medical history of Anxiety, Asthma, Carpal tunnel syndrome, bilateral, COPD (chronic obstructive pulmonary disease), Heavy cigarette smoker, Laryngeal papilloma, On home oxygen therapy, and Vocal cord mass (06/02/2017).    Surgical History:  has a past surgical history that includes Hysterectomy; Finger surgery; Carpal tunnel release (Bilateral); and Oophorectomy (1996).      Objective Findings:    Vital Signs:  Temp:  [97.5 °F (36.4 °C)-98.7 °F (37.1 °C)]   Pulse:  []   Resp:  [12-22]   BP: ()/(55-92)   SpO2:  [93 %-100 %]   Body mass index is 34.84 kg/m².      Physical Exam  Vitals and nursing note reviewed.   Constitutional:       Appearance: She is obese.   HENT:      Head: Normocephalic.   Pulmonary:      Effort: Pulmonary effort is normal.   Abdominal:      General: Bowel sounds are normal.      Palpations: Abdomen is soft.   Skin:     General: Skin is warm and dry.   Neurological:      Mental Status: She is alert and oriented to person, place, and time.   Psychiatric:         Mood and  Affect: Mood normal.         Behavior: Behavior normal.         Thought Content: Thought content normal.         Judgment: Judgment normal.               Labs:  Lab Results   Component Value Date    WBC 15.57 (H) 2023    HGB 8.5 (L) 2023    HCT 27.6 (L) 2023     2023    CHOL 173 2020    TRIG 85 2020    HDL 64 2020    ALT 9 (L) 2023    AST 14 2023     2023    K 3.1 (L) 2023    CL 91 (L) 2023    CREATININE 0.6 2023    BUN 12 2023    CO2 38 (H) 2023    TSH 2.702 2020    INR 1.0 2023    HGBA1C 5.4 2016             Imagin/26 CT abdomen pelvis-The esophagus, stomach, and duodenum are within normal limits.  The small bowel loops are unremarkable.  The appendix is within normal limits.  There is colonic diverticula without evidence of acute diverticulitis.     There is a focus of calcification in the right hepatic lobe.  The liver is otherwise.  There is cholelithiasis.  No gallbladder wall thickening is identified.  The biliary tree is unremarkable.  The spleen is unremarkable.  The pancreas is within normal limits.  The adrenal glands are unremarkable.      Endoscopy:     I have independently reviewed and interpreted the imaging above           Acute blood loss anemia. GI bleed. Black stools. Epigastric pain. Hypokalemia.Leukocytosis. UTI.  Plan/ Recommendations:  1. Agree with ppi, currently receiving K replacements. Denies any over bleeding episodes overnight or this am. Hgb decreased to 8.5. Still endorses epigastric pain. Currently on 3 lpm nc which is her baseline. Plan for upper endoscopy today. Remain npo. Rec abdominal US of GB.    2. HM managing UTI and leukocytosis      Thank you so much for allowing us to participate in the care of Yasmeen Valderrama . Please contact us if you have any additional questions.    Elizabeth Skaggs NP  Gastroenterology  [facility]

## 2023-09-27 NOTE — PLAN OF CARE
Problem: Adjustment to Illness (Gastrointestinal Bleeding)  Goal: Optimal Coping with Acute Illness  Outcome: Ongoing, Progressing     Problem: Bleeding (Gastrointestinal Bleeding)  Goal: Hemostasis  Outcome: Ongoing, Progressing     Problem: Adult Inpatient Plan of Care  Goal: Plan of Care Review  Outcome: Ongoing, Progressing  Goal: Patient-Specific Goal (Individualized)  Outcome: Ongoing, Progressing  Goal: Absence of Hospital-Acquired Illness or Injury  Outcome: Ongoing, Progressing  Goal: Optimal Comfort and Wellbeing  Outcome: Ongoing, Progressing  Goal: Readiness for Transition of Care  Outcome: Ongoing, Progressing

## 2023-09-27 NOTE — HOSPITAL COURSE
Placed in observation for melena. Hgb stable. EGD 9/27 with nonbleeding gastric ulcer. Hpylori labs pending. Plan pantoprazole 40mg BID. Discussed leukocytosis- recently finished round of steroids. No signs of infection. Stable for discharge to home.

## 2023-09-27 NOTE — PROGRESS NOTES
[facility], [facility]  Nurses Note -- 4 Eyes      9/27/2023       Skin assessed on: Q Shift      [x] No Pressure Injuries Present    []Prevention Measures Documented    [] Yes LDA  for Pressure Injury Previously documented     [] Yes New Pressure Injury Discovered   [] LDA for New Pressure Injury Added      Attending RN:  Lion Quinn RN     Second RN:  Myla Jha RN

## 2023-09-27 NOTE — NURSING
[facility], [facility]  Nurses Note -- 4 Eyes      9/26/2023       Skin assessed on: Admit      [x] No Pressure Injuries Present    [x]Prevention Measures Documented    [] Yes LDA  for Pressure Injury Previously documented     [] Yes New Pressure Injury Discovered   [] LDA for New Pressure Injury Added      Attending RN:  Myla Caldwell RN     Second RN:  Noreen Wheat RN

## 2023-09-27 NOTE — NURSING
Pt arrive to the unit by chair accompanied by transport. Assisted pt to bed. Tele monitoring initiated.  Admit assessment initiated.  Pt is AAO.  NO distress noted. 3 LNC.

## 2023-09-27 NOTE — TRANSFER OF CARE
"Anesthesia Transfer of Care Note    Patient: Yasmeen Valderrama    Procedure(s) Performed: Procedure(s) (LRB):  EGD (ESOPHAGOGASTRODUODENOSCOPY) (N/A)    Patient location: GI    Anesthesia Type: general    Transport from OR: Transported from OR on 2-3 L/min O2 by NC with adequate spontaneous ventilation    Post pain: adequate analgesia    Post assessment: no apparent anesthetic complications    Post vital signs: stable    Level of consciousness: awake    Nausea/Vomiting: no nausea/vomiting    Complications: none    Transfer of care protocol was followed      Last vitals:   Visit Vitals  BP (!) 121/59 (BP Location: Left arm, Patient Position: Lying)   Pulse 90   Temp 36.8 °C (98.2 °F)   Resp 16   Ht 5' 3" (1.6 m)   Wt 89.2 kg (196 lb 10.4 oz)   SpO2 95%   Breastfeeding No   BMI 34.84 kg/m²     "

## 2023-09-27 NOTE — ANESTHESIA POSTPROCEDURE EVALUATION
Anesthesia Post Evaluation    Patient: Yasmeen Valderrama    Procedure(s) Performed: Procedure(s) (LRB):  EGD (ESOPHAGOGASTRODUODENOSCOPY) (N/A)    Final Anesthesia Type: general      Patient location during evaluation: GI PACU  Patient participation: Yes- Able to Participate  Level of consciousness: awake and alert  Post-procedure vital signs: reviewed and stable  Pain management: adequate  Airway patency: patent    PONV status at discharge: No PONV  Anesthetic complications: no      Cardiovascular status: hemodynamically stable  Respiratory status: unassisted and spontaneous ventilation  Hydration status: euvolemic  Follow-up not needed.          Vitals Value Taken Time   /57 09/27/23 1259   Temp 36.8 °C (98.2 °F) 09/27/23 1251   Pulse 92 09/27/23 1259   Resp 14 09/27/23 1259   SpO2 97 % 09/27/23 1259   Vitals shown include unvalidated device data.      No case tracking events are documented in the log.      Pain/Christine Score: Pain Rating Prior to Med Admin: 4 (9/27/2023  8:42 AM)  Christine Score: 10 (9/27/2023 12:59 PM)

## 2023-09-27 NOTE — PLAN OF CARE
Procedure and recovery completed without complication. Pt AAOx4, in NAD, vitals recovered to baseline. Pt discussed findings with MD. Criteria met for transfer back to floor at this time. Report called to ERIC Seymour. Pt off unit via stretcher with [facility] tech

## 2023-09-27 NOTE — PLAN OF CARE
09/27/23 1441   Final Note   Assessment Type Final Discharge Note   Anticipated Discharge Disposition Home   Hospital Resources/Appts/Education Provided Appointments scheduled and added to AVS   Post-Acute Status   Post-Acute Authorization Other   Other Status No Post-Acute Service Needs     Pts nurse Seymour notified that the pt can d/c from CM standpoint

## 2023-09-27 NOTE — ANESTHESIA PREPROCEDURE EVALUATION
09/27/2023  Yasmeen Valderrama is a 53 y.o., female.      Pre-op Assessment    I have reviewed the Patient Summary Reports.     I have reviewed the Nursing Notes.       Review of Systems  Anesthesia Hx:  No problems with previous Anesthesia  Denies Family Hx of Anesthesia complications.   Denies Personal Hx of Anesthesia complications.   Social:  Smoker    Hematology/Oncology:         -- Anemia:   EENT/Dental:   Vocal polyp; hoarseness   Cardiovascular:   Denies MI.   Denies Dysrhythmias.   Denies Angina.   12/2022 stress;  ? Technically poor study.  ? The stress echo portion of this study is negative for myocardial ischemia.  ? The ECG portion of this study is negative for myocardial ischemia.  ? There were no arrhythmias during stress.  ? The left ventricle is normal in size with eccentric hypertrophy and normal systolic function.  ? The patient reached the end of the protocol.  ? The estimated ejection fraction is 55%.  ? Normal left ventricular diastolic function.  ? Normal right ventricular size with normal right ventricular systolic function.  ? Mild left atrial enlargement.  ? Mild right atrial enlargement.  ? Mild to moderate tricuspid regurgitation.  ? Normal central venous pressure (3 mmHg).  ? The estimated PA systolic pressure is 37 mmHg.  ? There is mild pulmonary hypertension.   Pulmonary:   COPD Asthma moderate Sleep Apnea Uses oxygen at all times 3L   Renal/:  Renal/ Normal     Hepatic/GI:   GI bleed; no recent N/V   Neurological:   Neuromuscular Disease, Headaches    Endocrine:  Endocrine Normal    Psych:   anxiety          Physical Exam  General: Well nourished, Cooperative, Alert and Oriented    Airway:  Mallampati: II   Mouth Opening: Normal  TM Distance: 4 - 6 cm  Tongue: Normal  Neck ROM: Normal ROM    Dental:    Chest/Lungs:  Normal Respiratory Rate  Decreased Breath Sounds: left  and right    Heart:  Rate: Normal  Rhythm: Regular Rhythm      Wt Readings from Last 3 Encounters:   09/26/23 89.2 kg (196 lb 10.4 oz)   12/09/22 97.5 kg (215 lb)   12/09/22 97.6 kg (215 lb 2.7 oz)     Temp Readings from Last 3 Encounters:   09/27/23 36.8 °C (98.3 °F) (Oral)   09/16/22 36.7 °C (98.1 °F) (Oral)   06/29/22 36.6 °C (97.8 °F) (Oral)     BP Readings from Last 3 Encounters:   09/27/23 (!) 111/57   12/09/22 (!) 153/93   11/15/22 (!) 129/58     Pulse Readings from Last 3 Encounters:   09/27/23 90   12/09/22 92   11/15/22 82     Lab Results   Component Value Date    WBC 15.57 (H) 09/27/2023    HGB 8.5 (L) 09/27/2023    HCT 27.6 (L) 09/27/2023    MCV 89 09/27/2023     09/27/2023       CMP  Sodium   Date Value Ref Range Status   09/27/2023 138 136 - 145 mmol/L Final     Potassium   Date Value Ref Range Status   09/27/2023 3.1 (L) 3.5 - 5.1 mmol/L Final     Chloride   Date Value Ref Range Status   09/27/2023 91 (L) 95 - 110 mmol/L Final     CO2   Date Value Ref Range Status   09/27/2023 38 (H) 23 - 29 mmol/L Final     Glucose   Date Value Ref Range Status   09/27/2023 113 (H) 70 - 110 mg/dL Final     BUN   Date Value Ref Range Status   09/27/2023 12 6 - 20 mg/dL Final     Creatinine   Date Value Ref Range Status   09/27/2023 0.6 0.5 - 1.4 mg/dL Final     Calcium   Date Value Ref Range Status   09/27/2023 9.1 8.7 - 10.5 mg/dL Final     Total Protein   Date Value Ref Range Status   09/27/2023 6.3 6.0 - 8.4 g/dL Final     Albumin   Date Value Ref Range Status   09/27/2023 3.5 3.5 - 5.2 g/dL Final     Total Bilirubin   Date Value Ref Range Status   09/27/2023 1.0 0.1 - 1.0 mg/dL Final     Comment:     For infants and newborns, interpretation of results should be based  on gestational age, weight and in agreement with clinical  observations.    Premature Infant recommended reference ranges:  Up to 24 hours.............<8.0 mg/dL  Up to 48 hours............<12.0 mg/dL  3-5 days..................<15.0  mg/dL  6-29 days.................<15.0 mg/dL       Alkaline Phosphatase   Date Value Ref Range Status   09/27/2023 93 55 - 135 U/L Final     AST   Date Value Ref Range Status   09/27/2023 14 10 - 40 U/L Final     ALT   Date Value Ref Range Status   09/27/2023 9 (L) 10 - 44 U/L Final     Anion Gap   Date Value Ref Range Status   09/27/2023 9 8 - 16 mmol/L Final     eGFR   Date Value Ref Range Status   09/27/2023 >60 >60 mL/min/1.73 m^2 Final         Anesthesia Plan  Type of Anesthesia, risks & benefits discussed:    Anesthesia Type: Gen Natural Airway, MAC  Intra-op Monitoring Plan: Standard ASA Monitors  Post Op Pain Control Plan: multimodal analgesia  Induction:  IV  Informed Consent: Informed consent signed with the Patient and all parties understand the risks and agree with anesthesia plan.  All questions answered.   ASA Score: 3  Day of Surgery Review of History & Physical: H&P Update referred to the surgeon/provider.    Ready For Surgery From Anesthesia Perspective.     .

## 2023-10-03 ENCOUNTER — TELEPHONE (OUTPATIENT)
Dept: ENDOSCOPY | Facility: HOSPITAL | Age: 53
End: 2023-10-03
Payer: MEDICAID

## 2023-10-03 DIAGNOSIS — K30 PEPTIC DISEASE: Primary | ICD-10-CM

## 2023-10-03 NOTE — TELEPHONE ENCOUNTER
"----- Message from Unique Baugh sent at 2023  8:47 AM CDT -----  Regarding: FW: Endo scheduling    ----- Message -----  From: Elizabeth Skaggs NP  Sent: 2023   1:30 PM CDT  To: [facility]  Subject: Endo scheduling                                  Procedure: EGD    Diagnosis: Peptic ulcer disease    Procedure Timin weeks    #If within 4 weeks selected, please hema as high priority#    #If greater than 12 weeks, please select "4-12 weeks" and delay sending until 2 months prior to requested date#     Provider: Any endoscopist    Location: [facility]    Additional Scheduling Information: Advanced cardiac or pulmonary disease    Prep Specifications:N/A    Have you attached a patient to this message: Yes        "

## 2023-10-03 NOTE — TELEPHONE ENCOUNTER
Spoke to pt to schedule procedure(s) Upper Endoscopy (EGD)       Physician to perform procedure(s) Dr. ANGELES Walls  Date of Procedure (s) 01/02/24  Arrival Time 9:30 AM  Time of Procedure(s) 10:30 AM   Location of Procedure(s) [address]  Type of Rx Prep sent to patient: N/A  Instructions provided to patient via [facility]    Patient was informed on the following information and verbalized understanding. Screening questionnaire reviewed with patient and complete. If procedure requires anesthesia, a responsible adult needs to be present to accompany the patient home, patient cannot drive after receiving anesthesia. Appointment details are tentative, especially check-in time. Patient will receive a prep-op call 4 days prior to confirm check-in time for procedure. If applicable the patient should contact their pharmacy to verify Rx for procedure prep is ready for pick-up. Patient was advised to call the scheduling department at [phone] if pharmacy states no Rx is available. Patient was advised to call the endoscopy scheduling department if any questions or concerns arise.      [facility] Scheduling Department

## 2023-11-07 ENCOUNTER — HOSPITAL ENCOUNTER (INPATIENT)
Facility: HOSPITAL | Age: 53
LOS: 6 days | Discharge: HOME | DRG: 542 | End: 2023-11-13
Attending: EMERGENCY MEDICINE | Admitting: HOSPITALIST
Payer: MEDICAID

## 2023-11-07 DIAGNOSIS — G47.30 SLEEP-RELATED BREATHING DISORDER: ICD-10-CM

## 2023-11-07 DIAGNOSIS — S32.000A: ICD-10-CM

## 2023-11-07 DIAGNOSIS — J44.9 STAGE 4 VERY SEVERE COPD BY GOLD CLASSIFICATION: Chronic | ICD-10-CM

## 2023-11-07 DIAGNOSIS — J44.1 COPD EXACERBATION: ICD-10-CM

## 2023-11-07 DIAGNOSIS — R06.2 WHEEZING: ICD-10-CM

## 2023-11-07 DIAGNOSIS — J44.9 CHRONIC OBSTRUCTIVE PULMONARY DISEASE, UNSPECIFIED COPD TYPE: Primary | ICD-10-CM

## 2023-11-07 DIAGNOSIS — G47.33 OSA (OBSTRUCTIVE SLEEP APNEA): ICD-10-CM

## 2023-11-07 DIAGNOSIS — R91.1 PULMONARY NODULE: ICD-10-CM

## 2023-11-07 DIAGNOSIS — M48.02 CERVICAL STENOSIS OF SPINAL CANAL: ICD-10-CM
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
## 2023-11-07 DIAGNOSIS — S32.010A CLOSED COMPRESSION FRACTURE OF BODY OF L1 VERTEBRA: ICD-10-CM

## 2023-11-07 PROBLEM — Z72.0 TOBACCO ABUSE: Status: ACTIVE | Noted: 2023-11-07

## 2023-11-07 PROBLEM — D64.89 OTHER SPECIFIED ANEMIAS: Chronic | Status: ACTIVE | Noted: 2023-11-07

## 2023-11-07 LAB
ALBUMIN SERPL BCP-MCNC: 3.7 G/DL (ref 3.5–5.2)
ALLENS TEST: ABNORMAL
ALP SERPL-CCNC: 124 U/L (ref 55–135)
ALT SERPL W/O P-5'-P-CCNC: 11 U/L (ref 10–44)
ANION GAP SERPL CALC-SCNC: 13 MMOL/L (ref 8–16)
AST SERPL-CCNC: 25 U/L (ref 10–40)
BASOPHILS # BLD AUTO: 0.05 K/UL (ref 0–0.2)
BASOPHILS NFR BLD: 0.3 % (ref 0–1.9)
BILIRUB SERPL-MCNC: 0.4 MG/DL (ref 0.1–1)
BILIRUB UR QL STRIP: NEGATIVE
BUN SERPL-MCNC: 11 MG/DL (ref 6–20)
CALCIUM SERPL-MCNC: 9.4 MG/DL (ref 8.7–10.5)
CHLORIDE SERPL-SCNC: 89 MMOL/L (ref 95–110)
CLARITY UR: CLEAR
CO2 SERPL-SCNC: 38 MMOL/L (ref 23–29)
COLOR UR: YELLOW
CREAT SERPL-MCNC: 0.7 MG/DL (ref 0.5–1.4)
CTP QC/QA: YES
CTP QC/QA: YES
DELSYS: ABNORMAL
DIFFERENTIAL METHOD: ABNORMAL
EOSINOPHIL # BLD AUTO: 0.1 K/UL (ref 0–0.5)
EOSINOPHIL NFR BLD: 0.7 % (ref 0–8)
ERYTHROCYTE [DISTWIDTH] IN BLOOD BY AUTOMATED COUNT: 20.1 % (ref 11.5–14.5)
EST. GFR  (NO RACE VARIABLE): >60 ML/MIN/1.73 M^2
GLUCOSE SERPL-MCNC: 159 MG/DL (ref 70–110)
GLUCOSE UR QL STRIP: NEGATIVE
HCO3 UR-SCNC: 44.4 MMOL/L (ref 24–28)
HCT VFR BLD AUTO: 28 % (ref 37–48.5)
HGB BLD-MCNC: 8.4 G/DL (ref 12–16)
HGB UR QL STRIP: ABNORMAL
IMM GRANULOCYTES # BLD AUTO: 0.16 K/UL (ref 0–0.04)
IMM GRANULOCYTES NFR BLD AUTO: 1 % (ref 0–0.5)
KETONES UR QL STRIP: NEGATIVE
LEUKOCYTE ESTERASE UR QL STRIP: NEGATIVE
LYMPHOCYTES # BLD AUTO: 1.5 K/UL (ref 1–4.8)
LYMPHOCYTES NFR BLD: 9.2 % (ref 18–48)
MAGNESIUM SERPL-MCNC: 1.9 MG/DL (ref 1.6–2.6)
MCH RBC QN AUTO: 22.8 PG (ref 27–31)
MCHC RBC AUTO-ENTMCNC: 30 G/DL (ref 32–36)
MCV RBC AUTO: 76 FL (ref 82–98)
MONOCYTES # BLD AUTO: 1.1 K/UL (ref 0.3–1)
MONOCYTES NFR BLD: 6.6 % (ref 4–15)
NEUTROPHILS # BLD AUTO: 13.4 K/UL (ref 1.8–7.7)
NEUTROPHILS NFR BLD: 82.2 % (ref 38–73)
NITRITE UR QL STRIP: NEGATIVE
NRBC BLD-RTO: 0 /100 WBC
PCO2 BLDA: 81.6 MMHG (ref 35–45)
PH SMN: 7.34 [PH] (ref 7.35–7.45)
PH UR STRIP: 6 [PH] (ref 5–8)
PLATELET # BLD AUTO: 405 K/UL (ref 150–450)
PMV BLD AUTO: 9.9 FL (ref 9.2–12.9)
PO2 BLDA: 43 MMHG (ref 80–100)
POC BE: 16 MMOL/L
POC MOLECULAR INFLUENZA A AGN: NEGATIVE
POC MOLECULAR INFLUENZA B AGN: NEGATIVE
POC SATURATED O2: 73 % (ref 95–100)
POC TCO2: 47 MMOL/L (ref 23–27)
POCT GLUCOSE: 134 MG/DL (ref 70–110)
POTASSIUM SERPL-SCNC: 3.1 MMOL/L (ref 3.5–5.1)
PROT SERPL-MCNC: 7 G/DL (ref 6–8.4)
PROT UR QL STRIP: NEGATIVE
RBC # BLD AUTO: 3.68 M/UL (ref 4–5.4)
SAMPLE: ABNORMAL
SARS-COV-2 RDRP RESP QL NAA+PROBE: NEGATIVE
SITE: ABNORMAL
SODIUM SERPL-SCNC: 140 MMOL/L (ref 136–145)
SP GR UR STRIP: 1.01 (ref 1–1.03)
URN SPEC COLLECT METH UR: ABNORMAL
UROBILINOGEN UR STRIP-ACNC: NEGATIVE EU/DL
WBC # BLD AUTO: 16.36 K/UL (ref 3.9–12.7)

## 2023-11-07 PROCEDURE — 94640 AIRWAY INHALATION TREATMENT: CPT | Mod: XB

## 2023-11-07 PROCEDURE — 25000242 PHARM REV CODE 250 ALT 637 W/ HCPCS: Performed by: NURSE PRACTITIONER

## 2023-11-07 PROCEDURE — 93005 ELECTROCARDIOGRAM TRACING: CPT

## 2023-11-07 PROCEDURE — 25000003 PHARM REV CODE 250: Performed by: NURSE PRACTITIONER

## 2023-11-07 PROCEDURE — 94760 N-INVAS EAR/PLS OXIMETRY 1: CPT | Mod: XB

## 2023-11-07 PROCEDURE — 81003 URINALYSIS AUTO W/O SCOPE: CPT | Performed by: NURSE PRACTITIONER

## 2023-11-07 PROCEDURE — 96365 THER/PROPH/DIAG IV INF INIT: CPT

## 2023-11-07 PROCEDURE — G0378 HOSPITAL OBSERVATION PER HR: HCPCS

## 2023-11-07 PROCEDURE — 25000003 PHARM REV CODE 250: Performed by: EMERGENCY MEDICINE

## 2023-11-07 PROCEDURE — 63600175 PHARM REV CODE 636 W HCPCS: Performed by: EMERGENCY MEDICINE

## 2023-11-07 PROCEDURE — 82803 BLOOD GASES ANY COMBINATION: CPT

## 2023-11-07 PROCEDURE — 85025 COMPLETE CBC W/AUTO DIFF WBC: CPT | Performed by: EMERGENCY MEDICINE

## 2023-11-07 PROCEDURE — 99223 1ST HOSP IP/OBS HIGH 75: CPT | Mod: ,,, | Performed by: PHYSICIAN ASSISTANT

## 2023-11-07 PROCEDURE — 25000242 PHARM REV CODE 250 ALT 637 W/ HCPCS: Performed by: EMERGENCY MEDICINE

## 2023-11-07 PROCEDURE — 93010 EKG 12-LEAD: ICD-10-PCS | Mod: ,,, | Performed by: INTERNAL MEDICINE

## 2023-11-07 PROCEDURE — 94761 N-INVAS EAR/PLS OXIMETRY MLT: CPT

## 2023-11-07 PROCEDURE — 83735 ASSAY OF MAGNESIUM: CPT | Performed by: EMERGENCY MEDICINE

## 2023-11-07 PROCEDURE — 93010 ELECTROCARDIOGRAM REPORT: CPT | Mod: ,,, | Performed by: INTERNAL MEDICINE

## 2023-11-07 PROCEDURE — 99223 PR INITIAL HOSPITAL CARE,LEVL III: ICD-10-PCS | Mod: ,,, | Performed by: PHYSICIAN ASSISTANT

## 2023-11-07 PROCEDURE — 99900035 HC TECH TIME PER 15 MIN (STAT)

## 2023-11-07 PROCEDURE — 80053 COMPREHEN METABOLIC PANEL: CPT | Performed by: EMERGENCY MEDICINE

## 2023-11-07 PROCEDURE — 96375 TX/PRO/DX INJ NEW DRUG ADDON: CPT

## 2023-11-07 PROCEDURE — 87635 SARS-COV-2 COVID-19 AMP PRB: CPT | Performed by: EMERGENCY MEDICINE

## 2023-11-07 PROCEDURE — 96376 TX/PRO/DX INJ SAME DRUG ADON: CPT

## 2023-11-07 PROCEDURE — 21400001 HC TELEMETRY ROOM

## 2023-11-07 PROCEDURE — 94640 AIRWAY INHALATION TREATMENT: CPT

## 2023-11-07 PROCEDURE — 99285 EMERGENCY DEPT VISIT HI MDM: CPT | Mod: 25

## 2023-11-07 PROCEDURE — 27000221 HC OXYGEN, UP TO 24 HOURS

## 2023-11-07 RX ORDER — FLUOCINOLONE ACETONIDE 0.11 MG/ML
5 OIL AURICULAR (OTIC) 2 TIMES DAILY
COMMUNITY
Start: 2023-11-02

## 2023-11-07 RX ORDER — GABAPENTIN 600 MG/1
1200 TABLET ORAL 3 TIMES DAILY
Status: ON HOLD | COMMUNITY
Start: 2023-10-13 | End: 2023-11-10 | Stop reason: HOSPADM

## 2023-11-07 RX ORDER — IPRATROPIUM BROMIDE AND ALBUTEROL SULFATE 2.5; .5 MG/3ML; MG/3ML
3 SOLUTION RESPIRATORY (INHALATION) EVERY 4 HOURS PRN
Status: DISCONTINUED | OUTPATIENT
Start: 2023-11-07 | End: 2023-11-09

## 2023-11-07 RX ORDER — METHOCARBAMOL 500 MG/1
500 TABLET, FILM COATED ORAL
Status: COMPLETED | OUTPATIENT
Start: 2023-11-07 | End: 2023-11-07

## 2023-11-07 RX ORDER — SODIUM CHLORIDE 0.9 % (FLUSH) 0.9 %
3 SYRINGE (ML) INJECTION
Status: DISCONTINUED | OUTPATIENT
Start: 2023-11-07 | End: 2023-11-13 | Stop reason: HOSPADM

## 2023-11-07 RX ORDER — LIDOCAINE 50 MG/G
1 PATCH TOPICAL
Status: DISCONTINUED | OUTPATIENT
Start: 2023-11-07 | End: 2023-11-13 | Stop reason: HOSPADM

## 2023-11-07 RX ORDER — KETOROLAC TROMETHAMINE 30 MG/ML
15 INJECTION, SOLUTION INTRAMUSCULAR; INTRAVENOUS
Status: COMPLETED | OUTPATIENT
Start: 2023-11-07 | End: 2023-11-07

## 2023-11-07 RX ORDER — METOLAZONE 5 MG/1
TABLET ORAL
COMMUNITY
Start: 2023-11-01 | End: 2023-11-30

## 2023-11-07 RX ORDER — IPRATROPIUM BROMIDE 0.5 MG/2.5ML
SOLUTION RESPIRATORY (INHALATION) EVERY 6 HOURS PRN
Status: ON HOLD | COMMUNITY
Start: 2023-10-18 | End: 2023-11-10 | Stop reason: HOSPADM

## 2023-11-07 RX ORDER — HYDROMORPHONE HYDROCHLORIDE 1 MG/ML
1 INJECTION, SOLUTION INTRAMUSCULAR; INTRAVENOUS; SUBCUTANEOUS
Status: COMPLETED | OUTPATIENT
Start: 2023-11-07 | End: 2023-11-07

## 2023-11-07 RX ORDER — DIAZEPAM 10 MG/2ML
5 INJECTION INTRAMUSCULAR ONCE AS NEEDED
Status: DISCONTINUED | OUTPATIENT
Start: 2023-11-07 | End: 2023-11-12

## 2023-11-07 RX ORDER — METOLAZONE 5 MG/1
5 TABLET ORAL DAILY
Status: DISCONTINUED | OUTPATIENT
Start: 2023-11-07 | End: 2023-11-13 | Stop reason: HOSPADM

## 2023-11-07 RX ORDER — OLOPATADINE HYDROCHLORIDE 1 MG/ML
SOLUTION/ DROPS OPHTHALMIC
COMMUNITY
Start: 2023-10-13

## 2023-11-07 RX ORDER — ONDANSETRON 2 MG/ML
4 INJECTION INTRAMUSCULAR; INTRAVENOUS
Status: COMPLETED | OUTPATIENT
Start: 2023-11-07 | End: 2023-11-07

## 2023-11-07 RX ORDER — HYDROCODONE BITARTRATE AND ACETAMINOPHEN 7.5; 325 MG/1; MG/1
1 TABLET ORAL EVERY 6 HOURS PRN
Status: DISCONTINUED | OUTPATIENT
Start: 2023-11-07 | End: 2023-11-10

## 2023-11-07 RX ORDER — IPRATROPIUM BROMIDE AND ALBUTEROL SULFATE 2.5; .5 MG/3ML; MG/3ML
3 SOLUTION RESPIRATORY (INHALATION) ONCE
Status: COMPLETED | OUTPATIENT
Start: 2023-11-07 | End: 2023-11-07

## 2023-11-07 RX ORDER — IBUPROFEN 200 MG
1 TABLET ORAL DAILY
Status: DISCONTINUED | OUTPATIENT
Start: 2023-11-08 | End: 2023-11-07

## 2023-11-07 RX ORDER — POTASSIUM CHLORIDE 7.45 MG/ML
10 INJECTION INTRAVENOUS ONCE
Status: COMPLETED | OUTPATIENT
Start: 2023-11-07 | End: 2023-11-07

## 2023-11-07 RX ORDER — IBUPROFEN 200 MG
1 TABLET ORAL DAILY
Status: DISCONTINUED | OUTPATIENT
Start: 2023-11-07 | End: 2023-11-13 | Stop reason: HOSPADM

## 2023-11-07 RX ORDER — PANTOPRAZOLE SODIUM 40 MG/1
40 TABLET, DELAYED RELEASE ORAL 2 TIMES DAILY
Status: DISCONTINUED | OUTPATIENT
Start: 2023-11-07 | End: 2023-11-13 | Stop reason: HOSPADM

## 2023-11-07 RX ORDER — HYDROCODONE BITARTRATE AND ACETAMINOPHEN 5; 325 MG/1; MG/1
1 TABLET ORAL EVERY 6 HOURS PRN
Status: DISCONTINUED | OUTPATIENT
Start: 2023-11-07 | End: 2023-11-10

## 2023-11-07 RX ORDER — PREDNISONE 20 MG/1
40 TABLET ORAL DAILY
Status: DISCONTINUED | OUTPATIENT
Start: 2023-11-07 | End: 2023-11-07

## 2023-11-07 RX ORDER — HYDROMORPHONE HYDROCHLORIDE 1 MG/ML
0.2 INJECTION, SOLUTION INTRAMUSCULAR; INTRAVENOUS; SUBCUTANEOUS
Status: COMPLETED | OUTPATIENT
Start: 2023-11-07 | End: 2023-11-07

## 2023-11-07 RX ORDER — DIAZEPAM 10 MG/2ML
5 INJECTION INTRAMUSCULAR ONCE AS NEEDED
Status: DISCONTINUED | OUTPATIENT
Start: 2023-11-07 | End: 2023-11-07

## 2023-11-07 RX ORDER — BUDESONIDE 0.5 MG/2ML
0.5 INHALANT ORAL 2 TIMES DAILY
Status: DISCONTINUED | OUTPATIENT
Start: 2023-11-07 | End: 2023-11-13 | Stop reason: HOSPADM

## 2023-11-07 RX ORDER — MORPHINE SULFATE 4 MG/ML
4 INJECTION, SOLUTION INTRAMUSCULAR; INTRAVENOUS
Status: DISCONTINUED | OUTPATIENT
Start: 2023-11-07 | End: 2023-11-07

## 2023-11-07 RX ORDER — SODIUM CHLORIDE 0.9 % (FLUSH) 0.9 %
10 SYRINGE (ML) INJECTION
Status: DISCONTINUED | OUTPATIENT
Start: 2023-11-07 | End: 2023-11-13 | Stop reason: HOSPADM

## 2023-11-07 RX ORDER — IPRATROPIUM BROMIDE AND ALBUTEROL SULFATE 2.5; .5 MG/3ML; MG/3ML
3 SOLUTION RESPIRATORY (INHALATION)
Status: DISCONTINUED | OUTPATIENT
Start: 2023-11-07 | End: 2023-11-09

## 2023-11-07 RX ADMIN — METOLAZONE 5 MG: 5 TABLET ORAL at 03:11

## 2023-11-07 RX ADMIN — IPRATROPIUM BROMIDE AND ALBUTEROL SULFATE 3 ML: 2.5; .5 SOLUTION RESPIRATORY (INHALATION) at 07:11

## 2023-11-07 RX ADMIN — LIDOCAINE 5% 1 PATCH: 700 PATCH TOPICAL at 09:11

## 2023-11-07 RX ADMIN — IPRATROPIUM BROMIDE AND ALBUTEROL SULFATE 3 ML: 2.5; .5 SOLUTION RESPIRATORY (INHALATION) at 03:11

## 2023-11-07 RX ADMIN — HYDROMORPHONE HYDROCHLORIDE 0.2 MG: 1 INJECTION, SOLUTION INTRAMUSCULAR; INTRAVENOUS; SUBCUTANEOUS at 10:11

## 2023-11-07 RX ADMIN — POTASSIUM CHLORIDE 10 MEQ: 7.46 INJECTION, SOLUTION INTRAVENOUS at 09:11

## 2023-11-07 RX ADMIN — METHOCARBAMOL 500 MG: 500 TABLET ORAL at 06:11

## 2023-11-07 RX ADMIN — PANTOPRAZOLE SODIUM 40 MG: 40 TABLET, DELAYED RELEASE ORAL at 09:11

## 2023-11-07 RX ADMIN — IPRATROPIUM BROMIDE AND ALBUTEROL SULFATE 3 ML: 2.5; .5 SOLUTION RESPIRATORY (INHALATION) at 10:11

## 2023-11-07 RX ADMIN — BUDESONIDE 0.5 MG: 0.5 INHALANT RESPIRATORY (INHALATION) at 07:11

## 2023-11-07 RX ADMIN — HYDROCODONE BITARTRATE AND ACETAMINOPHEN 1 TABLET: 7.5; 325 TABLET ORAL at 01:11

## 2023-11-07 RX ADMIN — KETOROLAC TROMETHAMINE 15 MG: 30 INJECTION, SOLUTION INTRAMUSCULAR; INTRAVENOUS at 06:11

## 2023-11-07 RX ADMIN — HYDROMORPHONE HYDROCHLORIDE 1 MG: 1 INJECTION, SOLUTION INTRAMUSCULAR; INTRAVENOUS; SUBCUTANEOUS at 07:11

## 2023-11-07 RX ADMIN — IPRATROPIUM BROMIDE AND ALBUTEROL SULFATE 3 ML: 2.5; .5 SOLUTION RESPIRATORY (INHALATION) at 06:11

## 2023-11-07 RX ADMIN — HYDROCODONE BITARTRATE AND ACETAMINOPHEN 1 TABLET: 7.5; 325 TABLET ORAL at 09:11

## 2023-11-07 RX ADMIN — ONDANSETRON 4 MG: 2 INJECTION INTRAMUSCULAR; INTRAVENOUS at 07:11

## 2023-11-07 NOTE — CONSULTS
Neurosurgery consult received for acute, traumatic L1 compression fracture.  Chart reviewed and recommend MRI lumbar spine w/w/o contrast along with standing lumbar x-rays in a TLSO brace.  Brace and imaging ordered.    TLSO brace whenever out of bed. No lifting >10lbs     We will follow-up for full exam.      Full consult note to follow        Nadia Ochoa PA-C  [facility]  Department of Neurosurgery  [phone]

## 2023-11-07 NOTE — HPI
Yasmeen Valderrama is a 52 yo female with significant history 1/2 day smoker, KATHERINE no longer on BiPAP due to claustrophobia, COPD on home oxygen 3 L NC, chronic pain, recent non bleeding gastric ulcer on EGD 9/27/23 who arrived to ED via EMS after a fall.  For the past few months patient will fall asleep even while upright position but usually will catch herself.  This a.m. at 2:30 a.m. patient woke up to let her dog out and while standing patient fell asleep and fell onto her buttocks.  Since patient did not pass out or hit head.  Since the fall patient complained of acute on chronic lower back pain 9/10.  She denies any numbness or weakness in all her extremities however reported to Neurosurgery team numbness and pain in arms and legs after the fall but now resolved.   Patient also reports worsening and shortness a breath that started after the fall.  Denies any productive cough.  Denies fever or chills. Patient sleeps in upright position in which she contributes BLE edema however worst then usual.   No prior treatment before arrival to hospital.  In ED, CXR no infiltrate. WBC 16 K (chronically elevated), afebrile.   X ray lumbar, CT thoracic L1 compression fracture. CT cervical no acute cervical fracture.   CT did reveal right apical ground glass opacity and recommended follow up CT 3-6 months.   Seen by Neurosurgery in ED who recommended MRI CTL w wo contrast, TLSO brace when out of bed, no lifting >10lbs, bending or twisting.

## 2023-11-07 NOTE — ED NOTES
"Patient states "I'm claustrophobic and will not be able to do the MRI." MRI has been notified awaiting communication from neuro.   "

## 2023-11-07 NOTE — Clinical Note
Diagnosis: Compression fracture of lumbosacral spine [289936]   Future Attending Provider: ALYSIA MORRIS [[id number]]   Admitting Provider:: ALYSIA MORRIS [[id number]]   Special Needs:: Fall Risk [15]

## 2023-11-07 NOTE — ASSESSMENT & PLAN NOTE
Yasmeen Valderrama is a 53 y.o. female with h/o substance abuse on Suboxone, advanced COPD, and KATHERINE who presented to the ED after a fall from standing with severe low back pain. She was found to have an acute L1 compression fracture. Fracture is stable on upright films, however there is more lytic change within the vertebral body that what would be expected. This combined with the new right apical soft tissue density warrants further workup with an MRI to rule out a pathologic fracture. She also has a large osteophyte complex at C5-6 and is hyperreflexic on exam.    -Pain control per primary. Will need pain mgmt referral as outpatient given substance abuse history  -MRI C-T-L w/wo contrast recommended  --patient reports significant claustrophobia. May require IV sedation for completion   -TLSO brace whenever out of bed  -No lifting >10lbs, bending, or twisting.      Case discussed with Dr. Coreas

## 2023-11-07 NOTE — H&P
[facility] Emergency Dept  [facility] Medicine  History & Physical    Patient Name: Yasmeen Valderrama  MRN: [id number]  Patient Class: OP- Observation  Admission Date: 11/7/2023  Attending Physician: Rambo Rojo MD   Primary Care Provider: Leeanna Stuart MD         Patient information was obtained from patient and ER records.     Subjective:     Principal Problem:Closed compression fracture of body of L1 vertebra    Chief Complaint:   Chief Complaint   Patient presents with    Fall     Pt arrived via ems, Pt chief complaint is a Fall. Pt states fell and now has lower back pain. Per ems pt was also having some SOB with some wheezes and was given a Duoneb treatment.         HPI: Yasmeen Valderrama is a 54 yo female with significant history 1/2 day smoker, KATHERINE no longer on BiPAP due to claustrophobia, COPD on home oxygen 3 L NC, chronic pain, recent non bleeding gastric ulcer on EGD 9/27/23 who arrived to ED via EMS after a fall.  For the past few months patient will fall asleep even while upright position but usually will catch herself.  This a.m. at 2:30 a.m. patient woke up to let her dog out and while standing patient fell asleep and fell onto her buttocks.  Since patient did not pass out or hit head.  Since the fall patient complained of acute on chronic lower back pain 9/10.  She denies any numbness or weakness in all her extremities however reported to Neurosurgery team numbness and pain in arms and legs after the fall but now resolved.   Patient also reports worsening and shortness a breath that started after the fall.  Denies any productive cough.  Denies fever or chills. Patient sleeps in upright position in which she contributes BLE edema however worst then usual.   No prior treatment before arrival to hospital.  In ED, CXR no infiltrate. WBC 16 K (chronically elevated), afebrile.   X ray lumbar, CT thoracic L1 compression fracture. CT cervical no acute cervical fracture.   CT did reveal right  apical ground glass opacity and recommended follow up CT 3-6 months.   Seen by Neurosurgery in ED who recommended MRI CTL w wo contrast, TLSO brace when out of bed, no lifting >10lbs, bending or twisting.       Past Medical History:   Diagnosis Date    Anxiety     Asthma     Carpal tunnel syndrome, bilateral     COPD (chronic obstructive pulmonary disease)     Heavy cigarette smoker     9/14/22 2PPD    Laryngeal papilloma     On home oxygen therapy     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    Right side with CT scan Referred to ENT for visualization       Past Surgical History:   Procedure Laterality Date    CARPAL TUNNEL RELEASE Bilateral     ESOPHAGOGASTRODUODENOSCOPY N/A 9/27/2023    Procedure: EGD (ESOPHAGOGASTRODUODENOSCOPY);  Surgeon: Sahil May MD;  Location: [facility];  Service: Endoscopy;  Laterality: N/A;    FINGER SURGERY      HYSTERECTOMY      OOPHORECTOMY  1996    Hysterectomy       Review of patient's allergies indicates:   Allergen Reactions    Antivert [meclizine] Other (See Comments)     Behavioral changes       No current facility-administered medications on file prior to encounter.     Current Outpatient Medications on File Prior to Encounter   Medication Sig    albuterol (PROVENTIL/VENTOLIN HFA) 90 mcg/actuation inhaler Inhale 2 puffs into the lungs every 6 (six) hours as needed.    albuterol-ipratropium (DUO-NEB) 2.5 mg-0.5 mg/3 mL nebulizer solution Take 3 mLs by nebulization every 4 (four) hours as needed for Wheezing. Rescue    fluticasone-umeclidin-vilanter (TRELEGY ELLIPTA) 200-62.5-25 mcg inhaler Trelegy Ellipta 200 mcg-62.5 mcg-25 mcg powder for inhalation   INHALE 1 PUFF(S) BY MOUTH into the lungs ONCE A DAY    furosemide (LASIX) 40 MG tablet Take 40 mg by mouth 2 (two) times daily as needed.    LINZESS 72 mcg Cap capsule Take 72 mcg by mouth every morning.    metOLazone (ZAROXOLYN) 2.5 MG tablet Take 1 tablet (2.5 mg total) by mouth every other day.    pantoprazole  (PROTONIX) 40 MG tablet Take 1 tablet (40 mg total) by mouth 2 (two) times daily.    SUBOXONE 12-3 mg Film PLACE 1 FILM UNDER THE TONGUE TWICE DAILY.     Family History       Problem Relation (Age of Onset)    COPD Mother    Heart disease Father    No Known Problems Sister, Brother          Tobacco Use    Smoking status: Every Day     Current packs/day: 2.00     Average packs/day: 2.0 packs/day for 39.9 years (79.9 ttl pk-yrs)     Types: Cigarettes     Start date: 12/1/1983    Smokeless tobacco: Current   Substance and Sexual Activity    Alcohol use: No     Alcohol/week: 0.0 standard drinks of alcohol    Drug use: No    Sexual activity: Yes     Partners: Male     Review of Systems   Constitutional:  Positive for activity change and fatigue. Negative for appetite change and chills.   HENT: Negative.     Eyes: Negative.    Respiratory:  Positive for shortness of breath.    Cardiovascular:  Positive for leg swelling. Negative for chest pain and palpitations.   Gastrointestinal: Negative.    Genitourinary: Negative.    Musculoskeletal:  Positive for arthralgias and back pain.   Skin: Negative.    Neurological:  Positive for weakness and numbness. Negative for tremors, syncope and speech difficulty.   Hematological: Negative.    Psychiatric/Behavioral: Negative.       Objective:     Vital Signs (Most Recent):  Temp: 97.7 °F (36.5 °C) (11/07/23 0533)  Pulse: 86 (11/07/23 1034)  Resp: 16 (11/07/23 1040)  BP: 110/61 (11/07/23 0617)  SpO2: 98 % (11/07/23 1034) Vital Signs (24h Range):  Temp:  [97.7 °F (36.5 °C)] 97.7 °F (36.5 °C)  Pulse:  [86-94] 86  Resp:  [16-27] 16  SpO2:  [91 %-98 %] 98 %  BP: (110-142)/(61-67) 110/61     Weight: 89.4 kg (197 lb)  Body mass index is 34.9 kg/m².     Physical Exam  Constitutional:       Appearance: Normal appearance. She is ill-appearing.   HENT:      Head: Normocephalic and atraumatic.      Nose: Nose normal.      Mouth/Throat:      Mouth: Mucous membranes are moist.    Cardiovascular:      Rate and Rhythm: Normal rate and regular rhythm.      Pulses: Normal pulses.      Heart sounds: Normal heart sounds.   Pulmonary:      Breath sounds: Wheezing and rhonchi present.   Abdominal:      Palpations: Abdomen is soft.   Musculoskeletal:         General: Swelling and signs of injury (lower mid back pain) present.      Right lower leg: Edema present.      Left lower leg: Edema present.   Skin:     General: Skin is warm and dry.   Neurological:      General: No focal deficit present.      Mental Status: She is alert and oriented to person, place, and time. Mental status is at baseline.      Sensory: No sensory deficit.                Significant Labs: All pertinent labs within the past 24 hours have been reviewed.    Significant Imaging: I have reviewed all pertinent imaging results/findings within the past 24 hours.    Assessment/Plan:     * Closed compression fracture of body of L1 vertebra  Patient presented to ED after a fall while upright position.  Patient have sleep apnea however returned her BiPAP due to claustrophobia and have been falling asleep while upright position lately but this time not able to catch herself.  Patient fell on her buttock.  No loss of consciousness or head injury  CT and x-ray show L1 compression fracture  Neurosurgery consulted-recommended MRI with without contrast cervical thoracic lumbar, no lifting more than 10 lb no twisting no bending  We will try valium for claustrophobia  PT OT consult pending MRI  Pain control        Tobacco abuse  One pack a day smoker.  Encouraged smoking cessation.  okay with nicotine patch.      Other specified anemias  Patient's anemia is currently controlled. Has not received any PRBCs to date. Etiology likely d/t Iron deficiency  Current CBC reviewed-   Lab Results   Component Value Date    HGB 8.4 (L) 11/07/2023    HCT 28.0 (L) 11/07/2023   Monitor serial CBC and transfuse if patient becomes hemodynamically unstable,  symptomatic or H/H drops below 7/21.  Hgb stable compared to last hgb 8.5 9/27 . Low T sat during that admission   Ferrous sulfate    Leukocytosis  Chronically elevated.  Afebrile.  No obvious infectious process.  Referral to Hematology outpatient      COPD exacerbation  Patient's COPD is with exacerbation noted by continued dyspnea currently.  Patient is currently on COPD Pathway. Continue scheduled inhalers Antibiotics and monitor respiratory status closely.   Continue home oxygen 3 LNC    Pulmonary hypertension  PASP 52 mmHg  Continue home lasix  Encourage smoking cessation       KATHERINE (obstructive sleep apnea)  Returned her BiPAP a few months ago due to claustrophobia      Chronic pain syndrome  On Suboxone at home.  We will hold Suboxone while acute pain control      Cervical stenosis of spinal canal  Neurosurgery following MRI of cervical with without contrast  .      VTE Risk Mitigation (From admission, onward)         Ordered     IP VTE HIGH RISK PATIENT  Once         11/07/23 1429     Place sequential compression device  Until discontinued         11/07/23 1429     Place sequential compression device  Until discontinued         11/07/23 1134                     On 11/07/2023, patient should be placed in hospital observation services under my care in collaboration with Rambo Rojo MD.          Ella Winston NP  Department of Hospital Medicine  [facility] [place] - Emergency Dept

## 2023-11-07 NOTE — SUBJECTIVE & OBJECTIVE
Past Medical History:   Diagnosis Date    Anxiety     Asthma     Carpal tunnel syndrome, bilateral     COPD (chronic obstructive pulmonary disease)     Heavy cigarette smoker     9/14/22 2PPD    Laryngeal papilloma     On home oxygen therapy     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    Right side with CT scan Referred to ENT for visualization       Past Surgical History:   Procedure Laterality Date    CARPAL TUNNEL RELEASE Bilateral     ESOPHAGOGASTRODUODENOSCOPY N/A 9/27/2023    Procedure: EGD (ESOPHAGOGASTRODUODENOSCOPY);  Surgeon: Sahil May MD;  Location: [facility];  Service: Endoscopy;  Laterality: N/A;    FINGER SURGERY      HYSTERECTOMY      OOPHORECTOMY  1996    Hysterectomy       Review of patient's allergies indicates:   Allergen Reactions    Antivert [meclizine] Other (See Comments)     Behavioral changes       No current facility-administered medications on file prior to encounter.     Current Outpatient Medications on File Prior to Encounter   Medication Sig    albuterol (PROVENTIL/VENTOLIN HFA) 90 mcg/actuation inhaler Inhale 2 puffs into the lungs every 6 (six) hours as needed.    albuterol-ipratropium (DUO-NEB) 2.5 mg-0.5 mg/3 mL nebulizer solution Take 3 mLs by nebulization every 4 (four) hours as needed for Wheezing. Rescue    fluticasone-umeclidin-vilanter (TRELEGY ELLIPTA) 200-62.5-25 mcg inhaler Trelegy Ellipta 200 mcg-62.5 mcg-25 mcg powder for inhalation   INHALE 1 PUFF(S) BY MOUTH into the lungs ONCE A DAY    furosemide (LASIX) 40 MG tablet Take 40 mg by mouth 2 (two) times daily as needed.    LINZESS 72 mcg Cap capsule Take 72 mcg by mouth every morning.    metOLazone (ZAROXOLYN) 2.5 MG tablet Take 1 tablet (2.5 mg total) by mouth every other day.    pantoprazole (PROTONIX) 40 MG tablet Take 1 tablet (40 mg total) by mouth 2 (two) times daily.    SUBOXONE 12-3 mg Film PLACE 1 FILM UNDER THE TONGUE TWICE DAILY.     Family History       Problem Relation (Age of Onset)    COPD Mother    Heart disease  Father    No Known Problems Sister, Brother          Tobacco Use    Smoking status: Every Day     Current packs/day: 2.00     Average packs/day: 2.0 packs/day for 39.9 years (79.9 ttl pk-yrs)     Types: Cigarettes     Start date: 12/1/1983    Smokeless tobacco: Current   Substance and Sexual Activity    Alcohol use: No     Alcohol/week: 0.0 standard drinks of alcohol    Drug use: No    Sexual activity: Yes     Partners: Male     Review of Systems   Constitutional:  Positive for activity change and fatigue. Negative for appetite change and chills.   HENT: Negative.     Eyes: Negative.    Respiratory:  Positive for shortness of breath.    Cardiovascular:  Positive for leg swelling. Negative for chest pain and palpitations.   Gastrointestinal: Negative.    Genitourinary: Negative.    Musculoskeletal:  Positive for arthralgias and back pain.   Skin: Negative.    Neurological:  Positive for weakness and numbness. Negative for tremors, syncope and speech difficulty.   Hematological: Negative.    Psychiatric/Behavioral: Negative.       Objective:     Vital Signs (Most Recent):  Temp: 97.7 °F (36.5 °C) (11/07/23 0533)  Pulse: 86 (11/07/23 1034)  Resp: 16 (11/07/23 1040)  BP: 110/61 (11/07/23 0617)  SpO2: 98 % (11/07/23 1034) Vital Signs (24h Range):  Temp:  [97.7 °F (36.5 °C)] 97.7 °F (36.5 °C)  Pulse:  [86-94] 86  Resp:  [16-27] 16  SpO2:  [91 %-98 %] 98 %  BP: (110-142)/(61-67) 110/61     Weight: 89.4 kg (197 lb)  Body mass index is 34.9 kg/m².     Physical Exam  Constitutional:       Appearance: Normal appearance. She is ill-appearing.   HENT:      Head: Normocephalic and atraumatic.      Nose: Nose normal.      Mouth/Throat:      Mouth: Mucous membranes are moist.   Cardiovascular:      Rate and Rhythm: Normal rate and regular rhythm.      Pulses: Normal pulses.      Heart sounds: Normal heart sounds.   Pulmonary:      Breath sounds: Wheezing and rhonchi present.   Abdominal:      Palpations: Abdomen is soft.    Musculoskeletal:         General: Swelling and signs of injury (lower mid back pain) present.      Right lower leg: Edema present.      Left lower leg: Edema present.   Skin:     General: Skin is warm and dry.   Neurological:      General: No focal deficit present.      Mental Status: She is alert and oriented to person, place, and time. Mental status is at baseline.      Sensory: No sensory deficit.                Significant Labs: All pertinent labs within the past 24 hours have been reviewed.    Significant Imaging: I have reviewed all pertinent imaging results/findings within the past 24 hours.

## 2023-11-07 NOTE — ASSESSMENT & PLAN NOTE
Patient's anemia is currently controlled. Has not received any PRBCs to date. Etiology likely d/t Iron deficiency  Current CBC reviewed-   Lab Results   Component Value Date    HGB 8.4 (L) 11/07/2023    HCT 28.0 (L) 11/07/2023   Monitor serial CBC and transfuse if patient becomes hemodynamically unstable, symptomatic or H/H drops below 7/21.  Hgb stable compared to last hgb 8.5 9/27 . Low T sat during that admission   Ferrous sulfate

## 2023-11-07 NOTE — ED NOTES
"Pt c/o B lower back pain since falling from standing. Pt states she falls asleep while standing this is common for pt. Pt denies head trauma. Pt also received duo neb Tx via EMS d/t being "always short of breath." Pt has pulmonary Hx and is on home O2. Audible, exp wheezing heard B.     Review of patient's allergies indicates:   Allergen Reactions    Antivert [meclizine] Other (See Comments)     Behavioral changes        Patient has verified the spelling of their name and  on armband.   APPEARANCE: Patient is alert, calm, oriented x 4, and does not appear distressed.  SKIN: Skin is normal for race, warm, and dry. Normal skin turgor and mucous membranes moist.  CARDIAC: Normal rate and rhythm, no murmur heard.   RESPIRATORY:Normal rate and effort. Breath sounds clear bilaterally throughout chest. Respirations are equal and unlabored.    GASTRO: Bowel sounds normal, abdomen is soft, no tenderness, and no abdominal distention.  MUSCLE: Full ROM. No bony tenderness or soft tissue tenderness. No obvious deformity.  PERIPHERAL VASCULAR: peripheral pulses present. Normal cap refill. No edema. Warm to touch.  NEURO: 5/5 strength major flexors/extensors bilaterally. Sensory intact to light touch bilaterally. Veda coma scale: eyes open spontaneously-4, oriented & converses-5, obeys commands-6. No neurological abnormalities.   MENTAL STATUS: awake, alert and aware of environment.  : Voids without complication    ED orders in progress. Pt SR up x 2. Bed in lowest position with wheels locked. Call bell within reach of pt.       "

## 2023-11-07 NOTE — SUBJECTIVE & OBJECTIVE
(Not in a hospital admission)      Review of patient's allergies indicates:   Allergen Reactions    Antivert [meclizine] Other (See Comments)     Behavioral changes       Past Medical History:   Diagnosis Date    Anxiety     Asthma     Carpal tunnel syndrome, bilateral     COPD (chronic obstructive pulmonary disease)     Heavy cigarette smoker     9/14/22 2PPD    Laryngeal papilloma     On home oxygen therapy     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    Right side with CT scan Referred to ENT for visualization     Past Surgical History:   Procedure Laterality Date    CARPAL TUNNEL RELEASE Bilateral     ESOPHAGOGASTRODUODENOSCOPY N/A 9/27/2023    Procedure: EGD (ESOPHAGOGASTRODUODENOSCOPY);  Surgeon: Sahil May MD;  Location: [facility];  Service: Endoscopy;  Laterality: N/A;    FINGER SURGERY      HYSTERECTOMY      OOPHORECTOMY  1996    Hysterectomy     Family History       Problem Relation (Age of Onset)    COPD Mother    Heart disease Father    No Known Problems Sister, Brother          Tobacco Use    Smoking status: Every Day     Current packs/day: 2.00     Average packs/day: 2.0 packs/day for 39.9 years (79.9 ttl pk-yrs)     Types: Cigarettes     Start date: 12/1/1983    Smokeless tobacco: Current   Substance and Sexual Activity    Alcohol use: No     Alcohol/week: 0.0 standard drinks of alcohol    Drug use: No    Sexual activity: Yes     Partners: Male     Review of Systems   Constitutional:  Positive for fatigue. Negative for diaphoresis, fever and unexpected weight change (weight frequently shifts due to fluid retention, but nothing out of the ordinary recently).   Respiratory:  Positive for shortness of breath.    Cardiovascular:  Positive for leg swelling.   Gastrointestinal:  Negative for constipation.   Genitourinary:  Negative for difficulty urinating.   Musculoskeletal:  Positive for back pain and gait problem (2/2 falling asleep while standing). Negative for myalgias.   Neurological:  Positive for numbness  (resolved, but reported numbness in all extremities initially after the fall). Negative for weakness.   Psychiatric/Behavioral:  Positive for sleep disturbance.        Objective:     Weight: 89.4 kg (197 lb)  Body mass index is 34.9 kg/m².  Vital Signs (Most Recent):  Temp: 97.7 °F (36.5 °C) (11/07/23 0533)  Pulse: 86 (11/07/23 1034)  Resp: 19 (11/07/23 1305)  BP: 110/61 (11/07/23 0617)  SpO2: 98 % (11/07/23 1034) Vital Signs (24h Range):  Temp:  [97.7 °F (36.5 °C)] 97.7 °F (36.5 °C)  Pulse:  [86-94] 86  Resp:  [16-27] 19  SpO2:  [91 %-98 %] 98 %  BP: (110-142)/(61-67) 110/61     Date 11/07/23 0700 - 11/08/23 0659   Shift 8148-6200 8381-1845 [id number] 24 Hour Total   INTAKE   IV Piggyback 100   100   Shift Total(mL/kg) 100(1.1)   100(1.1)   OUTPUT   Shift Total(mL/kg)       Weight (kg) 89.4 89.4 89.4 89.4           Physical Exam         Neurosurgery Physical Exam  General: well developed, well nourished, no distress.   Head: normocephalic, atraumatic  Neurologic: Awake, Alert, Oriented x 4. Thought content appropriate.  GCS: Motor: 6/Verbal: 5/Eyes: 4 GCS Total: 15  Language: No aphasia  Speech: No dysarthria  Cranial nerves: face symmetric, tongue midline, CN II-XII grossly intact.   Eyes: pupils equal, round, reactive to light with accomodation, EOMI.  Pulmonary: normal respirations, no signs of respiratory distress  Abdomen: soft, non-distended, not tender to palpation    Sensory: intact to light touch throughout BUE and BLE; subtle decreased sensation across the lower lumbar spine. No thoracic sensory level     Motor Strength: Moves all extremities spontaneously with good tone.    Strength  Deltoids Triceps Biceps   Hand    Upper: R 5/5 5/5 5/5   5/5    L 5/5 5/5 5/5   5/5     Iliopsoas Quadriceps Knee  Flexion Tibialis  anterior Gastro- cnemius EHL   Lower: R 5/5 5/5 5/5 5/5 5/5 5/5    L 5/5 5/5 5/5 5/5 5/5 5/5     Zee: present bilaterally   Clonus: absent    Skin: Skin is warm, dry and  "intact.    Midline bony tenderness: present in upper lumbar spine       Significant Labs:  Recent Labs   Lab 11/07/23  0611   *      K 3.1*   CL 89*   CO2 38*   BUN 11   CREATININE 0.7   CALCIUM 9.4     Recent Labs   Lab 11/07/23  0611   WBC 16.36*   HGB 8.4*   HCT 28.0*        No results for input(s): "LABPT", "INR", "APTT" in the last 48 hours.  Microbiology Results (last 7 days)       ** No results found for the last 168 hours. **              Significant Diagnostics:  I have personally reviewed imaging and agree with the findings.     CT cervical, thoracic, and lumbar spine 11/7/23  New acute inferior compression fracture L1 vertebral body discussed above appearing since 09/26/2023.  New right apical pleural base soft tissue density appearing since 2022.  ---CT scan chest lungs with IV contrast recommended for further evaluation new right apical pleural base lesion.  Large disc osteophyte complex at C5-6       Upright lumbar AP/Lat 11/7/23  Anterior wedge compression deformity of the L1 vertebral body, new since the abdominopelvic CT of 09/26/2023.  No retropulsion      "

## 2023-11-07 NOTE — ASSESSMENT & PLAN NOTE
Patient's COPD is with exacerbation noted by continued dyspnea currently.  Patient is currently on COPD Pathway. Continue scheduled inhalers Antibiotics and monitor respiratory status closely.   Continue home oxygen 3 LNC

## 2023-11-07 NOTE — ED PROVIDER NOTES
Encounter Date: 11/7/2023       History     Chief Complaint   Patient presents with    Fall     Pt arrived via ems, Pt chief complaint is a Fall. Pt states fell and now has lower back pain. Per ems pt was also having some SOB with some wheezes and was given a Duoneb treatment.      53-year-old female presenting following ground level fall.  History of COPD, O2 dependence at 2 L. patient states after waking this morning she was in the room when she fell asleep standing up.  Patient reports she has lower back pain following the fall.  Denies any syncope.  Denies any head trauma.  Patient reports pain is localized to the back.  No chest pain or abdominal pain.  Denies any dizziness or lightheadedness, nausea, vomiting.  Patient was discharged 9/27 following evaluation for possible GI bleed.  Patient was found to have a gastric ulcer.  Patient currently on Suboxone.      Review of patient's allergies indicates:   Allergen Reactions    Antivert [meclizine] Other (See Comments)     Behavioral changes     Past Medical History:   Diagnosis Date    Anxiety     Asthma     Carpal tunnel syndrome, bilateral     COPD (chronic obstructive pulmonary disease)     Heavy cigarette smoker     9/14/22 2PPD    Laryngeal papilloma     On home oxygen therapy     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    Right side with CT scan Referred to ENT for visualization     Past Surgical History:   Procedure Laterality Date    CARPAL TUNNEL RELEASE Bilateral     ESOPHAGOGASTRODUODENOSCOPY N/A 9/27/2023    Procedure: EGD (ESOPHAGOGASTRODUODENOSCOPY);  Surgeon: Sahil May MD;  Location: [facility];  Service: Endoscopy;  Laterality: N/A;    FINGER SURGERY      HYSTERECTOMY      OOPHORECTOMY  1996    Hysterectomy     Family History   Problem Relation Age of Onset    COPD Mother     Heart disease Father     No Known Problems Sister     No Known Problems Brother      Social History     Tobacco Use    Smoking status: Every Day     Current packs/day: 2.00      Average packs/day: 2.0 packs/day for 39.9 years (79.9 ttl pk-yrs)     Types: Cigarettes     Start date: 12/1/1983    Smokeless tobacco: Current   Substance Use Topics    Alcohol use: No     Alcohol/week: 0.0 standard drinks of alcohol    Drug use: No     Review of Systems   Constitutional:  Negative for chills and fever.   HENT:  Negative for congestion.    Respiratory:  Positive for shortness of breath and wheezing. Negative for cough.    Cardiovascular:  Negative for chest pain.   Gastrointestinal:  Negative for abdominal pain, diarrhea, nausea and vomiting.   Musculoskeletal:  Positive for back pain. Negative for neck pain.   Skin:  Negative for wound.   Neurological:  Negative for syncope, weakness, light-headedness and headaches.       Physical Exam     Initial Vitals [11/07/23 0533]   BP Pulse Resp Temp SpO2   130/62 94 (!) 26 97.7 °F (36.5 °C) 95 %      MAP       --         Physical Exam    Nursing note and vitals reviewed.  Constitutional: She appears well-developed and well-nourished. She does not appear ill. No distress.   Higher BMI.  Lying on left lateral decubitus.  Appears to be in pain.   HENT:   Head: Normocephalic and atraumatic.   Mouth/Throat: Oropharynx is clear and moist.   No preauricular hematoma.   Eyes: Conjunctivae and EOM are normal.   3 mm pupils bilaterally.   Neck:   Normal range of motion.  Cardiovascular:  Regular rhythm and normal heart sounds.   Tachycardia present.         No murmur heard.  Pulmonary/Chest: No accessory muscle usage. No tachypnea. No respiratory distress. She has wheezes in the right upper field and the left upper field.   Abdominal: Abdomen is soft. There is no abdominal tenderness.   Musculoskeletal:         General: No edema.      Cervical back: Normal range of motion. No rigidity. No spinous process tenderness. Normal range of motion.      Comments: Midline thoracic and lumbar tenderness.  No lower extremity edema.     Neurological: She is alert. GCS score is  15.   Skin: Skin is warm.   No bruising noted to the torso or back.  No bruising noted to the extremities.   Psychiatric: She has a normal mood and affect.         ED Course   Procedures  Labs Reviewed   COMPREHENSIVE METABOLIC PANEL - Abnormal; Notable for the following components:       Result Value    Potassium 3.1 (*)     Chloride 89 (*)     CO2 38 (*)     Glucose 159 (*)     All other components within normal limits   CBC W/ AUTO DIFFERENTIAL - Abnormal; Notable for the following components:    WBC 16.36 (*)     RBC 3.68 (*)     Hemoglobin 8.4 (*)     Hematocrit 28.0 (*)     MCV 76 (*)     MCH 22.8 (*)     MCHC 30.0 (*)     RDW 20.1 (*)     Immature Granulocytes 1.0 (*)     Gran # (ANC) 13.4 (*)     Immature Grans (Abs) 0.16 (*)     Mono # 1.1 (*)     Gran % 82.2 (*)     Lymph % 9.2 (*)     All other components within normal limits   SARS-COV-2 RDRP GENE   POCT INFLUENZA A/B MOLECULAR          Imaging Results              CT Cervical Spine Without Contrast (Edited Result - FINAL)  Result time 11/07/23 09:44:03      Addendum (preliminary) 1 of 1 by Ben Sanchez MD (11/07/23 09:44:03)      KEVIN Sanchez MD, first observed the findings on 11/07/2023 at 08:55, and reported the results to Jake Brenner MD, via epic secure chat message on 11/07/2023 at 09:05.  Patient name and medical record number were specified/linked in the message. The recipient promptly responded with a message acknowledging receipt of the information.      Electronically signed by: Ben Sanchez  Date:    11/07/2023  Time:    09:44                 Final result by Ben Sanchez MD (11/07/23 09:05:37)                   Impression:      CERVICAL SPINE CT:    Straightened cervical lordosis.    No acute fracture deformity or traumatic vertebral malalignment is demonstrated in the cervical spine.    Multilevel degenerative changes, with mild to moderate spinal canal stenosis and moderate to severe neural foraminal stenosis.    Right  apical ground-glass opacity could represent pulmonary contusion, infectious or inflammatory process, neoplasm, or other etiology.  Correlate clinically, and with follow-up chest CT within 3-6 months.    Additional results as detailed in the body of the report above.    This report was flagged in Epic as abnormal.      Electronically signed by: Ben Sanchez  Date:    11/07/2023  Time:    09:05               Narrative:    EXAMINATION:  CT CERVICAL SPINE WITHOUT CONTRAST    CLINICAL HISTORY:  Neck trauma, dangerous injury mechanism (Age 16-64y);    TECHNIQUE:  Low dose axial images, sagittal and coronal reformations were performed though the cervical spine.  Contrast was not administered.    COMPARISON:  None    FINDINGS:  Artifacts related to beam hardening and/or motion degrade portions of the scan.    There is a small geographic region of ground-glass opacification in the right lung apex.    No apical pneumothorax is apparent on either side.    Straightened cervical lordosis.    No acute fracture deformity or traumatic vertebral malalignment is demonstrated in the cervical spine.    Multilevel degenerative changes, with mild to moderate spinal canal stenosis and moderate to severe neural foraminal stenosis.    Retropharyngeal course of portions of the cervical carotids bilaterally.  No cervical prevertebral soft tissue swelling.    Prominent to mildly enlarged cervical lymph nodes bilaterally, etiology and significance uncertain.  An underlying infectious, inflammatory, neoplastic, or lymphoproliferative disorder is neither confirmed nor excluded.                                       CT Thoracic Spine Without Contrast (In process)  Result time 11/07/23 10:33:54                     CT Lumbar Spine Without Contrast (In process)                      X-Ray Chest 1 View (Final result)  Result time 11/07/23 08:03:18      Final result by Ben Sanchez MD (11/07/23 08:03:18)                   Impression:      Probable  pulmonary emphysema.  Correlate clinically.    No consolidation or advanced pulmonary edema.    Aortic atherosclerosis.    The cardiopericardial silhouette again appears mildly enlarged, with leading differential diagnosis of pericardial fluid, and/or cardiac chamber enlargement, and/or myocardial hypertrophy.      Electronically signed by: Ben Sanchez  Date:    11/07/2023  Time:    08:03               Narrative:    EXAMINATION:  XR CHEST 1 VIEW    CLINICAL HISTORY:  Wheezing    TECHNIQUE:  Single frontal view of the chest was performed.    COMPARISON:  Chest x-rays dated 09/26/2023, 05/04/2022, and 01/19/2010    FINDINGS:  Midline thoracic trachea.    Faint atherosclerotic calcification in the aortic arch.    The cardiopericardial silhouette again appears mildly enlarged.    Pre-existing bilateral hilar prominence, likely on a vascular basis.    Basilar reticular pulmonary opacities, left greater than right, likely represent subsegmental atelectasis or scarring.    Pre-existing parahilar pulmonary parenchymal lucency, likely related to pre-existing emphysema.    No consolidation, discrete pneumothorax, blunting of either lateral costophrenic angle, or acute osseous abnormality is apparent radiographically.                                       Medications   albuterol-ipratropium 2.5 mg-0.5 mg/3 mL nebulizer solution 3 mL (3 mLs Nebulization Given 11/7/23 0625)   ketorolac injection 15 mg (15 mg Intravenous Given 11/7/23 0610)   methocarbamoL tablet 500 mg (500 mg Oral Given 11/7/23 0611)   HYDROmorphone injection 1 mg (1 mg Intravenous Given 11/7/23 0752)   ondansetron injection 4 mg (4 mg Intravenous Given 11/7/23 0751)   potassium chloride 10 mEq in 100 mL IVPB (0 mEq Intravenous Stopped 11/7/23 1041)   albuterol-ipratropium 2.5 mg-0.5 mg/3 mL nebulizer solution 3 mL (3 mLs Nebulization Given 11/7/23 1034)   HYDROmorphone injection 0.2 mg (0.2 mg Intravenous Given 11/7/23 1040)     Medical Decision  Making  Amount and/or Complexity of Data Reviewed  External Data Reviewed: radiology.     Details: CT performed 2 months ago did not reveal a compression fracture  Labs: ordered.  Radiology: ordered.    Risk  Prescription drug management.  Parenteral controlled substances.  Decision regarding hospitalization.    Critical Care  Total time providing critical care: 35 minutes               ED Course as of 11/07/23 1048   Tue Nov 07, 2023   0558 Patient reports being on Suboxone. [JM]   0615 Assumed care at change of shift:  This is a 53-year-old woman who reports she fell asleep while standing falling and hurting her neck and back. [MH]   0615 My independent interpretation of the EKG is sinus rhythm at a rate of 90.  There is low voltage throughout the precordium.  There is an RSR prime in V1.  There is no ST segment elevation or depression concerning for infarct.  On chart review of EKGs performed outside of the ED visit there is no acute changes   [MH]   0620 On my exam the patient reports that she has an inability to sleep laying flat so she sleeps in a recliner and has a lot of trouble sleeping at night and has frequent episodes of falling asleep while standing up.  She is on home O2.  She has COPD.  She does still smoke.  She has chronic lower extremity edema.  She reports that today she would gotten up to go to the bathroom and was standing by her chair and fell asleep and then felt herself falling down but was unable to catch herself before she hit the ground.  She reports that she landed flat on her back and it caused her to lose her breath for a 2nd and she is had severe lower lumbar pain radiating bilaterally since then.   [MH]   0721 COVID and influenza are negative [MH]   0721 Patient has an elevated white blood cell count at 16,000. She has anemia of 8.4 and 28.  Platelets are normal.  On chart review of labs performed outside of the ED visit these findings are chronic.  I am uncertain of the etiology of  the chronic leukocytosis. [MH]   0722 CMP reveals a potassium of 3.1.  CO2 of 38 consistent with history of retaining CO2 and chronic hypoxia on home O2.  On chart review of labs performed outside of the ED visit, these findings are also chronic [MH]   0722 My wet read/independent interpretation of the chest x-ray: I do not see a pneumothorax or large infiltrate. [MH]   0851 Final read of the chest x-ray does not reveal an acute process [MH]   0851 Readings of the CT of the spine are pending [MH]   0853 My wet read/independent interpretation of the CT: There does appears to be a defect in 1 of the lower thoracic vertebral bodies [MH]   0912 CT: Compression  deformity L1 vertebral body [MH]   0942 CT cervical spine: Right apical ground-glass opacity could represent pulmonary contusion, infectious or inflammatory process, neoplasm, or other etiology.  Correlate clinically, and with follow-up chest CT within 3-6 months.    [MH]   0942 Discussed the findings of the L-spine CT with the radiologist:  There is a compression fracture which was not present on prior imaging. [MH]   1024 Patient reports that she is wheezing now would like a DuoNeb.  She reports continued pain with only moderate improvement after Dilaudid. [MH]   1046 Discussed with neurosurgery.  Admit and they will follow the patient as an inpatient []      ED Course User Index  [JM] Dano Yen MD  [] Jake Brenner MD                    Clinical Impression:   Final diagnoses:  [R06.2] Wheezing  [W19.XXXA] Fall  [J44.1] COPD exacerbation (Primary)  [S32.010A] Closed compression fracture of body of L1 vertebra  [R91.1] Pulmonary nodule               Jake Brenner MD  11/07/23 1049

## 2023-11-07 NOTE — CONSULTS
[facility]  Neurosurgery  Consult Note    Consults  Subjective:     Chief Complaint/Reason for Admission: fall     History of Present Illness: Yasmeen Valderrama is a 53 y.o. female with h/o substance abuse on Suboxone, COPD, KATHERINE, and home O2 dependence. She reports significant fluid retention and difficulty breathing at home. She frequently falls asleep during the day, but she is usually able to catch herself. Today, she fell asleep while standing and fell straight backwards. Immediately following the fall, she felt numbness and pain in her arms and legs. She found it difficult to lift her arms up initially. The numbness has since resolved, and she is able to move her arms freely. Her largest complaint at this time is of severe back pain that increases with most movements. Denies current leg pain, numbness, and weakness. Denies b/b dysfunction. Denies SA. Imaging revealed an acute L1 compression fracture with no evidence of retropulsion. CT cervical also revealed a large disc osteophyte complex at C5-6. Neurosurgery was consulted for mgmt of the compression fracture.       (Not in a hospital admission)      Review of patient's allergies indicates:   Allergen Reactions    Antivert [meclizine] Other (See Comments)     Behavioral changes       Past Medical History:   Diagnosis Date    Anxiety     Asthma     Carpal tunnel syndrome, bilateral     COPD (chronic obstructive pulmonary disease)     Heavy cigarette smoker     9/14/22 2PPD    Laryngeal papilloma     On home oxygen therapy     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    Right side with CT scan Referred to ENT for visualization     Past Surgical History:   Procedure Laterality Date    CARPAL TUNNEL RELEASE Bilateral     ESOPHAGOGASTRODUODENOSCOPY N/A 9/27/2023    Procedure: EGD (ESOPHAGOGASTRODUODENOSCOPY);  Surgeon: Sahil May MD;  Location: [facility];  Service: Endoscopy;  Laterality: N/A;    FINGER SURGERY      HYSTERECTOMY       OOPHORECTOMY  1996    Hysterectomy     Family History       Problem Relation (Age of Onset)    COPD Mother    Heart disease Father    No Known Problems Sister, Brother          Tobacco Use    Smoking status: Every Day     Current packs/day: 2.00     Average packs/day: 2.0 packs/day for 39.9 years (79.9 ttl pk-yrs)     Types: Cigarettes     Start date: 12/1/1983    Smokeless tobacco: Current   Substance and Sexual Activity    Alcohol use: No     Alcohol/week: 0.0 standard drinks of alcohol    Drug use: No    Sexual activity: Yes     Partners: Male     Review of Systems   Constitutional:  Positive for fatigue. Negative for diaphoresis, fever and unexpected weight change (weight frequently shifts due to fluid retention, but nothing out of the ordinary recently).   Respiratory:  Positive for shortness of breath.    Cardiovascular:  Positive for leg swelling.   Gastrointestinal:  Negative for constipation.   Genitourinary:  Negative for difficulty urinating.   Musculoskeletal:  Positive for back pain and gait problem (2/2 falling asleep while standing). Negative for myalgias.   Neurological:  Positive for numbness (resolved, but reported numbness in all extremities initially after the fall). Negative for weakness.   Psychiatric/Behavioral:  Positive for sleep disturbance.        Objective:     Weight: 89.4 kg (197 lb)  Body mass index is 34.9 kg/m².  Vital Signs (Most Recent):  Temp: 97.7 °F (36.5 °C) (11/07/23 0533)  Pulse: 86 (11/07/23 1034)  Resp: 19 (11/07/23 1305)  BP: 110/61 (11/07/23 0617)  SpO2: 98 % (11/07/23 1034) Vital Signs (24h Range):  Temp:  [97.7 °F (36.5 °C)] 97.7 °F (36.5 °C)  Pulse:  [86-94] 86  Resp:  [16-27] 19  SpO2:  [91 %-98 %] 98 %  BP: (110-142)/(61-67) 110/61     Date 11/07/23 0700 - 11/08/23 0659   Shift 3531-3946 0075-7382 4151-3014 24 Hour Total   INTAKE   IV Piggyback 100   100   Shift Total(mL/kg) 100(1.1)   100(1.1)   OUTPUT   Shift Total(mL/kg)       Weight (kg) 89.4 89.4 89.4 89.4  "          Physical Exam         Neurosurgery Physical Exam  General: well developed, well nourished, no distress.   Head: normocephalic, atraumatic  Neurologic: Awake, Alert, Oriented x 4. Thought content appropriate.  GCS: Motor: 6/Verbal: 5/Eyes: 4 GCS Total: 15  Language: No aphasia  Speech: No dysarthria  Cranial nerves: face symmetric, tongue midline, CN II-XII grossly intact.   Eyes: pupils equal, round, reactive to light with accomodation, EOMI.  Pulmonary: normal respirations, no signs of respiratory distress  Abdomen: soft, non-distended, not tender to palpation    Sensory: intact to light touch throughout BUE and BLE; subtle decreased sensation across the lower lumbar spine. No thoracic sensory level     Motor Strength: Moves all extremities spontaneously with good tone.    Strength  Deltoids Triceps Biceps   Hand    Upper: R 5/5 5/5 5/5   5/5    L 5/5 5/5 5/5   5/5     Iliopsoas Quadriceps Knee  Flexion Tibialis  anterior Gastro- cnemius EHL   Lower: R 5/5 5/5 5/5 5/5 5/5 5/5    L 5/5 5/5 5/5 5/5 5/5 5/5     Zee: present bilaterally   Clonus: absent    Skin: Skin is warm, dry and intact.    Midline bony tenderness: present in upper lumbar spine       Significant Labs:  Recent Labs   Lab 11/07/23  0611   *      K 3.1*   CL 89*   CO2 38*   BUN 11   CREATININE 0.7   CALCIUM 9.4     Recent Labs   Lab 11/07/23  0611   WBC 16.36*   HGB 8.4*   HCT 28.0*        No results for input(s): "LABPT", "INR", "APTT" in the last 48 hours.  Microbiology Results (last 7 days)       ** No results found for the last 168 hours. **              Significant Diagnostics:  I have personally reviewed imaging and agree with the findings.     CT cervical, thoracic, and lumbar spine 11/7/23  New acute inferior compression fracture L1 vertebral body discussed above appearing since 09/26/2023.  New right apical pleural base soft tissue density appearing since 2022.  ---CT scan chest lungs with IV contrast " recommended for further evaluation new right apical pleural base lesion.  Large disc osteophyte complex at C5-6       Upright lumbar AP/Lat 11/7/23  Anterior wedge compression deformity of the L1 vertebral body, new since the abdominopelvic CT of 09/26/2023.  No retropulsion        Assessment/Plan:     Closed compression fracture of body of L1 vertebra  Yasmeen Valderrama is a 53 y.o. female with h/o substance abuse on Suboxone, advanced COPD, and KATHERINE who presented to the ED after a fall from standing with severe low back pain. She was found to have an acute L1 compression fracture. Fracture is stable on upright films, however there is more lytic change within the vertebral body that what would be expected. This combined with the new right apical soft tissue density warrants further workup with an MRI to rule out a pathologic fracture. She also has a large osteophyte complex at C5-6 and is hyperreflexic on exam.    -Pain control per primary. Will need pain mgmt referral as outpatient given substance abuse history  -MRI C-T-L w/wo contrast recommended  --patient reports significant claustrophobia. May require IV sedation for completion   -TLSO brace whenever out of bed  -No lifting >10lbs, bending, or twisting.      Case discussed with Dr. Coreas        Thank you for your consult. I will follow-up with patient. Please contact us if you have any additional questions.    Nadia Ochoa PA-C  Neurosurgery  [facility] - Emergency Dept

## 2023-11-07 NOTE — HPI
Yasmeen Valderrama is a 53 y.o. female with h/o substance abuse on Suboxone, COPD, KATHERINE, and home O2 dependence. She reports significant fluid retention and difficulty breathing at home. She frequently falls asleep during the day, but she is usually able to catch herself. Today, she fell asleep while standing and fell straight backwards. Immediately following the fall, she felt numbness and pain in her arms and legs. She found it difficult to lift her arms up initially. The numbness has since resolved, and she is able to move her arms freely. Her largest complaint at this time is of severe back pain that increases with most movements. Denies current leg pain, numbness, and weakness. Denies b/b dysfunction. Denies SA. Imaging revealed an acute L1 compression fracture with no evidence of retropulsion. CT cervical also revealed a large disc osteophyte complex at C5-6. Neurosurgery was consulted for mgmt of the compression fracture.

## 2023-11-08 PROBLEM — Z71.89 ACP (ADVANCE CARE PLANNING): Status: ACTIVE | Noted: 2023-11-08

## 2023-11-08 PROBLEM — R93.89 ABNORMAL CT SCAN: Status: ACTIVE | Noted: 2023-11-08

## 2023-11-08 LAB
ALBUMIN SERPL BCP-MCNC: 3.9 G/DL (ref 3.5–5.2)
ALP SERPL-CCNC: 127 U/L (ref 55–135)
ALT SERPL W/O P-5'-P-CCNC: 12 U/L (ref 10–44)
ANION GAP SERPL CALC-SCNC: 12 MMOL/L (ref 8–16)
AST SERPL-CCNC: 26 U/L (ref 10–40)
BASOPHILS # BLD AUTO: 0.06 K/UL (ref 0–0.2)
BASOPHILS NFR BLD: 0.4 % (ref 0–1.9)
BILIRUB SERPL-MCNC: 0.8 MG/DL (ref 0.1–1)
BUN SERPL-MCNC: 8 MG/DL (ref 6–20)
CALCIUM SERPL-MCNC: 9.8 MG/DL (ref 8.7–10.5)
CHLORIDE SERPL-SCNC: 86 MMOL/L (ref 95–110)
CO2 SERPL-SCNC: 42 MMOL/L (ref 23–29)
CREAT SERPL-MCNC: 0.7 MG/DL (ref 0.5–1.4)
DIFFERENTIAL METHOD: ABNORMAL
EOSINOPHIL # BLD AUTO: 0.1 K/UL (ref 0–0.5)
EOSINOPHIL NFR BLD: 0.6 % (ref 0–8)
ERYTHROCYTE [DISTWIDTH] IN BLOOD BY AUTOMATED COUNT: 20.6 % (ref 11.5–14.5)
EST. GFR  (NO RACE VARIABLE): >60 ML/MIN/1.73 M^2
GLUCOSE SERPL-MCNC: 124 MG/DL (ref 70–110)
HCT VFR BLD AUTO: 31.5 % (ref 37–48.5)
HGB BLD-MCNC: 8.6 G/DL (ref 12–16)
IMM GRANULOCYTES # BLD AUTO: 0.05 K/UL (ref 0–0.04)
IMM GRANULOCYTES NFR BLD AUTO: 0.4 % (ref 0–0.5)
LYMPHOCYTES # BLD AUTO: 1.4 K/UL (ref 1–4.8)
LYMPHOCYTES NFR BLD: 9.6 % (ref 18–48)
MCH RBC QN AUTO: 22.1 PG (ref 27–31)
MCHC RBC AUTO-ENTMCNC: 27.3 G/DL (ref 32–36)
MCV RBC AUTO: 81 FL (ref 82–98)
MONOCYTES # BLD AUTO: 1.1 K/UL (ref 0.3–1)
MONOCYTES NFR BLD: 7.5 % (ref 4–15)
NEUTROPHILS # BLD AUTO: 11.6 K/UL (ref 1.8–7.7)
NEUTROPHILS NFR BLD: 81.5 % (ref 38–73)
NRBC BLD-RTO: 0 /100 WBC
PHOSPHATE SERPL-MCNC: 3.5 MG/DL (ref 2.7–4.5)
PLATELET # BLD AUTO: 465 K/UL (ref 150–450)
PMV BLD AUTO: 10.3 FL (ref 9.2–12.9)
POTASSIUM SERPL-SCNC: 3.3 MMOL/L (ref 3.5–5.1)
PROT SERPL-MCNC: 7.6 G/DL (ref 6–8.4)
RBC # BLD AUTO: 3.9 M/UL (ref 4–5.4)
SODIUM SERPL-SCNC: 140 MMOL/L (ref 136–145)
WBC # BLD AUTO: 14.2 K/UL (ref 3.9–12.7)

## 2023-11-08 PROCEDURE — 97167 OT EVAL HIGH COMPLEX 60 MIN: CPT

## 2023-11-08 PROCEDURE — 99900035 HC TECH TIME PER 15 MIN (STAT)

## 2023-11-08 PROCEDURE — 94761 N-INVAS EAR/PLS OXIMETRY MLT: CPT

## 2023-11-08 PROCEDURE — 36415 COLL VENOUS BLD VENIPUNCTURE: CPT | Performed by: NURSE PRACTITIONER

## 2023-11-08 PROCEDURE — 94640 AIRWAY INHALATION TREATMENT: CPT

## 2023-11-08 PROCEDURE — 84100 ASSAY OF PHOSPHORUS: CPT | Performed by: NURSE PRACTITIONER

## 2023-11-08 PROCEDURE — 85025 COMPLETE CBC W/AUTO DIFF WBC: CPT | Performed by: NURSE PRACTITIONER

## 2023-11-08 PROCEDURE — 94660 CPAP INITIATION&MGMT: CPT

## 2023-11-08 PROCEDURE — 63600175 PHARM REV CODE 636 W HCPCS: Performed by: NURSE PRACTITIONER

## 2023-11-08 PROCEDURE — 25000242 PHARM REV CODE 250 ALT 637 W/ HCPCS: Performed by: NURSE PRACTITIONER

## 2023-11-08 PROCEDURE — S4991 NICOTINE PATCH NONLEGEND: HCPCS | Performed by: NURSE PRACTITIONER

## 2023-11-08 PROCEDURE — 27000190 HC CPAP FULL FACE MASK W/VALVE

## 2023-11-08 PROCEDURE — 97535 SELF CARE MNGMENT TRAINING: CPT

## 2023-11-08 PROCEDURE — 21400001 HC TELEMETRY ROOM

## 2023-11-08 PROCEDURE — 80053 COMPREHEN METABOLIC PANEL: CPT | Performed by: NURSE PRACTITIONER

## 2023-11-08 PROCEDURE — 25000003 PHARM REV CODE 250: Performed by: NURSE PRACTITIONER

## 2023-11-08 PROCEDURE — 99233 PR SUBSEQUENT HOSPITAL CARE,LEVL III: ICD-10-PCS | Mod: ,,, | Performed by: PHYSICIAN ASSISTANT

## 2023-11-08 PROCEDURE — 25000003 PHARM REV CODE 250

## 2023-11-08 PROCEDURE — 99233 SBSQ HOSP IP/OBS HIGH 50: CPT | Mod: ,,, | Performed by: PHYSICIAN ASSISTANT

## 2023-11-08 PROCEDURE — 27000221 HC OXYGEN, UP TO 24 HOURS

## 2023-11-08 PROCEDURE — 97530 THERAPEUTIC ACTIVITIES: CPT

## 2023-11-08 RX ORDER — FUROSEMIDE 40 MG/1
40 TABLET ORAL DAILY
Status: DISCONTINUED | OUTPATIENT
Start: 2023-11-08 | End: 2023-11-13 | Stop reason: HOSPADM

## 2023-11-08 RX ORDER — CYCLOBENZAPRINE HCL 5 MG
5 TABLET ORAL ONCE
Status: COMPLETED | OUTPATIENT
Start: 2023-11-08 | End: 2023-11-08

## 2023-11-08 RX ORDER — AMOXICILLIN 250 MG
1 CAPSULE ORAL ONCE
Status: DISCONTINUED | OUTPATIENT
Start: 2023-11-09 | End: 2023-11-08

## 2023-11-08 RX ORDER — AMOXICILLIN 250 MG
1 CAPSULE ORAL ONCE
Status: COMPLETED | OUTPATIENT
Start: 2023-11-08 | End: 2023-11-08

## 2023-11-08 RX ORDER — POTASSIUM CHLORIDE 20 MEQ/1
40 TABLET, EXTENDED RELEASE ORAL ONCE
Status: COMPLETED | OUTPATIENT
Start: 2023-11-08 | End: 2023-11-08

## 2023-11-08 RX ORDER — GABAPENTIN 300 MG/1
600 CAPSULE ORAL 3 TIMES DAILY
Status: DISCONTINUED | OUTPATIENT
Start: 2023-11-08 | End: 2023-11-08

## 2023-11-08 RX ADMIN — IPRATROPIUM BROMIDE AND ALBUTEROL SULFATE 3 ML: 2.5; .5 SOLUTION RESPIRATORY (INHALATION) at 08:11

## 2023-11-08 RX ADMIN — LIDOCAINE 5% 1 PATCH: 700 PATCH TOPICAL at 09:11

## 2023-11-08 RX ADMIN — METHYLPREDNISOLONE SODIUM SUCCINATE 80 MG: 40 INJECTION, POWDER, FOR SOLUTION INTRAMUSCULAR; INTRAVENOUS at 09:11

## 2023-11-08 RX ADMIN — BUDESONIDE 0.5 MG: 0.5 INHALANT RESPIRATORY (INHALATION) at 08:11

## 2023-11-08 RX ADMIN — FUROSEMIDE 40 MG: 40 TABLET ORAL at 11:11

## 2023-11-08 RX ADMIN — METHYLPREDNISOLONE SODIUM SUCCINATE 80 MG: 40 INJECTION, POWDER, FOR SOLUTION INTRAMUSCULAR; INTRAVENOUS at 05:11

## 2023-11-08 RX ADMIN — METOLAZONE 5 MG: 5 TABLET ORAL at 09:11

## 2023-11-08 RX ADMIN — HYDROCODONE BITARTRATE AND ACETAMINOPHEN 1 TABLET: 7.5; 325 TABLET ORAL at 09:11

## 2023-11-08 RX ADMIN — PANTOPRAZOLE SODIUM 40 MG: 40 TABLET, DELAYED RELEASE ORAL at 09:11

## 2023-11-08 RX ADMIN — CYCLOBENZAPRINE HYDROCHLORIDE 5 MG: 5 TABLET, FILM COATED ORAL at 11:11

## 2023-11-08 RX ADMIN — SENNOSIDES AND DOCUSATE SODIUM 1 TABLET: 8.6; 5 TABLET ORAL at 11:11

## 2023-11-08 RX ADMIN — POTASSIUM CHLORIDE 40 MEQ: 1500 TABLET, EXTENDED RELEASE ORAL at 09:11

## 2023-11-08 RX ADMIN — HYDROCODONE BITARTRATE AND ACETAMINOPHEN 1 TABLET: 5; 325 TABLET ORAL at 03:11

## 2023-11-08 RX ADMIN — Medication 1 PATCH: at 09:11

## 2023-11-08 RX ADMIN — IPRATROPIUM BROMIDE AND ALBUTEROL SULFATE 3 ML: 2.5; .5 SOLUTION RESPIRATORY (INHALATION) at 01:11

## 2023-11-08 RX ADMIN — METHYLPREDNISOLONE SODIUM SUCCINATE 80 MG: 40 INJECTION, POWDER, FOR SOLUTION INTRAMUSCULAR; INTRAVENOUS at 03:11

## 2023-11-08 NOTE — PLAN OF CARE
11/08/23 0737   Discharge Planning   Assessment Type Discharge Planning Brief Assessment   Resource/Environmental Concerns none   Support Systems Spouse/significant other;Children   Equipment Currently Used at Home bedside commode;oxygen   Current Living Arrangements home   Patient/Family Anticipates Transition to home   Patient/Family Anticipated Services at Transition none   DME Needed Upon Discharge  none   Discharge Plan A Home with family       [facility] [address] [facility] [address]  [address] [address]  Phone: [phone] Fax: [phone]    [facility] [address] [address]  [address] [address] [address]  Phone: [phone] Fax: [phone]

## 2023-11-08 NOTE — ASSESSMENT & PLAN NOTE
On Suboxone at home.  We will hold Suboxone while acute pain control  Will need pain management on discharge

## 2023-11-08 NOTE — HOSPITAL COURSE
"Admission to the hospital for acute L1 compression fracture and acute on chronic respiratory failure with hypoxia and hypercapnia and COPD exacerbation.  Neurosurgery following. Patient adamantly declined MRI of cervical lumbar and thoracic.  PT OT evaluation completed and recommended low intense therapy.     Lethargy resolved after use of Bipap.  Wheezing anterior/posterior significantly improved DuoNeb, LAMA/LAMA,  IV steroid to PO, home oxygen.  CT also showed "new right apical pleural base soft tissue density appearing since 2022." CT scan chest with contrast stated with regard to density seen on previous CT thoracic, this is believed to represent pulmonary apical scarring.     Discharge cancel due to not able to obtain Bipap/wheelchair until Monday.     "

## 2023-11-08 NOTE — PROGRESS NOTES
[facility]  Neurosurgery  Progress Note    Subjective:     History of Present Illness: Yasmeen Valderrama is a 53 y.o. female with h/o substance abuse on Suboxone, COPD, KATHERINE, and home O2 dependence. She reports significant fluid retention and difficulty breathing at home. She frequently falls asleep during the day, but she is usually able to catch herself. Today, she fell asleep while standing and fell straight backwards. Immediately following the fall, she felt numbness and pain in her arms and legs. She found it difficult to lift her arms up initially. The numbness has since resolved, and she is able to move her arms freely. Her largest complaint at this time is of severe back pain that increases with most movements. Denies current leg pain, numbness, and weakness. Denies b/b dysfunction. Denies SA. Imaging revealed an acute L1 compression fracture with no evidence of retropulsion. CT cervical also revealed a large disc osteophyte complex at C5-6. Neurosurgery was consulted for mgmt of the compression fracture.       Post-Op Info:  * No surgery found *         Interval History: reports reduced back pain, though still severe. She is able to take deeper breaths on inhalation. No new extremity numbness, weakness, or pain.     Medications:  Continuous Infusions:  Scheduled Meds:   albuterol-ipratropium  3 mL Nebulization Q6H WAKE    budesonide  0.5 mg Nebulization BID    cyclobenzaprine  5 mg Oral Once    LIDOcaine  1 patch Transdermal Q24H    methylPREDNISolone sodium succinate injection  80 mg Intravenous Q8H    metOLazone  5 mg Oral Daily    nicotine  1 patch Transdermal Daily    pantoprazole  40 mg Oral BID     PRN Meds:albuterol-ipratropium, diazePAM, HYDROcodone-acetaminophen, HYDROcodone-acetaminophen, influenza, sodium chloride 0.9%, sodium chloride 0.9%     Review of Systems  Objective:     Weight: 100.4 kg (221 lb 5.5 oz)  Body mass index is 39.21 kg/m².  Vital Signs (Most  Recent):  Temp: 98.4 °F (36.9 °C) (11/08/23 0740)  Pulse: 96 (11/08/23 0814)  Resp: 20 (11/08/23 0915)  BP: (!) 141/93 (11/08/23 0740)  SpO2: (!) 90 % (11/08/23 0814) Vital Signs (24h Range):  Temp:  [98.4 °F (36.9 °C)-99 °F (37.2 °C)] 98.4 °F (36.9 °C)  Pulse:  [] 96  Resp:  [16-22] 20  SpO2:  [90 %-98 %] 90 %  BP: (135-166)/(77-97) 141/93     Date 11/08/23 0700 - 11/09/23 0659   Shift 0782-7965 8368-7784 2402-5720 24 Hour Total   INTAKE   P.O. 240   240   Shift Total(mL/kg) 240(2.4)   240(2.4)   OUTPUT   Urine(mL/kg/hr) 1050   1050   Shift Total(mL/kg) 1050(10.5)   1050(10.5)   Weight (kg) 100.4 100.4 100.4 100.4       Female External Urinary Catheter 11/08/23 0947 (Active)   Output (mL) 550 mL 11/08/23 0947          Physical Exam         Neurosurgery Physical Exam  General: well developed, well nourished, no distress.   Head: normocephalic, atraumatic  Neurologic: Awake, Alert, Oriented x 4. Thought content appropriate.  GCS: Motor: 6/Verbal: 5/Eyes: 4 GCS Total: 15  Language: No aphasia  Speech: No dysarthria  Cranial nerves: face symmetric, tongue midline, CN II-XII grossly intact.   Eyes: pupils equal, round, reactive to light with accomodation, EOMI.  Pulmonary: normal respirations, no signs of respiratory distress  Abdomen: soft, non-distended, not tender to palpation     Sensory: intact to light touch throughout BUE and BLE     Motor Strength: Moves all extremities spontaneously with good tone.    Strength   Deltoids Triceps Biceps     Hand    Upper: R 5/5 5/5 5/5     5/5     L 5/5 5/5 5/5     5/5       Iliopsoas Quadriceps Knee  Flexion Tibialis  anterior Gastro- cnemius EHL   Lower: R 5/5 5/5 5/5 5/5 5/5 5/5     L 5/5 5/5 5/5 5/5 5/5 5/5      Zee: present bilaterally   Clonus: absent     Skin: Skin is warm, dry and intact.     Midline bony tenderness: present in upper lumbar spine           Significant Labs:  Recent Labs   Lab 11/07/23  0611 11/08/23  0347   * 124*    140   K  "3.1* 3.3*   CL 89* 86*   CO2 38* 42*   BUN 11 8   CREATININE 0.7 0.7   CALCIUM 9.4 9.8   MG 1.9  --      Recent Labs   Lab 11/07/23  0611 11/08/23  0347   WBC 16.36* 14.20*   HGB 8.4* 8.6*   HCT 28.0* 31.5*    465*     No results for input(s): "LABPT", "INR", "APTT" in the last 48 hours.  Microbiology Results (last 7 days)       ** No results found for the last 168 hours. **              Significant Diagnostics:  No new imaging for review     Assessment/Plan:     * Closed compression fracture of body of L1 vertebra  Yasmeen Valderrama is a 53 y.o. female with h/o substance abuse on Suboxone, advanced COPD, and KATHERINE who presented to the ED after a fall from standing with severe low back pain. She was found to have an acute L1 compression fracture. Fracture is stable on upright films, however there is more lytic change within the vertebral body that what would be expected. This combined with the new right apical soft tissue density warrants further workup with an MRI to rule out a pathologic fracture. She also has a large osteophyte complex at C5-6 and is hyperreflexic on exam.    Pain slightly improved today, likely due to systemic steroids. Ok to continue steroids as needed for pulmonary pathology. Limit as able to reduce risk of delayed fracture healing.     -Pain control per primary. Will need pain mgmt referral as outpatient given substance abuse history  -MRI C-T-L w/wo contrast recommended  --patient reports significant claustrophobia. May require IV sedation for completion   -TLSO brace whenever out of bed  -No lifting >10lbs, bending, or twisting.      Case discussed with Dr. Joss Ochoa, PA-C  Neurosurgery  [facility] [place]  "

## 2023-11-08 NOTE — ASSESSMENT & PLAN NOTE
Patient presented to ED after a fall while upright standing position.  Patient have sleep apnea however returned her BiPAP due to claustrophobia and have been falling asleep while upright position lately but this time not able to catch herself.  Patient fell on her buttock.  No loss of consciousness or head injury  CT and x-ray show L1 compression fracture  Neurosurgery consulted-recommended MRI with without contrast cervical thoracic lumbar, no lifting more than 10 lb no twisting no bending  We will try valium for claustrophobia  Ok by Neurosurgery PT OT consult after off bipap  Pain control , Muscle relaxer x 1 now due to her overall resp issues  Generalized weakness with no focal neuro deficit on exam- plan as above

## 2023-11-08 NOTE — NURSING
Patient came to the unit via bed with O2 on 3L nasal Cannula, put on bed in lowest position, with HOB elevated. Vital signs taken. Back Brace on. Family member at bedside. Side rails raised, instructed to call for assistance unable to complete four eyes, patient states she is in too much pain and will remove pants at later time.

## 2023-11-08 NOTE — ASSESSMENT & PLAN NOTE
PASP 52 mmHg 2/10/2022> 37 mmhg 12/2022  Continue home lasix, zarosylyn  Encourage smoking cessation

## 2023-11-08 NOTE — ASSESSMENT & PLAN NOTE
CArdiologist Dr. Olvera  11/15/22 Coronary calcification on CT scan  11/15/22 Dobutamine stress test  Not on aSA due to recent GIB/gastric ulcer  Lab Results   Component Value Date    LDLCALC 92.0 09/29/2020   not on statin at home, add lipid panel

## 2023-11-08 NOTE — AI DETERIORATION ALERT
Artificial Intelligence Notification  [facility] [place]  [address] [place] [address]  Phone: [phone]    This documentation was triggered by an Artificial Intelligence Notification:    Admit Date: 2023   LOS: 0  Code Status: Full Code  : 1970  Age: 53 y.o.  Weight:   Wt Readings from Last 1 Encounters:   23 100.4 kg (221 lb 5.5 oz)        Sex: female  Bed: [place] [address] [address]  MRN: [id number]  Attending Physician: Rambo Rojo MD     Date of Alert: 2023  Time AI Alert Received: 0800            Vitals:    23 0740   BP: (!) 141/93   Pulse: 91   Resp: 20   Temp: 98.4 °F (36.9 °C)     SpO2: (!) 92 %      Artificial Intelligence alert discussed with Provider:     Name: Ella Winston   Date/Time of Provider Notification: 0800      Patient Condition: Stable.  Patient seen at bedside and stated breathing better than yesterday.   at bedside. Lung scattered wheezes posteriorly. On home 3 L home oxygen O2 sat 92%.  Lower back pain 10/10.  RN will give analgesics now.  See progress

## 2023-11-08 NOTE — ASSESSMENT & PLAN NOTE
Yasmeen Valderrama is a 53 y.o. female with h/o substance abuse on Suboxone, advanced COPD, and KATHERINE who presented to the ED after a fall from standing with severe low back pain. She was found to have an acute L1 compression fracture. Fracture is stable on upright films, however there is more lytic change within the vertebral body that what would be expected. This combined with the new right apical soft tissue density warrants further workup with an MRI to rule out a pathologic fracture. She also has a large osteophyte complex at C5-6 and is hyperreflexic on exam.    Pain slightly improved today, likely due to systemic steroids. Ok to continue steroids as needed for pulmonary pathology. Limit as able to reduce risk of delayed fracture healing.     -Pain control per primary. Will need pain mgmt referral as outpatient given substance abuse history  -MRI C-T-L w/wo contrast recommended  --patient reports significant claustrophobia. May require IV sedation for completion   -TLSO brace whenever out of bed  -No lifting >10lbs, bending, or twisting.      Case discussed with Dr. Coreas

## 2023-11-08 NOTE — ASSESSMENT & PLAN NOTE
Advance Care Planning     Date: 11/08/2023    Code Status  In light of the patients advanced and life limiting illness,I engaged the the patient and family in a voluntary conversation about the patient's preferences for care  at the very end of life. Long discussion with patient and family regarding patient respiratory issues stated above , including respiratory and cardiac arrest. Patient wish to have CPR or other invasive treatments performed when her heart and/or breathing stops.

## 2023-11-08 NOTE — ASSESSMENT & PLAN NOTE
BLE edema chronic for patient. Sleeps in sitting position at home.   Last 2 D echo 12/2022 normal EF 55%, normal diastolic and normal sized ventricle  Continue home lasix zarozyln

## 2023-11-08 NOTE — ASSESSMENT & PLAN NOTE
Admission in Sept and hgb stable compared to previous lab  9/27/22 EGD non bleeding gastric and duonenal ulcer   Add iron studies  PPI BID

## 2023-11-08 NOTE — SUBJECTIVE & OBJECTIVE
Interval History: though patient states breathing better. falling asleep during conversation.  at bedside. Lower back pain 10/10 - RN to give PRN analgesics (last dose 9 pm last night prior to my visit).     Review of Systems   Constitutional:  Positive for activity change and fatigue. Negative for appetite change and chills.   HENT: Negative.     Eyes: Negative.    Respiratory:  Positive for shortness of breath.    Cardiovascular:  Positive for leg swelling. Negative for chest pain and palpitations.   Gastrointestinal: Negative.    Genitourinary: Negative.    Musculoskeletal:  Positive for arthralgias and back pain.   Skin: Negative.    Neurological:  Negative for tremors, syncope and speech difficulty. Weakness: resolved PTA, currently generalized. Numbness: resolved PTA.  Hematological: Negative.    Psychiatric/Behavioral: Negative.       Objective:     Vital Signs (Most Recent):  Temp: 98.4 °F (36.9 °C) (11/08/23 0740)  Pulse: 96 (11/08/23 0814)  Resp: 20 (11/08/23 0915)  BP: (!) 141/93 (11/08/23 0740)  SpO2: (!) 90 % (11/08/23 0814) Vital Signs (24h Range):  Temp:  [98.4 °F (36.9 °C)-99 °F (37.2 °C)] 98.4 °F (36.9 °C)  Pulse:  [] 96  Resp:  [16-22] 20  SpO2:  [90 %-98 %] 90 %  BP: (135-166)/(77-97) 141/93     Weight: 100.4 kg (221 lb 5.5 oz)  Body mass index is 39.21 kg/m².    Intake/Output Summary (Last 24 hours) at 11/8/2023 0957  Last data filed at 11/8/2023 0947  Gross per 24 hour   Intake 340 ml   Output 1500 ml   Net -1160 ml         Physical Exam  Constitutional:       Appearance: Normal appearance. She is obese. She is ill-appearing. She is not diaphoretic.      Comments: Able to answer questions when wakes up but would fall asleep during conversation    HENT:      Head: Normocephalic and atraumatic.      Nose: Nose normal.      Mouth/Throat:      Mouth: Mucous membranes are moist.   Cardiovascular:      Rate and Rhythm: Normal rate and regular rhythm.      Pulses: Normal pulses.      Heart  sounds: Normal heart sounds.   Pulmonary:      Breath sounds: Wheezing and rhonchi present.   Abdominal:      Palpations: Abdomen is soft.   Musculoskeletal:         General: Swelling and signs of injury (lower mid back pain) present.      Right lower leg: Edema present.      Left lower leg: Edema present.   Skin:     General: Skin is warm and dry.   Neurological:      General: No focal deficit present.      Mental Status: She is oriented to person, place, and time. Mental status is at baseline.      Sensory: No sensory deficit.             Significant Labs: All pertinent labs within the past 24 hours have been reviewed.    Significant Imaging: I have reviewed all pertinent imaging results/findings within the past 24 hours.

## 2023-11-08 NOTE — PLAN OF CARE
Problem: Occupational Therapy  Goal: Occupational Therapy Goal  Description: Goals to be met by: 11/15/23     Patient will increase functional independence with ADLs by performing:    UE Dressing with Modified [place] including donning TLSO.   LE Dressing with Modified [place].  Grooming while seated at sink with Modified [place].  Toileting from BSC over toilet with Modified [place] for hygiene and clothing management.   Sitting in chair x 60 minutes with Supervision.  Toilet transfer to toilet with Supervision due to oxygen use   Upper extremity exercise program x10  reps per handout, with supervision.    Outcome: Ongoing, Progressing   Patient seen by occupational therapy for initial evaluation, so see notes for more info. Patient recommended for low intensity therapy. Occupational therapy to recommend reacher and sock aide for LB dressing. Occupational therapy to see 5x/wk while in acute care.

## 2023-11-08 NOTE — PLAN OF CARE
Problem: Adult Inpatient Plan of Care  Goal: Plan of Care Review  Outcome: Ongoing, Progressing  Goal: Patient-Specific Goal (Individualized)  Outcome: Ongoing, Progressing  Goal: Absence of Hospital-Acquired Illness or Injury  Outcome: Ongoing, Progressing  Goal: Optimal Comfort and Wellbeing  Outcome: Ongoing, Progressing  Goal: Readiness for Transition of Care  Outcome: Ongoing, Progressing     Problem: Fall Injury Risk  Goal: Absence of Fall and Fall-Related Injury  Outcome: Ongoing, Progressing     Problem: Adjustment to Illness COPD (Chronic Obstructive Pulmonary Disease)  Goal: Optimal Chronic Illness Coping  Outcome: Ongoing, Progressing     Problem: Functional Ability Impaired COPD (Chronic Obstructive Pulmonary Disease)  Goal: Optimal Level of Functional [place]  Outcome: Ongoing, Progressing     Problem: Infection COPD (Chronic Obstructive Pulmonary Disease)  Goal: Absence of Infection Signs and Symptoms  Outcome: Ongoing, Progressing     Problem: Oral Intake Inadequate COPD (Chronic Obstructive Pulmonary Disease)  Goal: Improved Nutrition Intake  Outcome: Ongoing, Progressing     Problem: Respiratory Compromise COPD (Chronic Obstructive Pulmonary Disease)  Goal: Effective Oxygenation and Ventilation  Outcome: Ongoing, Progressing

## 2023-11-08 NOTE — ASSESSMENT & PLAN NOTE
Patient with history obesity, severe COPD on home 3 L NC, pulmonary hypertension and KATHERINE not on BiPAP due to claustrophobia, continue smoking who was admitted for falling asleep in upright position sustaining  L1 compression fracture pain medication/muscle relaxer.   VBG on admission 7.34/81/43/42  11/8 able to answer question however falling asleep frequently during conversation   Now agree to bipap 1 hrs and then can reassess. Will try to avoid benzo but will give if need to assist with severe claustrophobia  Treat COPD exacerbation   Need to get back on BiPap , smoking cessation

## 2023-11-08 NOTE — SUBJECTIVE & OBJECTIVE
Interval History: reports reduced back pain, though still severe. She is able to take deeper breaths on inhalation. No new extremity numbness, weakness, or pain.     Medications:  Continuous Infusions:  Scheduled Meds:   albuterol-ipratropium  3 mL Nebulization Q6H WAKE    budesonide  0.5 mg Nebulization BID    cyclobenzaprine  5 mg Oral Once    LIDOcaine  1 patch Transdermal Q24H    methylPREDNISolone sodium succinate injection  80 mg Intravenous Q8H    metOLazone  5 mg Oral Daily    nicotine  1 patch Transdermal Daily    pantoprazole  40 mg Oral BID     PRN Meds:albuterol-ipratropium, diazePAM, HYDROcodone-acetaminophen, HYDROcodone-acetaminophen, influenza, sodium chloride 0.9%, sodium chloride 0.9%     Review of Systems  Objective:     Weight: 100.4 kg (221 lb 5.5 oz)  Body mass index is 39.21 kg/m².  Vital Signs (Most Recent):  Temp: 98.4 °F (36.9 °C) (11/08/23 0740)  Pulse: 96 (11/08/23 0814)  Resp: 20 (11/08/23 0915)  BP: (!) 141/93 (11/08/23 0740)  SpO2: (!) 90 % (11/08/23 0814) Vital Signs (24h Range):  Temp:  [98.4 °F (36.9 °C)-99 °F (37.2 °C)] 98.4 °F (36.9 °C)  Pulse:  [] 96  Resp:  [16-22] 20  SpO2:  [90 %-98 %] 90 %  BP: (135-166)/(77-97) 141/93     Date 11/08/23 0700 - 11/09/23 0659   Shift 2439-8729 6970-6738 9238-0709 24 Hour Total   INTAKE   P.O. 240   240   Shift Total(mL/kg) 240(2.4)   240(2.4)   OUTPUT   Urine(mL/kg/hr) 1050   1050   Shift Total(mL/kg) 1050(10.5)   1050(10.5)   Weight (kg) 100.4 100.4 100.4 100.4       Female External Urinary Catheter 11/08/23 0947 (Active)   Output (mL) 550 mL 11/08/23 0947          Physical Exam         Neurosurgery Physical Exam  General: well developed, well nourished, no distress.   Head: normocephalic, atraumatic  Neurologic: Awake, Alert, Oriented x 4. Thought content appropriate.  GCS: Motor: 6/Verbal: 5/Eyes: 4 GCS Total: 15  Language: No aphasia  Speech: No dysarthria  Cranial nerves: face symmetric, tongue midline, CN II-XII grossly intact.  "  Eyes: pupils equal, round, reactive to light with accomodation, EOMI.  Pulmonary: normal respirations, no signs of respiratory distress  Abdomen: soft, non-distended, not tender to palpation     Sensory: intact to light touch throughout BUE and BLE     Motor Strength: Moves all extremities spontaneously with good tone.    Strength   Deltoids Triceps Biceps     Hand    Upper: R 5/5 5/5 5/5     5/5     L 5/5 5/5 5/5     5/5       Iliopsoas Quadriceps Knee  Flexion Tibialis  anterior Gastro- cnemius EHL   Lower: R 5/5 5/5 5/5 5/5 5/5 5/5     L 5/5 5/5 5/5 5/5 5/5 5/5      Zee: present bilaterally   Clonus: absent     Skin: Skin is warm, dry and intact.     Midline bony tenderness: present in upper lumbar spine           Significant Labs:  Recent Labs   Lab 11/07/23  0611 11/08/23  0347   * 124*    140   K 3.1* 3.3*   CL 89* 86*   CO2 38* 42*   BUN 11 8   CREATININE 0.7 0.7   CALCIUM 9.4 9.8   MG 1.9  --      Recent Labs   Lab 11/07/23  0611 11/08/23  0347   WBC 16.36* 14.20*   HGB 8.4* 8.6*   HCT 28.0* 31.5*    465*     No results for input(s): "LABPT", "INR", "APTT" in the last 48 hours.  Microbiology Results (last 7 days)       ** No results found for the last 168 hours. **              Significant Diagnostics:  No new imaging for review   "

## 2023-11-08 NOTE — ASSESSMENT & PLAN NOTE
Initial fall patient reported pain and numbness in extremities however resolved prior to arriving at hospital   Neurosurgery following MRI of cervical with without contrast  .

## 2023-11-08 NOTE — ASSESSMENT & PLAN NOTE
Patient's COPD is with exacerbation noted by continued dyspnea currently.  Patient is currently on COPD Pathway. Continue scheduled inhalers Steroids and Supplemental oxygen and monitor respiratory status closely.  Encourage smoking cessation  Continue home LAMA/LABA, duoneb q 4, deescalate steroid soon as can

## 2023-11-08 NOTE — PROGRESS NOTES
[facility] Medicine  Progress Note    Patient Name: Yasmeen Valderrama  MRN: [id number]  Patient Class: OP- Observation   Admission Date: 11/7/2023  Length of Stay: 0 days  Attending Physician: Rambo Rojo MD  Primary Care Provider: Leeanna Stuart MD        Subjective:     Principal Problem:Closed compression fracture of body of L1 vertebra        HPI:  Yasmeen Valderrama is a 54 yo female with significant history 1/2 day smoker, KATHERINE no longer on BiPAP due to claustrophobia, COPD on home oxygen 3 L NC, chronic pain, recent non bleeding gastric ulcer on EGD 9/27/23 who arrived to ED via EMS after a fall.  For the past few months patient will fall asleep even while upright position but usually will catch herself.  This a.m. at 2:30 a.m. patient woke up to let her dog out and while standing patient fell asleep and fell onto her buttocks.  Since patient did not pass out or hit head.  Since the fall patient complained of acute on chronic lower back pain 9/10.  She denies any numbness or weakness in all her extremities however reported to Neurosurgery team numbness and pain in arms and legs after the fall but now resolved.   Patient also reports worsening and shortness a breath that started after the fall.  Denies any productive cough.  Denies fever or chills. Patient sleeps in upright position in which she contributes BLE edema however worst then usual.   No prior treatment before arrival to hospital.  In ED, CXR no infiltrate. WBC 16 K (chronically elevated), afebrile.   X ray lumbar, CT thoracic L1 compression fracture. CT cervical no acute cervical fracture.   CT did reveal right apical ground glass opacity and recommended follow up CT 3-6 months.   Seen by Neurosurgery in ED who recommended MRI CTL w wo contrast, TLSO brace when out of bed, no lifting >10lbs, bending or twisting.       Overview/Hospital Course:  Admission to the hospital for acute L1 compression fracture and acute  "on chronic respiratory failure with hypoxia and hypercapnia and COPD exacerbation.  Neurosurgery following. Plan for MRI of cervical lumbar and thoracic and okay for PT OT evaluation at this time.    Patient falling sleep during conversation this a.m. and last dose of pain medication was at 9:00 a.m. last night.  SOB improved compared to yesterday however still with diffuse scatter wheezing anterior/posterior and fall asleep during conversation. Continue DuoNeb, LAMA/LAMA,  steroid, home oxygen. Patient now agree to try BiPAP.   CT also showed "new right apical pleural base soft tissue density appearing since 2022." Will get CT scan lung chest with IV to evaluate right apical pleural base lesion.   Above discussed with  at bedside.        Interval History: though patient states breathing better. falling asleep during conversation.  at bedside. Lower back pain 10/10 - RN to give PRN analgesics (last dose 9 pm last night prior to my visit).     Review of Systems   Constitutional:  Positive for activity change and fatigue. Negative for appetite change and chills.   HENT: Negative.     Eyes: Negative.    Respiratory:  Positive for shortness of breath.    Cardiovascular:  Positive for leg swelling. Negative for chest pain and palpitations.   Gastrointestinal: Negative.    Genitourinary: Negative.    Musculoskeletal:  Positive for arthralgias and back pain.   Skin: Negative.    Neurological:  Negative for tremors, syncope and speech difficulty. Weakness: resolved PTA, currently generalized. Numbness: resolved PTA.  Hematological: Negative.    Psychiatric/Behavioral: Negative.       Objective:     Vital Signs (Most Recent):  Temp: 98.4 °F (36.9 °C) (11/08/23 0740)  Pulse: 96 (11/08/23 0814)  Resp: 20 (11/08/23 0915)  BP: (!) 141/93 (11/08/23 0740)  SpO2: (!) 90 % (11/08/23 0814) Vital Signs (24h Range):  Temp:  [98.4 °F (36.9 °C)-99 °F (37.2 °C)] 98.4 °F (36.9 °C)  Pulse:  [] 96  Resp:  [16-22] " 20  SpO2:  [90 %-98 %] 90 %  BP: (135-166)/(77-97) 141/93     Weight: 100.4 kg (221 lb 5.5 oz)  Body mass index is 39.21 kg/m².    Intake/Output Summary (Last 24 hours) at 11/8/2023 0900  Last data filed at 11/8/2023 0947  Gross per 24 hour   Intake 340 ml   Output 1500 ml   Net -1160 ml         Physical Exam  Constitutional:       Appearance: Normal appearance. She is obese. She is ill-appearing. She is not diaphoretic.      Comments: Able to answer questions when wakes up but would fall asleep during conversation    HENT:      Head: Normocephalic and atraumatic.      Nose: Nose normal.      Mouth/Throat:      Mouth: Mucous membranes are moist.   Cardiovascular:      Rate and Rhythm: Normal rate and regular rhythm.      Pulses: Normal pulses.      Heart sounds: Normal heart sounds.   Pulmonary:      Breath sounds: Wheezing and rhonchi present.   Abdominal:      Palpations: Abdomen is soft.   Musculoskeletal:         General: Swelling and signs of injury (lower mid back pain) present.      Right lower leg: Edema present.      Left lower leg: Edema present.   Skin:     General: Skin is warm and dry.   Neurological:      General: No focal deficit present.      Mental Status: She is oriented to person, place, and time. Mental status is at baseline.      Sensory: No sensory deficit.             Significant Labs: All pertinent labs within the past 24 hours have been reviewed.    Significant Imaging: I have reviewed all pertinent imaging results/findings within the past 24 hours.      Assessment/Plan:      * Closed compression fracture of body of L1 vertebra  Patient presented to ED after a fall while upright standing position.  Patient have sleep apnea however returned her BiPAP due to claustrophobia and have been falling asleep while upright position lately but this time not able to catch herself.  Patient fell on her buttock.  No loss of consciousness or head injury  CT and x-ray show L1 compression  fracture  Neurosurgery consulted-recommended MRI with without contrast cervical thoracic lumbar, no lifting more than 10 lb no twisting no bending  We will try valium for claustrophobia  Ok by Neurosurgery PT OT consult after off bipap  Pain control , Muscle relaxer x 1 now due to her overall resp issues  Generalized weakness with no focal neuro deficit on exam- plan as above      Acute on Chronic respiratory failure with hypoxia and hypercapnia  Patient with history obesity, severe COPD on home 3 L NC, pulmonary hypertension and KATHERINE not on BiPAP due to claustrophobia, continue smoking who was admitted for falling asleep in upright position sustaining  L1 compression fracture pain medication/muscle relaxer.   VBG on admission 7.34/81/43/42  11/8 able to answer question however falling asleep frequently during conversation   Now agree to bipap 1 hrs and then can reassess. Will try to avoid benzo but will give if need to assist with severe claustrophobia  Treat COPD exacerbation   Need to get back on BiPap , smoking cessation     Stage 4 very severe COPD by GOLD classification  Patient's COPD is with exacerbation noted by continued dyspnea currently.  Patient is currently on COPD Pathway. Continue scheduled inhalers Steroids and Supplemental oxygen and monitor respiratory status closely.  Encourage smoking cessation  Continue home LAMA/LABA, duoneb q 4, deescalate steroid soon as can       Pulmonary hypertension  PASP 52 mmHg 2/10/2022> 37 mmhg 12/2022  Continue home lasix, zarosylyn  Encourage smoking cessation       Cervical stenosis of spinal canal  Initial fall patient reported pain and numbness in extremities however resolved prior to arriving at hospital   Neurosurgery following MRI of cervical with without contrast  .      KATHERINE (obstructive sleep apnea)  Returned her BiPAP a few months ago due to claustrophobia.   See above #2.       ACP (advance care planning)  Advance Care Planning     Date: 11/08/2023    Code  Status  In light of the patients advanced and life limiting illness,I engaged the the patient and family in a voluntary conversation about the patient's preferences for care  at the very end of life. Long discussion with patient and family regarding patient respiratory issues stated above , including respiratory and cardiac arrest. Patient wish to have CPR or other invasive treatments performed when her heart and/or breathing stops.       Abnormal CT scan  New right apical pleural base soft tissue density appearing since 2022. Radiology recommended CT scan chest lungs with IV contrast recommended for further evaluation new right apical pleural base lesion.   Once resp issues and pain better control will get CT    Tobacco abuse  One pack a day smoker.  Encouraged smoking cessation.  okay with nicotine patch.      Other specified anemias  Patient's anemia is currently controlled. Has not received any PRBCs to date. Etiology likely d/t Iron deficiency  Current CBC reviewed-   Lab Results   Component Value Date    HGB 8.4 (L) 11/07/2023    HCT 28.0 (L) 11/07/2023   Monitor serial CBC and transfuse if patient becomes hemodynamically unstable, symptomatic or H/H drops below 7/21.  Hgb stable compared to last hgb 8.5 9/27 . Low T sat during that admission   Ferrous sulfate    Leukocytosis  Chronically elevated.  Afebrile.  No obvious infectious process.        GI bleed  Admission in Sept and hgb stable compared to previous lab  9/27/22 EGD non bleeding gastric and duonenal ulcer   Add iron studies  PPI BID      COPD exacerbation  See above     Aortic atherosclerosis  CArdiologist Dr. Olvera  11/15/22 Coronary calcification on CT scan  11/15/22 Dobutamine stress test  Not on aSA due to recent GIB/gastric ulcer  Lab Results   Component Value Date    LDLCALC 92.0 09/29/2020   not on statin at home, add lipid panel          Chronic pain syndrome  On Suboxone at home.  We will hold Suboxone while acute pain control  Will need  pain management on discharge      Generalized edema  BLE edema chronic for patient. Sleeps in sitting position at home.   Last 2 D echo 12/2022 normal EF 55%, normal diastolic and normal sized ventricle  Continue home lasix zarozyln      VTE Risk Mitigation (From admission, onward)           Ordered     IP VTE HIGH RISK PATIENT  Once         11/07/23 1429     Place sequential compression device  Until discontinued         11/07/23 1429     Place sequential compression device  Until discontinued         11/07/23 1134                    Discharge Planning   MONICO:      Code Status: Full Code   Is the patient medically ready for discharge?:     Reason for patient still in hospital (select all that apply): Treatment  Discharge Plan A: Home with family          Ella Winston NP  Department of Hospital Medicine   [facility]

## 2023-11-08 NOTE — NURSING
[facility], [facility] [place]  Nurses Note -- 4 Eyes      11/8/2023       Skin assessed on: Q Shift      [x] No Pressure Injuries Present    []Prevention Measures Documented    [] Yes LDA  for Pressure Injury Previously documented     [] Yes New Pressure Injury Discovered   [] LDA for New Pressure Injury Added      Attending RN:  Rufina Reyes LPN     Second RN:  Bell TRISTAN

## 2023-11-08 NOTE — ASSESSMENT & PLAN NOTE
New right apical pleural base soft tissue density appearing since 2022. Radiology recommended CT scan chest lungs with IV contrast recommended for further evaluation new right apical pleural base lesion.   Once resp issues and pain better control will get CT

## 2023-11-08 NOTE — PT/OT/SLP EVAL
Occupational Therapy Evaluation and Treatment    Name: Yasmeen Valderrama  MRN: [id number]  Admitting Diagnosis: Closed compression fracture of body of L1 vertebra    Recent Surgery: * No surgery found *      Recommendations:     Discharge Recommendations: Low Intensity Therapy  Level of Assistance Recommended: 24 hours significant assistance  Discharge Equipment Recommendations: other (see comments) (reacher, sock aide)  Barriers to discharge: None    Assessment:     Yasmeen Valderrama is a 53 y.o. female with a medical diagnosis of Closed compression fracture of body of L1 vertebra. She presents with performance deficits affecting function including weakness, impaired endurance, impaired sensation, impaired self care skills, impaired functional mobility, gait instability, decreased lower extremity function, decreased safety awareness, pain, orthopedic precautions. Pt. In bed with HOB elevated and 3L NC oxygen because she uses it at home, also. Patient's  is present with patient.     Rehab Prognosis: Good; patient would benefit from acute OT services to address these deficits and reach maximum level of function.    Plan:     Patient to be seen 5 x/week to address the above listed problems via self-care/home management, therapeutic activities, therapeutic exercises  Plan of Care Expires: 11/15/23  Plan of Care Reviewed with: patient    Subjective     Chief Complaint: back pain   Patient Comments/Goals: to return home with 's help   Pain/Comfort:  Pain Rating 1: other (see comments) (she did not rate, but had lower back pain when up and moving)  Location - Side 1: Bilateral  Location 1: back  Pain Addressed 1: Pre-medicate for activity, Reposition, Distraction, Cessation of Activity    Patients cultural, spiritual, Gnosticism conflicts given the current situation: no    Social History:  Living Environment: Patient lives with their spouse and child(chema) in a mobile home with number of outside stair(s):  3 with B hand rails   Prior Level of Function: Prior to admission, patient requires assistance with ADLs including bathing .  Roles and Routines: Patient was not driving and not working prior to admission.  Equipment Used at Home: bedside commode, oxygen, hospital bed, nebulizer, shower chair  DME owned (not currently used): none  Assistance Upon Discharge: family    Objective:     Communicated with RNRufina,  prior to session. Patient found HOB elevated with telemetry, peripheral IV, TLSO, bed alarm, oxygen, pulse ox (continuous) upon OT entry to room.    General Precautions: Standard, fall   Orthopedic Precautions: spinal precautions   Braces: TLSO    Respiratory Status: Nasal cannula, flow 3 L/min; patient has continuous pulse ox for monitoring.     Occupational Performance    Gait belt applied - N/A due to TLSO     Bed Mobility:   Rolling/Turning to Left with contact guard assistance  Scooting to HOB in supine: contact guard assistance  Scooting anteriorly to EOB to have both feet planted on floor: contact guard assistance  Supine to sit from left side of bed with contact guard assistance  Sit to Supine with moderate assistance on left  side of bed to manage lower extremity due to pain     Functional Mobility/Transfers:  Sit <> Stand Transfer with contact guard assistance with rolling walker  Toilet Transfer Step Transfer technique with contact guard assistance with rolling walker  Functional Mobility: Patient walked from bed to bathroom and returned to bed for ~20' with RW with CGA.     Activities of Daily Living:  Upper Body Dressing: maximal assistance to don outer gown and TLSO brace   Lower Body Dressing: moderate assistance to don socks with sock aide and to pull up/down underwear during toileting   Toileting:  max assistance for hygiene and managing underwear     Cognitive/Visual Perceptual:  Cognitive/Psychosocial Skills:    -     Oriented to: Person, Place, Time, Situation  -     Follows  Commands/attention: Follows multistep  commands  -     Communication: clear/fluent  -     Memory: No Deficits noted  -     Safety awareness/insight to disability: intact  -     Mood/Affect/Coping skills/emotional control: Appropriate to situation  Visual/Perceptual:    -     Intact    Physical Exam:  Balance:    -     Sitting: stand by assistance  -     Standing: contact guard assistance  Postural examination/scapula alignment:    -       Rounded shoulders  -       Forward head  Skin integrity: Visible skin intact  Edema:  Mild left lower extremity   Sensation:    -       Intact  Motor Planning: Intact  Dominant hand: Right  Upper Extremity Range of Motion:     -       Right Upper Extremity: WNL  -       Left Upper Extremity: WNL  Upper Extremity Strength:    -       Right Upper Extremity: WNL  -       Left Upper Extremity: WNL   Strength:    -       Right Upper Extremity: WNL  -       Left Upper Extremity: WNL  Fine Motor Coordination:    -       Intact  Gross motor coordination:   WFL    AMPAC 6 Click ADL:  AMPAC Total Score: 18    Treatment & Education:  Educated on the importance of mobility to maximize recovery  Educated on the importance of OOB mobility within safe range in order to decrease adverse effects of prolonged bedrest  Educated about Spinal precautions including no bending, lifting, or twisting. Patient recalled 0/3 spinal precautions  Will continue to educate as needed  Patient is clear to ambulate to/from bathroom with RN/PCT, assist x1  for OOB mobility with RN  Occupational therapy educated patient re: use of sock aide for LB dressing and BSC over toilet to protect back     Patient clear to ambulate to/from bathroom with RN/PCT, assist x1  .    Patient left HOB elevated with all lines intact, call button in reach, RN notified, and RN and spouse present.    GOALS:   Multidisciplinary Problems       Occupational Therapy Goals          Problem: Occupational Therapy    Goal Priority Disciplines  Outcome Interventions   Occupational Therapy Goal     OT, PT/OT Ongoing, Progressing    Description: Goals to be met by: 11/15/23     Patient will increase functional independence with ADLs by performing:    UE Dressing with Modified [place] including donning TLSO.   LE Dressing with Modified [place].  Grooming while seated at sink with Modified [place].  Toileting from [facility] over toilet with Modified [place] for hygiene and clothing management.   Sitting in chair x 60 minutes with Supervision.  Toilet transfer to toilet with Supervision due to oxygen use   Upper extremity exercise program x10  reps per handout, with supervision.                         History:     Past Medical History:   Diagnosis Date    Anxiety     Asthma     Carpal tunnel syndrome, bilateral     COPD (chronic obstructive pulmonary disease)     Heavy cigarette smoker     9/14/22 2PPD    Laryngeal papilloma     On home oxygen therapy     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    Right side with CT scan Referred to ENT for visualization         Past Surgical History:   Procedure Laterality Date    CARPAL TUNNEL RELEASE Bilateral     ESOPHAGOGASTRODUODENOSCOPY N/A 9/27/2023    Procedure: EGD (ESOPHAGOGASTRODUODENOSCOPY);  Surgeon: Sahil May MD;  Location: [facility];  Service: Endoscopy;  Laterality: N/A;    FINGER SURGERY      HYSTERECTOMY      OOPHORECTOMY  1996    Hysterectomy       Time Tracking:     OT Date of Treatment: 11/08/23  OT Start Time: 1450  OT Stop Time: 1545  OT Total Time (min): 55 min    Billable Minutes: Evaluation 15 , Self Care/Home Management 30 , and Therapeutic Activity 10     11/8/2023

## 2023-11-08 NOTE — CARE UPDATE
[facility] MEWS TRIGGER FOLLOW UP       MEWS Monitoring, Score is: 3  Indication for review: O2 Device, Called by Nurse practitioner due to concern for decreased level of alertness.  Upon bedside exam, found patient to bed fully awake and on 3L NC as she is at home.  She expresses her inability to wear BIPAP device which provider would like for her to try.  After explaining in detail to her the ramifications of further CO2 retention, she agrees to try the lease invasive BIPAP but states that nobody is going to force her to wear it.   is at bedside and is updated on situation.    Bedside Nurse, Rufina at bedside,  Nurse practioner aware and at bedside.  Spoke with RT Briseyda and updated on need for BIPAP.

## 2023-11-08 NOTE — PT/OT/SLP PROGRESS
Physical Therapy      Patient Name:  Yasmeen Valderrama   MRN:  [id number]    Patient not seen today secondary to Other (Comment) (Pt with OTAreli, reports ambulating to BR and declined PT eval at this time.). Will follow-up in am.

## 2023-11-08 NOTE — NURSING
Patient off the unit via bed, awake and alert on room air no distress noted. Antibiotics continued.  at bedside

## 2023-11-09 LAB
ALBUMIN SERPL BCP-MCNC: 3.6 G/DL (ref 3.5–5.2)
ALP SERPL-CCNC: 115 U/L (ref 55–135)
ALT SERPL W/O P-5'-P-CCNC: 10 U/L (ref 10–44)
ANION GAP SERPL CALC-SCNC: 13 MMOL/L (ref 8–16)
AST SERPL-CCNC: 18 U/L (ref 10–40)
BASOPHILS # BLD AUTO: 0.01 K/UL (ref 0–0.2)
BASOPHILS NFR BLD: 0.1 % (ref 0–1.9)
BILIRUB SERPL-MCNC: 0.5 MG/DL (ref 0.1–1)
BUN SERPL-MCNC: 13 MG/DL (ref 6–20)
CALCIUM SERPL-MCNC: 10.1 MG/DL (ref 8.7–10.5)
CHLORIDE SERPL-SCNC: 88 MMOL/L (ref 95–110)
CO2 SERPL-SCNC: 39 MMOL/L (ref 23–29)
CREAT SERPL-MCNC: 0.6 MG/DL (ref 0.5–1.4)
DIFFERENTIAL METHOD: ABNORMAL
EOSINOPHIL # BLD AUTO: 0 K/UL (ref 0–0.5)
EOSINOPHIL NFR BLD: 0 % (ref 0–8)
ERYTHROCYTE [DISTWIDTH] IN BLOOD BY AUTOMATED COUNT: 20.7 % (ref 11.5–14.5)
EST. GFR  (NO RACE VARIABLE): >60 ML/MIN/1.73 M^2
GLUCOSE SERPL-MCNC: 151 MG/DL (ref 70–110)
HCT VFR BLD AUTO: 31.7 % (ref 37–48.5)
HGB BLD-MCNC: 9 G/DL (ref 12–16)
IMM GRANULOCYTES # BLD AUTO: 0.09 K/UL (ref 0–0.04)
IMM GRANULOCYTES NFR BLD AUTO: 0.6 % (ref 0–0.5)
LYMPHOCYTES # BLD AUTO: 1.2 K/UL (ref 1–4.8)
LYMPHOCYTES NFR BLD: 7.8 % (ref 18–48)
MCH RBC QN AUTO: 22.4 PG (ref 27–31)
MCHC RBC AUTO-ENTMCNC: 28.4 G/DL (ref 32–36)
MCV RBC AUTO: 79 FL (ref 82–98)
MONOCYTES # BLD AUTO: 0.5 K/UL (ref 0.3–1)
MONOCYTES NFR BLD: 3 % (ref 4–15)
NEUTROPHILS # BLD AUTO: 13.1 K/UL (ref 1.8–7.7)
NEUTROPHILS NFR BLD: 88.5 % (ref 38–73)
NRBC BLD-RTO: 0 /100 WBC
PHOSPHATE SERPL-MCNC: 3.3 MG/DL (ref 2.7–4.5)
PLATELET # BLD AUTO: 433 K/UL (ref 150–450)
PMV BLD AUTO: 9.8 FL (ref 9.2–12.9)
POTASSIUM SERPL-SCNC: 3.6 MMOL/L (ref 3.5–5.1)
PROT SERPL-MCNC: 7.1 G/DL (ref 6–8.4)
RBC # BLD AUTO: 4.01 M/UL (ref 4–5.4)
SODIUM SERPL-SCNC: 140 MMOL/L (ref 136–145)
WBC # BLD AUTO: 14.77 K/UL (ref 3.9–12.7)

## 2023-11-09 PROCEDURE — 80053 COMPREHEN METABOLIC PANEL: CPT | Performed by: NURSE PRACTITIONER

## 2023-11-09 PROCEDURE — 36415 COLL VENOUS BLD VENIPUNCTURE: CPT | Performed by: NURSE PRACTITIONER

## 2023-11-09 PROCEDURE — S4991 NICOTINE PATCH NONLEGEND: HCPCS | Performed by: NURSE PRACTITIONER

## 2023-11-09 PROCEDURE — 85025 COMPLETE CBC W/AUTO DIFF WBC: CPT | Performed by: NURSE PRACTITIONER

## 2023-11-09 PROCEDURE — 25000242 PHARM REV CODE 250 ALT 637 W/ HCPCS: Performed by: NURSE PRACTITIONER

## 2023-11-09 PROCEDURE — 84100 ASSAY OF PHOSPHORUS: CPT | Performed by: NURSE PRACTITIONER

## 2023-11-09 PROCEDURE — 97116 GAIT TRAINING THERAPY: CPT

## 2023-11-09 PROCEDURE — 27000221 HC OXYGEN, UP TO 24 HOURS

## 2023-11-09 PROCEDURE — 63600175 PHARM REV CODE 636 W HCPCS: Performed by: NURSE PRACTITIONER

## 2023-11-09 PROCEDURE — 94660 CPAP INITIATION&MGMT: CPT

## 2023-11-09 PROCEDURE — 94640 AIRWAY INHALATION TREATMENT: CPT

## 2023-11-09 PROCEDURE — 99233 PR SUBSEQUENT HOSPITAL CARE,LEVL III: ICD-10-PCS | Mod: ,,, | Performed by: PHYSICIAN ASSISTANT

## 2023-11-09 PROCEDURE — 97110 THERAPEUTIC EXERCISES: CPT

## 2023-11-09 PROCEDURE — 97161 PT EVAL LOW COMPLEX 20 MIN: CPT

## 2023-11-09 PROCEDURE — 25500020 PHARM REV CODE 255: Performed by: HOSPITALIST

## 2023-11-09 PROCEDURE — 99900035 HC TECH TIME PER 15 MIN (STAT)

## 2023-11-09 PROCEDURE — 21400001 HC TELEMETRY ROOM

## 2023-11-09 PROCEDURE — 25000003 PHARM REV CODE 250: Performed by: NURSE PRACTITIONER

## 2023-11-09 PROCEDURE — 99233 SBSQ HOSP IP/OBS HIGH 50: CPT | Mod: ,,, | Performed by: PHYSICIAN ASSISTANT

## 2023-11-09 RX ORDER — PREDNISONE 20 MG/1
40 TABLET ORAL DAILY
Status: DISCONTINUED | OUTPATIENT
Start: 2023-11-10 | End: 2023-11-13 | Stop reason: HOSPADM

## 2023-11-09 RX ORDER — DOCUSATE SODIUM 100 MG/1
200 CAPSULE, LIQUID FILLED ORAL 2 TIMES DAILY
Status: DISCONTINUED | OUTPATIENT
Start: 2023-11-09 | End: 2023-11-13 | Stop reason: HOSPADM

## 2023-11-09 RX ORDER — IPRATROPIUM BROMIDE AND ALBUTEROL SULFATE 2.5; .5 MG/3ML; MG/3ML
3 SOLUTION RESPIRATORY (INHALATION) EVERY 4 HOURS
Status: DISCONTINUED | OUTPATIENT
Start: 2023-11-09 | End: 2023-11-13 | Stop reason: HOSPADM

## 2023-11-09 RX ORDER — POLYETHYLENE GLYCOL 3350 17 G/17G
17 POWDER, FOR SOLUTION ORAL 2 TIMES DAILY
Status: DISCONTINUED | OUTPATIENT
Start: 2023-11-09 | End: 2023-11-13 | Stop reason: HOSPADM

## 2023-11-09 RX ORDER — BISACODYL 5 MG
10 TABLET, DELAYED RELEASE (ENTERIC COATED) ORAL ONCE
Status: COMPLETED | OUTPATIENT
Start: 2023-11-09 | End: 2023-11-09

## 2023-11-09 RX ADMIN — HYDROCODONE BITARTRATE AND ACETAMINOPHEN 1 TABLET: 7.5; 325 TABLET ORAL at 10:11

## 2023-11-09 RX ADMIN — Medication 1 PATCH: at 09:11

## 2023-11-09 RX ADMIN — METOLAZONE 5 MG: 5 TABLET ORAL at 09:11

## 2023-11-09 RX ADMIN — HYDROCODONE BITARTRATE AND ACETAMINOPHEN 1 TABLET: 7.5; 325 TABLET ORAL at 04:11

## 2023-11-09 RX ADMIN — BUDESONIDE 0.5 MG: 0.5 INHALANT RESPIRATORY (INHALATION) at 08:11

## 2023-11-09 RX ADMIN — IPRATROPIUM BROMIDE AND ALBUTEROL SULFATE 3 ML: 2.5; .5 SOLUTION RESPIRATORY (INHALATION) at 03:11

## 2023-11-09 RX ADMIN — DOCUSATE SODIUM 200 MG: 100 CAPSULE, LIQUID FILLED ORAL at 08:11

## 2023-11-09 RX ADMIN — POLYETHYLENE GLYCOL 3350 17 G: 17 POWDER, FOR SOLUTION ORAL at 08:11

## 2023-11-09 RX ADMIN — POLYETHYLENE GLYCOL 3350 17 G: 17 POWDER, FOR SOLUTION ORAL at 10:11

## 2023-11-09 RX ADMIN — IPRATROPIUM BROMIDE AND ALBUTEROL SULFATE 3 ML: 2.5; .5 SOLUTION RESPIRATORY (INHALATION) at 07:11

## 2023-11-09 RX ADMIN — BISACODYL 10 MG: 5 TABLET, COATED ORAL at 05:11

## 2023-11-09 RX ADMIN — METHYLPREDNISOLONE SODIUM SUCCINATE 80 MG: 40 INJECTION, POWDER, FOR SOLUTION INTRAMUSCULAR; INTRAVENOUS at 05:11

## 2023-11-09 RX ADMIN — IOHEXOL 75 ML: 350 INJECTION, SOLUTION INTRAVENOUS at 11:11

## 2023-11-09 RX ADMIN — PANTOPRAZOLE SODIUM 40 MG: 40 TABLET, DELAYED RELEASE ORAL at 09:11

## 2023-11-09 RX ADMIN — IPRATROPIUM BROMIDE AND ALBUTEROL SULFATE 3 ML: 2.5; .5 SOLUTION RESPIRATORY (INHALATION) at 11:11

## 2023-11-09 RX ADMIN — PANTOPRAZOLE SODIUM 40 MG: 40 TABLET, DELAYED RELEASE ORAL at 08:11

## 2023-11-09 RX ADMIN — DOCUSATE SODIUM 200 MG: 100 CAPSULE, LIQUID FILLED ORAL at 10:11

## 2023-11-09 RX ADMIN — BUDESONIDE 0.5 MG: 0.5 INHALANT RESPIRATORY (INHALATION) at 07:11

## 2023-11-09 RX ADMIN — IPRATROPIUM BROMIDE AND ALBUTEROL SULFATE 3 ML: 2.5; .5 SOLUTION RESPIRATORY (INHALATION) at 08:11

## 2023-11-09 RX ADMIN — FUROSEMIDE 40 MG: 40 TABLET ORAL at 09:11

## 2023-11-09 NOTE — NURSING
[facility], [facility] [place]  Nurses Note -- 4 Eyes      11/8/2023       Skin assessed on: Q Shift      [x] No Pressure Injuries Present    [x]Prevention Measures Documented    [] Yes LDA  for Pressure Injury Previously documented     [] Yes New Pressure Injury Discovered   [] LDA for New Pressure Injury Added      Attending RN:  Don Calderon RN     Second RN:  KUN Almaraz

## 2023-11-09 NOTE — PROGRESS NOTES
[facility] [place] [facility]  Neurosurgery  Progress Note    Subjective:     History of Present Illness: Yasmeen Valderrama is a 53 y.o. female with h/o substance abuse on Suboxone, COPD, KATHERINE, and home O2 dependence. She reports significant fluid retention and difficulty breathing at home. She frequently falls asleep during the day, but she is usually able to catch herself. Today, she fell asleep while standing and fell straight backwards. Immediately following the fall, she felt numbness and pain in her arms and legs. She found it difficult to lift her arms up initially. The numbness has since resolved, and she is able to move her arms freely. Her largest complaint at this time is of severe back pain that increases with most movements. Denies current leg pain, numbness, and weakness. Denies b/b dysfunction. Denies SA. Imaging revealed an acute L1 compression fracture with no evidence of retropulsion. CT cervical also revealed a large disc osteophyte complex at C5-6. Neurosurgery was consulted for mgmt of the compression fracture.       Post-Op Info:  * No surgery found *         Interval History: unable to obtain MRI due to claustrophobia. CT chest with contrast shows chest mass is most consistent with scar tissue.     Medications:  Continuous Infusions:  Scheduled Meds:   albuterol-ipratropium  3 mL Nebulization Q4H    budesonide  0.5 mg Nebulization BID    docusate sodium  200 mg Oral BID    furosemide  40 mg Oral Daily    LIDOcaine  1 patch Transdermal Q24H    metOLazone  5 mg Oral Daily    nicotine  1 patch Transdermal Daily    pantoprazole  40 mg Oral BID    polyethylene glycol  17 g Oral BID    [START ON 11/10/2023] predniSONE  40 mg Oral Daily     PRN Meds:diazePAM, HYDROcodone-acetaminophen, HYDROcodone-acetaminophen, influenza, sodium chloride 0.9%, sodium chloride 0.9%     Review of Systems  Objective:     Weight: 100.4 kg (221 lb 5.5 oz)  Body mass index is 39.21 kg/m².  Vital Signs (Most  Recent):  Temp: 98.5 °F (36.9 °C) (11/09/23 1230)  Pulse: 93 (11/09/23 1230)  Resp: 20 (11/09/23 1230)  BP: (!) 118/56 (11/09/23 1230)  SpO2: 97 % (11/09/23 1230) Vital Signs (24h Range):  Temp:  [98.1 °F (36.7 °C)-98.7 °F (37.1 °C)] 98.5 °F (36.9 °C)  Pulse:  [78-97] 93  Resp:  [16-24] 20  SpO2:  [92 %-98 %] 97 %  BP: (118-146)/(56-82) 118/56                Oxygen Concentration (%):  [40] 40        Female External Urinary Catheter 11/08/23 0947 (Active)   Skin no redness;no breakdown 11/09/23 1123   Tolerance no signs/symptoms of discomfort 11/09/23 1123   Suction Continuous suction at 60 mmHg 11/08/23 2045   Date of last wick change 11/08/23 11/09/23 0754   Time of last wick change 0947 11/08/23 0947   Output (mL) 850 mL 11/08/23 2045          Physical Exam         Neurosurgery Physical Exam  General: well developed, well nourished, no distress.   Head: normocephalic, atraumatic  Neurologic: Awake, Alert, Oriented x 4. Thought content appropriate.  GCS: Motor: 6/Verbal: 5/Eyes: 4 GCS Total: 15  Language: No aphasia  Speech: No dysarthria  Cranial nerves: face symmetric, tongue midline, CN II-XII grossly intact.   Eyes: pupils equal, round, reactive to light with accomodation, EOMI.    Sensory: intact to light touch throughout BLE     Motor Strength: Moves all extremities spontaneously with good tone.    Strength   Deltoids Triceps Biceps     Hand    Upper: R 5/5 5/5 5/5     5/5     L 5/5 5/5 5/5     5/5       Iliopsoas Quadriceps Knee  Flexion Tibialis  anterior Gastro- cnemius EHL   Lower: R 5/5 5/5 5/5 5/5 5/5 5/5     L 5/5 5/5 5/5 5/5 5/5 5/5      Zee: present bilaterally   Clonus: absent     Skin: Skin is warm, dry and intact.     Midline bony tenderness: present in upper lumbar spine     Significant Labs:  Recent Labs   Lab 11/08/23  0347 11/09/23  0443   * 151*    140   K 3.3* 3.6   CL 86* 88*   CO2 42* 39*   BUN 8 13   CREATININE 0.7 0.6   CALCIUM 9.8 10.1     Recent Labs   Lab  "11/08/23  0347 11/09/23  0443   WBC 14.20* 14.77*   HGB 8.6* 9.0*   HCT 31.5* 31.7*   * 433     No results for input(s): "LABPT", "INR", "APTT" in the last 48 hours.  Microbiology Results (last 7 days)       ** No results found for the last 168 hours. **              Significant Diagnostics:  Chest CT with contrast 11/9/23  There are linear regions of scarring versus atelectasis seen within both lungs.  With regard to the density seen at the right apex on CT of the thorax this is believed to represent pulmonary apical scarring.      Assessment/Plan:     * Closed compression fracture of body of L1 vertebra  Yasmeen Valderrama is a 53 y.o. female with h/o substance abuse on Suboxone, advanced COPD, and KATHERINE who presented to the ED after a fall from standing with severe low back pain. She was found to have an acute L1 compression fracture. Fracture is stable on upright films, however there is more lytic change within the vertebral body that what would be expected. This combined with the new right apical soft tissue density warrants further workup with an MRI to rule out a pathologic fracture. She also has a large osteophyte complex at C5-6 and is hyperreflexic on exam.    Pain improved today, likely due to systemic steroids. Ok to continue steroids as needed for pulmonary pathology. Limit as able to reduce risk of delayed fracture healing.     -Pain control per primary. Will need pain mgmt referral as outpatient given substance abuse history  -MRI C-T-L w/wo contrast recommended but patient was unwilling to attempt due to severe claustrophobia. Valium offered, but patient still declined.   -CT chest with contrast shows chest mass is most likely scar tissue  -TLSO brace whenever out of bed  -No lifting >10lbs, bending, or twisting.      FU in 8 weeks with repeat standing xrays. Will schedule for patient.   Neurosurgery will sign off. Please contact us with any questions, concerns, or changes in neurologic status "     Case discussed with Dr. Joss Ochoa, PA-C  Neurosurgery  [facility] - [place] - Telemetry

## 2023-11-09 NOTE — SUBJECTIVE & OBJECTIVE
Interval History: unable to obtain MRI due to claustrophobia. CT chest with contrast shows chest mass is most consistent with scar tissue.     Medications:  Continuous Infusions:  Scheduled Meds:   albuterol-ipratropium  3 mL Nebulization Q4H    budesonide  0.5 mg Nebulization BID    docusate sodium  200 mg Oral BID    furosemide  40 mg Oral Daily    LIDOcaine  1 patch Transdermal Q24H    metOLazone  5 mg Oral Daily    nicotine  1 patch Transdermal Daily    pantoprazole  40 mg Oral BID    polyethylene glycol  17 g Oral BID    [START ON 11/10/2023] predniSONE  40 mg Oral Daily     PRN Meds:diazePAM, HYDROcodone-acetaminophen, HYDROcodone-acetaminophen, influenza, sodium chloride 0.9%, sodium chloride 0.9%     Review of Systems  Objective:     Weight: 100.4 kg (221 lb 5.5 oz)  Body mass index is 39.21 kg/m².  Vital Signs (Most Recent):  Temp: 98.5 °F (36.9 °C) (11/09/23 1230)  Pulse: 93 (11/09/23 1230)  Resp: 20 (11/09/23 1230)  BP: (!) 118/56 (11/09/23 1230)  SpO2: 97 % (11/09/23 1230) Vital Signs (24h Range):  Temp:  [98.1 °F (36.7 °C)-98.7 °F (37.1 °C)] 98.5 °F (36.9 °C)  Pulse:  [78-97] 93  Resp:  [16-24] 20  SpO2:  [92 %-98 %] 97 %  BP: (118-146)/(56-82) 118/56                Oxygen Concentration (%):  [40] 40        Female External Urinary Catheter 11/08/23 0947 (Active)   Skin no redness;no breakdown 11/09/23 1123   Tolerance no signs/symptoms of discomfort 11/09/23 1123   Suction Continuous suction at 60 mmHg 11/08/23 2045   Date of last wick change 11/08/23 11/09/23 0754   Time of last wick change 0947 11/08/23 0947   Output (mL) 850 mL 11/08/23 2045          Physical Exam         Neurosurgery Physical Exam  General: well developed, well nourished, no distress.   Head: normocephalic, atraumatic  Neurologic: Awake, Alert, Oriented x 4. Thought content appropriate.  GCS: Motor: 6/Verbal: 5/Eyes: 4 GCS Total: 15  Language: No aphasia  Speech: No dysarthria  Cranial nerves: face symmetric, tongue midline, CN  "II-XII grossly intact.   Eyes: pupils equal, round, reactive to light with accomodation, EOMI.    Sensory: intact to light touch throughout BLE     Motor Strength: Moves all extremities spontaneously with good tone.    Strength   Deltoids Triceps Biceps     Hand    Upper: R 5/5 5/5 5/5     5/5     L 5/5 5/5 5/5     5/5       Iliopsoas Quadriceps Knee  Flexion Tibialis  anterior Gastro- cnemius EHL   Lower: R 5/5 5/5 5/5 5/5 5/5 5/5     L 5/5 5/5 5/5 5/5 5/5 5/5      Zee: present bilaterally   Clonus: absent     Skin: Skin is warm, dry and intact.     Midline bony tenderness: present in upper lumbar spine     Significant Labs:  Recent Labs   Lab 11/08/23 0347 11/09/23  0443   * 151*    140   K 3.3* 3.6   CL 86* 88*   CO2 42* 39*   BUN 8 13   CREATININE 0.7 0.6   CALCIUM 9.8 10.1     Recent Labs   Lab 11/08/23 0347 11/09/23  0443   WBC 14.20* 14.77*   HGB 8.6* 9.0*   HCT 31.5* 31.7*   * 433     No results for input(s): "LABPT", "INR", "APTT" in the last 48 hours.  Microbiology Results (last 7 days)       ** No results found for the last 168 hours. **              Significant Diagnostics:  Chest CT with contrast 11/9/23  There are linear regions of scarring versus atelectasis seen within both lungs.  With regard to the density seen at the right apex on CT of the thorax this is believed to represent pulmonary apical scarring.    "

## 2023-11-09 NOTE — NURSING
[facility] [place]  Nurses Note -- 4 Eyes      11/9/2023       Skin assessed on: Q Shift      [x] No Pressure Injuries Present    [x]Prevention Measures Documented    [] Yes LDA  for Pressure Injury Previously documented     [] Yes New Pressure Injury Discovered   [] LDA for New Pressure Injury Added      Attending RN:  Alexandrea Veras LPN     Second RN:  Don Calderon RN

## 2023-11-09 NOTE — ASSESSMENT & PLAN NOTE
Yasmeen Valderrama is a 53 y.o. female with h/o substance abuse on Suboxone, advanced COPD, and KATHERINE who presented to the ED after a fall from standing with severe low back pain. She was found to have an acute L1 compression fracture. Fracture is stable on upright films, however there is more lytic change within the vertebral body that what would be expected. This combined with the new right apical soft tissue density warrants further workup with an MRI to rule out a pathologic fracture. She also has a large osteophyte complex at C5-6 and is hyperreflexic on exam.    Pain improved today, likely due to systemic steroids. Ok to continue steroids as needed for pulmonary pathology. Limit as able to reduce risk of delayed fracture healing.     -Pain control per primary. Will need pain mgmt referral as outpatient given substance abuse history  -MRI C-T-L w/wo contrast recommended but patient was unwilling to attempt due to severe claustrophobia. Valium offered, but patient still declined.   -CT chest with contrast shows chest mass is most likely scar tissue  -TLSO brace whenever out of bed  -No lifting >10lbs, bending, or twisting.      FU in 8 weeks with repeat standing xrays. Will schedule for patient.   Neurosurgery will sign off. Please contact us with any questions, concerns, or changes in neurologic status     Case discussed with Dr. Coreas

## 2023-11-09 NOTE — ASSESSMENT & PLAN NOTE
New right apical pleural base soft tissue density appearing since 2022. Radiology recommended CT scan chest lungs with IV contrast recommended for further evaluation new right apical pleural base lesion.   CT today

## 2023-11-09 NOTE — NURSING
Patient arrived to floor in stable condition. Visualized patient and assessed patient's overall condition and appearance. No complaints. NAD noted. Plan of care ongoing.

## 2023-11-09 NOTE — ASSESSMENT & PLAN NOTE
Patient with history obesity, severe COPD on home 3 L NC, pulmonary hypertension and KATHERINE not on BiPAP due to claustrophobia, continue smoking who was admitted for falling asleep in upright position sustaining  L1 compression fracture pain medication/muscle relaxer.   VBG on admission 7.34/81/43/42  11/8 able to answer question however falling asleep frequently during conversation . Tolerated bipap   Treat COPD exacerbation- imrpoving  Need to get back on BiPap  , smoking cessation

## 2023-11-09 NOTE — PLAN OF CARE
Problem: Respiratory Compromise COPD (Chronic Obstructive Pulmonary Disease)  Goal: Effective Oxygenation and Ventilation  11/9/2023 0500 by Don Calderon RN  Outcome: Ongoing, Progressing  11/9/2023 0459 by Don Calderon RN  Outcome: Ongoing, Progressing  Intervention: Promote Airway Secretion Clearance  11/9/2023 0500 by Don Calderon RN  Flowsheets (Taken 11/9/2023 0500)  Breathing Techniques/Airway Clearance: deep/controlled cough encouraged  Cough And Deep Breathing: done independently per patient  Activity Management:   Arm raise - L1   Rolling - L1  11/9/2023 0459 by Don Calderon RN  Flowsheets (Taken 11/9/2023 0459)  Breathing Techniques/Airway Clearance: deep/controlled cough encouraged  Cough And Deep Breathing: done independently per patient  Activity Management:   Arm raise - L1   Rolling - L1  Intervention: Optimize Oxygenation and Ventilation  11/9/2023 0500 by Don Calderon RN  Flowsheets (Taken 11/9/2023 0500)  Airway/Ventilation Management: airway patency maintained  Fluid/Electrolyte Management: fluids provided  Head of Bed (HOB) Positioning: HOB elevated  11/9/2023 0459 by Don Calderon RN  Flowsheets (Taken 11/9/2023 0459)  Airway/Ventilation Management: airway patency maintained  Fluid/Electrolyte Management: fluids provided  Head of Bed (HOB) Positioning: HOB elevated     Problem: Skin Injury Risk Increased  Goal: Skin Health and Integrity  Outcome: Ongoing, Progressing  Intervention: Optimize Skin Protection  Flowsheets (Taken 11/9/2023 0500)  Head of Bed (HOB) Positioning: HOB elevated     Problem: Pain Acute  Goal: Acceptable Pain Control and Functional Ability  Outcome: Ongoing, Progressing  Intervention: Prevent or Manage Pain  Flowsheets (Taken 11/9/2023 0500)  Sleep/Rest Enhancement: noise level reduced  Sensory Stimulation Regulation:   care clustered   quiet environment promoted  Medication Review/Management: medications reviewed  Intervention: Optimize Psychosocial  Wellbeing  Flowsheets (Taken 11/9/2023 0500)  Supportive Measures:   active listening utilized   verbalization of feelings encouraged  Diversional Activities:   television   smartphone

## 2023-11-09 NOTE — PLAN OF CARE
Problem: Physical Therapy  Goal: Physical Therapy Goal  Description: Goals to be met by: 23     Patient will increase functional independence with mobility by performin. Pt to be mod I with bed mobility.  2. Pt to transfer with (S)/mod I.  3. Pt to ambulate 150' /s or /s RW and SBA.    Outcome: Ongoing, Progressing   Initial eval completed, see in chart for details.

## 2023-11-09 NOTE — PLAN OF CARE
Problem: Occupational Therapy  Goal: Occupational Therapy Goal  Description: Goals to be met by: 11/15/23     Patient will increase functional independence with ADLs by performing:    UE Dressing with Modified [place] including donning TLSO.   LE Dressing with Modified [place].  Grooming while seated at sink with Modified [place].  Toileting from [facility] [place] with Modified [place] for hygiene and clothing management.   Sitting in chair x 60 minutes with Supervision.  Toilet transfer to toilet with Supervision due to oxygen use   Upper extremity exercise program x10  reps per handout, with supervision.    Outcome: Ongoing, Progressing   Patient completed 10 reps x 2 sets each while seated in bed in chair position with overhead shoulder presses, scapula retraction/protraction; triceps/biceps with no SOB or desaturation noted.

## 2023-11-09 NOTE — PT/OT/SLP PROGRESS
Occupational Therapy   Treatment    Name: Yasmeen Valderrama  MRN: [id number]  Admitting Diagnosis:  Closed compression fracture of body of L1 vertebra       Recommendations:     Discharge Recommendations: Low Intensity Therapy  Discharge Equipment Recommendations:  walker, rolling  Barriers to discharge:  Other (Comment) (patient is going to stay in [place] with her son upon d/c)    Assessment:     Yasmeen Valderrama is a 53 y.o. female with a medical diagnosis of Closed compression fracture of body of L1 vertebra.  She presents with bed in chair position with 4L oxygen with NC with no pain noted. Performance deficits affecting function are weakness, impaired endurance, impaired self care skills, impaired functional mobility, gait instability, decreased lower extremity function, decreased safety awareness, pain, impaired cardiopulmonary response to activity, orthopedic precautions.     Rehab Prognosis:  Good; patient would benefit from acute skilled OT services to address these deficits and reach maximum level of function.       Plan:     Patient to be seen 5 x/week to address the above listed problems via self-care/home management, therapeutic activities, therapeutic exercises  Plan of Care Expires: 11/15/23  Plan of Care Reviewed with: patient, spouse    Subjective     Chief Complaint: she said she is constipated and having difficulty sitting on toilet   Patient/Family Comments/goals: to go to son's home s/p acute care in [place] to get more services   Pain/Comfort:  Pain Rating 1: 0/10    Objective:     Communicated with: RNAlexandrea,  prior to session.  Patient found with bed in chair position with oxygen, telemetry, PureWick upon OT entry to room.    General Precautions: Standard, fall    Orthopedic Precautions:spinal precautions  Braces: TLSO  Respiratory Status: Nasal cannula, flow 4  L/min     Occupational Performance:     Bed Mobility:    Patient did not get out of bed      Functional  Mobility/Transfers:  Occupational therapy offered to walk with patient to bathroom and in room, but she declined.     Activities of Daily Living:  NT       [facility] 6 Click ADL: 18    Treatment & Education:  Occupational therapy educated patient re: lifting precautions per doctor's orders to protect back fracture.   Occupational therapy also educated patient re: completing UB therex with overhead shoulder presses, chest presses and triceps/biceps with no resistance with no SOB or desaturation noted. She completed 2 sets x 10 reps each.     Patient left with bed in chair position with all lines intact, call button in reach, RN  notified, and   present    GOALS:   Multidisciplinary Problems       Occupational Therapy Goals          Problem: Occupational Therapy    Goal Priority Disciplines Outcome Interventions   Occupational Therapy Goal     OT, PT/OT Ongoing, Progressing    Description: Goals to be met by: 11/15/23     Patient will increase functional independence with ADLs by performing:    UE Dressing with Modified [place] including donning TLSO.   LE Dressing with Modified [place].  Grooming while seated at sink with Modified [place].  Toileting from C over toilet with Modified [place] for hygiene and clothing management.   Sitting in chair x 60 minutes with Supervision.  Toilet transfer to toilet with Supervision due to oxygen use   Upper extremity exercise program x10  reps per handout, with supervision.                         Time Tracking:     OT Date of Treatment: 11/09/23  OT Start Time: 1500  OT Stop Time: 1514  OT Total Time (min): 14 min    Billable Minutes:Therapeutic Exercise 14     OT/JULIANNA: OT          11/9/2023

## 2023-11-09 NOTE — SUBJECTIVE & OBJECTIVE
Interval History: Sitting up in recliner. Breathing much better.  at bedside.     Review of Systems   Constitutional:  Positive for activity change and fatigue. Negative for appetite change and chills.   HENT: Negative.     Eyes: Negative.    Respiratory:  Positive for shortness of breath.    Cardiovascular:  Positive for leg swelling. Negative for chest pain and palpitations.   Gastrointestinal: Negative.    Genitourinary: Negative.    Musculoskeletal:  Positive for arthralgias and back pain.   Skin: Negative.    Neurological:  Negative for tremors, syncope and speech difficulty. Weakness: resolved PTA, currently generalized. Numbness: resolved PTA.  Hematological: Negative.    Psychiatric/Behavioral: Negative.       Objective:     Vital Signs (Most Recent):  Temp: 98.4 °F (36.9 °C) (11/09/23 0826)  Pulse: 97 (11/09/23 0826)  Resp: 16 (11/09/23 1035)  BP: (!) 120/58 (11/09/23 0826)  SpO2: 96 % (11/09/23 0826) Vital Signs (24h Range):  Temp:  [97.8 °F (36.6 °C)-98.7 °F (37.1 °C)] 98.4 °F (36.9 °C)  Pulse:  [78-97] 97  Resp:  [16-23] 16  SpO2:  [92 %-98 %] 96 %  BP: (120-146)/(58-93) 120/58     Weight: 100.4 kg (221 lb 5.5 oz)  Body mass index is 39.21 kg/m².    Intake/Output Summary (Last 24 hours) at 11/9/2023 1054  Last data filed at 11/9/2023 0630  Gross per 24 hour   Intake --   Output 2250 ml   Net -2250 ml         Physical Exam  Constitutional:       Appearance: Normal appearance. She is obese. She is ill-appearing. She is not diaphoretic.   HENT:      Head: Normocephalic and atraumatic.      Nose: Nose normal.      Mouth/Throat:      Mouth: Mucous membranes are moist.   Cardiovascular:      Rate and Rhythm: Normal rate and regular rhythm.      Pulses: Normal pulses.      Heart sounds: Normal heart sounds.   Pulmonary:      Breath sounds: No rhonchi. Wheezes: mild expiratory scattered wheeze - improved from yesterday.  Abdominal:      Palpations: Abdomen is soft.   Musculoskeletal:         General:  Swelling and signs of injury (lower mid back pain) present.      Right lower leg: Edema present.      Left lower leg: Edema present.   Skin:     General: Skin is warm and dry.   Neurological:      General: No focal deficit present.      Mental Status: She is alert and oriented to person, place, and time. Mental status is at baseline.      Sensory: No sensory deficit.             Significant Labs: All pertinent labs within the past 24 hours have been reviewed.    Significant Imaging: I have reviewed all pertinent imaging results/findings within the past 24 hours.

## 2023-11-09 NOTE — PLAN OF CARE
Problem: Adult Inpatient Plan of Care  Goal: Plan of Care Review  11/9/2023 1619 by Alexandrea Veras LPN  Outcome: Ongoing, Progressing  11/9/2023 1619 by Alexandrea Veras LPN  Outcome: Ongoing, Progressing     Problem: Fall Injury Risk  Goal: Absence of Fall and Fall-Related Injury  11/9/2023 1619 by Alexandrea Veras LPN  Outcome: Ongoing, Progressing  11/9/2023 1619 by Alexandrea Veras LPN  Outcome: Ongoing, Progressing     Problem: Adjustment to Illness COPD (Chronic Obstructive Pulmonary Disease)  Goal: Optimal Chronic Illness Coping  Outcome: Ongoing, Progressing     Problem: Skin Injury Risk Increased  Goal: Skin Health and Integrity  Outcome: Ongoing, Progressing     Problem: Pain Acute  Goal: Acceptable Pain Control and Functional Ability  Outcome: Ongoing, Progressing

## 2023-11-09 NOTE — PROGRESS NOTES
[facility] Medicine  Progress Note    Patient Name: Yasmeen Valderrama  MRN: [id number]  Patient Class: IP- Inpatient   Admission Date: 11/7/2023  Length of Stay: 1 days  Attending Physician: Rambo Rojo MD  Primary Care Provider: Leeanna Stuart MD        Subjective:     Principal Problem:Closed compression fracture of body of L1 vertebra        HPI:  Yasmeen Valderrama is a 52 yo female with significant history 1/2 day smoker, KATHERINE no longer on BiPAP due to claustrophobia, COPD on home oxygen 3 L NC, chronic pain, recent non bleeding gastric ulcer on EGD 9/27/23 who arrived to ED via EMS after a fall.  For the past few months patient will fall asleep even while upright position but usually will catch herself.  This a.m. at 2:30 a.m. patient woke up to let her dog out and while standing patient fell asleep and fell onto her buttocks.  Since patient did not pass out or hit head.  Since the fall patient complained of acute on chronic lower back pain 9/10.  She denies any numbness or weakness in all her extremities however reported to Neurosurgery team numbness and pain in arms and legs after the fall but now resolved.   Patient also reports worsening and shortness a breath that started after the fall.  Denies any productive cough.  Denies fever or chills. Patient sleeps in upright position in which she contributes BLE edema however worst then usual.   No prior treatment before arrival to hospital.  In ED, CXR no infiltrate. WBC 16 K (chronically elevated), afebrile.   X ray lumbar, CT thoracic L1 compression fracture. CT cervical no acute cervical fracture.   CT did reveal right apical ground glass opacity and recommended follow up CT 3-6 months.   Seen by Neurosurgery in ED who recommended MRI CTL w wo contrast, TLSO brace when out of bed, no lifting >10lbs, bending or twisting.       Overview/Hospital Course:  Admission to the hospital for acute L1 compression fracture and acute on  "chronic respiratory failure with hypoxia and hypercapnia and COPD exacerbation.  Neurosurgery following. Patient adamantly declined MRI of cervical lumbar and thoracic.  PT OT evaluation completed and recommended low intense therapy.     Lethargy resolved after use of Bipap.  Wheezing anterior/posterior significantly improved DuoNeb, LAMA/LAMA,  steroid, home oxygen. Will need BiPAP on discharge.   CT also showed "new right apical pleural base soft tissue density appearing since 2022." Will get CT scan lung chest with IV to evaluate right apical pleural base lesion.   Above discussed with  at bedside.        Interval History: Sitting up in recliner. Breathing much better.  at bedside.     Review of Systems   Constitutional:  Positive for activity change and fatigue. Negative for appetite change and chills.   HENT: Negative.     Eyes: Negative.    Respiratory:  Positive for shortness of breath.    Cardiovascular:  Positive for leg swelling. Negative for chest pain and palpitations.   Gastrointestinal: Negative.    Genitourinary: Negative.    Musculoskeletal:  Positive for arthralgias and back pain.   Skin: Negative.    Neurological:  Negative for tremors, syncope and speech difficulty. Weakness: resolved PTA, currently generalized. Numbness: resolved PTA.  Hematological: Negative.    Psychiatric/Behavioral: Negative.       Objective:     Vital Signs (Most Recent):  Temp: 98.4 °F (36.9 °C) (11/09/23 0826)  Pulse: 97 (11/09/23 0826)  Resp: 16 (11/09/23 1035)  BP: (!) 120/58 (11/09/23 0826)  SpO2: 96 % (11/09/23 0826) Vital Signs (24h Range):  Temp:  [97.8 °F (36.6 °C)-98.7 °F (37.1 °C)] 98.4 °F (36.9 °C)  Pulse:  [78-97] 97  Resp:  [16-23] 16  SpO2:  [92 %-98 %] 96 %  BP: (120-146)/(58-93) 120/58     Weight: 100.4 kg (221 lb 5.5 oz)  Body mass index is 39.21 kg/m².    Intake/Output Summary (Last 24 hours) at 11/9/2023 1054  Last data filed at 11/9/2023 0630  Gross per 24 hour   Intake --   Output 2250 ml "   Net -2250 ml         Physical Exam  Constitutional:       Appearance: Normal appearance. She is obese. She is ill-appearing. She is not diaphoretic.   HENT:      Head: Normocephalic and atraumatic.      Nose: Nose normal.      Mouth/Throat:      Mouth: Mucous membranes are moist.   Cardiovascular:      Rate and Rhythm: Normal rate and regular rhythm.      Pulses: Normal pulses.      Heart sounds: Normal heart sounds.   Pulmonary:      Breath sounds: No rhonchi. Wheezes: mild expiratory scattered wheeze - improved from yesterday.  Abdominal:      Palpations: Abdomen is soft.   Musculoskeletal:         General: Swelling and signs of injury (lower mid back pain) present.      Right lower leg: Edema present.      Left lower leg: Edema present.   Skin:     General: Skin is warm and dry.   Neurological:      General: No focal deficit present.      Mental Status: She is alert and oriented to person, place, and time. Mental status is at baseline.      Sensory: No sensory deficit.             Significant Labs: All pertinent labs within the past 24 hours have been reviewed.    Significant Imaging: I have reviewed all pertinent imaging results/findings within the past 24 hours.      Assessment/Plan:      * Closed compression fracture of body of L1 vertebra  Patient presented to ED after a fall while upright standing position.  Patient have sleep apnea however returned her BiPAP due to claustrophobia and have been falling asleep while upright position lately but this time not able to catch herself.  Patient fell on her buttock.  No loss of consciousness or head injury  CT and x-ray show L1 compression fracture  Neurosurgery consulted-recommended MRI with without contrast cervical thoracic lumbar however patient declined as not able to lay flat x 5 years  TLSO when OOB  No lifting more than 10 lb no twisting no bending  PT OT eval completed. Low intensity  Pain controlled with current regimen  Generalized weakness with no  focal neuro deficit on exam- plan as above      Acute on Chronic respiratory failure with hypoxia and hypercapnia  Patient with history obesity, severe COPD on home 3 L NC, pulmonary hypertension and KATHERINE not on BiPAP due to claustrophobia, continue smoking who was admitted for falling asleep in upright position sustaining  L1 compression fracture pain medication/muscle relaxer.   VBG on admission 7.34/81/43/42  11/8 able to answer question however falling asleep frequently during conversation . Tolerated bipap   Treat COPD exacerbation- imrpoving  Need to get back on BiPap  , smoking cessation       Stage 4 very severe COPD by GOLD classification  Patient's COPD is with exacerbation noted by continued dyspnea currently.  Patient is currently on COPD Pathway. Continue scheduled inhalers Steroids and Supplemental oxygen and monitor respiratory status closely.  Encourage smoking cessation  Continue home LAMA/LABA, duoneb q 4, deescalate steroid today        Pulmonary hypertension  PASP 52 mmHg 2/10/2022> 37 mmhg 12/2022  Continue home lasix, zarosylyn  Encourage smoking cessation       Cervical stenosis of spinal canal  Initial fall patient reported pain and numbness in extremities however resolved prior to arriving at hospital   Neurosurgery following MRI of cervical with without contrast  .      KATHERINE (obstructive sleep apnea)  Returned her BiPAP a few months ago due to claustrophobia.   Continue Bipap qhs and day time nap prn      ACP (advance care planning)  Advance Care Planning     Date: 11/08/2023    Code Status  In light of the patients advanced and life limiting illness,I engaged the the patient and family in a voluntary conversation about the patient's preferences for care  at the very end of life. Long discussion with patient and family regarding patient respiratory issues stated above , including respiratory and cardiac arrest. Patient wish to have CPR or other invasive treatments performed when her heart  and/or breathing stops.       Abnormal CT scan  New right apical pleural base soft tissue density appearing since 2022. Radiology recommended CT scan chest lungs with IV contrast recommended for further evaluation new right apical pleural base lesion.   CT today    Tobacco abuse  One pack a day smoker.  Encouraged smoking cessation.  okay with nicotine patch.      Other specified anemias  Patient's anemia is currently controlled. Has not received any PRBCs to date. Etiology likely d/t Iron deficiency  Current CBC reviewed-   Lab Results   Component Value Date    HGB 8.4 (L) 11/07/2023    HCT 28.0 (L) 11/07/2023   Monitor serial CBC and transfuse if patient becomes hemodynamically unstable, symptomatic or H/H drops below 7/21.  Hgb stable compared to last hgb 8.5 9/27 . Low T sat during that admission   Ferrous sulfate    Leukocytosis  Chronically elevated.  Afebrile.  No obvious infectious process.        GI bleed  Admission in Sept and hgb stable compared to previous lab  9/27/22 EGD non bleeding gastric and duonenal ulcer   Add iron studies  PPI BID      COPD exacerbation  See above     Aortic atherosclerosis  CArdiologist Dr. Olvera  11/15/22 Coronary calcification on CT scan  11/15/22 Dobutamine stress test  Not on aSA due to recent GIB/gastric ulcer  Lab Results   Component Value Date    LDLCALC 92.0 09/29/2020   not on statin at home, add lipid panel          Chronic pain syndrome  On Suboxone at home.  We will hold Suboxone while acute pain control  Will need pain management on discharge      Generalized edema  BLE edema chronic for patient. Sleeps in sitting position at home.   Last 2 D echo 12/2022 normal EF 55%, normal diastolic and normal sized ventricle  Continue home laskera friedman      VTE Risk Mitigation (From admission, onward)         Ordered     IP VTE HIGH RISK PATIENT  Once         11/07/23 1429     Place sequential compression device  Until discontinued         11/07/23 1429     Place sequential  compression device  Until discontinued         11/07/23 1134                Discharge Planning   MONICO:      Code Status: Full Code   Is the patient medically ready for discharge?:     Reason for patient still in hospital (select all that apply): Treatment and Imaging  Discharge Plan A: Home with family              Ella Winston NP  Department of Hospital Medicine   [facility]

## 2023-11-09 NOTE — PT/OT/SLP EVAL
Physical Therapy Evaluation and Treatment    Patient Name: Yasmeen Valderrama   MRN: [id number]  Recent Surgery: * No surgery found *      Recommendations:     Discharge Recommendations: Low Intensity Therapy   Discharge Equipment Recommendations: walker, rolling     Assessment:     Yasmeen Valderrama is a 53 y.o. female admitted with a medical diagnosis of Closed compression fracture of body of L1 vertebra. She presents with the following impairments/functional limitations: impaired functional mobility, gait instability, pain, orthopedic precautions.     Rehab Prognosis: Good; patient would benefit from acute PT services to address these deficits and reach maximum level of function.    Plan:     During this hospitalization, patient to be seen daily to address the above listed problems via gait training, therapeutic activities    Plan of Care Expires: 11/14/23    Subjective     Chief Complaint: They took all my stuff...  Patient Comments/Goals: To be able to get up to the bathroom  Pain/Comfort:  Pain Rating 1: 8/10  Location 1: abdomen (and back)  Pain Addressed 1: Reposition    Social History:  Living Environment: Patient lives with their spouse in a mobile home with number of outside stair(s): 3, (B) HRs  Prior Level of Function: Prior to admission, patient was independent  Equipment Used at Home: bedside commode, hospital bed, shower chair  DME owned (not currently used): none  Assistance Upon Discharge:      Objective:     Communicated with nurse after session. Patient found supine with oxygen, telemetry, PureWick upon PT entry to room.    General Precautions: Standard,     Orthopedic Precautions: spinal precautions   Braces: TLSO    Respiratory Status: Nasal cannula, flow 4 L/min    Exams:  RLE ROM: WFL  RLE Strength: WFL  LLE ROM: WFL  LLE Strength: WFL  Cognitive: Patient is oriented to Person, Place, Time, Situation      Functional Mobility:  Gait belt applied - No  Bed Mobility  Supine to Sit:  stand by assistance for all mobility.  Transfers  Sit to Stand: contact guard assistance with rolling walker  Gait  Patient ambulated 40' with rolling walker and contact guard assistance, O2@4L. Patient required cues for position in walker to increase independence and safety.   Balance  Sitting: Good static  Standing: Fair+ static/dynamic standing      Therapeutic Activities and Exercises:  Patient educated on role of acute care PT and PT POC and safety while in hospital including calling nurse for mobility.  Educated about importance of OOB mobility and remaining up in chair most of the day.  Pt demonstrates (I) with log roll, able to aidan TLSO with min A from .    AM-PAC 6 CLICK MOBILITY  Total Score:20    Patient left  reclined in chair  with all lines intact, call button in reach, RN notified, spouse present, and all needs in reach .    GOALS:   Multidisciplinary Problems       Physical Therapy Goals          Problem: Physical Therapy    Goal Priority Disciplines Outcome Goal Variances Interventions   Physical Therapy Goal     PT, PT/OT Ongoing, Progressing     Description: Goals to be met by: 23     Patient will increase functional independence with mobility by performin. Pt to be mod I with bed mobility.  2. Pt to transfer with (S)/mod I.  3. Pt to ambulate 150' /s or /s RW and SBA.                         History:     Past Medical History:   Diagnosis Date    Anxiety     Asthma     Carpal tunnel syndrome, bilateral     COPD (chronic obstructive pulmonary disease)     Heavy cigarette smoker     22 2PPD    Laryngeal papilloma     On home oxygen therapy     Vocal cord mass 2017    Right side with CT scan Referred to ENT for visualization       Past Surgical History:   Procedure Laterality Date    CARPAL TUNNEL RELEASE Bilateral     ESOPHAGOGASTRODUODENOSCOPY N/A 2023    Procedure: EGD (ESOPHAGOGASTRODUODENOSCOPY);  Surgeon: Sahil May MD;  Location: [facility];  Service:  Endoscopy;  Laterality: N/A;    FINGER SURGERY      HYSTERECTOMY      OOPHORECTOMY  1996    Hysterectomy       Time Tracking:     PT Received On: 11/09/23  PT Start Time: 0920  PT Stop Time: 0943  PT Total Time (min): 23 min     Billable Minutes: Evaluation 15 and Gait Training 8    11/9/2023

## 2023-11-09 NOTE — ASSESSMENT & PLAN NOTE
Patient presented to ED after a fall while upright standing position.  Patient have sleep apnea however returned her BiPAP due to claustrophobia and have been falling asleep while upright position lately but this time not able to catch herself.  Patient fell on her buttock.  No loss of consciousness or head injury  CT and x-ray show L1 compression fracture  Neurosurgery consulted-recommended MRI with without contrast cervical thoracic lumbar however patient declined as not able to lay flat x 5 years  TLSO when OOB  No lifting more than 10 lb no twisting no bending  PT OT eval completed. Low intensity  Pain controlled with current regimen  Generalized weakness with no focal neuro deficit on exam- plan as above

## 2023-11-09 NOTE — ASSESSMENT & PLAN NOTE
Returned her BiPAP a few months ago due to claustrophobia.   Continue Bipap qhs and day time nap prn

## 2023-11-09 NOTE — ASSESSMENT & PLAN NOTE
Patient's COPD is with exacerbation noted by continued dyspnea currently.  Patient is currently on COPD Pathway. Continue scheduled inhalers Steroids and Supplemental oxygen and monitor respiratory status closely.  Encourage smoking cessation  Continue home LAMA/LABA, duoneb q 4, deescalate steroid today

## 2023-11-09 NOTE — PLAN OF CARE
Case Management Assessment     PCP: Dr. Monaco  Pharmacy: [facility]    Patient Arrived From: Home  Existing Help at Home: Ousmane- spouse    Barriers to Discharge: None    Discharge Plan:    A. Home with family   B. Other- tbd      CM discussed discharge planning with pt at bedside. Pt stated when she discharges she and , Ousmane will move with her son, Jt in [place] until she gets better. She has the following equipment: oxygen, commode, walker and a hospital bed. Pt's family will provide transportation home.       11/09/23 1515   Discharge Assessment   Assessment Type Discharge Planning Assessment   Confirmed/corrected address, phone number and insurance Yes   Confirmed Demographics Correct on Facesheet   Source of Information patient   When was your last doctors appointment? 09/26/23   Reason For Admission Closed cpmpression fracture of body of L1 vertebrae   People in Home spouse   Facility Arrived From: home   Do you expect to return to your current living situation? No  (Pt will stay with sonJt in [place].)   Do you have help at home or someone to help you manage your care at home? Yes   Who are your caregiver(s) and their phone number(s)? Ousmane-spouse - [phone]   Prior to hospitilization cognitive status: Alert/Oriented   Current cognitive status: Alert/Oriented   Walking or Climbing Stairs   (none)   Dressing/Bathing bathing difficulty, requires equipment;bathing difficulty, assistance 1 person   Dressing/Bathing Management sc   Equipment Currently Used at Home bedside commode;hospital bed;shower chair   Readmission within 30 days? No   Patient currently being followed by outpatient case management? No   Do you currently have service(s) that help you manage your care at home? No   Do you take prescription medications? Yes   Do you have prescription coverage? Yes   Coverage [employer]- [facility]- Medicaid   Do you have any problems affording any of your prescribed medications? No   Is the  patient taking medications as prescribed? yes   Who is going to help you get home at discharge? Ousmane- spouse   How do you get to doctors appointments? family or friend will provide   Are you on dialysis? No   Do you take coumadin? No   DME Needed Upon Discharge  other (see comments)  (tbd)   Discharge Plan discussed with: Patient   Transition of Care Barriers None   Discharge Plan A Home with family   Discharge Plan B Other  (tbd)   Physical Activity   On average, how many days per week do you engage in moderate to strenuous exercise (like a brisk walk)? 0 days   On average, how many minutes do you engage in exercise at this level? 0 min   Financial Resource Strain   How hard is it for you to pay for the very basics like food, housing, medical care, and heating? Somewhat   Housing Stability   In the last 12 months, was there a time when you were not able to pay the mortgage or rent on time? N   In the last 12 months, how many places have you lived? 1   In the last 12 months, was there a time when you did not have a steady place to sleep or slept in a shelter (including now)? N   Transportation Needs   In the past 12 months, has lack of transportation kept you from medical appointments or from getting medications? no   In the past 12 months, has lack of transportation kept you from meetings, work, or from getting things needed for daily living? No   Food Insecurity   Within the past 12 months, you worried that your food would run out before you got the money to buy more. Never true   Within the past 12 months, the food you bought just didn't last and you didn't have money to get more. Never true   Stress   Do you feel stress - tense, restless, nervous, or anxious, or unable to sleep at night because your mind is troubled all the time - these days? Rather much   Social Connections   In a typical week, how many times do you talk on the phone with family, friends, or neighbors? More than 3   How often do you get together  with friends or relatives? More than 3   How often do you attend Caodaism or Confucianist services? Never   Do you belong to any clubs or organizations such as Caodaism groups, unions, fraternal or athletic groups, or school groups? No   How often do you attend meetings of the clubs or organizations you belong to? Never   Are you , , , , never , or living with a partner?    Alcohol Use   Q1: How often do you have a drink containing alcohol? Never   Q2: How many drinks containing alcohol do you have on a typical day when you are drinking? None   Q3: How often do you have six or more drinks on one occasion? Never   OTHER   Name(s) of People in Home Ousmane- spouse

## 2023-11-10 PROBLEM — R53.81 DEBILITY: Status: ACTIVE | Noted: 2023-11-10

## 2023-11-10 LAB
ALBUMIN SERPL BCP-MCNC: 3.5 G/DL (ref 3.5–5.2)
ALP SERPL-CCNC: 101 U/L (ref 55–135)
ALT SERPL W/O P-5'-P-CCNC: 12 U/L (ref 10–44)
ANION GAP SERPL CALC-SCNC: 10 MMOL/L (ref 8–16)
AST SERPL-CCNC: 16 U/L (ref 10–40)
BASOPHILS # BLD AUTO: 0.04 K/UL (ref 0–0.2)
BASOPHILS NFR BLD: 0.2 % (ref 0–1.9)
BILIRUB SERPL-MCNC: 0.6 MG/DL (ref 0.1–1)
BUN SERPL-MCNC: 21 MG/DL (ref 6–20)
CALCIUM SERPL-MCNC: 9.8 MG/DL (ref 8.7–10.5)
CHLORIDE SERPL-SCNC: 87 MMOL/L (ref 95–110)
CO2 SERPL-SCNC: 44 MMOL/L (ref 23–29)
CREAT SERPL-MCNC: 0.7 MG/DL (ref 0.5–1.4)
DIFFERENTIAL METHOD: ABNORMAL
EOSINOPHIL # BLD AUTO: 0.1 K/UL (ref 0–0.5)
EOSINOPHIL NFR BLD: 0.4 % (ref 0–8)
ERYTHROCYTE [DISTWIDTH] IN BLOOD BY AUTOMATED COUNT: 21.2 % (ref 11.5–14.5)
EST. GFR  (NO RACE VARIABLE): >60 ML/MIN/1.73 M^2
GLUCOSE SERPL-MCNC: 116 MG/DL (ref 70–110)
HCT VFR BLD AUTO: 28.4 % (ref 37–48.5)
HGB BLD-MCNC: 8 G/DL (ref 12–16)
IMM GRANULOCYTES # BLD AUTO: 0.08 K/UL (ref 0–0.04)
IMM GRANULOCYTES NFR BLD AUTO: 0.4 % (ref 0–0.5)
LYMPHOCYTES # BLD AUTO: 4 K/UL (ref 1–4.8)
LYMPHOCYTES NFR BLD: 22.4 % (ref 18–48)
MCH RBC QN AUTO: 22 PG (ref 27–31)
MCHC RBC AUTO-ENTMCNC: 28.2 G/DL (ref 32–36)
MCV RBC AUTO: 78 FL (ref 82–98)
MONOCYTES # BLD AUTO: 1.5 K/UL (ref 0.3–1)
MONOCYTES NFR BLD: 8.3 % (ref 4–15)
NEUTROPHILS # BLD AUTO: 12.2 K/UL (ref 1.8–7.7)
NEUTROPHILS NFR BLD: 68.3 % (ref 38–73)
NRBC BLD-RTO: 0 /100 WBC
PHOSPHATE SERPL-MCNC: 3.8 MG/DL (ref 2.7–4.5)
PLATELET # BLD AUTO: 414 K/UL (ref 150–450)
PMV BLD AUTO: 10.2 FL (ref 9.2–12.9)
POTASSIUM SERPL-SCNC: 2.9 MMOL/L (ref 3.5–5.1)
PROT SERPL-MCNC: 6.7 G/DL (ref 6–8.4)
RBC # BLD AUTO: 3.63 M/UL (ref 4–5.4)
SODIUM SERPL-SCNC: 141 MMOL/L (ref 136–145)
WBC # BLD AUTO: 17.92 K/UL (ref 3.9–12.7)

## 2023-11-10 PROCEDURE — 25000242 PHARM REV CODE 250 ALT 637 W/ HCPCS: Performed by: NURSE PRACTITIONER

## 2023-11-10 PROCEDURE — 97535 SELF CARE MNGMENT TRAINING: CPT | Mod: CO

## 2023-11-10 PROCEDURE — 84100 ASSAY OF PHOSPHORUS: CPT | Performed by: NURSE PRACTITIONER

## 2023-11-10 PROCEDURE — 94761 N-INVAS EAR/PLS OXIMETRY MLT: CPT

## 2023-11-10 PROCEDURE — 63600175 PHARM REV CODE 636 W HCPCS: Performed by: NURSE PRACTITIONER

## 2023-11-10 PROCEDURE — S4991 NICOTINE PATCH NONLEGEND: HCPCS | Performed by: NURSE PRACTITIONER

## 2023-11-10 PROCEDURE — 94640 AIRWAY INHALATION TREATMENT: CPT

## 2023-11-10 PROCEDURE — 97530 THERAPEUTIC ACTIVITIES: CPT | Mod: CO

## 2023-11-10 PROCEDURE — 80053 COMPREHEN METABOLIC PANEL: CPT | Performed by: NURSE PRACTITIONER

## 2023-11-10 PROCEDURE — 21400001 HC TELEMETRY ROOM

## 2023-11-10 PROCEDURE — 94660 CPAP INITIATION&MGMT: CPT

## 2023-11-10 PROCEDURE — 25000003 PHARM REV CODE 250: Performed by: NURSE PRACTITIONER

## 2023-11-10 PROCEDURE — 36415 COLL VENOUS BLD VENIPUNCTURE: CPT | Performed by: NURSE PRACTITIONER

## 2023-11-10 PROCEDURE — 27000221 HC OXYGEN, UP TO 24 HOURS

## 2023-11-10 PROCEDURE — 85025 COMPLETE CBC W/AUTO DIFF WBC: CPT | Performed by: NURSE PRACTITIONER

## 2023-11-10 RX ORDER — POTASSIUM CHLORIDE 20 MEQ/1
20 TABLET, EXTENDED RELEASE ORAL DAILY
Qty: 30 TABLET | Refills: 3 | Status: SHIPPED | OUTPATIENT
Start: 2023-11-10

## 2023-11-10 RX ORDER — POTASSIUM CHLORIDE 20 MEQ/1
40 TABLET, EXTENDED RELEASE ORAL
Status: COMPLETED | OUTPATIENT
Start: 2023-11-10 | End: 2023-11-10

## 2023-11-10 RX ORDER — HYDROCODONE BITARTRATE AND ACETAMINOPHEN 10; 325 MG/1; MG/1
1 TABLET ORAL EVERY 6 HOURS PRN
Status: DISCONTINUED | OUTPATIENT
Start: 2023-11-10 | End: 2023-11-10

## 2023-11-10 RX ORDER — CYCLOBENZAPRINE HCL 5 MG
5 TABLET ORAL 3 TIMES DAILY PRN
Qty: 30 TABLET | Refills: 0 | Status: SHIPPED | OUTPATIENT
Start: 2023-11-10 | End: 2023-11-20

## 2023-11-10 RX ORDER — GABAPENTIN 100 MG/1
100 CAPSULE ORAL 2 TIMES DAILY
Qty: 60 CAPSULE | Refills: 1 | Status: SHIPPED | OUTPATIENT
Start: 2023-11-10 | End: 2024-11-09

## 2023-11-10 RX ORDER — HYDROCODONE BITARTRATE AND ACETAMINOPHEN 10; 325 MG/1; MG/1
1 TABLET ORAL EVERY 6 HOURS PRN
Status: DISCONTINUED | OUTPATIENT
Start: 2023-11-10 | End: 2023-11-11

## 2023-11-10 RX ORDER — GABAPENTIN 100 MG/1
100 CAPSULE ORAL 2 TIMES DAILY
Status: DISCONTINUED | OUTPATIENT
Start: 2023-11-10 | End: 2023-11-13 | Stop reason: HOSPADM

## 2023-11-10 RX ORDER — CYCLOBENZAPRINE HCL 5 MG
5 TABLET ORAL ONCE
Status: COMPLETED | OUTPATIENT
Start: 2023-11-10 | End: 2023-11-10

## 2023-11-10 RX ORDER — HYDROCODONE BITARTRATE AND ACETAMINOPHEN 7.5; 325 MG/1; MG/1
1 TABLET ORAL EVERY 6 HOURS PRN
Status: DISCONTINUED | OUTPATIENT
Start: 2023-11-10 | End: 2023-11-11

## 2023-11-10 RX ORDER — PREDNISONE 20 MG/1
40 TABLET ORAL DAILY
Qty: 10 TABLET | Refills: 0 | Status: SHIPPED | OUTPATIENT
Start: 2023-11-11 | End: 2023-11-16

## 2023-11-10 RX ORDER — HYDROCODONE BITARTRATE AND ACETAMINOPHEN 10; 325 MG/1; MG/1
1 TABLET ORAL EVERY 6 HOURS PRN
Qty: 30 TABLET | Refills: 0 | Status: SHIPPED | OUTPATIENT
Start: 2023-11-10

## 2023-11-10 RX ORDER — POLYETHYLENE GLYCOL 3350 17 G/17G
17 POWDER, FOR SOLUTION ORAL 2 TIMES DAILY
Qty: 510 G | Refills: 0 | Status: SHIPPED | OUTPATIENT
Start: 2023-11-10

## 2023-11-10 RX ORDER — CYCLOBENZAPRINE HCL 5 MG
5 TABLET ORAL 3 TIMES DAILY PRN
Status: DISCONTINUED | OUTPATIENT
Start: 2023-11-10 | End: 2023-11-13 | Stop reason: HOSPADM

## 2023-11-10 RX ADMIN — HYDROCODONE BITARTRATE AND ACETAMINOPHEN 1 TABLET: 7.5; 325 TABLET ORAL at 05:11

## 2023-11-10 RX ADMIN — POLYETHYLENE GLYCOL 3350 17 G: 17 POWDER, FOR SOLUTION ORAL at 08:11

## 2023-11-10 RX ADMIN — IPRATROPIUM BROMIDE AND ALBUTEROL SULFATE 3 ML: 2.5; .5 SOLUTION RESPIRATORY (INHALATION) at 08:11

## 2023-11-10 RX ADMIN — IPRATROPIUM BROMIDE AND ALBUTEROL SULFATE 3 ML: 2.5; .5 SOLUTION RESPIRATORY (INHALATION) at 03:11

## 2023-11-10 RX ADMIN — PANTOPRAZOLE SODIUM 40 MG: 40 TABLET, DELAYED RELEASE ORAL at 08:11

## 2023-11-10 RX ADMIN — METOLAZONE 5 MG: 5 TABLET ORAL at 08:11

## 2023-11-10 RX ADMIN — IPRATROPIUM BROMIDE AND ALBUTEROL SULFATE 3 ML: 2.5; .5 SOLUTION RESPIRATORY (INHALATION) at 12:11

## 2023-11-10 RX ADMIN — HYDROCODONE BITARTRATE AND ACETAMINOPHEN 1 TABLET: 10; 325 TABLET ORAL at 11:11

## 2023-11-10 RX ADMIN — PREDNISONE 40 MG: 20 TABLET ORAL at 08:11

## 2023-11-10 RX ADMIN — POTASSIUM CHLORIDE 40 MEQ: 1500 TABLET, EXTENDED RELEASE ORAL at 09:11

## 2023-11-10 RX ADMIN — FUROSEMIDE 40 MG: 40 TABLET ORAL at 08:11

## 2023-11-10 RX ADMIN — LIDOCAINE 5% 1 PATCH: 700 PATCH TOPICAL at 09:11

## 2023-11-10 RX ADMIN — POTASSIUM CHLORIDE 40 MEQ: 1500 TABLET, EXTENDED RELEASE ORAL at 11:11

## 2023-11-10 RX ADMIN — HYDROCODONE BITARTRATE AND ACETAMINOPHEN 1 TABLET: 10; 325 TABLET ORAL at 05:11

## 2023-11-10 RX ADMIN — DOCUSATE SODIUM 200 MG: 100 CAPSULE, LIQUID FILLED ORAL at 08:11

## 2023-11-10 RX ADMIN — CYCLOBENZAPRINE HYDROCHLORIDE 5 MG: 5 TABLET, FILM COATED ORAL at 04:11

## 2023-11-10 RX ADMIN — GABAPENTIN 100 MG: 100 CAPSULE ORAL at 08:11

## 2023-11-10 RX ADMIN — Medication 1 PATCH: at 08:11

## 2023-11-10 RX ADMIN — BUDESONIDE 0.5 MG: 0.5 INHALANT RESPIRATORY (INHALATION) at 08:11

## 2023-11-10 RX ADMIN — IPRATROPIUM BROMIDE AND ALBUTEROL SULFATE 3 ML: 2.5; .5 SOLUTION RESPIRATORY (INHALATION) at 11:11

## 2023-11-10 RX ADMIN — CYCLOBENZAPRINE HYDROCHLORIDE 5 MG: 5 TABLET, FILM COATED ORAL at 08:11

## 2023-11-10 NOTE — NURSING
Bedside Report given to night nurse ERIC Rebollar. Walking rounds completed. Visualized and assessed patient NAD noted. Safety precautions maintained and call light within reach.     Chart check completed.

## 2023-11-10 NOTE — PLAN OF CARE
Plan of care reviewed with patient.  Patient verbalized understanding and had no further questions.  Patient's pain controlled with oral analgesics throughout the shift.  Patient able to work with PT/OT, and sat up in chair most of the day.  Patient now resting comfortably in bed locked in lowest position, side rails up x3, and call bell in reach.  Will continue to monitor.       Problem: Adult Inpatient Plan of Care  Goal: Plan of Care Review  Outcome: Ongoing, Progressing  Goal: Patient-Specific Goal (Individualized)  Outcome: Ongoing, Progressing  Goal: Absence of Hospital-Acquired Illness or Injury  Outcome: Ongoing, Progressing  Goal: Optimal Comfort and Wellbeing  Outcome: Ongoing, Progressing  Goal: Readiness for Transition of Care  Outcome: Ongoing, Progressing     Problem: Fall Injury Risk  Goal: Absence of Fall and Fall-Related Injury  Outcome: Ongoing, Progressing     Problem: Adjustment to Illness COPD (Chronic Obstructive Pulmonary Disease)  Goal: Optimal Chronic Illness Coping  Outcome: Ongoing, Progressing     Problem: Functional Ability Impaired COPD (Chronic Obstructive Pulmonary Disease)  Goal: Optimal Level of Functional [place]  Outcome: Ongoing, Progressing     Problem: Infection COPD (Chronic Obstructive Pulmonary Disease)  Goal: Absence of Infection Signs and Symptoms  Outcome: Ongoing, Progressing     Problem: Oral Intake Inadequate COPD (Chronic Obstructive Pulmonary Disease)  Goal: Improved Nutrition Intake  Outcome: Ongoing, Progressing     Problem: Respiratory Compromise COPD (Chronic Obstructive Pulmonary Disease)  Goal: Effective Oxygenation and Ventilation  Outcome: Ongoing, Progressing     Problem: Skin Injury Risk Increased  Goal: Skin Health and Integrity  Outcome: Ongoing, Progressing     Problem: Pain Acute  Goal: Acceptable Pain Control and Functional Ability  Outcome: Ongoing, Progressing

## 2023-11-10 NOTE — PLAN OF CARE
Problem: Adult Inpatient Plan of Care  Goal: Plan of Care Review  Outcome: Ongoing, Progressing     Problem: Fall Injury Risk  Goal: Absence of Fall and Fall-Related Injury  Outcome: Ongoing, Progressing     Problem: Respiratory Compromise COPD (Chronic Obstructive Pulmonary Disease)  Goal: Effective Oxygenation and Ventilation  Outcome: Ongoing, Progressing     Problem: Skin Injury Risk Increased  Goal: Skin Health and Integrity  Outcome: Ongoing, Progressing     Problem: Pain Acute  Goal: Acceptable Pain Control and Functional Ability  Outcome: Ongoing, Progressing

## 2023-11-10 NOTE — ASSESSMENT & PLAN NOTE
New right apical pleural base soft tissue density appearing since 2022. Radiology recommended CT scan chest lungs with IV contrast recommended for further evaluation new right apical pleural base lesion.   CT scan chest with contrast stated with regard to density seen on previous CT thoracic, this is believed to represent pulmonary apical scarring.

## 2023-11-10 NOTE — RESPIRATORY THERAPY
Seen patient this morning for possible discharge. Patient is currently using BIPAP  (I=12 E= 6, Fio2?).  She was on home NIV, however that was taken away. The patient reported tolerating the current BIPAP mode, and reported she was not tolerating the home version of NIV. She reported the pressure was too high.She never had a sleep study. Co2 was high, 42.    Patient is on NC at 4 LPM Oxygen saturation 94%. She reported she is on home oxygen at 3 LPM.   BBS, good aeration  Appeared in no distress

## 2023-11-10 NOTE — PROGRESS NOTES
[facility] Medicine  Progress Note    Patient Name: Yasmeen Valderrama  MRN: [id number]  Patient Class: IP- Inpatient   Admission Date: 11/7/2023  Length of Stay: 2 days  Attending Physician: Rambo Rojo MD  Primary Care Provider: Leeanna Stuart MD        Subjective:     Principal Problem:Closed compression fracture of body of L1 vertebra        HPI:  Yasmeen Valderrama is a 52 yo female with significant history 1/2 day smoker, KATHERINE no longer on BiPAP due to claustrophobia, COPD on home oxygen 3 L NC, chronic pain, recent non bleeding gastric ulcer on EGD 9/27/23 who arrived to ED via EMS after a fall.  For the past few months patient will fall asleep even while upright position but usually will catch herself.  This a.m. at 2:30 a.m. patient woke up to let her dog out and while standing patient fell asleep and fell onto her buttocks.  Since patient did not pass out or hit head.  Since the fall patient complained of acute on chronic lower back pain 9/10.  She denies any numbness or weakness in all her extremities however reported to Neurosurgery team numbness and pain in arms and legs after the fall but now resolved.   Patient also reports worsening and shortness a breath that started after the fall.  Denies any productive cough.  Denies fever or chills. Patient sleeps in upright position in which she contributes BLE edema however worst then usual.   No prior treatment before arrival to hospital.  In ED, CXR no infiltrate. WBC 16 K (chronically elevated), afebrile.   X ray lumbar, CT thoracic L1 compression fracture. CT cervical no acute cervical fracture.   CT did reveal right apical ground glass opacity and recommended follow up CT 3-6 months.   Seen by Neurosurgery in ED who recommended MRI CTL w wo contrast, TLSO brace when out of bed, no lifting >10lbs, bending or twisting.       Overview/Hospital Course:  Admission to the hospital for acute L1 compression fracture and acute on  "chronic respiratory failure with hypoxia and hypercapnia and COPD exacerbation.  Neurosurgery following. Patient adamantly declined MRI of cervical lumbar and thoracic.  PT OT evaluation completed and recommended low intense therapy.     Lethargy resolved after use of Bipap.  Wheezing anterior/posterior significantly improved DuoNeb, LAMA/LAMA,  IV steroid to PO, home oxygen.  CT also showed "new right apical pleural base soft tissue density appearing since 2022." CT scan chest with contrast stated with regard to density seen on previous CT thoracic, this is believed to represent pulmonary apical scarring.     Discharge cancel due to not able to obtain Bipap until Monday.       Interval History: Seen this am and afternoon, pain more manageable with current analgesics.     Review of Systems   Constitutional:  Positive for activity change and fatigue. Negative for appetite change and chills.   HENT: Negative.     Eyes: Negative.    Respiratory:  Positive for shortness of breath.    Cardiovascular:  Positive for leg swelling. Negative for chest pain and palpitations.   Gastrointestinal: Negative.    Genitourinary: Negative.    Musculoskeletal:  Positive for arthralgias and back pain.   Skin: Negative.    Neurological:  Negative for tremors, syncope and speech difficulty. Weakness: resolved PTA, currently generalized. Numbness: resolved PTA.  Hematological: Negative.    Psychiatric/Behavioral: Negative.       Objective:     Vital Signs (Most Recent):  Temp: 98.4 °F (36.9 °C) (11/10/23 1125)  Pulse: 102 (11/10/23 1243)  Resp: 18 (11/10/23 1243)  BP: 131/74 (11/10/23 1125)  SpO2: 99 % (11/10/23 1243) Vital Signs (24h Range):  Temp:  [97.7 °F (36.5 °C)-99 °F (37.2 °C)] 98.4 °F (36.9 °C)  Pulse:  [] 102  Resp:  [14-24] 18  SpO2:  [94 %-99 %] 99 %  BP: (114-145)/(58-74) 131/74     Weight: 100.4 kg (221 lb 5.5 oz)  Body mass index is 39.21 kg/m².    Intake/Output Summary (Last 24 hours) at 11/10/2023 1523  Last data " filed at 11/10/2023 1125  Gross per 24 hour   Intake 480 ml   Output 2250 ml   Net -1770 ml         Physical Exam  Constitutional:       Appearance: Normal appearance. She is obese. She is ill-appearing. She is not diaphoretic.   HENT:      Head: Normocephalic and atraumatic.      Nose: Nose normal.      Mouth/Throat:      Mouth: Mucous membranes are moist.   Cardiovascular:      Rate and Rhythm: Normal rate and regular rhythm.      Pulses: Normal pulses.      Heart sounds: Normal heart sounds.   Pulmonary:      Breath sounds: No rhonchi. Wheezes: mild expiratory scattered wheeze - improved from yesterday.  Abdominal:      Palpations: Abdomen is soft.   Musculoskeletal:         General: Swelling and signs of injury (lower mid back pain) present.      Right lower leg: Edema present.      Left lower leg: Edema present.   Skin:     General: Skin is warm and dry.   Neurological:      General: No focal deficit present.      Mental Status: She is alert and oriented to person, place, and time. Mental status is at baseline.      Sensory: No sensory deficit.             Significant Labs: All pertinent labs within the past 24 hours have been reviewed.    Significant Imaging: I have reviewed all pertinent imaging results/findings within the past 24 hours.      Assessment/Plan:      * Closed compression fracture of body of L1 vertebra  Patient presented to ED after a fall while upright standing position.  Patient have sleep apnea however returned her BiPAP due to claustrophobia and have been falling asleep while upright position lately but this time not able to catch herself.  Patient fell on her buttock.  No loss of consciousness or head injury  CT and x-ray show L1 compression fracture  Neurosurgery consulted-recommended MRI with without contrast cervical thoracic lumbar however patient declined as not able to lay flat x 5 years  TLSO when OOB  No lifting more than 10 lb no twisting no bending  PT OT eval completed. Low  intensity  Pain controlled with current regimen  Generalized weakness with no focal neuro deficit on exam- plan as above      Acute on Chronic respiratory failure with hypoxia and hypercapnia  Patient with history obesity, severe COPD on home 3 L NC, pulmonary hypertension and KATHERINE not on BiPAP due to claustrophobia, continue smoking who was admitted for falling asleep in upright position sustaining  L1 compression fracture pain medication/muscle relaxer.   VBG on admission 7.34/81/43/42  11/8 able to answer question however falling asleep frequently during conversation . Tolerated bipap   Treat COPD exacerbation- imrpoving  Need to get back on BiPap  , smoking cessation     TN/SW assisting with Bipap      Stage 4 very severe COPD by GOLD classification  Patient's COPD is with exacerbation noted by continued dyspnea currently.  Patient is currently on COPD Pathway. Continue scheduled inhalers Steroids and Supplemental oxygen and monitor respiratory status closely.  Encourage smoking cessation  Continue home LAMA/LABA, duoneb q 4, deescalate IV to PO steroid x 5 days        Pulmonary hypertension  PASP 52 mmHg 2/10/2022> 37 mmhg 12/2022  Continue home lasix, zarosylyn  Encourage smoking cessation       Cervical stenosis of spinal canal  Initial fall patient reported pain and numbness in extremities however resolved prior to arriving at hospital   Neurosurgery following MRI of cervical with without contrast  .      KATHERINE (obstructive sleep apnea)  Returned her BiPAP a few months ago due to claustrophobia.   Continue Bipap qhs and day time nap prn      Debility  Patient with Acute on chronic debility due to fracture/prosthesis.   PT/OT evaluation completed. Patient have L 1 compression fracture now significantly impairs patient ability to participate in one or more mobility related to ADLs at home. Pateint can't use a rolling walker as recommended for fall distance. Patient's functional mobility deficit can be  sufficiently resolved with the use of a wheelchair.     ACP (advance care planning)  Advance Care Planning     Date: 11/08/2023    Code Status  In light of the patients advanced and life limiting illness,I engaged the the patient and family in a voluntary conversation about the patient's preferences for care  at the very end of life. Long discussion with patient and family regarding patient respiratory issues stated above , including respiratory and cardiac arrest. Patient wish to have CPR or other invasive treatments performed when her heart and/or breathing stops.       Abnormal CT scan  New right apical pleural base soft tissue density appearing since 2022. Radiology recommended CT scan chest lungs with IV contrast recommended for further evaluation new right apical pleural base lesion.   CT scan chest with contrast stated with regard to density seen on previous CT thoracic, this is believed to represent pulmonary apical scarring.         Tobacco abuse  One pack a day smoker.  Encouraged smoking cessation.  okay with nicotine patch.      Other specified anemias  Patient's anemia is currently controlled. Has not received any PRBCs to date. Etiology likely d/t Iron deficiency  Current CBC reviewed-   Lab Results   Component Value Date    HGB 8.4 (L) 11/07/2023    HCT 28.0 (L) 11/07/2023   Monitor serial CBC and transfuse if patient becomes hemodynamically unstable, symptomatic or H/H drops below 7/21.  Hgb stable compared to last hgb 8.5 9/27 . Low T sat during that admission   Ferrous sulfate    Leukocytosis  Chronically elevated.  Afebrile.  No obvious infectious process.      GI bleed  Admission in Sept and hgb stable compared to previous lab  9/27/22 EGD non bleeding gastric and duonenal ulcer   Add iron studies  PPI BID      COPD exacerbation  See above     Aortic atherosclerosis  CArdiologist Dr. Olvera  11/15/22 Coronary calcification on CT scan  11/15/22 Dobutamine stress test  Not on aSA due to recent  GIB/gastric ulcer  Lab Results   Component Value Date    LDLCALC 92.0 09/29/2020   not on statin at home, add lipid panel          Chronic pain syndrome  On Suboxone at home.  We will hold Suboxone while acute pain control  Will need pain management on discharge      Generalized edema  BLE edema chronic for patient. Sleeps in sitting position at home.   Last 2 D echo 12/2022 normal EF 55%, normal diastolic and normal sized ventricle  Continue home lasix zarozyln      VTE Risk Mitigation (From admission, onward)           Ordered     IP VTE HIGH RISK PATIENT  Once         11/07/23 1429     Place sequential compression device  Until discontinued         11/07/23 1429     Place sequential compression device  Until discontinued         11/07/23 1134                    Discharge Planning   MONICO: 11/10/2023     Code Status: Full Code   Is the patient medically ready for discharge?:     Reason for patient still in hospital (select all that apply): Treatment  Discharge Plan A: Home with family            Ella Winston NP  Department of Hospital Medicine   [facility] [facility]

## 2023-11-10 NOTE — PT/OT/SLP PROGRESS
Occupational Therapy   Treatment    Name: Yasmeen Valderrama  MRN: [id number]  Admitting Diagnosis:  Closed compression fracture of body of L1 vertebra       Recommendations:     Discharge Recommendations: Low Intensity Therapy  Discharge Equipment Recommendations:  walker, rolling  Barriers to discharge: none    Assessment:     Yasmeen Valderrama is a 53 y.o. female with a medical diagnosis of Closed compression fracture of body of L1 vertebra.  She presents with the following performance deficits affecting function: weakness, impaired endurance, impaired self care skills, gait instability, impaired functional mobility, decreased safety awareness, pain, decreased upper extremity function, decreased lower extremity function, impaired balance, decreased ROM, impaired cardiopulmonary response to activity, orthopedic precautions.     Rehab Prognosis:  Good; patient would benefit from acute skilled OT services to address these deficits and reach maximum level of function.       Plan:     Patient to be seen 5 x/week to address the above listed problems via self-care/home management, therapeutic activities, therapeutic exercises  Plan of Care Expires: 11/15/23  Plan of Care Reviewed with: patient, spouse    Subjective     Chief Complaint: back pain  Patient/Family Comments/goals: Pt very pleasant and agreeable to therapy.  Pain/Comfort:  Pain Rating 1: 8/10  Location 1: back  Pain Addressed 1: Pre-medicate for activity, Reposition, Distraction    Objective:     Communicated with: Nurse prior to session.  Patient found HOB elevated with oxygen, telemetry, PureWick upon OT entry to room.    General Precautions: Standard, fall    Orthopedic Precautions:spinal precautions  Braces: TLSO  Respiratory Status: Nasal cannula, flow 4 L/min, SPO2 94%     Occupational Performance:     Bed Mobility:    Patient completed Supine to Sit with stand by assistance with HOB fully elevated    Functional Mobility/Transfers:  Patient completed  Sit <> Stand Transfer with stand by assistance/contact guard assistance  with  rolling walker   Patient completed Bed <> Chair Transfer using Step Transfer technique with stand by assistance with rolling walker  Functional Mobility: Pt able to complete functional mobility within room ~ 20 ft SBA, RW and TLSO on. 4L O2 NC in place. Pt c/o pain, but overall tolerated well.    Activities of Daily Living:  Upper Body Dressing: moderate assistance to don back gown and TLSO brace  Lower Body Dressing: total assistance to don socks      AMPA 6 Click ADL: 18    Treatment & Education:  Bed mobility, functional transfer/mobility , and ADL completed as noted above.   Pt educated on safety awareness with all OOB mobility and ADL .   Encouraged increased OOB activity to maximize recovery.  Pt educated on spinal precautions: no bending, lifting >10 pounds, twisting. TLSO on when OOB. Pt verbalized understanding.  Education on proper body mechanics with sit<>stand and during ADL. Discussed DME.     Patient left up in chair on pressure relief cushion, with TLSO on with all lines intact, call button in reach, and nurse notified    GOALS:   Multidisciplinary Problems       Occupational Therapy Goals          Problem: Occupational Therapy    Goal Priority Disciplines Outcome Interventions   Occupational Therapy Goal     OT, PT/OT Ongoing, Progressing    Description: Goals to be met by: 11/15/23     Patient will increase functional independence with ADLs by performing:    UE Dressing with Modified [place] including donning TLSO.   LE Dressing with Modified [place].  Grooming while seated at sink with Modified [place].  Toileting from C over toilet with Modified [place] for hygiene and clothing management.   Sitting in chair x 60 minutes with Supervision.  Toilet transfer to toilet with Supervision due to oxygen use   Upper extremity exercise program x10  reps per handout, with supervision.                          Time Tracking:     OT Date of Treatment: 11/10/23  OT Start Time: 1127  OT Stop Time: 1157  OT Total Time (min): 30 min    Billable Minutes:Self Care/Home Management 10  Therapeutic Activity 20    OT/JULIANNA: JULIANNA     Number of JULIANNA visits since last OT visit: 1    11/10/2023

## 2023-11-10 NOTE — NURSING
[facility]  Nurses Note -- 4 Eyes      11/9/2023      Skin assessed on: Q Shift      [x] No Pressure Injuries Present    [x]Prevention Measures Documented    [] Yes LDA  for Pressure Injury Previously documented     [] Yes New Pressure Injury Discovered   [] LDA for New Pressure Injury Added      Attending RN:  Britney Covington RN     Second RN:  KUN Lechuga

## 2023-11-10 NOTE — ASSESSMENT & PLAN NOTE
Patient with Acute on chronic debility due to fracture/prosthesis.   PT/OT evaluation completed. Patient have L 1 compression fracture now significantly impairs patient ability to participate in one or more mobility related to ADLs at home. Pateint can't use a rolling walker as recommended for fall distance. Patient's functional mobility deficit can be sufficiently resolved with the use of a wheelchair.

## 2023-11-10 NOTE — PLAN OF CARE
SOFIE notified by Keely at [facility] that diagnosis used on order does not qualify patient for Bipap. Keely stated that patient as a $1,000 balance from the last time. Keely also informed SW that patient did not use the Bipap at all. The insurance denied her and she was billed for the months she had it.      SOFIE notified NP. NP stated that patient will not be able to discharge without Bipap.     SOFIE sent order for BIPAP will follow up Monday.

## 2023-11-10 NOTE — SUBJECTIVE & OBJECTIVE
Interval History: Seen this am and afternoon, pain more manageable with current analgesics.     Review of Systems   Constitutional:  Positive for activity change and fatigue. Negative for appetite change and chills.   HENT: Negative.     Eyes: Negative.    Respiratory:  Positive for shortness of breath.    Cardiovascular:  Positive for leg swelling. Negative for chest pain and palpitations.   Gastrointestinal: Negative.    Genitourinary: Negative.    Musculoskeletal:  Positive for arthralgias and back pain.   Skin: Negative.    Neurological:  Negative for tremors, syncope and speech difficulty. Weakness: resolved PTA, currently generalized. Numbness: resolved PTA.  Hematological: Negative.    Psychiatric/Behavioral: Negative.       Objective:     Vital Signs (Most Recent):  Temp: 98.4 °F (36.9 °C) (11/10/23 1125)  Pulse: 102 (11/10/23 1243)  Resp: 18 (11/10/23 1243)  BP: 131/74 (11/10/23 1125)  SpO2: 99 % (11/10/23 1243) Vital Signs (24h Range):  Temp:  [97.7 °F (36.5 °C)-99 °F (37.2 °C)] 98.4 °F (36.9 °C)  Pulse:  [] 102  Resp:  [14-24] 18  SpO2:  [94 %-99 %] 99 %  BP: (114-145)/(58-74) 131/74     Weight: 100.4 kg (221 lb 5.5 oz)  Body mass index is 39.21 kg/m².    Intake/Output Summary (Last 24 hours) at 11/10/2023 1523  Last data filed at 11/10/2023 1125  Gross per 24 hour   Intake 480 ml   Output 2250 ml   Net -1770 ml         Physical Exam  Constitutional:       Appearance: Normal appearance. She is obese. She is ill-appearing. She is not diaphoretic.   HENT:      Head: Normocephalic and atraumatic.      Nose: Nose normal.      Mouth/Throat:      Mouth: Mucous membranes are moist.   Cardiovascular:      Rate and Rhythm: Normal rate and regular rhythm.      Pulses: Normal pulses.      Heart sounds: Normal heart sounds.   Pulmonary:      Breath sounds: No rhonchi. Wheezes: mild expiratory scattered wheeze - improved from yesterday.  Abdominal:      Palpations: Abdomen is soft.   Musculoskeletal:          General: Swelling and signs of injury (lower mid back pain) present.      Right lower leg: Edema present.      Left lower leg: Edema present.   Skin:     General: Skin is warm and dry.   Neurological:      General: No focal deficit present.      Mental Status: She is alert and oriented to person, place, and time. Mental status is at baseline.      Sensory: No sensory deficit.             Significant Labs: All pertinent labs within the past 24 hours have been reviewed.    Significant Imaging: I have reviewed all pertinent imaging results/findings within the past 24 hours.

## 2023-11-10 NOTE — PLAN OF CARE
[facility] [place] - Telemetry      HOME HEALTH ORDERS  FACE TO FACE ENCOUNTER    Patient Name: Yasmeen Valderrama  YOB: 1970    PCP: Leeanna Stuart MD   [facility] [address] [place] [address]  PCP Phone Number: [phone]  PCP Fax: [phone]    Encounter Date: 11/7/23    Admit to Home Health    Diagnoses:  Active Hospital Problems    Diagnosis  POA    *Closed compression fracture of body of L1 vertebra [S32.010A]  Yes     Priority: 1 - High    Acute on Chronic respiratory failure with hypoxia and hypercapnia [J96.11, J96.12]  Yes     Priority: 2      Chronic     home portable 2-3 years  Has continuous mini-portable  History of hospitalization and very severe COPD.  Ideally NIV but since she has not tried and fail therapy,  will try BIPAP with AVAPS so we can expedite treatment  FEV1 only 0.46 L and 17% of predicted, she would not likely  tolerate CPAP or simple BiPAP without algorithm to adjust to changing respiratory needs.      Stage 4 very severe COPD by GOLD classification [J44.9]  Yes     Priority: 3      Chronic     CT chest 4/12/2016:IMPRESSION:     Atelectasis or parenchymal scarring in the right lower lobe laterally and anteriorly and in the inferior lingular portion of the left upper lobe.   Paraseptal emphysematous changes in the medial aspect of the right lung apex.  Will need updated imaging. High risk of cancer.   Pulmonary studies 9/12/2022:  ABG pH 7.357 pCO2 70 pO2 45 pHCO3 39 SpO2 77  PFT:  Ratio 36 FEV1 0.46 (17) FVC 1.27 (39)TLC 68 DLCO 48    Plan: BIPAP with AVAPS  Continuous home oxygen concentrator   Resume trelegy   Pulmonary rehab  Smoking cessation (need 6 months cessation before transplant consideration if she does not have cancer)  Airway clearance regimen -duoneb, saline, aerobika, follow by cough or huffing          Pulmonary hypertension [I27.20]  Yes     Priority: 4      Chronic     Likely secondary hypoxia and pulmonary disease but  also at high risk cardiac problems, follow by cardiology as well    ECHO 2/10/2022:  The estimated ejection fraction is 55%.  The left ventricle is normal in size with normal systolic function.  Normal left ventricular diastolic function.  Moderate right ventricular enlargement with mildly reduced right ventricular systolic function.  Moderate left atrial enlargement.  Mild tricuspid regurgitation.  Mild right atrial enlargement.  Intermediate central venous pressure (8 mmHg).  The estimated PA systolic pressure is 52 mmHg.  There is pulmonary hypertension.      Cervical stenosis of spinal canal [M48.02]  Yes     Priority: 6      12/2016 MRI severe disease C5/6      KATHERINE (obstructive sleep apnea) [G47.33]  Yes     Priority: 7     Abnormal CT scan [R93.89]  Yes    ACP (advance care planning) [Z71.89]  Not Applicable    Other specified anemias [D64.89]  Yes     Chronic    Tobacco abuse [Z72.0]  Yes    Leukocytosis [D72.829]  Yes    COPD exacerbation [J44.1]  Yes    Aortic atherosclerosis [I70.0]  Yes    Chronic pain syndrome [G89.4]  Yes       06/25/2018 7/22 next fills.    New guidelines  Wean to 0.5 mg 25/month of xanax  Stay with 10/325 mg of hydrocodone      Generalized edema [R60.1]  Yes      Resolved Hospital Problems   No resolved problems to display.       Follow Up Appointments:  No future appointments.    Allergies:  Review of patient's allergies indicates:   Allergen Reactions    Antivert [meclizine] Other (See Comments)     Behavioral changes       Medications: Review discharge medications with patient and family and provide education.    Current Facility-Administered Medications   Medication Dose Route Frequency Provider Last Rate Last Admin    albuterol-ipratropium 2.5 mg-0.5 mg/3 mL nebulizer solution 3 mL  3 mL Nebulization Q4H Ella Winston NP   3 mL at 11/10/23 0817    budesonide nebulizer solution 0.5 mg  0.5 mg Nebulization BID Ella Winston NP   0.5 mg at 11/10/23 0817     cyclobenzaprine tablet 5 mg  5 mg Oral TID PRN Ella Winston NP        diazePAM injection 5 mg  5 mg Intravenous Once PRN Rambo Rojo MD        docusate sodium capsule 200 mg  200 mg Oral BID Winston Ella Butts, NP   200 mg at 11/10/23 0808    furosemide tablet 40 mg  40 mg Oral Daily Winston Ella Butts, NP   40 mg at 11/10/23 0808    gabapentin capsule 100 mg  100 mg Oral BID WinstonElla bernal, NP   100 mg at 11/10/23 0808    HYDROcodone-acetaminophen  mg per tablet 1 tablet  1 tablet Oral Q6H PRN Ella Winston NP        HYDROcodone-acetaminophen 7.5-325 mg per tablet 1 tablet  1 tablet Oral Q6H PRN Ella Winston NP        influenza (QUADRIVALENT PF) vaccine 0.5 mL  0.5 mL Intramuscular vaccine x 1 dose Rambo Rojo MD        LIDOcaine 5 % patch 1 patch  1 patch Transdermal Q24H Ella Winston NP   1 patch at 11/08/23 2122    metOLazone tablet 5 mg  5 mg Oral Daily Winston, Ella Benjamín, NP   5 mg at 11/10/23 0808    nicotine 21 mg/24 hr 1 patch  1 patch Transdermal Daily Winston, Ella Benjamín, NP   1 patch at 11/10/23 0809    pantoprazole EC tablet 40 mg  40 mg Oral BID Ella Winston Benjamín, NP   40 mg at 11/10/23 0808    polyethylene glycol packet 17 g  17 g Oral BID Winston, Ella Benjamín, NP   17 g at 11/10/23 0808    potassium chloride SA CR tablet 40 mEq  40 mEq Oral Q2H WinstonElla bernal, NP   40 mEq at 11/10/23 0934    predniSONE tablet 40 mg  40 mg Oral Daily Ella Winston, NP   40 mg at 11/10/23 0808    sodium chloride 0.9% flush 10 mL  10 mL Intravenous PRN Ella Winston NP        sodium chloride 0.9% flush 3 mL  3 mL Intravenous PRN Ella Winston NP         Current Discharge Medication List        START taking these medications    Details   cyclobenzaprine (FLEXERIL) 5 MG tablet Take 1 tablet (5 mg total) by mouth 3 (three) times daily as needed for Muscle spasms.  Qty: 30 tablet, Refills: 0      gabapentin (NEURONTIN) 100 MG capsule Take 1 capsule (100  mg total) by mouth 2 (two) times daily.  Qty: 60 capsule, Refills: 1      HYDROcodone-acetaminophen (NORCO)  mg per tablet Take 1 tablet by mouth every 6 (six) hours as needed.  Qty: 30 tablet, Refills: 0    Comments: Quantity prescribed more than 7 day supply? No      polyethylene glycol (GLYCOLAX) 17 gram PwPk Take 17 g by mouth 2 (two) times daily.  Qty: 60 each, Refills: 0      potassium chloride SA (K-DUR,KLOR-CON) 20 MEQ tablet Take 1 tablet (20 mEq total) by mouth once daily.  Qty: 30 tablet, Refills: 3      predniSONE (DELTASONE) 20 MG tablet Take 2 tablets (40 mg total) by mouth once daily. for 5 days  Qty: 10 tablet, Refills: 0           CONTINUE these medications which have NOT CHANGED    Details   albuterol (PROVENTIL/VENTOLIN HFA) 90 mcg/actuation inhaler Inhale 2 puffs into the lungs every 6 (six) hours as needed.      albuterol-ipratropium (DUO-NEB) 2.5 mg-0.5 mg/3 mL nebulizer solution Take 3 mLs by nebulization every 4 (four) hours as needed for Wheezing. Rescue  Qty: 1620 mL, Refills: 5    Associated Diagnoses: Stage 4 very severe COPD by GOLD classification      fluocinolone acetonide oiL 0.01 % Drop Place 5 drops into both ears 2 (two) times daily.      fluticasone-umeclidin-vilanter (TRELEGY ELLIPTA) 200-62.5-25 mcg inhaler Trelegy Ellipta 200 mcg-62.5 mcg-25 mcg powder for inhalation   INHALE 1 PUFF(S) BY MOUTH into the lungs ONCE A DAY      furosemide (LASIX) 40 MG tablet Take 40 mg by mouth 2 (two) times daily as needed.      LINZESS 72 mcg Cap capsule Take 72 mcg by mouth every morning.      metOLazone (ZAROXOLYN) 5 MG tablet SMARTSI.5 Tablet(s) By Mouth Every Other Day      olopatadine (PATANOL) 0.1 % ophthalmic solution SMARTSI Drop(s) In Eye(s) Twice Daily PRN      pantoprazole (PROTONIX) 40 MG tablet Take 1 tablet (40 mg total) by mouth 2 (two) times daily.  Qty: 60 tablet, Refills: 1           STOP taking these medications       gabapentin (NEURONTIN) 600 MG tablet Comments:    Reason for Stopping:         ipratropium (ATROVENT) 0.02 % nebulizer solution Comments:   Reason for Stopping:         SUBOXONE 12-3 mg Film Comments:   Reason for Stopping:                 I have seen and examined this patient within the last 30 days. My clinical findings that support the need for the home health skilled services and home bound status are the following:no   Weakness/numbness causing balance and gait disturbance due to Fracture making it taxing to leave home.  Requiring assistive device to leave home due to unsteady gait caused by  Fracture.  Medical restrictions requiring assistance of another human to leave home due to  Dyspnea on exertion (SOB).     Diet:   cardiac diet    Labs:      Referrals/ Consults  Physical Therapy to evaluate and treat. Evaluate for home safety and equipment needs;    to evaluate for community resources/long-range planning.  Aide to provide assistance with personal care, ADLs, and vital signs.    Activities:   activity as tolerated    Nursing:   Agency to admit patient within 24 hours of hospital discharge unless specified on physician order or at patient request    SN to complete comprehensive assessment including routine vital signs. Instruct on disease process and s/s of complications to report to MD. Review/verify medication list sent home with the patient at time of discharge  and instruct patient/caregiver as needed. Frequency may be adjusted depending on start of care date.     Skilled nurse to perform up to 3 visits PRN for symptoms related to diagnosis    Notify MD if SBP > 160 or < 90; DBP > 90 or < 50; HR > 120 or < 50; Temp > 101; O2 < 88%; Other:     Ok to schedule additional visits based on staff availability and patient request on consecutive days within the home health episode.    When multiple disciplines ordered:    Start of Care occurs on Sunday - Wednesday schedule remaining discipline evaluations as ordered on separate consecutive days  following the start of care.    Thursday SOC -schedule subsequent evaluations Friday and Monday the following week.     Friday - Saturday SOC - schedule subsequent discipline evaluations on consecutive days starting Monday of the following week.    For all post-discharge communication and subsequent orders please contact patient's primary care physician.       Home Health Aide:  Nursing Three times weekly,   and Home Health Aide Three times weekly        I certify that this patient is confined to her home and needs intermittent skilled nursing care,

## 2023-11-10 NOTE — ASSESSMENT & PLAN NOTE
Patient with history obesity, severe COPD on home 3 L NC, pulmonary hypertension and KATHERINE not on BiPAP due to claustrophobia, continue smoking who was admitted for falling asleep in upright position sustaining  L1 compression fracture pain medication/muscle relaxer.   VBG on admission 7.34/81/43/42  11/8 able to answer question however falling asleep frequently during conversation . Tolerated bipap   Treat COPD exacerbation- imrpoving  Need to get back on BiPap  , smoking cessation     TN/SW assisting with Bipap

## 2023-11-10 NOTE — CONSULTS
[facility] Medicine  Telemedicine Consult Note    Patient Name: Yasmeen Valderrama  MRN: [id number]  Admission Date: 11/7/2023  Hospital Length of Stay: 2 days  Attending Physician: Rambo Rojo MD   Primary Care Provider: Leeanna Stuart MD       Thank you for your consult to [facility]. We have reviewed the patient chart. This patient does meet criteria for [facility] service at this time.  Will assume care on 11/11/23 at 6AM.        Teresa Lopez MD  Department of Hospital Medicine   [facility]

## 2023-11-10 NOTE — PLAN OF CARE
Problem: Occupational Therapy  Goal: Occupational Therapy Goal  Description: Goals to be met by: 11/15/23     Patient will increase functional independence with ADLs by performing:    UE Dressing with Modified [place] including donning TLSO.   LE Dressing with Modified [place].  Grooming while seated at sink with Modified [place].  Toileting from BSC over toilet with Modified [place] for hygiene and clothing management.   Sitting in chair x 60 minutes with Supervision.  Toilet transfer to toilet with Supervision due to oxygen use   Upper extremity exercise program x10  reps per handout, with supervision.    Outcome: Ongoing, Progressing

## 2023-11-11 LAB
ALBUMIN SERPL BCP-MCNC: 3.5 G/DL (ref 3.5–5.2)
ALP SERPL-CCNC: 106 U/L (ref 55–135)
ALT SERPL W/O P-5'-P-CCNC: 11 U/L (ref 10–44)
ANION GAP SERPL CALC-SCNC: 10 MMOL/L (ref 8–16)
AST SERPL-CCNC: 13 U/L (ref 10–40)
BASOPHILS # BLD AUTO: 0.04 K/UL (ref 0–0.2)
BASOPHILS NFR BLD: 0.3 % (ref 0–1.9)
BILIRUB SERPL-MCNC: 0.5 MG/DL (ref 0.1–1)
BUN SERPL-MCNC: 19 MG/DL (ref 6–20)
CALCIUM SERPL-MCNC: 10 MG/DL (ref 8.7–10.5)
CHLORIDE SERPL-SCNC: 88 MMOL/L (ref 95–110)
CO2 SERPL-SCNC: 43 MMOL/L (ref 23–29)
CREAT SERPL-MCNC: 0.7 MG/DL (ref 0.5–1.4)
DIFFERENTIAL METHOD: ABNORMAL
EOSINOPHIL # BLD AUTO: 0.1 K/UL (ref 0–0.5)
EOSINOPHIL NFR BLD: 0.7 % (ref 0–8)
ERYTHROCYTE [DISTWIDTH] IN BLOOD BY AUTOMATED COUNT: 21.1 % (ref 11.5–14.5)
EST. GFR  (NO RACE VARIABLE): >60 ML/MIN/1.73 M^2
GLUCOSE SERPL-MCNC: 110 MG/DL (ref 70–110)
HCT VFR BLD AUTO: 28.4 % (ref 37–48.5)
HGB BLD-MCNC: 8.3 G/DL (ref 12–16)
IMM GRANULOCYTES # BLD AUTO: 0.08 K/UL (ref 0–0.04)
IMM GRANULOCYTES NFR BLD AUTO: 0.5 % (ref 0–0.5)
LYMPHOCYTES # BLD AUTO: 3.8 K/UL (ref 1–4.8)
LYMPHOCYTES NFR BLD: 24.8 % (ref 18–48)
MCH RBC QN AUTO: 22.3 PG (ref 27–31)
MCHC RBC AUTO-ENTMCNC: 29.2 G/DL (ref 32–36)
MCV RBC AUTO: 76 FL (ref 82–98)
MONOCYTES # BLD AUTO: 1.5 K/UL (ref 0.3–1)
MONOCYTES NFR BLD: 9.6 % (ref 4–15)
NEUTROPHILS # BLD AUTO: 9.9 K/UL (ref 1.8–7.7)
NEUTROPHILS NFR BLD: 64.1 % (ref 38–73)
NRBC BLD-RTO: 0 /100 WBC
PHOSPHATE SERPL-MCNC: 4.2 MG/DL (ref 2.7–4.5)
PLATELET # BLD AUTO: 370 K/UL (ref 150–450)
PMV BLD AUTO: 9.5 FL (ref 9.2–12.9)
POTASSIUM SERPL-SCNC: 3.9 MMOL/L (ref 3.5–5.1)
PROT SERPL-MCNC: 6.6 G/DL (ref 6–8.4)
RBC # BLD AUTO: 3.72 M/UL (ref 4–5.4)
SODIUM SERPL-SCNC: 141 MMOL/L (ref 136–145)
WBC # BLD AUTO: 15.38 K/UL (ref 3.9–12.7)

## 2023-11-11 PROCEDURE — 85025 COMPLETE CBC W/AUTO DIFF WBC: CPT | Performed by: NURSE PRACTITIONER

## 2023-11-11 PROCEDURE — 25000003 PHARM REV CODE 250: Performed by: NURSE PRACTITIONER

## 2023-11-11 PROCEDURE — 25000242 PHARM REV CODE 250 ALT 637 W/ HCPCS: Performed by: NURSE PRACTITIONER

## 2023-11-11 PROCEDURE — 84100 ASSAY OF PHOSPHORUS: CPT | Performed by: NURSE PRACTITIONER

## 2023-11-11 PROCEDURE — 94660 CPAP INITIATION&MGMT: CPT

## 2023-11-11 PROCEDURE — 21400001 HC TELEMETRY ROOM

## 2023-11-11 PROCEDURE — 94761 N-INVAS EAR/PLS OXIMETRY MLT: CPT

## 2023-11-11 PROCEDURE — S4991 NICOTINE PATCH NONLEGEND: HCPCS | Performed by: NURSE PRACTITIONER

## 2023-11-11 PROCEDURE — 94640 AIRWAY INHALATION TREATMENT: CPT

## 2023-11-11 PROCEDURE — 25000003 PHARM REV CODE 250: Performed by: HOSPITALIST

## 2023-11-11 PROCEDURE — 99900035 HC TECH TIME PER 15 MIN (STAT)

## 2023-11-11 PROCEDURE — 63600175 PHARM REV CODE 636 W HCPCS: Performed by: NURSE PRACTITIONER

## 2023-11-11 PROCEDURE — 80053 COMPREHEN METABOLIC PANEL: CPT | Performed by: NURSE PRACTITIONER

## 2023-11-11 PROCEDURE — 36415 COLL VENOUS BLD VENIPUNCTURE: CPT | Performed by: NURSE PRACTITIONER

## 2023-11-11 PROCEDURE — 27000221 HC OXYGEN, UP TO 24 HOURS

## 2023-11-11 RX ORDER — HYDROCODONE BITARTRATE AND ACETAMINOPHEN 10; 325 MG/1; MG/1
1 TABLET ORAL EVERY 4 HOURS PRN
Status: DISCONTINUED | OUTPATIENT
Start: 2023-11-11 | End: 2023-11-13 | Stop reason: HOSPADM

## 2023-11-11 RX ADMIN — CYCLOBENZAPRINE HYDROCHLORIDE 5 MG: 5 TABLET, FILM COATED ORAL at 12:11

## 2023-11-11 RX ADMIN — LIDOCAINE 5% 1 PATCH: 700 PATCH TOPICAL at 09:11

## 2023-11-11 RX ADMIN — PANTOPRAZOLE SODIUM 40 MG: 40 TABLET, DELAYED RELEASE ORAL at 09:11

## 2023-11-11 RX ADMIN — BUDESONIDE 0.5 MG: 0.5 INHALANT RESPIRATORY (INHALATION) at 07:11

## 2023-11-11 RX ADMIN — PANTOPRAZOLE SODIUM 40 MG: 40 TABLET, DELAYED RELEASE ORAL at 08:11

## 2023-11-11 RX ADMIN — IPRATROPIUM BROMIDE AND ALBUTEROL SULFATE 3 ML: 2.5; .5 SOLUTION RESPIRATORY (INHALATION) at 07:11

## 2023-11-11 RX ADMIN — POLYETHYLENE GLYCOL 3350 17 G: 17 POWDER, FOR SOLUTION ORAL at 09:11

## 2023-11-11 RX ADMIN — HYDROCODONE BITARTRATE AND ACETAMINOPHEN 1 TABLET: 10; 325 TABLET ORAL at 11:11

## 2023-11-11 RX ADMIN — IPRATROPIUM BROMIDE AND ALBUTEROL SULFATE 3 ML: 2.5; .5 SOLUTION RESPIRATORY (INHALATION) at 08:11

## 2023-11-11 RX ADMIN — PREDNISONE 40 MG: 20 TABLET ORAL at 08:11

## 2023-11-11 RX ADMIN — IPRATROPIUM BROMIDE AND ALBUTEROL SULFATE 3 ML: 2.5; .5 SOLUTION RESPIRATORY (INHALATION) at 12:11

## 2023-11-11 RX ADMIN — DOCUSATE SODIUM 200 MG: 100 CAPSULE, LIQUID FILLED ORAL at 08:11

## 2023-11-11 RX ADMIN — METOLAZONE 5 MG: 5 TABLET ORAL at 08:11

## 2023-11-11 RX ADMIN — CYCLOBENZAPRINE HYDROCHLORIDE 5 MG: 5 TABLET, FILM COATED ORAL at 07:11

## 2023-11-11 RX ADMIN — GABAPENTIN 100 MG: 100 CAPSULE ORAL at 09:11

## 2023-11-11 RX ADMIN — IPRATROPIUM BROMIDE AND ALBUTEROL SULFATE 3 ML: 2.5; .5 SOLUTION RESPIRATORY (INHALATION) at 04:11

## 2023-11-11 RX ADMIN — HYDROCODONE BITARTRATE AND ACETAMINOPHEN 1 TABLET: 10; 325 TABLET ORAL at 07:11

## 2023-11-11 RX ADMIN — GABAPENTIN 100 MG: 100 CAPSULE ORAL at 08:11

## 2023-11-11 RX ADMIN — Medication 1 PATCH: at 08:11

## 2023-11-11 RX ADMIN — HYDROCODONE BITARTRATE AND ACETAMINOPHEN 1 TABLET: 10; 325 TABLET ORAL at 03:11

## 2023-11-11 RX ADMIN — DOCUSATE SODIUM 200 MG: 100 CAPSULE, LIQUID FILLED ORAL at 09:11

## 2023-11-11 RX ADMIN — BUDESONIDE 0.5 MG: 0.5 INHALANT RESPIRATORY (INHALATION) at 08:11

## 2023-11-11 RX ADMIN — HYDROCODONE BITARTRATE AND ACETAMINOPHEN 1 TABLET: 10; 325 TABLET ORAL at 05:11

## 2023-11-11 RX ADMIN — FUROSEMIDE 40 MG: 40 TABLET ORAL at 08:11

## 2023-11-11 NOTE — PT/OT/SLP PROGRESS
Physical Therapy      Patient Name:  Yasmeen Valderrama   MRN:  [id number]    Patient not seen today secondary to Other (pt just finished using BSC and about to receive breathing treatment by RT). Will follow-up.

## 2023-11-11 NOTE — NURSING
[facility] [place]  Nurses Note -- 4 Eyes      11/11/2023       Skin assessed on: Q Shift      [x] No Pressure Injuries Present    [x]Prevention Measures Documented    [] Yes LDA  for Pressure Injury Previously documented     [] Yes New Pressure Injury Discovered   [] LDA for New Pressure Injury Added      Attending RN:  Micheal Swain, RN     Second RN:  Karina KHAN RN

## 2023-11-11 NOTE — SUBJECTIVE & OBJECTIVE
Interval History: No acute events overnight. Awaiting CM to assist with Bipap on discharge. Stable for discharge.     Review of Systems   Constitutional:  Negative for chills and fever.   Respiratory:  Negative for shortness of breath.    Cardiovascular:  Negative for chest pain.   Gastrointestinal:  Negative for abdominal pain.     Objective:     Vital Signs (Most Recent):  Temp: 97.9 °F (36.6 °C) (11/11/23 0723)  Pulse: 86 (11/11/23 0723)  Resp: 18 (11/11/23 0723)  BP: 129/67 (11/11/23 0723)  SpO2: 98 % (11/11/23 0723) Vital Signs (24h Range):  Temp:  [97.9 °F (36.6 °C)-98.4 °F (36.9 °C)] 97.9 °F (36.6 °C)  Pulse:  [] 86  Resp:  [14-24] 18  SpO2:  [95 %-100 %] 98 %  BP: (129-142)/(65-85) 129/67     Weight: 100.4 kg (221 lb 5.5 oz)  Body mass index is 39.21 kg/m².    Intake/Output Summary (Last 24 hours) at 11/11/2023 0746  Last data filed at 11/11/2023 0517  Gross per 24 hour   Intake 480 ml   Output 3200 ml   Net -2720 ml         Physical Exam  Vitals and nursing note reviewed.   Constitutional:       General: She is awake. She is not in acute distress.     Appearance: Normal appearance. She is well-developed and well-groomed. She is not ill-appearing, toxic-appearing or diaphoretic.   HENT:      Head: Normocephalic and atraumatic.   Eyes:      General: No scleral icterus.  Cardiovascular:      Rate and Rhythm: Normal rate.   Pulmonary:      Effort: No tachypnea or respiratory distress.   Musculoskeletal:      Right lower leg: No edema.      Left lower leg: No edema.   Skin:     Coloration: Skin is not jaundiced.   Neurological:      General: No focal deficit present.      Mental Status: She is alert and oriented to person, place, and time. Mental status is at baseline.   Psychiatric:         Attention and Perception: Attention normal.         Mood and Affect: Mood and affect normal.         Speech: Speech normal.         Behavior: Behavior normal. Behavior is cooperative.         Thought Content: Thought  content normal.         Cognition and Memory: Cognition and memory normal. Cognition is not impaired. Memory is not impaired.         Judgment: Judgment normal.             Significant Labs: All pertinent labs within the past 24 hours have been reviewed.  CBC:   Recent Labs   Lab 11/10/23  0509 11/11/23  0322   WBC 17.92* 15.38*   HGB 8.0* 8.3*   HCT 28.4* 28.4*    370     CMP:   Recent Labs   Lab 11/10/23  0509 11/11/23  0322    141   K 2.9* 3.9   CL 87* 88*   CO2 44* 43*   * 110   BUN 21* 19   CREATININE 0.7 0.7   CALCIUM 9.8 10.0   PROT 6.7 6.6   ALBUMIN 3.5 3.5   BILITOT 0.6 0.5   ALKPHOS 101 106   AST 16 13   ALT 12 11   ANIONGAP 10 10       Significant Imaging: I have reviewed all pertinent imaging results/findings within the past 24 hours.

## 2023-11-11 NOTE — PROGRESS NOTES
[facility]  Telemedicine Progress Note    Patient Name: Yasmeen Valderrama  MRN: [id number]  Patient Class: IP- Inpatient   Admission Date: 11/7/2023  Length of Stay: 3 days  Attending Physician: Tammy Caal MD  Primary Care Provider: Leeanna Stuart MD          Subjective:     Principal Problem:Closed compression fracture of body of L1 vertebra        HPI:  Yasmeen Valderrama is a 54 yo female with significant history 1/2 day smoker, KATHERINE no longer on BiPAP due to claustrophobia, COPD on home oxygen 3 L NC, chronic pain, recent non bleeding gastric ulcer on EGD 9/27/23 who arrived to ED via EMS after a fall.  For the past few months patient will fall asleep even while upright position but usually will catch herself.  This a.m. at 2:30 a.m. patient woke up to let her dog out and while standing patient fell asleep and fell onto her buttocks.  Since patient did not pass out or hit head.  Since the fall patient complained of acute on chronic lower back pain 9/10.  She denies any numbness or weakness in all her extremities however reported to Neurosurgery team numbness and pain in arms and legs after the fall but now resolved.   Patient also reports worsening and shortness a breath that started after the fall.  Denies any productive cough.  Denies fever or chills. Patient sleeps in upright position in which she contributes BLE edema however worst then usual.   No prior treatment before arrival to hospital.  In ED, CXR no infiltrate. WBC 16 K (chronically elevated), afebrile.   X ray lumbar, CT thoracic L1 compression fracture. CT cervical no acute cervical fracture.   CT did reveal right apical ground glass opacity and recommended follow up CT 3-6 months.   Seen by Neurosurgery in ED who recommended MRI CTL w wo contrast, TLSO brace when out of bed, no lifting >10lbs, bending or twisting.       Overview/Hospital Course:  Admission to the hospital for acute L1 compression  "fracture and acute on chronic respiratory failure with hypoxia and hypercapnia and COPD exacerbation.  Neurosurgery following. Patient adamantly declined MRI of cervical lumbar and thoracic.  PT OT evaluation completed and recommended low intense therapy.     Lethargy resolved after use of Bipap.  Wheezing anterior/posterior significantly improved DuoNeb, LAMA/LAMA,  IV steroid to PO, home oxygen.  CT also showed "new right apical pleural base soft tissue density appearing since 2022." CT scan chest with contrast stated with regard to density seen on previous CT thoracic, this is believed to represent pulmonary apical scarring.     Discharge cancel due to not able to obtain Bipap/wheelchair until Monday.         Interval History: No acute events overnight. Awaiting CM to assist with Bipap on discharge. Stable for discharge.     Review of Systems   Constitutional:  Negative for chills and fever.   Respiratory:  Negative for shortness of breath.    Cardiovascular:  Negative for chest pain.   Gastrointestinal:  Negative for abdominal pain.     Objective:     Vital Signs (Most Recent):  Temp: 97.9 °F (36.6 °C) (11/11/23 0723)  Pulse: 86 (11/11/23 0723)  Resp: 18 (11/11/23 0723)  BP: 129/67 (11/11/23 0723)  SpO2: 98 % (11/11/23 0723) Vital Signs (24h Range):  Temp:  [97.9 °F (36.6 °C)-98.4 °F (36.9 °C)] 97.9 °F (36.6 °C)  Pulse:  [] 86  Resp:  [14-24] 18  SpO2:  [95 %-100 %] 98 %  BP: (129-142)/(65-85) 129/67     Weight: 100.4 kg (221 lb 5.5 oz)  Body mass index is 39.21 kg/m².    Intake/Output Summary (Last 24 hours) at 11/11/2023 0763  Last data filed at 11/11/2023 0517  Gross per 24 hour   Intake 480 ml   Output 3200 ml   Net -2720 ml         Physical Exam  Vitals and nursing note reviewed.   Constitutional:       General: She is awake. She is not in acute distress.     Appearance: Normal appearance. She is well-developed and well-groomed. She is not ill-appearing, toxic-appearing or diaphoretic.   HENT:      " Head: Normocephalic and atraumatic.   Eyes:      General: No scleral icterus.  Cardiovascular:      Rate and Rhythm: Normal rate.   Pulmonary:      Effort: No tachypnea or respiratory distress.   Musculoskeletal:      Right lower leg: No edema.      Left lower leg: No edema.   Skin:     Coloration: Skin is not jaundiced.   Neurological:      General: No focal deficit present.      Mental Status: She is alert and oriented to person, place, and time. Mental status is at baseline.   Psychiatric:         Attention and Perception: Attention normal.         Mood and Affect: Mood and affect normal.         Speech: Speech normal.         Behavior: Behavior normal. Behavior is cooperative.         Thought Content: Thought content normal.         Cognition and Memory: Cognition and memory normal. Cognition is not impaired. Memory is not impaired.         Judgment: Judgment normal.             Significant Labs: All pertinent labs within the past 24 hours have been reviewed.  CBC:   Recent Labs   Lab 11/10/23  0509 11/11/23  0322   WBC 17.92* 15.38*   HGB 8.0* 8.3*   HCT 28.4* 28.4*    370     CMP:   Recent Labs   Lab 11/10/23  0509 11/11/23  0322    141   K 2.9* 3.9   CL 87* 88*   CO2 44* 43*   * 110   BUN 21* 19   CREATININE 0.7 0.7   CALCIUM 9.8 10.0   PROT 6.7 6.6   ALBUMIN 3.5 3.5   BILITOT 0.6 0.5   ALKPHOS 101 106   AST 16 13   ALT 12 11   ANIONGAP 10 10       Significant Imaging: I have reviewed all pertinent imaging results/findings within the past 24 hours.      Assessment/Plan:      * Closed compression fracture of body of L1 vertebra  Patient presented to ED after a fall while upright standing position.  Patient have sleep apnea however returned her BiPAP due to claustrophobia and have been falling asleep while upright position lately but this time not able to catch herself.  Patient fell on her buttock.  No loss of consciousness or head injury  CT and x-ray show L1 compression  fracture  Neurosurgery consulted-recommended MRI with without contrast cervical thoracic lumbar however patient declined as not able to lay flat x 5 years  TLSO when OOB  No lifting more than 10 lb no twisting no bending  PT OT eval completed. Low intensity  Pain controlled with current regimen  Generalized weakness with no focal neuro deficit on exam- plan as above      Debility  Patient with Acute on chronic debility due to fracture/prosthesis.   PT/OT evaluation completed. Patient have L 1 compression fracture now significantly impairs patient ability to participate in one or more mobility related to ADLs at home. Pateint can't use a rolling walker as recommended for fall distance. Patient's functional mobility deficit can be sufficiently resolved with the use of a wheelchair.     ACP (advance care planning)  Advance Care Planning     Date: 11/08/2023    Code Status  In light of the patients advanced and life limiting illness,I engaged the the patient and family in a voluntary conversation about the patient's preferences for care  at the very end of life. Long discussion with patient and family regarding patient respiratory issues stated above , including respiratory and cardiac arrest. Patient wish to have CPR or other invasive treatments performed when her heart and/or breathing stops.       Abnormal CT scan  New right apical pleural base soft tissue density appearing since 2022. Radiology recommended CT scan chest lungs with IV contrast recommended for further evaluation new right apical pleural base lesion.   CT scan chest with contrast stated with regard to density seen on previous CT thoracic, this is believed to represent pulmonary apical scarring.       Tobacco abuse  One pack a day smoker.  Encouraged smoking cessation.  okay with nicotine patch.      Other specified anemias  Patient's anemia is currently controlled. Has not received any PRBCs to date. Etiology likely d/t Iron deficiency  Current CBC  reviewed-   Lab Results   Component Value Date    HGB 8.3 (L) 11/11/2023    HCT 28.4 (L) 11/11/2023   Monitor serial CBC and transfuse if patient becomes hemodynamically unstable, symptomatic or H/H drops below 7/21.  Hgb stable compared to last hgb 8.5 9/27 . Low T sat during that admission   Ferrous sulfate    Leukocytosis  Chronically elevated.  Afebrile.  No obvious infectious process.        GI bleed  Admission in Sept and hgb stable compared to previous lab  9/27/22 EGD non bleeding gastric and duonenal ulcer   Add iron studies  PPI BID      COPD exacerbation  See above     Pulmonary hypertension  PASP 52 mmHg 2/10/2022> 37 mmhg 12/2022  Continue home lasix, zarosylyn  Encourage smoking cessation       Acute on Chronic respiratory failure with hypoxia and hypercapnia  Patient with history obesity, severe COPD on home 3 L NC, pulmonary hypertension and KATHERINE not on BiPAP due to claustrophobia, continue smoking who was admitted for falling asleep in upright position sustaining  L1 compression fracture pain medication/muscle relaxer.   VBG on admission 7.34/81/43/42  11/8 able to answer question however falling asleep frequently during conversation . Tolerated bipap   Treat COPD exacerbation- imrpoving  Need to get back on BiPap  , smoking cessation     TN/SW assisting with Bipap      Aortic atherosclerosis  CArdiologist Dr. Olvera  11/15/22 Coronary calcification on CT scan  11/15/22 Dobutamine stress test  Not on aSA due to recent GIB/gastric ulcer  Lab Results   Component Value Date    LDLCALC 92.0 09/29/2020   not on statin at home, add lipid panel          KATHERINE (obstructive sleep apnea)  Returned her BiPAP a few months ago due to claustrophobia.   Continue Bipap qhs and day time nap prn      Chronic pain syndrome  On Suboxone at home.  We will hold Suboxone while acute pain control  Will need pain management on discharge      Cervical stenosis of spinal canal  Initial fall patient reported pain and numbness in  extremities however resolved prior to arriving at hospital   Neurosurgery following MRI of cervical with without contrast  .      Generalized edema  BLE edema chronic for patient. Sleeps in sitting position at home.   Last 2 D echo 12/2022 normal EF 55%, normal diastolic and normal sized ventricle  Continue home lasix zarozyln    Stage 4 very severe COPD by GOLD classification  Patient's COPD is with exacerbation noted by continued dyspnea currently.  Patient is currently on COPD Pathway. Continue scheduled inhalers Steroids and Supplemental oxygen and monitor respiratory status closely.  Encourage smoking cessation  Continue home LAMA/LABA, duoneb q 4, deescalate steroid today          VTE Risk Mitigation (From admission, onward)         Ordered     IP VTE HIGH RISK PATIENT  Once         11/07/23 1429     Place sequential compression device  Until discontinued         11/07/23 1429     Place sequential compression device  Until discontinued         11/07/23 1134                      I have completed this tele-visit without the assistance of a telepresenter.    The attending portion of this evaluation, treatment, and documentation was performed per Tammy Wilson MD via Telemedicine AudioVisual using the secure [employer] software platform with 2 way audio/video. The provider was located off-site and the patient is located in the hospital. The aforementioned video software was utilized to document the relevant history and physical exam    Tammy Wilson MD  Department of Hospital Medicine   [facility]

## 2023-11-11 NOTE — ASSESSMENT & PLAN NOTE
Patient's anemia is currently controlled. Has not received any PRBCs to date. Etiology likely d/t Iron deficiency  Current CBC reviewed-   Lab Results   Component Value Date    HGB 8.3 (L) 11/11/2023    HCT 28.4 (L) 11/11/2023   Monitor serial CBC and transfuse if patient becomes hemodynamically unstable, symptomatic or H/H drops below 7/21.  Hgb stable compared to last hgb 8.5 9/27 . Low T sat during that admission   Ferrous sulfate

## 2023-11-11 NOTE — PLAN OF CARE
Problem: Adult Inpatient Plan of Care  Goal: Plan of Care Review  Outcome: Ongoing, Progressing     Problem: Fall Injury Risk  Goal: Absence of Fall and Fall-Related Injury  Outcome: Ongoing, Progressing     Problem: Adjustment to Illness COPD (Chronic Obstructive Pulmonary Disease)  Goal: Optimal Chronic Illness Coping  Outcome: Ongoing, Progressing     Problem: Functional Ability Impaired COPD (Chronic Obstructive Pulmonary Disease)  Goal: Optimal Level of Functional [place]  Outcome: Ongoing, Progressing     Problem: Infection COPD (Chronic Obstructive Pulmonary Disease)  Goal: Absence of Infection Signs and Symptoms  Outcome: Ongoing, Progressing     Problem: Oral Intake Inadequate COPD (Chronic Obstructive Pulmonary Disease)  Goal: Improved Nutrition Intake  Outcome: Ongoing, Progressing     Problem: Respiratory Compromise COPD (Chronic Obstructive Pulmonary Disease)  Goal: Effective Oxygenation and Ventilation  Outcome: Ongoing, Progressing     Problem: Skin Injury Risk Increased  Goal: Skin Health and Integrity  Outcome: Ongoing, Progressing     Problem: Pain Acute  Goal: Acceptable Pain Control and Functional Ability  Outcome: Ongoing, Progressing

## 2023-11-11 NOTE — NURSING
[facility]  Nurses Note -- 4 Eyes      11/10/2023       Skin assessed on: Q Shift      [x] No Pressure Injuries Present    [x]Prevention Measures Documented    [] Yes LDA  for Pressure Injury Previously documented     [] Yes New Pressure Injury Discovered   [] LDA for New Pressure Injury Added      Attending RN:  Karina Tran RN     Second RN:  Dano MERRILL RN

## 2023-11-12 LAB — PHOSPHATE SERPL-MCNC: 4.1 MG/DL (ref 2.7–4.5)

## 2023-11-12 PROCEDURE — 25000242 PHARM REV CODE 250 ALT 637 W/ HCPCS: Performed by: NURSE PRACTITIONER

## 2023-11-12 PROCEDURE — 21400001 HC TELEMETRY ROOM

## 2023-11-12 PROCEDURE — 94640 AIRWAY INHALATION TREATMENT: CPT

## 2023-11-12 PROCEDURE — 84100 ASSAY OF PHOSPHORUS: CPT | Performed by: NURSE PRACTITIONER

## 2023-11-12 PROCEDURE — 27000221 HC OXYGEN, UP TO 24 HOURS

## 2023-11-12 PROCEDURE — S4991 NICOTINE PATCH NONLEGEND: HCPCS | Performed by: NURSE PRACTITIONER

## 2023-11-12 PROCEDURE — 99900035 HC TECH TIME PER 15 MIN (STAT)

## 2023-11-12 PROCEDURE — 97110 THERAPEUTIC EXERCISES: CPT | Mod: CQ

## 2023-11-12 PROCEDURE — 25000003 PHARM REV CODE 250: Performed by: NURSE PRACTITIONER

## 2023-11-12 PROCEDURE — 94761 N-INVAS EAR/PLS OXIMETRY MLT: CPT

## 2023-11-12 PROCEDURE — 25000003 PHARM REV CODE 250: Performed by: HOSPITALIST

## 2023-11-12 PROCEDURE — 63600175 PHARM REV CODE 636 W HCPCS: Performed by: NURSE PRACTITIONER

## 2023-11-12 PROCEDURE — 36415 COLL VENOUS BLD VENIPUNCTURE: CPT | Performed by: NURSE PRACTITIONER

## 2023-11-12 PROCEDURE — 97116 GAIT TRAINING THERAPY: CPT | Mod: CQ

## 2023-11-12 RX ADMIN — METOLAZONE 5 MG: 5 TABLET ORAL at 09:11

## 2023-11-12 RX ADMIN — POLYETHYLENE GLYCOL 3350 17 G: 17 POWDER, FOR SOLUTION ORAL at 09:11

## 2023-11-12 RX ADMIN — HYDROCODONE BITARTRATE AND ACETAMINOPHEN 1 TABLET: 10; 325 TABLET ORAL at 03:11

## 2023-11-12 RX ADMIN — IPRATROPIUM BROMIDE AND ALBUTEROL SULFATE 3 ML: 2.5; .5 SOLUTION RESPIRATORY (INHALATION) at 04:11

## 2023-11-12 RX ADMIN — PREDNISONE 40 MG: 20 TABLET ORAL at 09:11

## 2023-11-12 RX ADMIN — GABAPENTIN 100 MG: 100 CAPSULE ORAL at 09:11

## 2023-11-12 RX ADMIN — CYCLOBENZAPRINE HYDROCHLORIDE 5 MG: 5 TABLET, FILM COATED ORAL at 03:11

## 2023-11-12 RX ADMIN — HYDROCODONE BITARTRATE AND ACETAMINOPHEN 1 TABLET: 10; 325 TABLET ORAL at 05:11

## 2023-11-12 RX ADMIN — LIDOCAINE 5% 1 PATCH: 700 PATCH TOPICAL at 10:11

## 2023-11-12 RX ADMIN — IPRATROPIUM BROMIDE AND ALBUTEROL SULFATE 3 ML: 2.5; .5 SOLUTION RESPIRATORY (INHALATION) at 08:11

## 2023-11-12 RX ADMIN — FUROSEMIDE 40 MG: 40 TABLET ORAL at 09:11

## 2023-11-12 RX ADMIN — PANTOPRAZOLE SODIUM 40 MG: 40 TABLET, DELAYED RELEASE ORAL at 10:11

## 2023-11-12 RX ADMIN — Medication 1 PATCH: at 09:11

## 2023-11-12 RX ADMIN — BUDESONIDE 0.5 MG: 0.5 INHALANT RESPIRATORY (INHALATION) at 07:11

## 2023-11-12 RX ADMIN — CYCLOBENZAPRINE HYDROCHLORIDE 5 MG: 5 TABLET, FILM COATED ORAL at 09:11

## 2023-11-12 RX ADMIN — HYDROCODONE BITARTRATE AND ACETAMINOPHEN 1 TABLET: 10; 325 TABLET ORAL at 01:11

## 2023-11-12 RX ADMIN — GABAPENTIN 100 MG: 100 CAPSULE ORAL at 10:11

## 2023-11-12 RX ADMIN — HYDROCODONE BITARTRATE AND ACETAMINOPHEN 1 TABLET: 10; 325 TABLET ORAL at 09:11

## 2023-11-12 RX ADMIN — BUDESONIDE 0.5 MG: 0.5 INHALANT RESPIRATORY (INHALATION) at 08:11

## 2023-11-12 RX ADMIN — IPRATROPIUM BROMIDE AND ALBUTEROL SULFATE 3 ML: 2.5; .5 SOLUTION RESPIRATORY (INHALATION) at 12:11

## 2023-11-12 RX ADMIN — IPRATROPIUM BROMIDE AND ALBUTEROL SULFATE 3 ML: 2.5; .5 SOLUTION RESPIRATORY (INHALATION) at 07:11

## 2023-11-12 RX ADMIN — PANTOPRAZOLE SODIUM 40 MG: 40 TABLET, DELAYED RELEASE ORAL at 09:11

## 2023-11-12 RX ADMIN — IPRATROPIUM BROMIDE AND ALBUTEROL SULFATE 3 ML: 2.5; .5 SOLUTION RESPIRATORY (INHALATION) at 03:11

## 2023-11-12 RX ADMIN — DOCUSATE SODIUM 200 MG: 100 CAPSULE, LIQUID FILLED ORAL at 09:11

## 2023-11-12 NOTE — NURSING
[facility] [place]  Nurses Note -- 4 Eyes      11/12/2023       Skin assessed on: Q Shift      [x] No Pressure Injuries Present    [x]Prevention Measures Documented    [] Yes LDA  for Pressure Injury Previously documented     [] Yes New Pressure Injury Discovered   [] LDA for New Pressure Injury Added      Attending RN:  Micheal Swain, RN     Second RN:  Karina KHAN RN

## 2023-11-12 NOTE — PROGRESS NOTES
[facility]  Telemedicine Progress Note    Patient Name: Yasmeen Valderrama  MRN: [id number]  Patient Class: IP- Inpatient   Admission Date: 11/7/2023  Length of Stay: 4 days  Attending Physician: Tammy Caal MD  Primary Care Provider: Leeanna Stuart MD          Subjective:     Principal Problem:Closed compression fracture of body of L1 vertebra        HPI:  Yasmeen Valderrama is a 52 yo female with significant history 1/2 day smoker, KATHERINE no longer on BiPAP due to claustrophobia, COPD on home oxygen 3 L NC, chronic pain, recent non bleeding gastric ulcer on EGD 9/27/23 who arrived to ED via EMS after a fall.  For the past few months patient will fall asleep even while upright position but usually will catch herself.  This a.m. at 2:30 a.m. patient woke up to let her dog out and while standing patient fell asleep and fell onto her buttocks.  Since patient did not pass out or hit head.  Since the fall patient complained of acute on chronic lower back pain 9/10.  She denies any numbness or weakness in all her extremities however reported to Neurosurgery team numbness and pain in arms and legs after the fall but now resolved.   Patient also reports worsening and shortness a breath that started after the fall.  Denies any productive cough.  Denies fever or chills. Patient sleeps in upright position in which she contributes BLE edema however worst then usual.   No prior treatment before arrival to hospital.  In ED, CXR no infiltrate. WBC 16 K (chronically elevated), afebrile.   X ray lumbar, CT thoracic L1 compression fracture. CT cervical no acute cervical fracture.   CT did reveal right apical ground glass opacity and recommended follow up CT 3-6 months.   Seen by Neurosurgery in ED who recommended MRI CTL w wo contrast, TLSO brace when out of bed, no lifting >10lbs, bending or twisting.       Overview/Hospital Course:  Admission to the hospital for acute L1 compression  "fracture and acute on chronic respiratory failure with hypoxia and hypercapnia and COPD exacerbation.  Neurosurgery following. Patient adamantly declined MRI of cervical lumbar and thoracic.  PT OT evaluation completed and recommended low intense therapy.     Lethargy resolved after use of Bipap.  Wheezing anterior/posterior significantly improved DuoNeb, LAMA/LAMA,  IV steroid to PO, home oxygen.  CT also showed "new right apical pleural base soft tissue density appearing since 2022." CT scan chest with contrast stated with regard to density seen on previous CT thoracic, this is believed to represent pulmonary apical scarring.     Discharge cancel due to not able to obtain Bipap/wheelchair until Monday.         Interval History: No acute events overnight. Awaiting CM to assist with Bipap on discharge. Stable for discharge.     Review of Systems   Constitutional:  Negative for chills and fever.   Respiratory:  Negative for shortness of breath.    Cardiovascular:  Negative for chest pain.   Gastrointestinal:  Negative for abdominal pain.     Objective:     Vital Signs (Most Recent):  Temp: 97.9 °F (36.6 °C) (11/12/23 0712)  Pulse: 99 (11/12/23 0801)  Resp: 18 (11/12/23 0801)  BP: 138/68 (11/12/23 0712)  SpO2: 99 % (11/12/23 0801) Vital Signs (24h Range):  Temp:  [97.3 °F (36.3 °C)-98.4 °F (36.9 °C)] 97.9 °F (36.6 °C)  Pulse:  [] 99  Resp:  [15-20] 18  SpO2:  [93 %-100 %] 99 %  BP: (131-149)/(63-99) 138/68     Weight: 100.4 kg (221 lb 5.5 oz)  Body mass index is 39.21 kg/m².    Intake/Output Summary (Last 24 hours) at 11/12/2023 0820  Last data filed at 11/12/2023 0404  Gross per 24 hour   Intake --   Output 2300 ml   Net -2300 ml           Physical Exam  Vitals and nursing note reviewed.   Constitutional:       General: She is awake. She is not in acute distress.     Appearance: Normal appearance. She is well-developed and well-groomed. She is not ill-appearing, toxic-appearing or diaphoretic.   HENT:      " Head: Normocephalic and atraumatic.   Eyes:      General: No scleral icterus.  Cardiovascular:      Rate and Rhythm: Normal rate.   Pulmonary:      Effort: No tachypnea or respiratory distress.   Musculoskeletal:      Right lower leg: No edema.      Left lower leg: No edema.   Skin:     Coloration: Skin is not jaundiced.   Neurological:      General: No focal deficit present.      Mental Status: She is alert and oriented to person, place, and time. Mental status is at baseline.   Psychiatric:         Attention and Perception: Attention normal.         Mood and Affect: Mood and affect normal.         Speech: Speech normal.         Behavior: Behavior normal. Behavior is cooperative.         Thought Content: Thought content normal.         Cognition and Memory: Cognition and memory normal. Cognition is not impaired. Memory is not impaired.         Judgment: Judgment normal.             Significant Labs: All pertinent labs within the past 24 hours have been reviewed.  CBC:   Recent Labs   Lab 11/11/23 0322   WBC 15.38*   HGB 8.3*   HCT 28.4*          CMP:   Recent Labs   Lab 11/11/23 0322      K 3.9   CL 88*   CO2 43*      BUN 19   CREATININE 0.7   CALCIUM 10.0   PROT 6.6   ALBUMIN 3.5   BILITOT 0.5   ALKPHOS 106   AST 13   ALT 11   ANIONGAP 10         Significant Imaging: I have reviewed all pertinent imaging results/findings within the past 24 hours.      Assessment/Plan:      * Closed compression fracture of body of L1 vertebra  Patient presented to ED after a fall while upright standing position.  Patient have sleep apnea however returned her BiPAP due to claustrophobia and have been falling asleep while upright position lately but this time not able to catch herself.  Patient fell on her buttock.  No loss of consciousness or head injury  CT and x-ray show L1 compression fracture  Neurosurgery consulted-recommended MRI with without contrast cervical thoracic lumbar however patient declined as  not able to lay flat x 5 years  TLSO when OOB  No lifting more than 10 lb no twisting no bending  PT OT eval completed. Low intensity  Pain controlled with current regimen  Generalized weakness with no focal neuro deficit on exam- plan as above      Debility  Patient with Acute on chronic debility due to fracture/prosthesis.   PT/OT evaluation completed. Patient have L 1 compression fracture now significantly impairs patient ability to participate in one or more mobility related to ADLs at home. Pateint can't use a rolling walker as recommended for fall distance. Patient's functional mobility deficit can be sufficiently resolved with the use of a wheelchair.     ACP (advance care planning)  Advance Care Planning     Date: 11/08/2023    Code Status  In light of the patients advanced and life limiting illness,I engaged the the patient and family in a voluntary conversation about the patient's preferences for care  at the very end of life. Long discussion with patient and family regarding patient respiratory issues stated above , including respiratory and cardiac arrest. Patient wish to have CPR or other invasive treatments performed when her heart and/or breathing stops.       Abnormal CT scan  New right apical pleural base soft tissue density appearing since 2022. Radiology recommended CT scan chest lungs with IV contrast recommended for further evaluation new right apical pleural base lesion.   CT scan chest with contrast stated with regard to density seen on previous CT thoracic, this is believed to represent pulmonary apical scarring.       Tobacco abuse  One pack a day smoker.  Encouraged smoking cessation.  okay with nicotine patch.      Other specified anemias  Patient's anemia is currently controlled. Has not received any PRBCs to date. Etiology likely d/t Iron deficiency  Current CBC reviewed-   Lab Results   Component Value Date    HGB 8.3 (L) 11/11/2023    HCT 28.4 (L) 11/11/2023   Monitor serial CBC and  transfuse if patient becomes hemodynamically unstable, symptomatic or H/H drops below 7/21.  Hgb stable compared to last hgb 8.5 9/27 . Low T sat during that admission   Ferrous sulfate    Leukocytosis  Chronically elevated.  Afebrile.  No obvious infectious process.        GI bleed  Admission in Sept and hgb stable compared to previous lab  9/27/22 EGD non bleeding gastric and duonenal ulcer   Add iron studies  PPI BID      COPD exacerbation  See above     Pulmonary hypertension  PASP 52 mmHg 2/10/2022> 37 mmhg 12/2022  Continue home lasix, zarosylyn  Encourage smoking cessation       Acute on Chronic respiratory failure with hypoxia and hypercapnia  Patient with history obesity, severe COPD on home 3 L NC, pulmonary hypertension and KATHERINE not on BiPAP due to claustrophobia, continue smoking who was admitted for falling asleep in upright position sustaining  L1 compression fracture pain medication/muscle relaxer.   VBG on admission 7.34/81/43/42  11/8 able to answer question however falling asleep frequently during conversation . Tolerated bipap   Treat COPD exacerbation- imrpoving  Need to get back on BiPap  , smoking cessation     TN/SW assisting with Bipap      Aortic atherosclerosis  CArdiologist Dr. Olvera  11/15/22 Coronary calcification on CT scan  11/15/22 Dobutamine stress test  Not on aSA due to recent GIB/gastric ulcer  Lab Results   Component Value Date    LDLCALC 92.0 09/29/2020   not on statin at home, add lipid panel          KATHERINE (obstructive sleep apnea)  Returned her BiPAP a few months ago due to claustrophobia.   Continue Bipap qhs and day time nap prn      Chronic pain syndrome  On Suboxone at home.  We will hold Suboxone while acute pain control  Will need pain management on discharge      Cervical stenosis of spinal canal  Initial fall patient reported pain and numbness in extremities however resolved prior to arriving at hospital   Neurosurgery following MRI of cervical with without  contrast  .      Generalized edema  BLE edema chronic for patient. Sleeps in sitting position at home.   Last 2 D echo 12/2022 normal EF 55%, normal diastolic and normal sized ventricle  Continue home lasix zarozyln    Stage 4 very severe COPD by GOLD classification  Patient's COPD is with exacerbation noted by continued dyspnea currently.  Patient is currently on COPD Pathway. Continue scheduled inhalers Steroids and Supplemental oxygen and monitor respiratory status closely.  Encourage smoking cessation  Continue home LAMA/LABA, duoneb q 4, deescalate steroid today          VTE Risk Mitigation (From admission, onward)         Ordered     IP VTE HIGH RISK PATIENT  Once         11/07/23 1429     Place sequential compression device  Until discontinued         11/07/23 1429     Place sequential compression device  Until discontinued         11/07/23 1134                      I have completed this tele-visit without the assistance of a telepresenter.    The attending portion of this evaluation, treatment, and documentation was performed per Tammy Wilson MD via Telemedicine AudioVisual using the secure [employer] software platform with 2 way audio/video. The provider was located off-site and the patient is located in the hospital. The aforementioned video software was utilized to document the relevant history and physical exam    Tammy Wilson MD  Department of Hospital Medicine   [facility]

## 2023-11-12 NOTE — PLAN OF CARE
Problem: Adult Inpatient Plan of Care  Goal: Plan of Care Review  Outcome: Ongoing, Progressing  Flowsheets (Taken 11/11/2023 1811)  Plan of Care Reviewed With:   patient   spouse  Goal: Patient-Specific Goal (Individualized)  Outcome: Ongoing, Progressing  Goal: Absence of Hospital-Acquired Illness or Injury  Outcome: Ongoing, Progressing  Goal: Optimal Comfort and Wellbeing  Outcome: Ongoing, Progressing  Goal: Readiness for Transition of Care  Outcome: Ongoing, Progressing     Problem: Fall Injury Risk  Goal: Absence of Fall and Fall-Related Injury  Outcome: Ongoing, Progressing     Problem: Adjustment to Illness COPD (Chronic Obstructive Pulmonary Disease)  Goal: Optimal Chronic Illness Coping  Outcome: Ongoing, Progressing     Problem: Functional Ability Impaired COPD (Chronic Obstructive Pulmonary Disease)  Goal: Optimal Level of Functional Rabun  Outcome: Ongoing, Progressing     Problem: Infection COPD (Chronic Obstructive Pulmonary Disease)  Goal: Absence of Infection Signs and Symptoms  Outcome: Ongoing, Progressing     Problem: Oral Intake Inadequate COPD (Chronic Obstructive Pulmonary Disease)  Goal: Improved Nutrition Intake  Outcome: Ongoing, Progressing     Problem: Respiratory Compromise COPD (Chronic Obstructive Pulmonary Disease)  Goal: Effective Oxygenation and Ventilation  Outcome: Ongoing, Progressing     Problem: Skin Injury Risk Increased  Goal: Skin Health and Integrity  Outcome: Ongoing, Progressing     Problem: Pain Acute  Goal: Acceptable Pain Control and Functional Ability  Outcome: Ongoing, Progressing  Intervention: Develop Pain Management Plan  Flowsheets (Taken 11/11/2023 1811)  Pain Management Interventions:   medication offered   pain management plan reviewed with patient/caregiver

## 2023-11-12 NOTE — NURSING
[facility]  Nurses Note -- 4 Eyes      11/11/2023       Skin assessed on: Q Shift      [x] No Pressure Injuries Present    [x]Prevention Measures Documented    [] Yes LDA  for Pressure Injury Previously documented     [] Yes New Pressure Injury Discovered   [] LDA for New Pressure Injury Added      Attending RN:  Karina Tran RN     Second RN:  ERIC Burton

## 2023-11-12 NOTE — PT/OT/SLP PROGRESS
Physical Therapy Treatment    Patient Name:  aYsmeen Valderrama   MRN:  [id number]    Recommendations:     Discharge Recommendations: Low Intensity Therapy  Discharge Equipment Recommendations: walker, rolling  Barriers to discharge: None    Assessment:     Yasmeen Valderrama is a 53 y.o. female admitted with a medical diagnosis of Closed compression fracture of body of L1 vertebra.  She presents with the following impairments/functional limitations: weakness, impaired endurance, impaired self care skills, impaired functional mobility, gait instability, impaired balance, impaired cardiopulmonary response to activity, pain, decreased safety awareness, decreased lower extremity function, decreased upper extremity function, decreased coordination, decreased ROM.      Pt tolerated treatment good today despite increased c/o pain. Demonstrates increased gait distance with decreased assistance this session. Pt ambulated ~80ft, ~40ft with RW, SBA on level tile. Pt's O2 sats above 95% throughout session. Pt would continue to benefit from skilled PT services to address pt's deficits and improve current level of function.     Rehab Prognosis: Good; patient would benefit from acute skilled PT services to address these deficits and reach maximum level of function.    Recent Surgery: * No surgery found *      Plan:     During this hospitalization, patient to be seen daily to address the identified rehab impairments via gait training, therapeutic activities and progress toward the following goals:    Plan of Care Expires:  11/14/23    Subjective     Chief Complaint: back pain  Patient/Family Comments/goals: Pt reports she just received something for pain. Pt agreeable to participate in therapy session.   Pain/Comfort:  Pain Rating 1: other (see comments) (11/10)  Location - Side 1: Bilateral  Location 1: back  Pain Addressed 1: Pre-medicate for activity, Cessation of Activity, Distraction, Reposition, Nurse  notified      Objective:     Communicated with pt's nurse, Micheal, prior to session.  Patient found up in chair with S.O present, telemetry, oxygen, TLSO, PureWick upon PT entry to room.     General Precautions: Standard, fall  Orthopedic Precautions: spinal precautions (no bending, lifting greater than 10lbs, twisting- pt verbalized understanding)  Braces: TLSO  Respiratory Status: Nasal cannula, flow 3 L/min   Donned back gown, non-skid socks and gait belt prior to OOB activity.     Functional Mobility:  Transfers: from BSchair  x1 trial  Sit to Stand:  contact guard assistance with rolling walker  Gait: Pt ambulated ~80ft, ~40ft with RW, SBA, TLSO with standing rest break in between. Cues for upright posture. Pt with decreased sujatha, step length, velocity of limb, and endurance.   Balance: good sitting and fair+ standing.       AM-PAC 6 CLICK MOBILITY  Turning over in bed (including adjusting bedclothes, sheets and blankets)?: 4  Sitting down on and standing up from a chair with arms (e.g., wheelchair, bedside commode, etc.): 3  Moving from lying on back to sitting on the side of the bed?: 4  Moving to and from a bed to a chair (including a wheelchair)?: 3  Need to walk in hospital room?: 3  Climbing 3-5 steps with a railing?: 3  Basic Mobility Total Score: 20       Treatment & Education:  Seated therex to BLEs x15 reps: Marching, LAQs, hip abduction/adduction, heel raises/toe raises  Pt received handout for energy conservation with education provided. Pt verbalized understanding.  Pt received handout for HEP with education provided on all exercises and verbalization of understanding.  Encouraged to perform in a chair or in bed. Omitted bridges on H/O. Also wrote out spinal precautions on h.o.   Pt educated on PTA role/POC.   Importance of OOB activity with staff assistance.  Importance of sitting up in the chair throughout the day as tolerated, especially for meals   Safety during functional t/f and mobility  with use of RW and staff  White board updated   Multiple self-care tasks/functional mobility completed- assistance level noted above   All questions/concerns answered within scope of practice  Pt encouraged to use call button for assistance for all OOB/OOchair activity 2/2 fall risk. Pt verbalized understanding. Tray table and all needs within reach.    Patient left  reclined in bSchair with green pressure cushion, TLSO, tray table and all needs within reach  with all lines intact, call button in reach, pt's nurse, Micheal, notified, and S.O present..    GOALS:   Multidisciplinary Problems       Physical Therapy Goals          Problem: Physical Therapy    Goal Priority Disciplines Outcome Goal Variances Interventions   Physical Therapy Goal     PT, PT/OT Ongoing, Progressing     Description: Goals to be met by: 23     Patient will increase functional independence with mobility by performin. Pt to be mod I with bed mobility.  2. Pt to transfer with (S)/mod I.  3. Pt to ambulate 150' /s or /s RW and SBA.                         Time Tracking:     PT Received On: 23  PT Start Time: 925     PT Stop Time: 956  PT Total Time (min): 31 min     Billable Minutes: Gait Training 16 and Therapeutic Exercise 15    Treatment Type: Treatment  PT/PTA: PTA     Number of PTA visits since last PT visit: 1     2023

## 2023-11-12 NOTE — SUBJECTIVE & OBJECTIVE
Interval History: No acute events overnight. Awaiting CM to assist with Bipap on discharge. Stable for discharge.     Review of Systems   Constitutional:  Negative for chills and fever.   Respiratory:  Negative for shortness of breath.    Cardiovascular:  Negative for chest pain.   Gastrointestinal:  Negative for abdominal pain.     Objective:     Vital Signs (Most Recent):  Temp: 97.9 °F (36.6 °C) (11/12/23 0712)  Pulse: 99 (11/12/23 0801)  Resp: 18 (11/12/23 0801)  BP: 138/68 (11/12/23 0712)  SpO2: 99 % (11/12/23 0801) Vital Signs (24h Range):  Temp:  [97.3 °F (36.3 °C)-98.4 °F (36.9 °C)] 97.9 °F (36.6 °C)  Pulse:  [] 99  Resp:  [15-20] 18  SpO2:  [93 %-100 %] 99 %  BP: (131-149)/(63-99) 138/68     Weight: 100.4 kg (221 lb 5.5 oz)  Body mass index is 39.21 kg/m².    Intake/Output Summary (Last 24 hours) at 11/12/2023 0820  Last data filed at 11/12/2023 0404  Gross per 24 hour   Intake --   Output 2300 ml   Net -2300 ml           Physical Exam  Vitals and nursing note reviewed.   Constitutional:       General: She is awake. She is not in acute distress.     Appearance: Normal appearance. She is well-developed and well-groomed. She is not ill-appearing, toxic-appearing or diaphoretic.   HENT:      Head: Normocephalic and atraumatic.   Eyes:      General: No scleral icterus.  Cardiovascular:      Rate and Rhythm: Normal rate.   Pulmonary:      Effort: No tachypnea or respiratory distress.   Musculoskeletal:      Right lower leg: No edema.      Left lower leg: No edema.   Skin:     Coloration: Skin is not jaundiced.   Neurological:      General: No focal deficit present.      Mental Status: She is alert and oriented to person, place, and time. Mental status is at baseline.   Psychiatric:         Attention and Perception: Attention normal.         Mood and Affect: Mood and affect normal.         Speech: Speech normal.         Behavior: Behavior normal. Behavior is cooperative.         Thought Content: Thought  content normal.         Cognition and Memory: Cognition and memory normal. Cognition is not impaired. Memory is not impaired.         Judgment: Judgment normal.             Significant Labs: All pertinent labs within the past 24 hours have been reviewed.  CBC:   Recent Labs   Lab 11/11/23 0322   WBC 15.38*   HGB 8.3*   HCT 28.4*          CMP:   Recent Labs   Lab 11/11/23 0322      K 3.9   CL 88*   CO2 43*      BUN 19   CREATININE 0.7   CALCIUM 10.0   PROT 6.6   ALBUMIN 3.5   BILITOT 0.5   ALKPHOS 106   AST 13   ALT 11   ANIONGAP 10         Significant Imaging: I have reviewed all pertinent imaging results/findings within the past 24 hours.

## 2023-11-12 NOTE — PLAN OF CARE
Problem: Physical Therapy  Goal: Physical Therapy Goal  Description: Goals to be met by: 23     Patient will increase functional independence with mobility by performin. Pt to be mod I with bed mobility.  2. Pt to transfer with (S)/mod I.  3. Pt to ambulate 150' /s or /s RW and SBA.    Outcome: Ongoing, Progressing     Pt tolerated treatment good today despite increased c/o pain. Demonstrates increased gait distance with decreased assistance this session. Pt ambulated ~80ft, ~40ft with RW, SBA on level tile. Pt's O2 sats above 95% throughout session. Pt would continue to benefit from skilled PT services to address pt's deficits and improve current level of function.

## 2023-11-12 NOTE — PLAN OF CARE
Problem: Adult Inpatient Plan of Care  Goal: Plan of Care Review  Outcome: Ongoing, Progressing  Flowsheets (Taken 11/12/2023 1707)  Plan of Care Reviewed With:   patient   spouse  Goal: Patient-Specific Goal (Individualized)  Outcome: Ongoing, Progressing  Goal: Absence of Hospital-Acquired Illness or Injury  Outcome: Ongoing, Progressing  Goal: Optimal Comfort and Wellbeing  Outcome: Ongoing, Progressing  Goal: Readiness for Transition of Care  Outcome: Ongoing, Progressing     Problem: Fall Injury Risk  Goal: Absence of Fall and Fall-Related Injury  Outcome: Ongoing, Progressing     Problem: Adjustment to Illness COPD (Chronic Obstructive Pulmonary Disease)  Goal: Optimal Chronic Illness Coping  Outcome: Ongoing, Progressing  Intervention: Support and Optimize Psychosocial Response  Flowsheets (Taken 11/12/2023 1707)  Supportive Measures:   relaxation techniques promoted   self-care encouraged     Problem: Functional Ability Impaired COPD (Chronic Obstructive Pulmonary Disease)  Goal: Optimal Level of Functional [place]  Outcome: Ongoing, Progressing     Problem: Infection COPD (Chronic Obstructive Pulmonary Disease)  Goal: Absence of Infection Signs and Symptoms  Outcome: Ongoing, Progressing     Problem: Oral Intake Inadequate COPD (Chronic Obstructive Pulmonary Disease)  Goal: Improved Nutrition Intake  Outcome: Ongoing, Progressing     Problem: Respiratory Compromise COPD (Chronic Obstructive Pulmonary Disease)  Goal: Effective Oxygenation and Ventilation  Outcome: Ongoing, Progressing  Intervention: Promote Airway Secretion Clearance  Flowsheets (Taken 11/12/2023 1707)  Breathing Techniques/Airway Clearance: deep/controlled cough encouraged  Cough And Deep Breathing: done independently per patient  Activity Management: Ambulated in room - L4     Problem: Skin Injury Risk Increased  Goal: Skin Health and Integrity  Outcome: Ongoing, Progressing     Problem: Pain Acute  Goal: Acceptable Pain Control and  Functional Ability  Outcome: Ongoing, Progressing  Intervention: Develop Pain Management Plan  Flowsheets (Taken 11/12/2023 0704)  Pain Management Interventions: medication offered

## 2023-11-12 NOTE — PLAN OF CARE
Problem: Adult Inpatient Plan of Care  Goal: Plan of Care Review  Outcome: Ongoing, Progressing     Problem: Fall Injury Risk  Goal: Absence of Fall and Fall-Related Injury  Outcome: Ongoing, Progressing     Problem: Adjustment to Illness COPD (Chronic Obstructive Pulmonary Disease)  Goal: Optimal Chronic Illness Coping  Outcome: Ongoing, Progressing     Problem: Functional Ability Impaired COPD (Chronic Obstructive Pulmonary Disease)  Goal: Optimal Level of Functional [place]  Outcome: Ongoing, Progressing     Problem: Infection COPD (Chronic Obstructive Pulmonary Disease)  Goal: Absence of Infection Signs and Symptoms  Outcome: Ongoing, Progressing     Problem: Oral Intake Inadequate COPD (Chronic Obstructive Pulmonary Disease)  Goal: Improved Nutrition Intake  Outcome: Ongoing, Progressing     Problem: Respiratory Compromise COPD (Chronic Obstructive Pulmonary Disease)  Goal: Effective Oxygenation and Ventilation  Outcome: Ongoing, Progressing     Problem: Skin Injury Risk Increased  Goal: Skin Health and Integrity  Outcome: Ongoing, Progressing     Problem: Pain Acute  Goal: Acceptable Pain Control and Functional Ability  Outcome: Ongoing, Progressing

## 2023-11-13 VITALS
WEIGHT: 206.81 LBS | RESPIRATION RATE: 18 BRPM | TEMPERATURE: 99 F | SYSTOLIC BLOOD PRESSURE: 133 MMHG | HEIGHT: 63 IN | HEART RATE: 99 BPM | BODY MASS INDEX: 36.64 KG/M2 | OXYGEN SATURATION: 91 % | DIASTOLIC BLOOD PRESSURE: 93 MMHG

## 2023-11-13 LAB — PHOSPHATE SERPL-MCNC: 3.6 MG/DL (ref 2.7–4.5)

## 2023-11-13 PROCEDURE — 84100 ASSAY OF PHOSPHORUS: CPT | Performed by: NURSE PRACTITIONER

## 2023-11-13 PROCEDURE — 63600175 PHARM REV CODE 636 W HCPCS: Performed by: NURSE PRACTITIONER

## 2023-11-13 PROCEDURE — S4991 NICOTINE PATCH NONLEGEND: HCPCS | Performed by: NURSE PRACTITIONER

## 2023-11-13 PROCEDURE — 25000003 PHARM REV CODE 250: Performed by: HOSPITALIST

## 2023-11-13 PROCEDURE — 99900035 HC TECH TIME PER 15 MIN (STAT)

## 2023-11-13 PROCEDURE — 94640 AIRWAY INHALATION TREATMENT: CPT

## 2023-11-13 PROCEDURE — 94761 N-INVAS EAR/PLS OXIMETRY MLT: CPT

## 2023-11-13 PROCEDURE — 97530 THERAPEUTIC ACTIVITIES: CPT

## 2023-11-13 PROCEDURE — 27000221 HC OXYGEN, UP TO 24 HOURS

## 2023-11-13 PROCEDURE — 25000242 PHARM REV CODE 250 ALT 637 W/ HCPCS: Performed by: NURSE PRACTITIONER

## 2023-11-13 PROCEDURE — 97116 GAIT TRAINING THERAPY: CPT

## 2023-11-13 PROCEDURE — 36415 COLL VENOUS BLD VENIPUNCTURE: CPT | Performed by: NURSE PRACTITIONER

## 2023-11-13 PROCEDURE — 25000003 PHARM REV CODE 250: Performed by: NURSE PRACTITIONER

## 2023-11-13 RX ADMIN — PANTOPRAZOLE SODIUM 40 MG: 40 TABLET, DELAYED RELEASE ORAL at 09:11

## 2023-11-13 RX ADMIN — HYDROCODONE BITARTRATE AND ACETAMINOPHEN 1 TABLET: 10; 325 TABLET ORAL at 10:11

## 2023-11-13 RX ADMIN — IPRATROPIUM BROMIDE AND ALBUTEROL SULFATE 3 ML: 2.5; .5 SOLUTION RESPIRATORY (INHALATION) at 08:11

## 2023-11-13 RX ADMIN — GABAPENTIN 100 MG: 100 CAPSULE ORAL at 09:11

## 2023-11-13 RX ADMIN — IPRATROPIUM BROMIDE AND ALBUTEROL SULFATE 3 ML: 2.5; .5 SOLUTION RESPIRATORY (INHALATION) at 11:11

## 2023-11-13 RX ADMIN — HYDROCODONE BITARTRATE AND ACETAMINOPHEN 1 TABLET: 10; 325 TABLET ORAL at 01:11

## 2023-11-13 RX ADMIN — IPRATROPIUM BROMIDE AND ALBUTEROL SULFATE 3 ML: 2.5; .5 SOLUTION RESPIRATORY (INHALATION) at 12:11

## 2023-11-13 RX ADMIN — METOLAZONE 5 MG: 5 TABLET ORAL at 09:11

## 2023-11-13 RX ADMIN — BUDESONIDE 0.5 MG: 0.5 INHALANT RESPIRATORY (INHALATION) at 08:11

## 2023-11-13 RX ADMIN — FUROSEMIDE 40 MG: 40 TABLET ORAL at 09:11

## 2023-11-13 RX ADMIN — PREDNISONE 40 MG: 20 TABLET ORAL at 09:11

## 2023-11-13 RX ADMIN — Medication 1 PATCH: at 09:11

## 2023-11-13 RX ADMIN — HYDROCODONE BITARTRATE AND ACETAMINOPHEN 1 TABLET: 10; 325 TABLET ORAL at 06:11

## 2023-11-13 RX ADMIN — IPRATROPIUM BROMIDE AND ALBUTEROL SULFATE 3 ML: 2.5; .5 SOLUTION RESPIRATORY (INHALATION) at 05:11

## 2023-11-13 RX ADMIN — CYCLOBENZAPRINE HYDROCHLORIDE 5 MG: 5 TABLET, FILM COATED ORAL at 06:11

## 2023-11-13 NOTE — PLAN OF CARE
[facility] - Telemetry    HOME HEALTH ORDERS  FACE TO FACE ENCOUNTER    Patient Name: Yasmeen Valderrama  YOB: 1970    PCP: Leeanna Stuart MD   PCP Address: [address] / [place] [address]  PCP Phone Number: [phone]  PCP Fax: [phone]       Encounter Date: 11/13/2023    Admit to Home Health    Diagnoses:  Active Hospital Problems    Diagnosis  POA    *Closed compression fracture of body of L1 vertebra [S32.010A]  Yes    Debility [R53.81]  Yes    Abnormal CT scan [R93.89]  Yes    ACP (advance care planning) [Z71.89]  Not Applicable    Other specified anemias [D64.89]  Yes     Chronic    Tobacco abuse [Z72.0]  Yes    Leukocytosis [D72.829]  Yes    Pulmonary hypertension [I27.20]  Yes     Chronic     Likely secondary hypoxia and pulmonary disease but also at high risk cardiac problems, follow by cardiology as well    ECHO 2/10/2022:  The estimated ejection fraction is 55%.  The left ventricle is normal in size with normal systolic function.  Normal left ventricular diastolic function.  Moderate right ventricular enlargement with mildly reduced right ventricular systolic function.  Moderate left atrial enlargement.  Mild tricuspid regurgitation.  Mild right atrial enlargement.  Intermediate central venous pressure (8 mmHg).  The estimated PA systolic pressure is 52 mmHg.  There is pulmonary hypertension.      COPD exacerbation [J44.1]  Yes    Acute on Chronic respiratory failure with hypoxia and hypercapnia [J96.11, J96.12]  Yes     Chronic     home portable 2-3 years  Has continuous mini-portable  History of hospitalization and very severe COPD.  Ideally NIV but since she has not tried and fail therapy,  will try BIPAP with AVAPS so we can expedite treatment  FEV1 only 0.46 L and 17% of predicted, she would not likely  tolerate CPAP or simple BiPAP without algorithm to adjust to changing respiratory needs.      Aortic atherosclerosis [I70.0]  Yes    KATHERINE  (obstructive sleep apnea) [G47.33]  Yes    Chronic pain syndrome [G89.4]  Yes       06/25/2018 7/22 next fills.    New guidelines  Wean to 0.5 mg 25/month of xanax  Stay with 10/325 mg of hydrocodone      Cervical stenosis of spinal canal [M48.02]  Yes     12/2016 MRI severe disease C5/6      Generalized edema [R60.1]  Yes    Stage 4 very severe COPD by GOLD classification [J44.9]  Yes     Chronic     CT chest 4/12/2016:IMPRESSION:     Atelectasis or parenchymal scarring in the right lower lobe laterally and anteriorly and in the inferior lingular portion of the left upper lobe.   Paraseptal emphysematous changes in the medial aspect of the right lung apex.  Will need updated imaging. High risk of cancer.   Pulmonary studies 9/12/2022:  ABG pH 7.357 pCO2 70 pO2 45 pHCO3 39 SpO2 77  PFT:  Ratio 36 FEV1 0.46 (17) FVC 1.27 (39)TLC 68 DLCO 48    Plan: BIPAP with AVAPS  Continuous home oxygen concentrator   Resume trelegy   Pulmonary rehab  Smoking cessation (need 6 months cessation before transplant consideration if she does not have cancer)  Airway clearance regimen -duoneb, saline, aerobika, follow by cough or huffing            Resolved Hospital Problems   No resolved problems to display.       Future Appointments   Date Time Provider Department Center   11/27/2023  1:30 PM Rae Goodrich, PT [facility] OP [address]      Follow-up Information       Leeanna Stuart MD .    Specialty: Family Medicine  Why: Call to schedule an appointment for a 3 month follow-up of pulmonary nodule  Contact information:  [id number] [facility] [address]  [address]  [place] [address] [phone]               [facility] Follow up.    Why: Clinic will call you to schedule appointment.  Contact information:  [address] [address] [phone]                             I have seen and examined this patient face to face today. My clinical findings that support the need for the home health  skilled services and home bound status are the following:  Weakness/numbness causing balance and gait disturbance due to Weakness/Debility making it taxing to leave home.    Allergies:  Review of patient's allergies indicates:   Allergen Reactions    Antivert [meclizine] Other (See Comments)     Behavioral changes       Diet: cardiac diet    Activities: activity as tolerated    Nursing:   SN to complete comprehensive assessment including routine vital signs. Instruct on disease process and s/s of complications to report to MD. Review/verify medication list sent home with the patient at time of discharge  and instruct patient/caregiver as needed. Frequency may be adjusted depending on start of care date.    Notify MD if SBP > 160 or < 90; DBP > 90 or < 50; HR > 120 or < 50; Temp > 101; Other:         CONSULTS:    Physical Therapy to evaluate and treat. Evaluate for home safety and equipment needs; Establish/upgrade home exercise program. Perform / instruct on therapeutic exercises, gait training, transfer training, and Range of Motion.  Occupational Therapy to evaluate and treat. Evaluate home environment for safety and equipment needs. Perform/Instruct on transfers, ADL training, ROM, and therapeutic exercises.   to evaluate for community resources/long-range planning.  Aide to provide assistance with personal care, ADLs, and vital signs.    MISCELLANEOUS CARE:  Routine Skin for Bedridden Patients: Instruct patient/caregiver to apply moisture barrier cream to all skin folds and wet areas in perineal area daily and after baths and all bowel movements.  Home Oxygen:  Oxygen at 3 L/min nasal canula to be used:  Continuously., Assess oxygen saturation via pulse oximeter as needed for increase in SOB., Notify physician if oxygen saturation less than 88%, and Consult respiratory therapy for instruction on home oxygen use    WOUND CARE ORDERS  no      Medications: Review discharge medications with patient and  family and provide education.      Current Discharge Medication List        START taking these medications    Details   cyclobenzaprine (FLEXERIL) 5 MG tablet Take 1 tablet (5 mg total) by mouth 3 (three) times daily as needed for Muscle spasms.  Qty: 30 tablet, Refills: 0      gabapentin (NEURONTIN) 100 MG capsule Take 1 capsule (100 mg total) by mouth 2 (two) times daily.  Qty: 60 capsule, Refills: 1      HYDROcodone-acetaminophen (NORCO)  mg per tablet Take 1 tablet by mouth every 6 (six) hours as needed.  Qty: 30 tablet, Refills: 0    Comments: Quantity prescribed more than 7 day supply? No      polyethylene glycol (GLYCOLAX) 17 gram/dose powder Mix 1 capful (17 g) with fluids and drink by mouth 2 (two) times daily.  Qty: 510 g, Refills: 0      potassium chloride SA (K-DUR,KLOR-CON) 20 MEQ tablet Take 1 tablet (20 mEq total) by mouth once daily.  Qty: 30 tablet, Refills: 3      predniSONE (DELTASONE) 20 MG tablet Take 2 tablets (40 mg total) by mouth once daily. for 5 days  Qty: 10 tablet, Refills: 0           CONTINUE these medications which have NOT CHANGED    Details   albuterol (PROVENTIL/VENTOLIN HFA) 90 mcg/actuation inhaler Inhale 2 puffs into the lungs every 6 (six) hours as needed.      albuterol-ipratropium (DUO-NEB) 2.5 mg-0.5 mg/3 mL nebulizer solution Take 3 mLs by nebulization every 4 (four) hours as needed for Wheezing. Rescue  Qty: 1620 mL, Refills: 5    Associated Diagnoses: Stage 4 very severe COPD by GOLD classification      fluocinolone acetonide oiL 0.01 % Drop Place 5 drops into both ears 2 (two) times daily.      fluticasone-umeclidin-vilanter (TRELEGY ELLIPTA) 200-62.5-25 mcg inhaler Trelegy Ellipta 200 mcg-62.5 mcg-25 mcg powder for inhalation   INHALE 1 PUFF(S) BY MOUTH into the lungs ONCE A DAY      furosemide (LASIX) 40 MG tablet Take 40 mg by mouth 2 (two) times daily as needed.      LINZESS 72 mcg Cap capsule Take 72 mcg by mouth every morning.      metOLazone (ZAROXOLYN) 5 MG  tablet SMARTSI.5 Tablet(s) By Mouth Every Other Day      olopatadine (PATANOL) 0.1 % ophthalmic solution SMARTSI Drop(s) In Eye(s) Twice Daily PRN      pantoprazole (PROTONIX) 40 MG tablet Take 1 tablet (40 mg total) by mouth 2 (two) times daily.  Qty: 60 tablet, Refills: 1           STOP taking these medications       gabapentin (NEURONTIN) 600 MG tablet Comments:   Reason for Stopping:         ipratropium (ATROVENT) 0.02 % nebulizer solution Comments:   Reason for Stopping:         SUBOXONE 12-3 mg Film Comments:   Reason for Stopping:               I certify that this patient is confined to her home and needs intermittent skilled nursing care, physical therapy, and occupational therapy.

## 2023-11-13 NOTE — PLAN OF CARE
Prabha Munoz notified SW that they are unable to provide Bipap. Prabha stated that patient has to get Bipap from [facility].

## 2023-11-13 NOTE — PLAN OF CARE
Alexander stated they are unable to provide Bipap. Pt will need to pay balance of $1000 to [facility]. MD notified. CM notified nurse, Jessica that pt is ready for discharge from CM standpoint. All CM needs met.     11/13/23 0907   Final Note   Assessment Type Final Discharge Note   Anticipated Discharge Disposition Home   What phone number can be called within the next 1-3 days to see how you are doing after discharge? [phone]   Hospital Resources/Appts/Education Provided Appointments scheduled and added to AVS   Post-Acute Status   Coverage Healthy Blue- [employer]   Discharge Delays None known at this time

## 2023-11-13 NOTE — PLAN OF CARE
Problem: Adult Inpatient Plan of Care  Goal: Plan of Care Review  Outcome: Ongoing, Progressing     Problem: Fall Injury Risk  Goal: Absence of Fall and Fall-Related Injury  Outcome: Ongoing, Progressing     Problem: Adjustment to Illness COPD (Chronic Obstructive Pulmonary Disease)  Goal: Optimal Chronic Illness Coping  Outcome: Ongoing, Progressing     Problem: Functional Ability Impaired COPD (Chronic Obstructive Pulmonary Disease)  Goal: Optimal Level of Functional [place]  Outcome: Ongoing, Progressing     Problem: Infection COPD (Chronic Obstructive Pulmonary Disease)  Goal: Absence of Infection Signs and Symptoms  Outcome: Ongoing, Progressing     Problem: Oral Intake Inadequate COPD (Chronic Obstructive Pulmonary Disease)  Goal: Improved Nutrition Intake  Outcome: Ongoing, Progressing     Problem: Respiratory Compromise COPD (Chronic Obstructive Pulmonary Disease)  Goal: Effective Oxygenation and Ventilation  Outcome: Ongoing, Progressing     Problem: Skin Injury Risk Increased  Goal: Skin Health and Integrity  Outcome: Ongoing, Progressing     Problem: Pain Acute  Goal: Acceptable Pain Control and Functional Ability  Outcome: Ongoing, Progressing

## 2023-11-13 NOTE — NURSING
[facility] [place]  Nurses Note -- 4 Eyes      11/13/2023       Skin assessed on: Q Shift      [x] No Pressure Injuries Present    [x]Prevention Measures Documented    [] Yes LDA  for Pressure Injury Previously documented     [] Yes New Pressure Injury Discovered   [] LDA for New Pressure Injury Added      Attending RN:  Jessica Wood RN     Second RN: ERIC Ureña

## 2023-11-13 NOTE — DISCHARGE SUMMARY
[facility] Medicine  Discharge Summary      Patient Name: Yasmeen Valderrama  MRN: [id number]  Patient Class: IP- Inpatient  Admission Date: 11/7/2023  Hospital Length of Stay: 5 days  Discharge Date and Time:  11/13/2023 9:01 AM  Attending Physician: Tammy Caal MD   Discharging Provider: Tammy Wilson MD  Primary Care Provider: Leeanna Stuart MD      HPI:   Yasmeen Valderrama is a 54 yo female with significant history 1/2 day smoker, KATHERINE no longer on BiPAP due to claustrophobia, COPD on home oxygen 3 L NC, chronic pain, recent non bleeding gastric ulcer on EGD 9/27/23 who arrived to ED via EMS after a fall.  For the past few months patient will fall asleep even while upright position but usually will catch herself.  This a.m. at 2:30 a.m. patient woke up to let her dog out and while standing patient fell asleep and fell onto her buttocks.  Since patient did not pass out or hit head.  Since the fall patient complained of acute on chronic lower back pain 9/10.  She denies any numbness or weakness in all her extremities however reported to Neurosurgery team numbness and pain in arms and legs after the fall but now resolved.   Patient also reports worsening and shortness a breath that started after the fall.  Denies any productive cough.  Denies fever or chills. Patient sleeps in upright position in which she contributes BLE edema however worst then usual.   No prior treatment before arrival to hospital.  In ED, CXR no infiltrate. WBC 16 K (chronically elevated), afebrile.   X ray lumbar, CT thoracic L1 compression fracture. CT cervical no acute cervical fracture.   CT did reveal right apical ground glass opacity and recommended follow up CT 3-6 months.   Seen by Neurosurgery in ED who recommended MRI CTL w wo contrast, TLSO brace when out of bed, no lifting >10lbs, bending or twisting.     * No surgery found *      Hospital Course:   Admission to the hospital for acute L1 compression  "fracture and acute on chronic respiratory failure with hypoxia and hypercapnia and COPD exacerbation.  Neurosurgery following. Patient adamantly declined MRI of cervical lumbar and thoracic.  PT OT evaluation completed and recommended low intense therapy.     Lethargy resolved after use of Bipap.  Wheezing anterior/posterior significantly improved DuoNeb, LAMA/LAMA,  IV steroid to PO, home oxygen.  CT also showed "new right apical pleural base soft tissue density appearing since 2022." CT scan chest with contrast stated with regard to density seen on previous CT thoracic, this is believed to represent pulmonary apical scarring.     Discharge cancel due to not able to obtain Bipap/wheelchair until Monday.        Goals of Care Treatment Preferences:  Code Status: Full Code      Consults:   Consults (From admission, onward)          Status Ordering Provider     Inpatient virtual consult to Hospital Medicine  Once        Provider:  Teresa Lopez MD    Completed JAY LIRIANO     Inpatient consult to Neurosurgery  Once        Provider:  Nadia Ochoa PA-C    Completed ANABEL BETANCOURT            Neuro  * Closed compression fracture of body of L1 vertebra  Patient presented to ED after a fall while upright standing position.  Patient have sleep apnea however returned her BiPAP due to claustrophobia and have been falling asleep while upright position lately but this time not able to catch herself.  Patient fell on her buttock.  No loss of consciousness or head injury  CT and x-ray show L1 compression fracture  Neurosurgery consulted-recommended MRI with without contrast cervical thoracic lumbar however patient declined as not able to lay flat x 5 years  TLSO when OOB  No lifting more than 10 lb no twisting no bending  PT OT eval completed. Low intensity  Pain controlled with current regimen  Generalized weakness with no focal neuro deficit on exam- plan as above      Chronic pain syndrome  On Suboxone " at home.  We will hold Suboxone while acute pain control  Will need pain management on discharge      Cervical stenosis of spinal canal  Initial fall patient reported pain and numbness in extremities however resolved prior to arriving at hospital   Neurosurgery following MRI of cervical with without contrast  .      Pulmonary  COPD exacerbation  See above     Acute on Chronic respiratory failure with hypoxia and hypercapnia  Patient with history obesity, severe COPD on home 3 L NC, pulmonary hypertension and KATHERINE not on BiPAP due to claustrophobia, continue smoking who was admitted for falling asleep in upright position sustaining  L1 compression fracture pain medication/muscle relaxer.   VBG on admission 7.34/81/43/42  11/8 able to answer question however falling asleep frequently during conversation . Tolerated bipap   Treat COPD exacerbation- imrpoving  Need to get back on BiPap  , smoking cessation     TN/SW assisting with Bipap      Stage 4 very severe COPD by GOLD classification  Patient's COPD is with exacerbation noted by continued dyspnea currently.  Patient is currently on COPD Pathway. Continue scheduled inhalers Steroids and Supplemental oxygen and monitor respiratory status closely.  Encourage smoking cessation  Continue home LAMA/LABA, duoneb q 4, deescalate steroid today        Cardiac/Vascular  Pulmonary hypertension  PASP 52 mmHg 2/10/2022> 37 mmhg 12/2022  Continue home lasix, zarosylyn  Encourage smoking cessation       Aortic atherosclerosis  CArdiologist Dr. Olvera  11/15/22 Coronary calcification on CT scan  11/15/22 Dobutamine stress test  Not on aSA due to recent GIB/gastric ulcer  Lab Results   Component Value Date    LDLCALC 92.0 09/29/2020   not on statin at home, add lipid panel          Oncology  Other specified anemias  Patient's anemia is currently controlled. Has not received any PRBCs to date. Etiology likely d/t Iron deficiency  Current CBC reviewed-   Lab Results   Component Value  Date    HGB 8.3 (L) 11/11/2023    HCT 28.4 (L) 11/11/2023   Monitor serial CBC and transfuse if patient becomes hemodynamically unstable, symptomatic or H/H drops below 7/21.  Hgb stable compared to last hgb 8.5 9/27 . Low T sat during that admission   Ferrous sulfate    Other  KATHERINE (obstructive sleep apnea)  Returned her BiPAP a few months ago due to claustrophobia.   Continue Bipap qhs and day time nap prn      Generalized edema  BLE edema chronic for patient. Sleeps in sitting position at home.   Last 2 D echo 12/2022 normal EF 55%, normal diastolic and normal sized ventricle  Continue home lasix zarozyln      Final Active Diagnoses:    Diagnosis Date Noted POA    PRINCIPAL PROBLEM:  Closed compression fracture of body of L1 vertebra [S32.010A] 11/07/2023 Yes    Debility [R53.81] 11/10/2023 Yes    Abnormal CT scan [R93.89] 11/08/2023 Yes    ACP (advance care planning) [Z71.89] 11/08/2023 Not Applicable    Other specified anemias [D64.89] 11/07/2023 Yes     Chronic    Tobacco abuse [Z72.0] 11/07/2023 Yes    Leukocytosis [D72.829] 09/26/2023 Yes    Pulmonary hypertension [I27.20] 06/28/2022 Yes     Chronic    COPD exacerbation [J44.1] 06/28/2022 Yes    Acute on Chronic respiratory failure with hypoxia and hypercapnia [J96.11, J96.12] 06/28/2022 Yes     Chronic    Aortic atherosclerosis [I70.0] 03/19/2021 Yes    KATHERINE (obstructive sleep apnea) [G47.33] 03/04/2020 Yes    Chronic pain syndrome [G89.4] 05/03/2017 Yes    Cervical stenosis of spinal canal [M48.02] 01/04/2017 Yes    Generalized edema [R60.1] 09/03/2015 Yes    Stage 4 very severe COPD by GOLD classification [J44.9] 12/01/2014 Yes     Chronic      Problems Resolved During this Admission:       Discharged Condition: good    Disposition: Home or Self Care    Follow Up:   Follow-up Information       Leeanna Stuart MD .    Specialty: Family Medicine  Why: Call to schedule an appointment for a 3 month follow-up of pulmonary nodule  Contact  "information:  [id number] [facility] [address]  [address]  [place] [id number] [phone]               [facility] [place] Follow up.    Why: Clinic will call you to schedule appointment.  Contact information:  [place] [address] [phone]                         Patient Instructions:      WHEELCHAIR FOR HOME USE     Order Specific Question Answer Comments   Hours in W/C per day: 10    Type of Wheelchair: Standard    Size(Width): 18"(STD adult)    Leg Support: STD footrests    Lap Belt: Velcro    Accessories: Anti-tippers    Cushion: Basic    Reclining Back No    Height: 5' 3" (1.6 m)    Weight: 100.4 kg (221 lb 5.5 oz)    Does patient have medical equipment at home? bedside commode    Does patient have medical equipment at home? hospital bed    Does patient have medical equipment at home? shower chair    Length of need (1-99 months): 99    Please check all that apply: Patient's upper body strength is sufficient for propulsion.      BIPAP FOR HOME USE     Order Specific Question Answer Comments   Length of need (1-99 months): 99    Type:  Bipap S/T    IPAP: cmH20 (4-30): 12    EPAP: cmH20 (4-25): 6    Breath Rate: BPM (Off, 1-30): 12    Humidification (): Heated    Choose ONE mask type and its corresponding cushions and/or pillows:  Nasal Mask, 1 per 90 days:  Nasal Cushions, (6 per 90 days):  Nasal Pillows, (6 per 90 days)    Choose EITHER Heated or Non-Heated Tubjing  Heated Tubing, 1 per 6 months    All other supplies as needed as listed below:  Headgear, 1 per 180 days    All other supplies as needed as listed below:  Disposable Filter, 6 per 90 days    All other supplies as needed as listed below:  Exhalation Port, contact payer for quantity/frequency    All other supplies as needed as listed below:  Humidifier Chamber, 1 per 180 days    All other supplies as needed as listed below:  Non-Disposable Filter, 1 per 180 days  "   All other supplies as needed as listed below:  Chin Strap, 1 per 180 days      Ambulatory referral/consult to Physical/Occupational Therapy   Standing Status: Future   Referral Priority: Routine Referral Type: Physical Medicine   Referral Reason: Specialty Services Required   Number of Visits Requested: 1     Ambulatory referral/consult to Pain Clinic   Standing Status: Future   Referral Priority: Routine Referral Type: Consultation   Referral Reason: Specialty Services Required   Requested Specialty: Pain Medicine   Number of Visits Requested: 1     Diet Cardiac     Diet Cardiac     Notify your health care provider if you experience any of the following:  difficulty breathing or increased cough     Reason for not Prescribing Nicotine Replacement   Order Comments: Follow up in program     Order Specific Question Answer Comments   Reason for not Prescribing: Patient refused      Reason for not Ordering Smoking Cessation Referral     Order Specific Question Answer Comments   Reason for not ordering: Patient refused      Activity as tolerated       Significant Diagnostic Studies: N/A    Pending Diagnostic Studies:       None           Medications:  Reconciled Home Medications:      Medication List        START taking these medications      cyclobenzaprine 5 MG tablet  Commonly known as: FLEXERIL  Take 1 tablet (5 mg total) by mouth 3 (three) times daily as needed for Muscle spasms.     gabapentin 100 MG capsule  Commonly known as: NEURONTIN  Take 1 capsule (100 mg total) by mouth 2 (two) times daily.  Replaces: gabapentin 600 MG tablet     GAVILAX 17 gram/dose powder  Generic drug: polyethylene glycol  Mix 1 capful (17 g) with fluids and drink by mouth 2 (two) times daily.     HYDROcodone-acetaminophen  mg per tablet  Commonly known as: NORCO  Take 1 tablet by mouth every 6 (six) hours as needed.     potassium chloride SA 20 MEQ tablet  Commonly known as: K-DUR,KLOR-CON  Take 1 tablet (20 mEq total) by  mouth once daily.     predniSONE 20 MG tablet  Commonly known as: DELTASONE  Take 2 tablets (40 mg total) by mouth once daily. for 5 days            CONTINUE taking these medications      albuterol 90 mcg/actuation inhaler  Commonly known as: PROVENTIL/VENTOLIN HFA  Inhale 2 puffs into the lungs every 6 (six) hours as needed.     albuterol-ipratropium 2.5 mg-0.5 mg/3 mL nebulizer solution  Commonly known as: DUO-NEB  Take 3 mLs by nebulization every 4 (four) hours as needed for Wheezing. Rescue     fluocinolone acetonide oiL 0.01 % Drop  Place 5 drops into both ears 2 (two) times daily.     fluticasone-umeclidin-vilanter 200-62.5-25 mcg inhaler  Commonly known as: TRELEGY ELLIPTA  Trelegy Ellipta 200 mcg-62.5 mcg-25 mcg powder for inhalation   INHALE 1 PUFF(S) BY MOUTH into the lungs ONCE A DAY     furosemide 40 MG tablet  Commonly known as: LASIX  Take 40 mg by mouth 2 (two) times daily as needed.     LINZESS 72 mcg Cap capsule  Generic drug: linaCLOtide  Take 72 mcg by mouth every morning.     metOLazone 5 MG tablet  Commonly known as: ZAROXOLYN  SMARTSI.5 Tablet(s) By Mouth Every Other Day     olopatadine 0.1 % ophthalmic solution  Commonly known as: PATANOL  SMARTSI Drop(s) In Eye(s) Twice Daily PRN     pantoprazole 40 MG tablet  Commonly known as: PROTONIX  Take 1 tablet (40 mg total) by mouth 2 (two) times daily.            STOP taking these medications      gabapentin 600 MG tablet  Commonly known as: NEURONTIN  Replaced by: gabapentin 100 MG capsule     ipratropium 0.02 % nebulizer solution  Commonly known as: ATROVENT     SUBOXONE 12-3 mg Film  Generic drug: buprenorphine-naloxone              Indwelling Lines/Drains at time of discharge:   Lines/Drains/Airways       Drain  Duration             Female External Urinary Catheter 23 0947 4 days                    Time spent on the discharge of patient: 45 minutes         The attending portion of this evaluation, treatment, and documentation was  performed per Tammy Wilson MD via Telemedicine AudioVisual using the secure [employer] software platform with 2 way audio/video. The provider was located off-site and the patient is located in the hospital. The aforementioned video software was utilized to document the relevant history and physical exam    Tammy Wilson MD  Department of Hospital Medicine  [facility]

## 2023-11-13 NOTE — NURSING
Patient being discharged home, educated completed by virtual nurse. IV and telemetry removed. Awaiting transport.

## 2023-11-13 NOTE — PLAN OF CARE
Problem: Adult Inpatient Plan of Care  Goal: Plan of Care Review  Outcome: Adequate for Care Transition  Goal: Patient-Specific Goal (Individualized)  Outcome: Adequate for Care Transition  Goal: Absence of Hospital-Acquired Illness or Injury  Outcome: Adequate for Care Transition  Goal: Optimal Comfort and Wellbeing  Outcome: Adequate for Care Transition  Goal: Readiness for Transition of Care  Outcome: Adequate for Care Transition     Problem: Fall Injury Risk  Goal: Absence of Fall and Fall-Related Injury  Outcome: Adequate for Care Transition     Problem: Adjustment to Illness COPD (Chronic Obstructive Pulmonary Disease)  Goal: Optimal Chronic Illness Coping  Outcome: Adequate for Care Transition     Problem: Functional Ability Impaired COPD (Chronic Obstructive Pulmonary Disease)  Goal: Optimal Level of Functional [place]  Outcome: Adequate for Care Transition     Problem: Infection COPD (Chronic Obstructive Pulmonary Disease)  Goal: Absence of Infection Signs and Symptoms  Outcome: Adequate for Care Transition     Problem: Oral Intake Inadequate COPD (Chronic Obstructive Pulmonary Disease)  Goal: Improved Nutrition Intake  Outcome: Adequate for Care Transition     Problem: Respiratory Compromise COPD (Chronic Obstructive Pulmonary Disease)  Goal: Effective Oxygenation and Ventilation  Outcome: Adequate for Care Transition     Problem: Skin Injury Risk Increased  Goal: Skin Health and Integrity  Outcome: Adequate for Care Transition     Problem: Pain Acute  Goal: Acceptable Pain Control and Functional Ability  Outcome: Adequate for Care Transition

## 2023-11-13 NOTE — NURSING
[facility] [place], [place]  Nurses Note -- 4 Eyes      11/12/2023       Skin assessed on: Q Shift      [x] No Pressure Injuries Present    [x]Prevention Measures Documented    [] Yes LDA  for Pressure Injury Previously documented     [] Yes New Pressure Injury Discovered   [] LDA for New Pressure Injury Added      Attending RN:  Karina Tran RN     Second RN:  ERIC Burton

## 2023-11-13 NOTE — PLAN OF CARE
Problem: Physical Therapy  Goal: Physical Therapy Goal  Description: Goals to be met by: 23     Patient will increase functional independence with mobility by performin. Pt to be mod I with bed mobility.  2. Pt to transfer with (S)/mod I.  3. Pt to ambulate 150' /s or /s RW and SBA.    Outcome: Ongoing, Progressing

## 2023-11-13 NOTE — PT/OT/SLP PROGRESS
"Physical Therapy Treatment    Patient Name:  Yasmeen Valderrama   MRN:  [id number]    Recommendations:     Discharge Recommendations: Low Intensity Therapy  Discharge Equipment Recommendations: walker, rolling  Barriers to discharge:  None from PT standpoint    Assessment:     Yasmeen Valderrama is a 53 y.o. female admitted with a medical diagnosis of Closed compression fracture of body of L1 vertebra.  She presents with the following impairments/functional limitations: weakness, impaired balance, decreased safety awareness, impaired skin, impaired endurance, pain, impaired self care skills, decreased coordination, decreased ROM, impaired joint extensibility, impaired functional mobility, impaired coordination, decreased upper extremity function, gait instability .    Rehab Prognosis: Good; patient would benefit from acute skilled PT services to address these deficits and reach maximum level of function.    Recent Surgery: * No surgery found *      Plan:     During this hospitalization, patient to be seen daily to address the identified rehab impairments via gait training, therapeutic activities, therapeutic exercises and progress toward the following goals:    Plan of Care Expires:  11/14/23    Subjective     Chief Complaint: pain, "tightness in B calves  Patient/Family Comments/goals: Contacted nursing for pain meds, pt agreeable to treatment and to remain up in chair following.  States that she thinks she "over did it" yesterday by staying up in the chair for 12hrs.    Pain/Comfort:  Pain Rating 1: 10/10  Location 1: back  Pain Addressed 1: Reposition, Distraction, Cessation of Activity, Nurse notified (Nursing called and on her way to administer pain meds)  Pain Rating Post-Intervention 1: 10/10      Objective:     Communicated with nsg prior to session.  Patient found HOB elevated with telemetry, oxygen, PureWick upon PT entry to room.     General Precautions: Standard, fall  Orthopedic Precautions: spinal " precautions  Braces: TLSO  Respiratory Status: Nasal cannula, flow 3 L/min     Functional Mobility:  Bed Mobility:     Scooting: stand by assistance  Supine to Sit: minimum assistance with HOB fully elevated  Transfers:     Sit to Stand:  contact guard assistance with VC/TC for hand placement and hip hinging  Gait: Gait training with RW and CGA/SBA and VC/TC for walker management/safety and  increased trunk extension approx 160' with decreased sujatha and step length   Balance: Fair + sit, Fair+/Fair stand      AM-PAC 6 CLICK MOBILITY  Turning over in bed (including adjusting bedclothes, sheets and blankets)?: 3  Sitting down on and standing up from a chair with arms (e.g., wheelchair, bedside commode, etc.): 3  Moving from lying on back to sitting on the side of the bed?: 3  Moving to and from a bed to a chair (including a wheelchair)?: 3  Need to walk in hospital room?: 3  Climbing 3-5 steps with a railing?: 2  Basic Mobility Total Score: 17       Treatment & Education:  Educated pt and spouse on Spinal precautions per handout, TLSO bed mobility/logrolling, transfers/hip hinging, LE TE per handout: AP's, TKE's, GS, FAQ's to be performed 10x each 3x/day, and goal of sitting up in chair for 6 hrs total that can be broken up throughout the day.    Patient left up in chair with all lines intact, call button in reach, nsg notified, spouse present, and green cushion in chair i ..    GOALS:   Multidisciplinary Problems       Physical Therapy Goals          Problem: Physical Therapy    Goal Priority Disciplines Outcome Goal Variances Interventions   Physical Therapy Goal     PT, PT/OT Ongoing, Progressing     Description: Goals to be met by: 23     Patient will increase functional independence with mobility by performin. Pt to be mod I with bed mobility.  2. Pt to transfer with (S)/mod I.  3. Pt to ambulate 150' /s or /s RW and SBA.                         Time Tracking:     PT Received On: 23  PT Start  Time: 1016     PT Stop Time: 1039  PT Total Time (min): 23 min     Billable Minutes: Gait Training 10 and Therapeutic Activity 13    Treatment Type: Treatment  PT/PTA: PT     Number of PTA visits since last PT visit: 0     11/13/2023

## 2023-11-13 NOTE — PLAN OF CARE
Pt stated she has a rolling walker at home. CM explained that pt doesn't qualify for a wheelchair per PT. Pt verbally expressed understanding. Pt's , Ousmane will purchase a wheelchair.

## 2023-11-29 ENCOUNTER — CLINICAL SUPPORT (OUTPATIENT)
Dept: REHABILITATION | Facility: HOSPITAL | Age: 53
End: 2023-11-29
Payer: MEDICAID

## 2023-11-29 DIAGNOSIS — S32.010A CLOSED COMPRESSION FRACTURE OF BODY OF L1 VERTEBRA: ICD-10-CM

## 2023-11-29 DIAGNOSIS — Z74.09 IMPAIRED FUNCTIONAL MOBILITY AND ACTIVITY TOLERANCE: Primary | ICD-10-CM

## 2023-11-29 PROCEDURE — 97162 PT EVAL MOD COMPLEX 30 MIN: CPT

## 2023-11-29 NOTE — PLAN OF CARE
"[facility]   Physical Therapy Initial Evaluation      Name: Yasmeen Gonzáles [facility]  Clinic Number: [id number]    Therapy Diagnosis:   Encounter Diagnoses   Name Primary?    Closed compression fracture of body of L1 vertebra     Impaired functional mobility and activity tolerance Yes        Physician: Ella Winston NP    Physician Orders: PT Eval and Treat   Medical Diagnosis from Referral: J44.1 (ICD-10-CM) - Acute exacerbation of chronic obstructive pulmonary disease (COPD) S32.010A (ICD-10-CM) - Wedge compression fracture of first lumbar vertebra, initial encounter for closed fracture  Evaluation Date: 11/29/2023  Authorization Period Expiration: 11/9/2024  Plan of Care Expiration: 2/9/2024  Progress Note Due: 10th visit  Visit # / Visits authorized: 1/ 1   FOTO: 1/ 3    Precautions: Standard     Time In: 1:30 pm  Time Out: 2:15  Total Billable Time: 45 minutes    Subjective     Date of onset: 11/7/2023    History of current condition - Yasmeen reports: Patient reports she had a fall from falling asleep standing up. Patient reports her CO2 levels were too high and that's why she was going to sleep. Patient reports she feel asleep standing up and fell directly on her buttocks. Patient reports she has been on oxygen for years and is currently using 3L. Patient reports she has been abiding by her precautions and wearing her TLSO brace when out of bed. Patient reports since her 5 day hospitalization following her fall, she has been using a RW, experiencing leg cramps,  and ceased smoking. Patient reports she is able to "sleep close to flat;" notes she is not usuing BiPAP machine. Patient reports her leg swelling has decreased since she has been routinely elevating her legs. Patient reports she lives in [place], [place] and is interested in transferring to the [facility] so she can live in her own home.     Falls: 3 falls in the last 1.5 years     Imaging: see imaging    Prior Therapy: " "none  Social History:  lives with their spouse  Occupation: unemployed   Prior Level of Function: independent with all household and personal ADL's, independent ambulation no AD  Current Level of Function: ambulates with rolling walker due to fatigue and fear of LOB. Frustrated she is limited overall and unable to perform household ADL's due to pain and precautions; no bending, lifting twisting, no lifting heavier than 10 lbs. Increased pain when ambulating, standing, or sitting for long periods of time.    Pain:  Current 7/10, worst 10/10, best 4/10   Location: bilateral lumbar    Description: Sharp and "something wants to tear out of me"  Aggravating Factors: Sitting, Standing, and Getting out of bed/chair  Easing Factors: rest    Patients goals: improve functional mobility      Medical History:   Past Medical History:   Diagnosis Date    Anxiety     Asthma     Carpal tunnel syndrome, bilateral     COPD (chronic obstructive pulmonary disease)     Heavy cigarette smoker     9/14/22 2PPD    Laryngeal papilloma     On home oxygen therapy     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    Right side with CT scan Referred to ENT for visualization       Surgical History:   Yasmeen Valderrama  has a past surgical history that includes Hysterectomy; Finger surgery; Carpal tunnel release (Bilateral); Oophorectomy (1996); and Esophagogastroduodenoscopy (N/A, 9/27/2023).    Medications:   Yasmeen has a current medication list which includes the following prescription(s): albuterol, albuterol-ipratropium, fluocinolone acetonide oil, fluticasone-umeclidin-vilanter, furosemide, gabapentin, hydrocodone-acetaminophen, linzess, metolazone, olopatadine, pantoprazole, polyethylene glycol, and potassium chloride sa.    Allergies:   Review of patient's allergies indicates:   Allergen Reactions    Antivert [meclizine] Other (See Comments)     Behavioral changes        Objective      Postural examination/scapula alignment: rounded shoulders and forward " head. Don's TLSO brace and ambulates with RW and O2 tank      Strength: manual muscle test grades below      Lower Extremity Strength  Right LE   Left LE     Hip Flexion: 3+/5 Hip Flexion: 3+/5   Hip Abduction: 4-/5 Hip Abduction 4-/5   Hip Adduction 4-/5 Hip Adduction 4-/5   Knee Extension: 3+/5 Knee Extension: 4-/5   Knee Flexion: 3+/5 Knee Flexion: 4-/5   Ankle Dorsiflexion: 3+/5 Ankle Dorsiflexion: 3+/5   Ankle Plantarflexion: 3+/5 Ankle Plantarflexion: 3+/5         GAIT DEVIATIONS:  Pt ambulates with reduced gait speed secondary to pain and fear of LOB, with left foot ER.     Endurance Assessment:       Evaluation   Timed Up and Go 26 seconds with RW notes SOB; 14 seconds without RW   30 STS 5x, pain and SOB, hands on knees   Self Selected Walking Speed 0.475 m/sec (based on 2 Minute Walk Test)   57 meters (188 feet) 1 rest break      Table: Population Norms for TUG    Age  Average TUG    60 - 69 years  8.1 seconds    70 - 79 years  9.2 seconds    80 - 99 years  11.3 seconds     Intake Outcome Measure for FOTO lumbar Survey    Therapist reviewed FOTO scores for Yasmeen Valderrama on 11/29/2023.   FOTO report - see Media section or FOTO account episode details.    Intake Score: see media section         Treatment     Total Treatment time (time-based codes) separate from Evaluation: 00 minutes     Yasmeen received the treatments listed below:        Patient Education and Home Exercises     Education provided:   - HEP  -prognosis  - plan of care  - importance of precautions  -continue to ambulate with RW for energy conservation and fall risk  - lumbar anatomy    Written Home Exercises Provided: yes. Exercises were reviewed and Yasmeen was able to demonstrate them prior to the end of the session.  Yasmeen demonstrated good  understanding of the education provided. See EMR under Patient Instructions for exercises provided during therapy sessions.    Assessment     Yasmeen is a 53 y.o. female referred to outpatient  Physical Therapy with a medical diagnosis of J44.1 (ICD-10-CM) - Acute exacerbation of chronic obstructive pulmonary disease (COPD) and  Wedge compression fracture of first lumbar vertebra, initial encounter for closed fracture. Patient presents donning TLSO brace, rolling walker, and personal oxygen tank. Patient demonstrates poor activity tolerance, poor balance, poor tolerance for walking long distances, and decreased functional mobility. Patient demonstrates decreased walking speed and poor endurance per 2 minute walk test. Patient would benefit from skilled outpatient physical therapy to address motor control, strength and range of motion deficits so that she may return to full independence with all household and personal ADL's, and improve overall functional mobility, reduce risk for falls and meet all social and recreational needs.    Patient prognosis is Fair.   Patient will benefit from skilled outpatient Physical Therapy to address the deficits stated above and in the chart below, provide patient /family education, and to maximize patientt's level of independence.     Plan of care discussed with patient: Yes  Patient's spiritual, cultural and educational needs considered and patient is agreeable to the plan of care and goals as stated below:     Anticipated Barriers for therapy: chronicity, COPD, fx    Medical Necessity is demonstrated by the following  History  Co-morbidities and personal factors that may impact the plan of care [] LOW: no personal factors / co-morbidities  [] MODERATE: 1-2 personal factors / co-morbidities  [x] HIGH: 3+ personal factors / co-morbidities    Moderate / High Support Documentation:   Co-morbidities affecting plan of care: see medical chart    Personal Factors:   age     Examination  Body Structures and Functions, activity limitations and participation restrictions that may impact the plan of care [] LOW: addressing 1-2 elements  [] MODERATE: 3+ elements  [x] HIGH: 4+  elements (please support below)    Moderate / High Support Documentation: Limitations with prolonged walking, difficulty with ADLs, decreased lumbar range of motion, decreased strength, difficulty with gait, neuromuscular re-education, and pain.       Clinical Presentation [] LOW: stable  [x] MODERATE: Evolving  [] HIGH: Unstable     Decision Making/ Complexity Score: moderate       Goals:  Short Term Goals (6 Weeks):   1. Patient will be independent with home exercise program to supplement physical therapy treatment in improving functional status.  2. Pt will improve impaired lower extremity manual muscle tests to >/= 4-/5 to improve dynamic lower extremity support for closed chain tasks.  3. Patient will perform 3 consecutive hip hinges with less than 3 VC to demonstrate improved lumbopelvic movement for safety during household ADL's and improved lifting techniques.   4. Patient will report decreased pain with activity 3/10 to demonstrate improved tolerance for activity and household ADL's.     Long Term Goals (10 Weeks):   1. Pt will improve impaired lower extremity manual muscle tests to >/= 4/5 to improve dynamic lower extremity support for closed chain tasks.  2. Patient will improve the total FOTO Lumbar Survey Score to </= 40% limited to demonstrate increased perceived functional mobility.  3. Patient will perform at least 10 sit to stands in 30 seconds without UE support to demonstrate increased functional strength.   4. Patient will perform 3 consecutive hip hinges with less than 0VC to demonstrate improved lumbopelvic movement for safety during household ADL's and improved lifting techniques.   5. Patient will initiate walking program to demonstrate improved activity tolerance.  6.  Patient will perform timed up and go with least restrictive assistive device in < 10 seconds to improve gait speed and safety with community ambulation.  7. Patient will demonstrate 2 minute walk test within age group  norms.    Plan     Plan of care Certification: 11/29/2023 to 2/9/2024.    Outpatient Physical Therapy 2 times weekly for 10 weeks to include the following interventions: Aquatic Therapy, Cervical/Lumbar Traction, Electrical Stimulation TENS, Gait Training, Manual Therapy, Moist Heat/ Ice, Neuromuscular Re-ed, Orthotic Management and Training, Patient Education, Self Care, Therapeutic Activities, Therapeutic Exercise, and functional dry needling .     Rae Goodrich PT        Physician's Signature: _________________________________________ Date: ________________

## 2023-11-30 PROBLEM — Z74.09 IMPAIRED FUNCTIONAL MOBILITY AND ACTIVITY TOLERANCE: Status: ACTIVE | Noted: 2023-11-30

## 2023-11-30 RX ORDER — METOLAZONE 5 MG/1
TABLET ORAL
Qty: 30 TABLET | Refills: 3 | Status: SHIPPED | OUTPATIENT
Start: 2023-11-30

## 2023-12-06 ENCOUNTER — DOCUMENTATION ONLY (OUTPATIENT)
Dept: REHABILITATION | Facility: HOSPITAL | Age: 53
End: 2023-12-06
Payer: MEDICAID

## 2023-12-06 NOTE — PROGRESS NOTES
Physical Therapy: No show/Cancellation of Visit  Date: 12/06/2023    Patient cancelled today's PT appointment. Reason for cancellation: transportation. Patient's next scheduled appointment is Tuesday, 12/12/2023.     Initial Evaluation: 11/29/2023  Plan of Care Explanation: 2/9/2024  Cancel: 2  No show: 0    Therapist: Karina Funk PTA

## 2023-12-12 ENCOUNTER — DOCUMENTATION ONLY (OUTPATIENT)
Dept: REHABILITATION | Facility: HOSPITAL | Age: 53
End: 2023-12-12
Payer: MEDICAID

## 2023-12-12 NOTE — PROGRESS NOTES
Physical Therapy: No show/Cancellation of Visit  Date: 12/12/2023    Patient cancelled today's PT appointment as well as remaining future PT visits. No reason provided.    Initial Evaluation: 11/29/2023  Plan of Care Explanation: 2/9/2024  Cancel: 3  No show: 0    Therapist: Karina Funk PTA

## 2023-12-26 ENCOUNTER — TELEPHONE (OUTPATIENT)
Dept: ENDOSCOPY | Facility: HOSPITAL | Age: 53
End: 2023-12-26
Payer: MEDICAID

## 2023-12-26 NOTE — TELEPHONE ENCOUNTER
Left voicemail and sent portal message for patient to call Endoscopy Scheduling to confirm Upper endoscopy (EGD)  on 1/2/24.

## 2023-12-27 ENCOUNTER — TELEPHONE (OUTPATIENT)
Dept: ENDOSCOPY | Facility: HOSPITAL | Age: 53
End: 2023-12-27
Payer: MEDICAID

## 2023-12-27 NOTE — TELEPHONE ENCOUNTER
Contacted patient for pre call for EGD scheduled on 1/2/24.  Patient states that she needs to cancel appointment and does not want to reschedule at this time.

## 2024-01-01 PROBLEM — K92.2 GI BLEED: Status: RESOLVED | Noted: 2023-09-26 | Resolved: 2024-01-01

## 2024-01-01 PROBLEM — K25.3 ACUTE GASTRIC ULCER WITHOUT HEMORRHAGE OR PERFORATION: Status: RESOLVED | Noted: 2023-09-27 | Resolved: 2024-01-01

## 2024-02-12 PROBLEM — J96.11 CHRONIC RESPIRATORY FAILURE WITH HYPOXIA AND HYPERCAPNIA: Chronic | Status: RESOLVED | Noted: 2022-06-28 | Resolved: 2024-02-12

## 2024-02-12 PROBLEM — J96.12 CHRONIC RESPIRATORY FAILURE WITH HYPOXIA AND HYPERCAPNIA: Chronic | Status: RESOLVED | Noted: 2022-06-28 | Resolved: 2024-02-12

## 2024-02-20 DIAGNOSIS — M48.02 SPINAL STENOSIS, CERVICAL REGION: Primary | ICD-10-CM

## 2024-02-20 DIAGNOSIS — R91.1 SOLITARY PULMONARY NODULE: Primary | ICD-10-CM

## 2024-02-27 ENCOUNTER — HOSPITAL ENCOUNTER (OUTPATIENT)
Dept: RADIOLOGY | Facility: HOSPITAL | Age: 54
Discharge: HOME | End: 2024-02-27
Attending: FAMILY MEDICINE
Payer: MEDICAID

## 2024-02-27 DIAGNOSIS — M48.02 SPINAL STENOSIS, CERVICAL REGION: ICD-10-CM

## 2024-02-27 DIAGNOSIS — R91.1 SOLITARY PULMONARY NODULE: ICD-10-CM

## 2024-02-27 PROCEDURE — 72125 CT NECK SPINE W/O DYE: CPT | Mod: TC

## 2024-02-27 PROCEDURE — 71250 CT THORAX DX C-: CPT | Mod: TC

## 2024-05-14 ENCOUNTER — HOSPITAL ENCOUNTER (EMERGENCY)
Facility: HOSPITAL | Age: 54
Discharge: HOME | End: 2024-05-14
Attending: EMERGENCY MEDICINE
Payer: MEDICAID

## 2024-05-14 VITALS
HEIGHT: 63 IN | HEART RATE: 73 BPM | SYSTOLIC BLOOD PRESSURE: 116 MMHG | TEMPERATURE: 98 F | WEIGHT: 189 LBS | OXYGEN SATURATION: 98 % | DIASTOLIC BLOOD PRESSURE: 63 MMHG | RESPIRATION RATE: 20 BRPM | BODY MASS INDEX: 33.49 KG/M2

## 2024-05-14 DIAGNOSIS — R10.13 ABDOMINAL PAIN, ACUTE, EPIGASTRIC: ICD-10-CM

## 2024-05-14 PROBLEM — R10.9 ABDOMINAL PAIN: Status: ACTIVE | Noted: 2024-05-14

## 2024-05-14 LAB
ALBUMIN SERPL BCP-MCNC: 4.1 G/DL (ref 3.5–5.2)
ALP SERPL-CCNC: 121 U/L (ref 55–135)
ALT SERPL W/O P-5'-P-CCNC: 11 U/L (ref 10–44)
ANION GAP SERPL CALC-SCNC: 8 MMOL/L (ref 8–16)
AST SERPL-CCNC: 15 U/L (ref 10–40)
BASOPHILS # BLD AUTO: 0.07 K/UL (ref 0–0.2)
BASOPHILS NFR BLD: 0.6 % (ref 0–1.9)
BILIRUB SERPL-MCNC: 1 MG/DL (ref 0.1–1)
BUN SERPL-MCNC: 14 MG/DL (ref 6–20)
CALCIUM SERPL-MCNC: 10.1 MG/DL (ref 8.7–10.5)
CHLORIDE SERPL-SCNC: 92 MMOL/L (ref 95–110)
CO2 SERPL-SCNC: 37 MMOL/L (ref 23–29)
CREAT SERPL-MCNC: 0.6 MG/DL (ref 0.5–1.4)
DIFFERENTIAL METHOD BLD: ABNORMAL
EOSINOPHIL # BLD AUTO: 0.2 K/UL (ref 0–0.5)
EOSINOPHIL NFR BLD: 1.8 % (ref 0–8)
ERYTHROCYTE [DISTWIDTH] IN BLOOD BY AUTOMATED COUNT: 14.3 % (ref 11.5–14.5)
EST. GFR  (NO RACE VARIABLE): >60 ML/MIN/1.73 M^2
GLUCOSE SERPL-MCNC: 114 MG/DL (ref 70–110)
HCT VFR BLD AUTO: 41.4 % (ref 37–48.5)
HGB BLD-MCNC: 11.7 G/DL (ref 12–16)
IMM GRANULOCYTES # BLD AUTO: 0.06 K/UL (ref 0–0.04)
IMM GRANULOCYTES NFR BLD AUTO: 0.5 % (ref 0–0.5)
LIPASE SERPL-CCNC: 10 U/L (ref 4–60)
LYMPHOCYTES # BLD AUTO: 3 K/UL (ref 1–4.8)
LYMPHOCYTES NFR BLD: 23.7 % (ref 18–48)
MCH RBC QN AUTO: 24.7 PG (ref 27–31)
MCHC RBC AUTO-ENTMCNC: 28.3 G/DL (ref 32–36)
MCV RBC AUTO: 88 FL (ref 82–98)
MONOCYTES # BLD AUTO: 1.1 K/UL (ref 0.3–1)
MONOCYTES NFR BLD: 8.5 % (ref 4–15)
NEUTROPHILS # BLD AUTO: 8.2 K/UL (ref 1.8–7.7)
NEUTROPHILS NFR BLD: 64.9 % (ref 38–73)
NRBC BLD-RTO: 0 /100 WBC
PLATELET # BLD AUTO: 355 K/UL (ref 150–450)
PMV BLD AUTO: 11 FL (ref 9.2–12.9)
POTASSIUM SERPL-SCNC: 4 MMOL/L (ref 3.5–5.1)
PROT SERPL-MCNC: 7.6 G/DL (ref 6–8.4)
RBC # BLD AUTO: 4.73 M/UL (ref 4–5.4)
SODIUM SERPL-SCNC: 137 MMOL/L (ref 136–145)
WBC # BLD AUTO: 12.67 K/UL (ref 3.9–12.7)

## 2024-05-14 PROCEDURE — 80053 COMPREHEN METABOLIC PANEL: CPT | Performed by: EMERGENCY MEDICINE

## 2024-05-14 PROCEDURE — 96361 HYDRATE IV INFUSION ADD-ON: CPT

## 2024-05-14 PROCEDURE — 99285 EMERGENCY DEPT VISIT HI MDM: CPT | Mod: 25

## 2024-05-14 PROCEDURE — 63600175 PHARM REV CODE 636 W HCPCS: Performed by: EMERGENCY MEDICINE

## 2024-05-14 PROCEDURE — 96374 THER/PROPH/DIAG INJ IV PUSH: CPT

## 2024-05-14 PROCEDURE — 85025 COMPLETE CBC W/AUTO DIFF WBC: CPT | Performed by: EMERGENCY MEDICINE

## 2024-05-14 PROCEDURE — C9113 INJ PANTOPRAZOLE SODIUM, VIA: HCPCS | Performed by: EMERGENCY MEDICINE

## 2024-05-14 PROCEDURE — 25000003 PHARM REV CODE 250: Performed by: EMERGENCY MEDICINE

## 2024-05-14 PROCEDURE — 96375 TX/PRO/DX INJ NEW DRUG ADDON: CPT

## 2024-05-14 PROCEDURE — 99283 EMERGENCY DEPT VISIT LOW MDM: CPT | Mod: ,,, | Performed by: SURGERY

## 2024-05-14 PROCEDURE — 83690 ASSAY OF LIPASE: CPT | Performed by: EMERGENCY MEDICINE

## 2024-05-14 RX ORDER — PANTOPRAZOLE SODIUM 40 MG/10ML
80 INJECTION, POWDER, LYOPHILIZED, FOR SOLUTION INTRAVENOUS
Status: COMPLETED | OUTPATIENT
Start: 2024-05-14 | End: 2024-05-14

## 2024-05-14 RX ORDER — PANTOPRAZOLE SODIUM 40 MG/1
40 TABLET, DELAYED RELEASE ORAL 2 TIMES DAILY
Qty: 60 TABLET | Refills: 1 | Status: SHIPPED | OUTPATIENT
Start: 2024-05-14 | End: 2024-07-13

## 2024-05-14 RX ORDER — HYDROCODONE BITARTRATE AND ACETAMINOPHEN 5; 325 MG/1; MG/1
1 TABLET ORAL EVERY 6 HOURS PRN
Qty: 10 TABLET | Refills: 0 | Status: SHIPPED | OUTPATIENT
Start: 2024-05-14

## 2024-05-14 RX ORDER — ALUMINUM HYDROXIDE, MAGNESIUM HYDROXIDE, AND SIMETHICONE 1200; 120; 1200 MG/30ML; MG/30ML; MG/30ML
60 SUSPENSION ORAL
Status: COMPLETED | OUTPATIENT
Start: 2024-05-14 | End: 2024-05-14

## 2024-05-14 RX ORDER — HYDROMORPHONE HYDROCHLORIDE 1 MG/ML
1 INJECTION, SOLUTION INTRAMUSCULAR; INTRAVENOUS; SUBCUTANEOUS
Status: COMPLETED | OUTPATIENT
Start: 2024-05-14 | End: 2024-05-14

## 2024-05-14 RX ORDER — ALUMINUM HYDROXIDE, MAGNESIUM HYDROXIDE, AND SIMETHICONE 2400; 240; 2400 MG/30ML; MG/30ML; MG/30ML
15 SUSPENSION ORAL EVERY 4 HOURS PRN
Qty: 335 ML | Refills: 0 | Status: SHIPPED | OUTPATIENT
Start: 2024-05-14 | End: 2025-05-14

## 2024-05-14 RX ORDER — ONDANSETRON HYDROCHLORIDE 2 MG/ML
4 INJECTION, SOLUTION INTRAVENOUS
Status: COMPLETED | OUTPATIENT
Start: 2024-05-14 | End: 2024-05-14

## 2024-05-14 RX ADMIN — SODIUM CHLORIDE 1000 ML: 0.9 INJECTION, SOLUTION INTRAVENOUS at 12:05

## 2024-05-14 RX ADMIN — HYDROMORPHONE HYDROCHLORIDE 1 MG: 1 INJECTION, SOLUTION INTRAMUSCULAR; INTRAVENOUS; SUBCUTANEOUS at 12:05

## 2024-05-14 RX ADMIN — PANTOPRAZOLE SODIUM 80 MG: 40 INJECTION, POWDER, FOR SOLUTION INTRAVENOUS at 12:05

## 2024-05-14 RX ADMIN — ONDANSETRON 4 MG: 2 INJECTION INTRAMUSCULAR; INTRAVENOUS at 12:05

## 2024-05-14 RX ADMIN — ALUMINUM HYDROXIDE, MAGNESIUM HYDROXIDE, AND SIMETHICONE 60 ML: 1200; 120; 1200 SUSPENSION ORAL at 12:05

## 2024-05-14 NOTE — ED PROVIDER NOTES
"Encounter Date: 5/14/2024    SCRIBE #1 NOTE: I, Leticia Chaney Tish, am scribing for, and in the presence of,  Dano Hayward MD. I have scribed the following portions of the note - Other sections scribed: HPI, ROS.       History     Chief Complaint   Patient presents with    Abdominal Pain     Pt reports epigastric abdominal pain intermittently since October. Pt states "Its my gallbladder". Pt reports had a surgery scheduled to get her gallbladder removed but fell and broke her back and was unable to get the surgery done. Pt reports having an ultrasound today due to the ongoing abdominal pain and was called and told to come straight to the ED to have a surgery scheduled to remove her gallbladder because it is "getting worse".      53 y. o. female with a PMHx of COPD (stage 4), heavy cigarette smoker, asthma, who presents to the ED for a chief complaint of intermittent epigastric abdominal pain since 10/2023. Patient reports she was scheduled to have her gallbladder removed last October but fell and broke her back 3 days before the surgery, so she was unable to get the surgery done. Further reports having an ultrasound today due to the ongoing abdominal pain and was called and told to come straight to the ED to have a surgery scheduled to remove her gallbladder because it is "getting worse." Patient also reports experiencing nausea intermittently without vomiting. Patient states her last EGD was on 09/27/23. Endorses Hx of COPD and asthma, and states she is currently on 3L of O2 via NC at home. No other alleviating or exacerbating factors. Denies cough, chest pain, fever, chills, vomiting, diarrhea, dysuria, headaches, congestion, sore throat, arm or leg trouble, eye pain, ear pain, rash, or other associated symptoms. She is allergic to Antivert.     The history is provided by the patient. No  was used.     Review of patient's allergies indicates:   Allergen Reactions    Antivert [meclizine] " Other (See Comments)     Behavioral changes     Past Medical History:   Diagnosis Date    Anxiety     Asthma     Carpal tunnel syndrome, bilateral     COPD (chronic obstructive pulmonary disease)     Heavy cigarette smoker     9/14/22 2PPD    Laryngeal papilloma     On home oxygen therapy     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    Right side with CT scan Referred to ENT for visualization     Past Surgical History:   Procedure Laterality Date    CARPAL TUNNEL RELEASE Bilateral     ESOPHAGOGASTRODUODENOSCOPY N/A 9/27/2023    Procedure: EGD (ESOPHAGOGASTRODUODENOSCOPY);  Surgeon: Sahil May MD;  Location: [facility];  Service: Endoscopy;  Laterality: N/A;    FINGER SURGERY      HYSTERECTOMY      OOPHORECTOMY  1996    Hysterectomy     Family History   Problem Relation Name Age of Onset    COPD Mother      Heart disease Father      No Known Problems Sister      No Known Problems Brother       Social History     Tobacco Use    Smoking status: Every Day     Current packs/day: 2.00     Average packs/day: 2.0 packs/day for 40.4 years (80.9 ttl pk-yrs)     Types: Cigarettes     Start date: 12/1/1983    Smokeless tobacco: Current   Substance Use Topics    Alcohol use: No     Alcohol/week: 0.0 standard drinks of alcohol    Drug use: No     Review of Systems   Constitutional:  Negative for chills, diaphoresis and fever.   HENT:  Negative for ear pain and sore throat.    Eyes:  Negative for pain.   Respiratory:  Negative for cough.    Cardiovascular:  Negative for chest pain.   Gastrointestinal:  Positive for abdominal pain (epigastric region) and nausea (intermittent). Negative for diarrhea and vomiting.   Genitourinary:  Negative for dysuria.   Musculoskeletal:  Negative for back pain.        (-) Arm or leg trouble.    Skin:  Negative for rash.   Neurological:  Negative for headaches.   Psychiatric/Behavioral:  Negative for confusion.        Physical Exam     Initial Vitals [05/14/24 1151]   BP Pulse Resp Temp SpO2   (!) 183/95 99  18 98.4 °F (36.9 °C) 95 %      MAP       --         Physical Exam  The patient was examined specifically for the following:   General:No significant distress, Good color, Warm and dry. Head and neck:Scalp atraumatic, Neck supple. Neurological:Appropriate conversation, Gross motor deficits. Eyes:Conjugate gaze, Clear corneas. ENT: No epistaxis. Cardiac: Regular rate and rhythm, Grossly normal heart tones. Pulmonary: Wheezing, Rales. Gastrointestinal: Abdominal tenderness, Abdominal distention. Musculoskeletal: Extremity deformity, Apparent pain with range of motion of the joints. Skin: Rash.   The findings on examination were normal except for the following:  The patient has moderate diffuse epigastric tenderness.  There is some guarding, but no rebound mass or distention.  The patient is afebrile.   ED Course   Procedures  Labs Reviewed   COMPREHENSIVE METABOLIC PANEL - Abnormal; Notable for the following components:       Result Value    Chloride 92 (*)     CO2 37 (*)     Glucose 114 (*)     All other components within normal limits   CBC W/ AUTO DIFFERENTIAL - Abnormal; Notable for the following components:    Hemoglobin 11.7 (*)     MCH 24.7 (*)     MCHC 28.3 (*)     Gran # (ANC) 8.2 (*)     Immature Grans (Abs) 0.06 (*)     Mono # 1.1 (*)     All other components within normal limits   LIPASE          Imaging Results              US Abdomen Limited (Final result)  Result time 05/14/24 14:33:58      Final result by Aguila Mckeon MD (05/14/24 14:33:58)                   Impression:      Cholelithiasis without definite sonographic evidence of acute cholecystitis on provided images.      Electronically signed by: Aguila Mckeon  Date:    05/14/2024  Time:    14:33               Narrative:    EXAMINATION:  US ABDOMEN LIMITED    CLINICAL HISTORY:  Cholelithiasis;    TECHNIQUE:  Limited ultrasound of the right upper quadrant of the abdomen (including pancreas, liver, gallbladder, common bile duct, and spleen) was  performed.    COMPARISON:  None.    FINDINGS:  Gallbladder: Multiple mobile gallstones measuring up to 8 mm.  Normal gallbladder wall thickness without hypervascularity.  No sonographic Ballard sign is reported by the sonographer.    Biliary system: The common duct is not dilated, measuring 7-8 mm.  No intrahepatic ductal dilatation.    Miscellaneous: No upper abdominal ascites.  No evidence of right-sided hydronephrosis.  Visualized region of the pancreas grossly unremarkable, noting marked obscuration by bowel gas.  Visualized portions of the liver without definite acute change.                                       X-Ray Chest 1 View (Final result)  Result time 05/14/24 13:48:56      Final result by Herman Abreu MD (05/14/24 13:48:56)                   Impression:      Findings as above without significant interval detrimental change from 11/07/2023.      Electronically signed by: Herman Abreu  Date:    05/14/2024  Time:    13:48               Narrative:    EXAMINATION:  XR CHEST 1 VIEW    CLINICAL HISTORY:  Epigastric pain    TECHNIQUE:  Single frontal view of the chest was performed.    COMPARISON:  11/07/2023    FINDINGS:  Cardiomediastinal silhouette unchanged.  Lungs appear similar with mild lower interstitial attenuation.  No new confluent opacity or lobar consolidation.  No pleural effusion or gross pneumothorax.  No acute osseous abnormality.                                       Medications   sodium chloride 0.9% bolus 1,000 mL 1,000 mL (0 mLs Intravenous Stopped 5/14/24 1427)   pantoprazole injection 80 mg (80 mg Intravenous Given 5/14/24 1250)   aluminum-magnesium hydroxide-simethicone 200-200-20 mg/5 mL suspension 60 mL (60 mLs Oral Given 5/14/24 1250)   sodium chloride 0.9% bolus 1,000 mL 1,000 mL (0 mLs Intravenous Stopped 5/14/24 1427)   HYDROmorphone injection 1 mg (1 mg Intravenous Given 5/14/24 1250)   ondansetron injection 4 mg (4 mg Intravenous Given 5/14/24 1250)     Medical  Decision Making  Amount and/or Complexity of Data Reviewed  External Data Reviewed: notes.     Details: See HPI  Labs: ordered. Decision-making details documented in ED Course.  Radiology: ordered. Decision-making details documented in ED Course.    Risk  OTC drugs.  Prescription drug management.    This is the ultrasound report, done yesterday.                Scribe Attestation:   Scribe #1: I performed the above scribed service and the documentation accurately describes the services I performed. I attest to the accuracy of the note.    Please note that the documentation on this chart was provided by the scribe above on the date of service noted above, and that the documentation in the chart accurately reflects the work and decisions made by me alone.  Signed, Dr. Hayward            Medical decision making: Given the above this patient was sent to the emergency room for an evaluation by General surgery on the basis of an ultrasound that showed possible ductal dilatation at 0.7 cm, and a sonographic Ballard's sign..  Ultrasound here in the emergency room showed no evidence of cholecystitis, no wall edema pericholecystic fluid, ductal dilatation.  Alkaline phosphatase bilirubin are normal.  Common duct stone seems unlikely.  The patient reports that her epigastric abdominal pain has been present for 2 months.  She has a history of ulcer disease in the past.  She is tender in the epigastrium exactly.  Hemoglobin and hematocrit are stable.  She continues to have pain in the emergency room, despite treatment but was sleeping at 4:20 p.m..  I will put her on pantoprazole 40 mg twice a day I will refer to Gastroenterology.  Of note I offered this patient admission to the hospital because of intractable abdominal pain but she wishes instead to go home.  I will treat also with Mylanta dietary modifications and 10 Tylenol with hydrocodone.  I will refer her to Gastroenterology and have her return to the emergency room if she  gets worse or if new problems develop.  That is her wish.  She declines admission.  General surgery evaluated the patient does not feel that the patient has cholecystitis clinically.  The lipase is not elevated to suggest pancreatitis.  Chest x-ray fails to reveal free air under the diaphragm.  The patient has severe COPD.  Any surgery will be quite risky.                  Clinical Impression:  Final diagnoses:  [R10.13] Abdominal pain, acute, epigastric          ED Disposition Condition    Discharge Stable          ED Prescriptions       Medication Sig Dispense Start Date End Date Auth. Provider    pantoprazole (PROTONIX) 40 MG tablet Take 1 tablet (40 mg total) by mouth 2 (two) times daily. 60 tablet 5/14/2024 7/13/2024 Dano Hayward MD    HYDROcodone-acetaminophen (NORCO) 5-325 mg per tablet Take 1 tablet by mouth every 6 (six) hours as needed for Pain. 10 tablet 5/14/2024 -- Dano Hayward MD    aluminum & magnesium hydroxide-simethicone (MYLANTA MAXIMUM STRENGTH) 400-400-40 mg/5 mL suspension Take 15 mLs by mouth every 4 (four) hours as needed for Indigestion. 335 mL 5/14/2024 5/14/2025 Dano Hayward MD          Follow-up Information       Follow up With Specialties Details Why Contact Info    Jessica Nance MD Gastroenterology In 3 days  [address] [facility] [address]  [phone]               Dano Hayward MD  05/14/24 [id number]

## 2024-05-14 NOTE — ED TRIAGE NOTES
"Pt reports epigastric abdominal pain intermittently since October. Pt states "Its my gallbladder". Pt reports had a surgery scheduled to get her gallbladder removed but fell and broke her back and was unable to get the surgery done. Pt reports having an ultrasound today due to the ongoing abdominal pain and was called and told to come straight to the ED to have a surgery scheduled to remove her gallbladder because it is "getting worse" . Pt denies fever, n/v. Pt on home oxygen at 3L NC  "

## 2024-05-14 NOTE — CONSULTS
[facility] Emergency Dept  General Surgery  Consult Note    Patient Name: Yasmeen Valderrama  MRN: [id number]  Code Status: Prior  Admission Date: 5/14/2024  Hospital Length of Stay: 0 days  Attending Physician: Dano Hayward MD  Primary Care Provider: Leeanna Stuart MD    Patient information was obtained from patient, past medical records, and ER records.     Inpatient consult to General surgery  Consult performed by: Iam Urias MD  Consult ordered by: Dano Hayward MD  Assessment/Recommendations: See below         Subjective:     Principal Problem: Abdominal pain    History of Present Illness: 53M here for epigastric pain. She states it has been worse recently and is always there since October. It is worse with PO intake. She states even a few sips of water can cause her to have pan. Most of her pain is in the epigastric region with some radiation to left abdomen. No fevers, chills or diarrhea. She takes PPI that she only started taking recently and only takes it once a day.     Work-up revealed no white count, US pending. She had prior US that shows stones and sludge. Mild CBD dilation. No alterations in Tbili or liver enzymes. Vitals normal.     She has severe COPD on 3L at home and states with that still has trouble walking short distance. She has Pulm HTN.   Hx of narcotic addiction   Currently smoker and smokes a pack a day for 40 years.   No blood thinners  No Stroke or MI  Lap hysterectomy for abdoinal surgery    No current facility-administered medications on file prior to encounter.     Current Outpatient Medications on File Prior to Encounter   Medication Sig    albuterol (PROVENTIL/VENTOLIN HFA) 90 mcg/actuation inhaler Inhale 2 puffs into the lungs every 6 (six) hours as needed.    albuterol-ipratropium (DUO-NEB) 2.5 mg-0.5 mg/3 mL nebulizer solution Take 3 mLs by nebulization every 4 (four) hours as needed for Wheezing. Rescue    fluocinolone acetonide oiL 0.01 % Drop Place  5 drops into both ears 2 (two) times daily.    fluticasone-umeclidin-vilanter (TRELEGY ELLIPTA) 200-62.5-25 mcg inhaler Trelegy Ellipta 200 mcg-62.5 mcg-25 mcg powder for inhalation   INHALE 1 PUFF(S) BY MOUTH into the lungs ONCE A DAY    furosemide (LASIX) 40 MG tablet Take 40 mg by mouth 2 (two) times daily as needed.    gabapentin (NEURONTIN) 100 MG capsule Take 1 capsule (100 mg total) by mouth 2 (two) times daily.    HYDROcodone-acetaminophen (NORCO)  mg per tablet Take 1 tablet by mouth every 6 (six) hours as needed.    LINZESS 72 mcg Cap capsule Take 72 mcg by mouth every morning.    metOLazone (ZAROXOLYN) 5 MG tablet TAKE 1/2 (one-half) TABLET(S) BY MOUTH EVERY OTHER DAY    olopatadine (PATANOL) 0.1 % ophthalmic solution SMARTSI Drop(s) In Eye(s) Twice Daily PRN    pantoprazole (PROTONIX) 40 MG tablet Take 1 tablet (40 mg total) by mouth 2 (two) times daily.    polyethylene glycol (GLYCOLAX) 17 gram/dose powder Mix 1 capful (17 g) with fluids and drink by mouth 2 (two) times daily.    potassium chloride SA (K-DUR,KLOR-CON) 20 MEQ tablet Take 1 tablet (20 mEq total) by mouth once daily.       Review of patient's allergies indicates:   Allergen Reactions    Antivert [meclizine] Other (See Comments)     Behavioral changes       Past Medical History:   Diagnosis Date    Anxiety     Asthma     Carpal tunnel syndrome, bilateral     COPD (chronic obstructive pulmonary disease)     Heavy cigarette smoker     22 2PPD    Laryngeal papilloma     On home oxygen therapy     Vocal cord mass 2017    Right side with CT scan Referred to ENT for visualization     Past Surgical History:   Procedure Laterality Date    CARPAL TUNNEL RELEASE Bilateral     ESOPHAGOGASTRODUODENOSCOPY N/A 2023    Procedure: EGD (ESOPHAGOGASTRODUODENOSCOPY);  Surgeon: Sahil May MD;  Location: [facility];  Service: Endoscopy;  Laterality: N/A;    FINGER SURGERY      HYSTERECTOMY      OOPHORECTOMY      Hysterectomy      Family History       Problem Relation (Age of Onset)    COPD Mother    Heart disease Father    No Known Problems Sister, Brother          Tobacco Use    Smoking status: Every Day     Current packs/day: 2.00     Average packs/day: 2.0 packs/day for 40.4 years (80.9 ttl pk-yrs)     Types: Cigarettes     Start date: 12/1/1983    Smokeless tobacco: Current   Substance and Sexual Activity    Alcohol use: No     Alcohol/week: 0.0 standard drinks of alcohol    Drug use: No    Sexual activity: Yes     Partners: Male     Review of Systems   Gastrointestinal:  Positive for abdominal pain.   All other systems reviewed and are negative.    Objective:     Vital Signs (Most Recent):  Temp: 98.4 °F (36.9 °C) (05/14/24 1151)  Pulse: 85 (05/14/24 1303)  Resp: 20 (05/14/24 1250)  BP: (!) 141/81 (05/14/24 1303)  SpO2: 98 % (05/14/24 1303) Vital Signs (24h Range):  Temp:  [98.4 °F (36.9 °C)] 98.4 °F (36.9 °C)  Pulse:  [85-99] 85  Resp:  [18-20] 20  SpO2:  [95 %-98 %] 98 %  BP: (141-183)/(81-95) 141/81     Weight: 85.7 kg (189 lb)  Body mass index is 33.48 kg/m².     Physical Exam  Vitals reviewed.   Constitutional:       General: She is not in acute distress.  HENT:      Head: Normocephalic and atraumatic.      Nose: Nose normal.   Eyes:      General:         Right eye: No discharge.         Left eye: No discharge.   Cardiovascular:      Rate and Rhythm: Normal rate and regular rhythm.   Pulmonary:      Effort: Pulmonary effort is normal. No respiratory distress.      Breath sounds: No stridor.      Comments: On 3L   Abdominal:      Comments: Soft, ND, epigastric tenderness    Musculoskeletal:         General: Normal range of motion.      Cervical back: Normal range of motion.   Skin:     General: Skin is warm and dry.      Capillary Refill: Capillary refill takes 2 to 3 seconds.   Neurological:      General: No focal deficit present.      Mental Status: She is alert and oriented to person, place, and time.   Psychiatric:          Mood and Affect: Mood normal.         Behavior: Behavior normal.          I have reviewed all pertinent lab results within the past 24 hours.    Significant Diagnostics:  I have reviewed all pertinent imaging results/findings within the past 24 hours.    Assessment/Plan:     * Abdominal pain  53F with severe COPD on home O2 and SOB with short distances and hx of ulcer disease here with worsening epigastric abdominal pain. Surgery consulted for possible gallbladder etiology.     -Would admit to medicine for pain control and work-up  -Not convinced this is gallbladder etiology. Possible her ulcer disease. Would put on BID PPI and consult GI. Possible repeat scope since none since 2023   -Us pending. If any concerns obtain HIDA scan to rule out cholecystitis. . She is not a surgical candidate and if positive would need transfer for IR mariam tube.   -Please call with questions       VTE Risk Mitigation (From admission, onward)      None            Thank you for your consult. I will follow-up with patient. Please contact us if you have any additional questions.    Iam Urias MD  General Surgery  [facility] [place] - Emergency Dept

## 2024-05-14 NOTE — ASSESSMENT & PLAN NOTE
53F with severe COPD on home O2 and SOB with short distances and hx of ulcer disease here with worsening epigastric abdominal pain. Surgery consulted for possible gallbladder etiology.     -Would admit to medicine for pain control and work-up  -Not convinced this is gallbladder etiology. Possible her ulcer disease. Would put on BID PPI and consult GI. Possible re-peat scope since none since 2023   -Us pending. If any concerns obtain HIDA scan to rule out cholecystitis. . She is not a surgical candidate and if positive would need transfer for IR mariam tube.   -Please call with questions

## 2024-05-14 NOTE — DISCHARGE INSTRUCTIONS
Please follow-up with your gastroenterologist, the gastroenterologist above this week.  Return immediately if you get worse or if new problems develop.  Please avoid caffeine carbonation alcohol cigarette citrus tomato Naprosyn aspirin ibuprofen.  Call tomorrow for your appointment with a gastroenterologist.

## 2024-05-14 NOTE — HPI
53M here for epigastric pain. She states it has been worse recently and is always there since October. It is worse with PO intake. She states even a few sips of water can cause her to have pan. Most of her pain is in the epigastric region with some radiation to left abdomen. No fevers, chills or diarrhea. She takes PPI that she only started taking recently and only takes it once a day.     Work-up revealed no white count, US pending. She had prior US that shows stones and sludge. Mild CBD dilation. No alterations in liver studies or Tbili.  Vitals normal.     She has severe COPD on 3L at home and states with that still has trouble walking short distance. She has Pulm HTN.   Hx of narcotic addiction   Currently smoker and smokes a pack a day for 40 years.   No blood thinners  No Stroke or MI  Lap hysterectomy for abdoinal surgery

## 2024-05-14 NOTE — ED NOTES
Pt sitting up, respirations even/unlabored.  Updated pt on poc. Pt denies any needs. Side rails upx2, call bell within reach. Will continue to monitor

## 2024-05-14 NOTE — SUBJECTIVE & OBJECTIVE
No current facility-administered medications on file prior to encounter.     Current Outpatient Medications on File Prior to Encounter   Medication Sig    albuterol (PROVENTIL/VENTOLIN HFA) 90 mcg/actuation inhaler Inhale 2 puffs into the lungs every 6 (six) hours as needed.    albuterol-ipratropium (DUO-NEB) 2.5 mg-0.5 mg/3 mL nebulizer solution Take 3 mLs by nebulization every 4 (four) hours as needed for Wheezing. Rescue    fluocinolone acetonide oiL 0.01 % Drop Place 5 drops into both ears 2 (two) times daily.    fluticasone-umeclidin-vilanter (TRELEGY ELLIPTA) 200-62.5-25 mcg inhaler Trelegy Ellipta 200 mcg-62.5 mcg-25 mcg powder for inhalation   INHALE 1 PUFF(S) BY MOUTH into the lungs ONCE A DAY    furosemide (LASIX) 40 MG tablet Take 40 mg by mouth 2 (two) times daily as needed.    gabapentin (NEURONTIN) 100 MG capsule Take 1 capsule (100 mg total) by mouth 2 (two) times daily.    HYDROcodone-acetaminophen (NORCO)  mg per tablet Take 1 tablet by mouth every 6 (six) hours as needed.    LINZESS 72 mcg Cap capsule Take 72 mcg by mouth every morning.    metOLazone (ZAROXOLYN) 5 MG tablet TAKE 1/2 (one-half) TABLET(S) BY MOUTH EVERY OTHER DAY    olopatadine (PATANOL) 0.1 % ophthalmic solution SMARTSI Drop(s) In Eye(s) Twice Daily PRN    pantoprazole (PROTONIX) 40 MG tablet Take 1 tablet (40 mg total) by mouth 2 (two) times daily.    polyethylene glycol (GLYCOLAX) 17 gram/dose powder Mix 1 capful (17 g) with fluids and drink by mouth 2 (two) times daily.    potassium chloride SA (K-DUR,KLOR-CON) 20 MEQ tablet Take 1 tablet (20 mEq total) by mouth once daily.       Review of patient's allergies indicates:   Allergen Reactions    Antivert [meclizine] Other (See Comments)     Behavioral changes       Past Medical History:   Diagnosis Date    Anxiety     Asthma     Carpal tunnel syndrome, bilateral     COPD (chronic obstructive pulmonary disease)     Heavy cigarette smoker     22 2PPD    Laryngeal  papilloma     On home oxygen therapy     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    Right side with CT scan Referred to ENT for visualization     Past Surgical History:   Procedure Laterality Date    CARPAL TUNNEL RELEASE Bilateral     ESOPHAGOGASTRODUODENOSCOPY N/A 9/27/2023    Procedure: EGD (ESOPHAGOGASTRODUODENOSCOPY);  Surgeon: Sahil May MD;  Location: [facility];  Service: Endoscopy;  Laterality: N/A;    FINGER SURGERY      HYSTERECTOMY      OOPHORECTOMY  1996    Hysterectomy     Family History       Problem Relation (Age of Onset)    COPD Mother    Heart disease Father    No Known Problems Sister, Brother          Tobacco Use    Smoking status: Every Day     Current packs/day: 2.00     Average packs/day: 2.0 packs/day for 40.4 years (80.9 ttl pk-yrs)     Types: Cigarettes     Start date: 12/1/1983    Smokeless tobacco: Current   Substance and Sexual Activity    Alcohol use: No     Alcohol/week: 0.0 standard drinks of alcohol    Drug use: No    Sexual activity: Yes     Partners: Male     Review of Systems   Gastrointestinal:  Positive for abdominal pain.   All other systems reviewed and are negative.    Objective:     Vital Signs (Most Recent):  Temp: 98.4 °F (36.9 °C) (05/14/24 1151)  Pulse: 85 (05/14/24 1303)  Resp: 20 (05/14/24 1250)  BP: (!) 141/81 (05/14/24 1303)  SpO2: 98 % (05/14/24 1303) Vital Signs (24h Range):  Temp:  [98.4 °F (36.9 °C)] 98.4 °F (36.9 °C)  Pulse:  [85-99] 85  Resp:  [18-20] 20  SpO2:  [95 %-98 %] 98 %  BP: (141-183)/(81-95) 141/81     Weight: 85.7 kg (189 lb)  Body mass index is 33.48 kg/m².     Physical Exam  Vitals reviewed.   Constitutional:       General: She is not in acute distress.  HENT:      Head: Normocephalic and atraumatic.      Nose: Nose normal.   Eyes:      General:         Right eye: No discharge.         Left eye: No discharge.   Cardiovascular:      Rate and Rhythm: Normal rate and regular rhythm.   Pulmonary:      Effort: Pulmonary effort is normal. No respiratory  distress.      Breath sounds: No stridor.      Comments: On 3L   Abdominal:      Comments: Soft, ND, epigastric tenderness    Musculoskeletal:         General: Normal range of motion.      Cervical back: Normal range of motion.   Skin:     General: Skin is warm and dry.      Capillary Refill: Capillary refill takes 2 to 3 seconds.   Neurological:      General: No focal deficit present.      Mental Status: She is alert and oriented to person, place, and time.   Psychiatric:         Mood and Affect: Mood normal.         Behavior: Behavior normal.          I have reviewed all pertinent lab results within the past 24 hours.    Significant Diagnostics:  I have reviewed all pertinent imaging results/findings within the past 24 hours.

## 2024-07-12 ENCOUNTER — TELEPHONE (OUTPATIENT)
Dept: CARDIOLOGY | Facility: CLINIC | Age: 54
End: 2024-07-12
Payer: MEDICAID

## 2024-07-12 NOTE — TELEPHONE ENCOUNTER
Spoke with patient and scheduled an appointment. Patient acknowledged, did not request any further assistance.

## 2024-07-12 NOTE — TELEPHONE ENCOUNTER
----- Message from Lennie Gutierrez MA sent at 7/12/2024 11:06 AM CDT -----  Please assist  ----- Message -----  From: Paz Porter  Sent: 7/12/2024  11:05 AM CDT  To: [facility] Staff    Type:  Sooner Appointment Request    Patient is requesting a sooner appointment.  Patient declined first available appointment listed as well as another facility and provider .  Patient will not accept being placed on the waitlist and is requesting a message be sent to doctor.    Name of Caller: self     When is the first available appointment? Medicaid N/A    Symptoms: surgery clearance     Would the patient rather a call back or a response via [facility]? call    Best Call Back Number: .[phone] (home)

## 2024-08-02 ENCOUNTER — OFFICE VISIT (OUTPATIENT)
Dept: CARDIOLOGY | Facility: CLINIC | Age: 54
End: 2024-08-02
Payer: MEDICAID

## 2024-08-02 VITALS
RESPIRATION RATE: 15 BRPM | HEIGHT: 63 IN | HEART RATE: 83 BPM | BODY MASS INDEX: 34.42 KG/M2 | DIASTOLIC BLOOD PRESSURE: 72 MMHG | OXYGEN SATURATION: 93 % | WEIGHT: 194.25 LBS | SYSTOLIC BLOOD PRESSURE: 130 MMHG

## 2024-08-02 DIAGNOSIS — Z01.810 PREOPERATIVE CARDIOVASCULAR EXAMINATION: Primary | ICD-10-CM

## 2024-08-02 DIAGNOSIS — I27.20 PULMONARY HYPERTENSION: Chronic | ICD-10-CM

## 2024-08-02 DIAGNOSIS — I70.0 AORTIC ATHEROSCLEROSIS: ICD-10-CM

## 2024-08-02 DIAGNOSIS — G47.33 OSA (OBSTRUCTIVE SLEEP APNEA): ICD-10-CM

## 2024-08-02 DIAGNOSIS — I25.119 ATHEROSCLEROSIS OF NATIVE CORONARY ARTERY OF NATIVE HEART WITH ANGINA PECTORIS: ICD-10-CM

## 2024-08-02 DIAGNOSIS — J44.9 STAGE 4 VERY SEVERE COPD BY GOLD CLASSIFICATION: Chronic | ICD-10-CM

## 2024-08-02 PROCEDURE — 99214 OFFICE O/P EST MOD 30 MIN: CPT | Mod: PBBFAC,PO | Performed by: INTERNAL MEDICINE

## 2024-08-02 PROCEDURE — 99999 PR PBB SHADOW E&M-EST. PATIENT-LVL IV: CPT | Mod: PBBFAC,,, | Performed by: INTERNAL MEDICINE

## 2024-08-02 NOTE — PROGRESS NOTES
CARDIOLOGY CLINIC VISIT        HISTORY OF PRESENT ILLNESS:     Rickey Valderrama presents for continued care. Recent hospitalization with COPD exacerbation. Symptoms improved with IV solumedrol. Hx of COPD. Hypertension. Pulmonary Hypertension. Echocardiogram from 02/10/2022 sowed normal LVEF of 55%. Normal diastolic function. RVE with reduced function. PASP 52mmHg. In 2018 32mmHg. She has not smoked in three weeks. On discharge she was given a prescription for Amlodipine. She briefly took the medication but has stopped because it made her nauseous. LE edema is not resolving with leg elevation. She is on lasix. No compression hose. Her bnp was normal.    11/15/2022: Echocardiogram showed normal function with an estimated ejection fraction of 55%.  Mildly elevated pulmonary pressure, 46 mmHg.  Venous ultrasound negative. Persistent LE edema. Normal albumin. No proteinuria. Normal bnp.    08/02/2024: Plans for cholecystectomy.  Her functional capacity is less than 4 Mets.  EKG today shows normal sinus rhythm.  Incomplete right bundle branch block.  Dobutamine stress echocardiogram in 2020 2- for ischemia.    CARDIOVASCULAR HISTORY:     Coronary atherosclerosis  Pulmonary Hypertension    PAST MEDICAL HISTORY:     Past Medical History:   Diagnosis Date    Anxiety     Asthma     Carpal tunnel syndrome, bilateral     COPD (chronic obstructive pulmonary disease)     Heavy cigarette smoker     9/14/22 2PPD    Laryngeal papilloma     On home oxygen therapy     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    Right side with CT scan Referred to ENT for visualization       PAST SURGICAL HISTORY:     Past Surgical History:   Procedure Laterality Date    CARPAL TUNNEL RELEASE Bilateral     ESOPHAGOGASTRODUODENOSCOPY N/A 9/27/2023    Procedure: EGD (ESOPHAGOGASTRODUODENOSCOPY);  Surgeon: Sahil May MD;  Location: [facility];  Service: Endoscopy;  Laterality: N/A;    FINGER SURGERY      HYSTERECTOMY      OOPHORECTOMY  1996    Hysterectomy        ALLERGIES AND MEDICATION:     Review of patient's allergies indicates:   Allergen Reactions    Antivert [meclizine] Other (See Comments)     Behavioral changes        Medication List            Accurate as of August 2, 2024  1:37 PM. If you have any questions, ask your nurse or doctor.                CONTINUE taking these medications      albuterol 90 mcg/actuation inhaler  Commonly known as: PROVENTIL/VENTOLIN HFA     albuterol-ipratropium 2.5 mg-0.5 mg/3 mL nebulizer solution  Commonly known as: DUO-NEB  Take 3 mLs by nebulization every 4 (four) hours as needed for Wheezing. Rescue     aluminum & magnesium hydroxide-simethicone 400-400-40 mg/5 mL suspension  Commonly known as: MYLANTA MAXIMUM STRENGTH  Take 15 mLs by mouth every 4 (four) hours as needed for Indigestion.     fluocinolone acetonide oiL 0.01 % Drop     fluticasone-umeclidin-vilanter 200-62.5-25 mcg inhaler  Commonly known as: TRELEGY ELLIPTA     furosemide 40 MG tablet  Commonly known as: LASIX     gabapentin 100 MG capsule  Commonly known as: NEURONTIN  Take 1 capsule (100 mg total) by mouth 2 (two) times daily.     GAVILAX 17 gram/dose powder  Generic drug: polyethylene glycol  Mix 1 capful (17 g) with fluids and drink by mouth 2 (two) times daily.     HYDROcodone-acetaminophen 5-325 mg per tablet  Commonly known as: NORCO  Take 1 tablet by mouth every 6 (six) hours as needed for Pain.     LINZESS 72 mcg Cap capsule  Generic drug: linaCLOtide     metOLazone 5 MG tablet  Commonly known as: ZAROXOLYN  TAKE 1/2 (one-half) TABLET(S) BY MOUTH EVERY OTHER DAY     olopatadine 0.1 % ophthalmic solution  Commonly known as: PATANOL     pantoprazole 40 MG tablet  Commonly known as: PROTONIX  Take 1 tablet (40 mg total) by mouth 2 (two) times daily.     potassium chloride SA 20 MEQ tablet  Commonly known as: K-DUR,KLOR-CON  Take 1 tablet (20 mEq total) by mouth once daily.              SOCIAL HISTORY:     Social History     Socioeconomic History     Marital status:    Tobacco Use    Smoking status: Every Day     Current packs/day: 2.00     Average packs/day: 2.0 packs/day for 40.7 years (81.3 ttl pk-yrs)     Types: Cigarettes     Start date: 12/1/1983    Smokeless tobacco: Current   Substance and Sexual Activity    Alcohol use: No     Alcohol/week: 0.0 standard drinks of alcohol    Drug use: No    Sexual activity: Yes     Partners: Male     Social Determinants of Health     Financial Resource Strain: Medium Risk (7/27/2024)    Overall Financial Resource Strain (CARDIA)     Difficulty of Paying Living Expenses: Somewhat hard   Food Insecurity: No Food Insecurity (7/27/2024)    Hunger Vital Sign     Worried About Running Out of Food in the Last Year: Never true     Ran Out of Food in the Last Year: Never true   Transportation Needs: No Transportation Needs (11/9/2023)    PRAPARE - Transportation     Lack of Transportation (Medical): No     Lack of Transportation (Non-Medical): No   Physical Activity: Inactive (7/27/2024)    Exercise Vital Sign     Days of Exercise per Week: 0 days     Minutes of Exercise per Session: 0 min   Stress: Stress Concern Present (7/27/2024)    [employer] - Occupational Stress Questionnaire     Feeling of Stress : Very much   Housing Stability: Low Risk  (11/9/2023)    Housing Stability Vital Sign     Unable to Pay for Housing in the Last Year: No     Number of Places Lived in the Last Year: 1     Unstable Housing in the Last Year: No       FAMILY HISTORY:     Family History   Problem Relation Name Age of Onset    COPD Mother      Heart disease Father      No Known Problems Sister      No Known Problems Brother         REVIEW OF SYSTEMS:   Review of Systems   Constitutional:  Negative for chills, diaphoresis, fever, malaise/fatigue and weight loss.   HENT:  Negative for congestion, hearing loss, sinus pain, sore throat and tinnitus.    Eyes:  Negative for blurred vision, double vision, photophobia and  "pain.   Respiratory:  Positive for shortness of breath. Negative for cough, hemoptysis, sputum production, wheezing and stridor.    Cardiovascular:  Positive for leg swelling. Negative for chest pain, palpitations, orthopnea, claudication and PND.   Gastrointestinal:  Negative for abdominal pain, blood in stool, heartburn, melena, nausea and vomiting.   Musculoskeletal:  Negative for back pain, falls, joint pain, myalgias and neck pain.   Neurological:  Negative for dizziness, tingling, tremors, sensory change, speech change, focal weakness, seizures, loss of consciousness, weakness and headaches.   Endo/Heme/Allergies:  Does not bruise/bleed easily.   Psychiatric/Behavioral:  Negative for depression, memory loss and substance abuse. The patient is not nervous/anxious.        PHYSICAL EXAM:     Vitals:    24 1303   BP: 130/72   Pulse: 83   Resp: 15      Body mass index is 34.41 kg/m².  Weight: 88.1 kg (194 lb 3.6 oz)   Height: 5' 3" (160 cm)     Physical Exam  Vitals reviewed.   Constitutional:       General: She is not in acute distress.     Appearance: She is well-developed. She is obese. She is not diaphoretic.   HENT:      Head: Normocephalic.   Neck:      Vascular: No carotid bruit or JVD.   Cardiovascular:      Rate and Rhythm: Normal rate and regular rhythm.      Pulses: Normal pulses.      Heart sounds: Normal heart sounds.   Pulmonary:      Effort: Pulmonary effort is normal.      Breath sounds: Decreased air movement present.   Abdominal:      General: Bowel sounds are normal.      Palpations: Abdomen is soft.      Tenderness: There is abdominal tenderness.   Musculoskeletal:      Right lower le+ Edema present.      Left lower le+ Edema present.   Skin:     General: Skin is warm and dry.   Neurological:      Mental Status: She is alert and oriented to person, place, and time.   Psychiatric:         Speech: Speech normal.         Behavior: Behavior normal.         Thought Content: Thought " content normal.         DATA:   EKG: (personally reviewed tracing)  08/02/2024-normal sinus rhythm, incomplete right bundle branch block  09/14/2022: NSR, incomplete RBBB  Laboratory:  CBC:  Recent Labs   Lab 11/10/23  0509 11/11/23  0322 05/14/24  1244   WBC 17.92 H 15.38 H 12.67   Hemoglobin 8.0 L 8.3 L 11.7 L   Hematocrit 28.4 L 28.4 L 41.4   Platelets 414 370 355       CHEMISTRIES:  Recent Labs   Lab 05/04/22  1433 06/28/22  1037 06/29/22  0503 09/13/22  1652 12/09/22  1127 09/26/23  1131 09/27/23  0436 11/07/23  0611 11/08/23  0347 11/10/23  0509 11/11/23  0322 05/14/24  1244   Glucose 92 119 H 146 H   < > 94 121 H   < > 159 H   < > 116 H 110 114 H   Sodium 142 139 141   < > 138 136   < > 140   < > 141 141 137   Potassium 4.5 4.3 4.4   < > 4.7 2.8 L   < > 3.1 L   < > 2.9 L 3.9 4.0   BUN 9 10 16   < > 7 19   < > 11   < > 21 H 19 14   Creatinine 0.6 0.6 0.6   < > 0.6 0.6   < > 0.7   < > 0.7 0.7 0.6   eGFR if African American >60 >60 >60  --   --   --   --   --   --   --   --   --    eGFR if non African American >60 >60 >60  --   --   --   --   --   --   --   --   --    Calcium 9.2 9.2 9.4   < > 10.1 9.0   < > 9.4   < > 9.8 10.0 10.1   Magnesium  --   --  2.2   < > 2.0 2.3  --  1.9  --   --   --   --     < > = values in this interval not displayed.       CARDIAC BIOMARKERS:  Recent Labs   Lab 06/28/22  1503 09/14/22  1411 09/26/23  1131   Troponin I 0.020 <0.006 <0.006       COAGS:  Recent Labs   Lab 09/26/23  1131   INR 1.0       LIPIDS/LFTS:  Recent Labs   Lab 11/10/23  0509 11/11/23  0322 05/14/24  1244   AST 16 13 15   ALT 12 11 11       Cardiovascular Testing:    Echocardiogram 11/07/2022:    The left ventricle is normal in size with mild concentric hypertrophy and normal systolic function.  The estimated ejection fraction is 55%.  Moderate right ventricular enlargement with low normal right ventricular systolic function.  There is right ventricular hypertrophy.  The estimated PA systolic pressure is 46  mmHg.  There is pulmonary hypertension.     Venous US 11/07/2022:    There is no evidence of a right lower extremity DVT.  The right superficial femoral middle vein is normal.  There is no evidence of a left lower extremity DVT.    Echocardiogram 02/10/2022:    The estimated ejection fraction is 55%.  The left ventricle is normal in size with normal systolic function.  Normal left ventricular diastolic function.  Moderate right ventricular enlargement with mildly reduced right ventricular systolic function.  Moderate left atrial enlargement.  Mild tricuspid regurgitation.  Mild right atrial enlargement.  Intermediate central venous pressure (8 mmHg).  The estimated PA systolic pressure is 52 mmHg.  There is pulmonary hypertension.    Echocardiogram 03/14/2018:      1 - Normal left ventricular systolic function (EF 55-60%).     2 - No wall motion abnormalities.     3 - Trivial mitral regurgitation.     4 - Trivial tricuspid regurgitation.     5 - The estimated PA systolic pressure is 32 mmHg.     ASSESSMENT:     Preoperative cardiovascular evaluation  Coronary atherosclerosis   Hypertension  Pulmonary hypertension  Localized edema  COPD    PLAN:     Preoperative cardiovascular evaluation: Functional capacity less than 4 Mets.  Dobutamine stress echocardiogram.  Coronary atherosclerosis: Ischemic evaluation as above.  Hypertension: Monitor.  Pulmonary hypertension: Echocardiogram.  Localized edema: Continue Lasix, Zaroxolyn.  Compression hose.  Return to clinic 1 month.           Selvin Olvera MD, MPH, FACC, [facility]

## 2024-08-03 LAB
OHS QRS DURATION: 100 MS
OHS QTC CALCULATION: 427 MS

## 2024-08-14 ENCOUNTER — HOSPITAL ENCOUNTER (OUTPATIENT)
Dept: CARDIOLOGY | Facility: HOSPITAL | Age: 54
Discharge: HOME | End: 2024-08-14
Attending: INTERNAL MEDICINE
Payer: MEDICAID

## 2024-08-14 DIAGNOSIS — I27.20 PULMONARY HYPERTENSION: Chronic | ICD-10-CM

## 2024-08-14 DIAGNOSIS — Z01.810 PREOPERATIVE CARDIOVASCULAR EXAMINATION: ICD-10-CM

## 2024-08-14 DIAGNOSIS — I25.119 ATHEROSCLEROSIS OF NATIVE CORONARY ARTERY OF NATIVE HEART WITH ANGINA PECTORIS: ICD-10-CM

## 2024-08-14 DIAGNOSIS — I70.0 AORTIC ATHEROSCLEROSIS: ICD-10-CM

## 2024-08-14 LAB
ASCENDING AORTA: 3.63 CM
AV INDEX (PROSTH): 0.64
AV MEAN GRADIENT: 7 MMHG
AV PEAK GRADIENT: 12 MMHG
AV VALVE AREA BY VELOCITY RATIO: 2.21 CM²
AV VALVE AREA: 2.19 CM²
AV VELOCITY RATIO: 0.64
CV ECHO LV RWT: 0.36 CM
CV STRESS BASE HR: 73 BPM
DIASTOLIC BLOOD PRESSURE: 71 MMHG
DOP CALC AO PEAK VEL: 1.71 M/S
DOP CALC AO VTI: 39.2 CM
DOP CALC LVOT AREA: 3.4 CM2
DOP CALC LVOT DIAMETER: 2.09 CM
DOP CALC LVOT PEAK VEL: 1.1 M/S
DOP CALC LVOT STROKE VOLUME: 85.72 CM3
DOP CALCLVOT PEAK VEL VTI: 25 CM
E WAVE DECELERATION TIME: 211.61 MSEC
E/A RATIO: 1.13
E/E' RATIO: 6.27 M/S
ECHO LV POSTERIOR WALL: 0.96 CM (ref 0.6–1.1)
FRACTIONAL SHORTENING: 26 % (ref 28–44)
INTERVENTRICULAR SEPTUM: 1.22 CM (ref 0.6–1.1)
IVC DIAMETER: 1.19 CM
LA MINOR: 2.95 CM
LA WIDTH: 3 CM
LEFT ATRIUM SIZE: 4.5 CM
LEFT INTERNAL DIMENSION IN SYSTOLE: 3.92 CM (ref 2.1–4)
LEFT VENTRICLE DIASTOLIC VOLUME: 135.64 ML
LEFT VENTRICLE SYSTOLIC VOLUME: 66.66 ML
LEFT VENTRICULAR INTERNAL DIMENSION IN DIASTOLE: 5.31 CM (ref 3.5–6)
LEFT VENTRICULAR MASS: 225.63 G
LV LATERAL E/E' RATIO: 5.31 M/S
LV SEPTAL E/E' RATIO: 7.67 M/S
LVED V (TEICH): 135.64 ML
LVES V (TEICH): 66.66 ML
LVOT MG: 2.76 MMHG
LVOT MV: 0.76 CM/S
MV PEAK A VEL: 0.61 M/S
MV PEAK E VEL: 0.69 M/S
MV STENOSIS PRESSURE HALF TIME: 61.37 MS
MV VALVE AREA P 1/2 METHOD: 3.58 CM2
OHS CV CPX 1 MINUTE RECOVERY HEART RATE: 3 BPM
OHS CV CPX 85 PERCENT MAX PREDICTED HEART RATE MALE: 139
OHS CV CPX ESTIMATED METS: 1
OHS CV CPX MAX PREDICTED HEART RATE: 164
OHS CV CPX PATIENT IS FEMALE: 1
OHS CV CPX PATIENT IS MALE: 0
OHS CV CPX PEAK DIASTOLIC BLOOD PRESSURE: 88 MMHG
OHS CV CPX PEAK HEAR RATE: 142 BPM
OHS CV CPX PEAK RATE PRESSURE PRODUCT: ABNORMAL
OHS CV CPX PEAK SYSTOLIC BLOOD PRESSURE: 165 MMHG
OHS CV CPX PERCENT MAX PREDICTED HEART RATE ACHIEVED: 91
OHS CV CPX RATE PRESSURE PRODUCT PRESENTING: 9782
OHS CV RV/LV RATIO: 0.71 CM
PISA TR MAX VEL: 2.9 M/S
PULM VEIN S/D RATIO: 1.91
PV PEAK D VEL: 0.34 M/S
PV PEAK S VEL: 0.65 M/S
RA MAJOR: 5.01 CM
RA PRESSURE ESTIMATED: 8 MMHG
RA WIDTH: 3.76 CM
RIGHT VENTRICLE DIASTOLIC BASEL DIMENSION: 3.8 CM
RIGHT VENTRICULAR END-DIASTOLIC DIMENSION: 3.79 CM
RV TB RVSP: 11 MMHG
SINUS: 3.24 CM
STJ: 3.49 CM
STRESS ECHO POST EXERCISE DUR MIN: 7 MINUTES
STRESS ECHO POST EXERCISE DUR SEC: 46 SECONDS
SYSTOLIC BLOOD PRESSURE: 134 MMHG
TDI LATERAL: 0.13 M/S
TDI SEPTAL: 0.09 M/S
TDI: 0.11 M/S
TR MAX PG: 34 MMHG
TRICUSPID ANNULAR PLANE SYSTOLIC EXCURSION: 2.22 CM
TV REST PULMONARY ARTERY PRESSURE: 42 MMHG

## 2024-08-14 PROCEDURE — 93351 STRESS TTE COMPLETE: CPT | Mod: 26,,, | Performed by: INTERNAL MEDICINE

## 2024-08-14 PROCEDURE — 93325 DOPPLER ECHO COLOR FLOW MAPG: CPT

## 2024-08-14 PROCEDURE — 93325 DOPPLER ECHO COLOR FLOW MAPG: CPT | Mod: 26,,, | Performed by: INTERNAL MEDICINE

## 2024-08-14 PROCEDURE — 93320 DOPPLER ECHO COMPLETE: CPT

## 2024-08-14 PROCEDURE — 63600175 PHARM REV CODE 636 W HCPCS: Performed by: INTERNAL MEDICINE

## 2024-08-14 PROCEDURE — 93320 DOPPLER ECHO COMPLETE: CPT | Mod: 26,,, | Performed by: INTERNAL MEDICINE

## 2024-08-14 RX ORDER — DOBUTAMINE HYDROCHLORIDE 400 MG/100ML
10-30 INJECTION INTRAVENOUS CONTINUOUS
Status: DISCONTINUED | OUTPATIENT
Start: 2024-08-14 | End: 2024-08-15 | Stop reason: HOSPADM

## 2024-08-14 RX ADMIN — DOBUTAMINE HYDROCHLORIDE 10 MCG/KG/MIN: 400 INJECTION INTRAVENOUS at 11:08

## 2024-09-09 ENCOUNTER — OFFICE VISIT (OUTPATIENT)
Dept: CARDIOLOGY | Facility: CLINIC | Age: 54
End: 2024-09-09
Payer: MEDICAID

## 2024-09-09 VITALS
OXYGEN SATURATION: 94 % | HEART RATE: 93 BPM | WEIGHT: 190.25 LBS | SYSTOLIC BLOOD PRESSURE: 132 MMHG | BODY MASS INDEX: 33.71 KG/M2 | HEIGHT: 63 IN | DIASTOLIC BLOOD PRESSURE: 74 MMHG

## 2024-09-09 DIAGNOSIS — R60.0 LOCALIZED EDEMA: ICD-10-CM

## 2024-09-09 DIAGNOSIS — I25.10 ATHEROSCLEROSIS OF NATIVE CORONARY ARTERY OF NATIVE HEART WITHOUT ANGINA PECTORIS: ICD-10-CM

## 2024-09-09 DIAGNOSIS — I70.0 AORTIC ATHEROSCLEROSIS: ICD-10-CM

## 2024-09-09 DIAGNOSIS — Z01.810 PREOPERATIVE CARDIOVASCULAR EXAMINATION: Primary | ICD-10-CM

## 2024-09-09 DIAGNOSIS — I27.20 PULMONARY HYPERTENSION: Chronic | ICD-10-CM

## 2024-09-09 PROCEDURE — 99214 OFFICE O/P EST MOD 30 MIN: CPT | Mod: PBBFAC,PO | Performed by: INTERNAL MEDICINE

## 2024-09-09 PROCEDURE — 1159F MED LIST DOCD IN RCRD: CPT | Mod: CPTII,,, | Performed by: INTERNAL MEDICINE

## 2024-09-09 PROCEDURE — 99214 OFFICE O/P EST MOD 30 MIN: CPT | Mod: S$PBB,,, | Performed by: INTERNAL MEDICINE

## 2024-09-09 PROCEDURE — 3008F BODY MASS INDEX DOCD: CPT | Mod: CPTII,,, | Performed by: INTERNAL MEDICINE

## 2024-09-09 PROCEDURE — 3078F DIAST BP <80 MM HG: CPT | Mod: CPTII,,, | Performed by: INTERNAL MEDICINE

## 2024-09-09 PROCEDURE — 99999 PR PBB SHADOW E&M-EST. PATIENT-LVL IV: CPT | Mod: PBBFAC,,, | Performed by: INTERNAL MEDICINE

## 2024-09-09 PROCEDURE — 1160F RVW MEDS BY RX/DR IN RCRD: CPT | Mod: CPTII,,, | Performed by: INTERNAL MEDICINE

## 2024-09-09 PROCEDURE — 3075F SYST BP GE 130 - 139MM HG: CPT | Mod: CPTII,,, | Performed by: INTERNAL MEDICINE

## 2024-09-09 PROCEDURE — 4010F ACE/ARB THERAPY RXD/TAKEN: CPT | Mod: CPTII,,, | Performed by: INTERNAL MEDICINE

## 2024-09-09 NOTE — PROGRESS NOTES
CARDIOLOGY CLINIC VISIT        HISTORY OF PRESENT ILLNESS:     Rickey Valderrama presents for continued care. Recent hospitalization with COPD exacerbation. Symptoms improved with IV solumedrol. Hx of COPD. Hypertension. Pulmonary Hypertension. Echocardiogram from 02/10/2022 sowed normal LVEF of 55%. Normal diastolic function. RVE with reduced function. PASP 52mmHg. In 2018 32mmHg. She has not smoked in three weeks. On discharge she was given a prescription for Amlodipine. She briefly took the medication but has stopped because it made her nauseous. LE edema is not resolving with leg elevation. She is on lasix. No compression hose. Her bnp was normal.    11/15/2022: Echocardiogram showed normal function with an estimated ejection fraction of 55%.  Mildly elevated pulmonary pressure, 46 mmHg.  Venous ultrasound negative. Persistent LE edema. Normal albumin. No proteinuria. Normal bnp.    08/02/2024: Plans for cholecystectomy.  Her functional capacity is less than 4 Mets.  EKG today shows normal sinus rhythm.  Incomplete right bundle branch block.  Dobutamine stress echocardiogram in 2020 2- for ischemia.    09/09/2024:  Stress echocardiogram showed ejection fraction of 55-60%.  Mildly elevated pulmonary pressure.  EKG portion of the study was negative for ischemia.  No echocardiographic evidence of ischemia.    CARDIOVASCULAR HISTORY:     Coronary atherosclerosis  Pulmonary Hypertension  Aortic atherosclerosis    PAST MEDICAL HISTORY:     Past Medical History:   Diagnosis Date    Anxiety     Asthma     Carpal tunnel syndrome, bilateral     COPD (chronic obstructive pulmonary disease)     Heavy cigarette smoker     9/14/22 2PPD    Laryngeal papilloma     On home oxygen therapy     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    Right side with CT scan Referred to ENT for visualization       PAST SURGICAL HISTORY:     Past Surgical History:   Procedure Laterality Date    CARPAL TUNNEL RELEASE Bilateral     ESOPHAGOGASTRODUODENOSCOPY N/A  9/27/2023    Procedure: EGD (ESOPHAGOGASTRODUODENOSCOPY);  Surgeon: Sahil May MD;  Location: [facility];  Service: Endoscopy;  Laterality: N/A;    FINGER SURGERY      HYSTERECTOMY      OOPHORECTOMY  1996    Hysterectomy       ALLERGIES AND MEDICATION:     Review of patient's allergies indicates:   Allergen Reactions    Antivert [meclizine] Other (See Comments)     Behavioral changes        Medication List            Accurate as of September 9, 2024  1:43 PM. If you have any questions, ask your nurse or doctor.                CONTINUE taking these medications      albuterol 90 mcg/actuation inhaler  Commonly known as: PROVENTIL/VENTOLIN HFA     albuterol-ipratropium 2.5 mg-0.5 mg/3 mL nebulizer solution  Commonly known as: DUO-NEB  Take 3 mLs by nebulization every 4 (four) hours as needed for Wheezing. Rescue     aluminum & magnesium hydroxide-simethicone 400-400-40 mg/5 mL suspension  Commonly known as: MYLANTA MAXIMUM STRENGTH  Take 15 mLs by mouth every 4 (four) hours as needed for Indigestion.     fluocinolone acetonide oiL 0.01 % Drop     fluticasone-umeclidin-vilanter 200-62.5-25 mcg inhaler  Commonly known as: TRELEGY ELLIPTA     furosemide 40 MG tablet  Commonly known as: LASIX     gabapentin 100 MG capsule  Commonly known as: NEURONTIN  Take 1 capsule (100 mg total) by mouth 2 (two) times daily.     LINZESS 72 mcg Cap capsule  Generic drug: linaCLOtide     metOLazone 5 MG tablet  Commonly known as: ZAROXOLYN  TAKE 1/2 (one-half) TABLET(S) BY MOUTH EVERY OTHER DAY     olopatadine 0.1 % ophthalmic solution  Commonly known as: PATANOL     pantoprazole 40 MG tablet  Commonly known as: PROTONIX  Take 1 tablet (40 mg total) by mouth 2 (two) times daily.     potassium chloride SA 20 MEQ tablet  Commonly known as: K-DUR,KLOR-CON  Take 1 tablet (20 mEq total) by mouth once daily.              SOCIAL HISTORY:     Social History     Socioeconomic History    Marital status:    Tobacco Use    Smoking status:  Every Day     Current packs/day: 2.00     Average packs/day: 2.0 packs/day for 40.8 years (81.5 ttl pk-yrs)     Types: Cigarettes     Start date: 12/1/1983    Smokeless tobacco: Current   Substance and Sexual Activity    Alcohol use: No     Alcohol/week: 0.0 standard drinks of alcohol    Drug use: No    Sexual activity: Yes     Partners: Male     Social Determinants of Health     Financial Resource Strain: Medium Risk (7/27/2024)    Overall Financial Resource Strain (CARDIA)     Difficulty of Paying Living Expenses: Somewhat hard   Food Insecurity: No Food Insecurity (7/27/2024)    Hunger Vital Sign     Worried About Running Out of Food in the Last Year: Never true     Ran Out of Food in the Last Year: Never true   Transportation Needs: No Transportation Needs (11/9/2023)    PRAPARE - Transportation     Lack of Transportation (Medical): No     Lack of Transportation (Non-Medical): No   Physical Activity: Inactive (7/27/2024)    Exercise Vital Sign     Days of Exercise per Week: 0 days     Minutes of Exercise per Session: 0 min   Stress: Stress Concern Present (7/27/2024)    [employer] - Occupational Stress Questionnaire     Feeling of Stress : Very much   Housing Stability: Low Risk  (11/9/2023)    Housing Stability Vital Sign     Unable to Pay for Housing in the Last Year: No     Number of Places Lived in the Last Year: 1     Unstable Housing in the Last Year: No       FAMILY HISTORY:     Family History   Problem Relation Name Age of Onset    COPD Mother      Heart disease Father      No Known Problems Sister      No Known Problems Brother         REVIEW OF SYSTEMS:   Review of Systems   Constitutional:  Negative for chills, diaphoresis, fever, malaise/fatigue and weight loss.   HENT:  Negative for congestion, hearing loss, sinus pain, sore throat and tinnitus.    Eyes:  Negative for blurred vision, double vision, photophobia and pain.   Respiratory:  Positive for shortness of breath.  "Negative for cough, hemoptysis, sputum production, wheezing and stridor.    Cardiovascular:  Positive for leg swelling. Negative for chest pain, palpitations, orthopnea, claudication and PND.   Gastrointestinal:  Positive for abdominal pain and nausea. Negative for blood in stool, heartburn, melena and vomiting.   Musculoskeletal:  Negative for back pain, falls, joint pain, myalgias and neck pain.   Neurological:  Negative for dizziness, tingling, tremors, sensory change, speech change, focal weakness, seizures, loss of consciousness, weakness and headaches.   Endo/Heme/Allergies:  Does not bruise/bleed easily.   Psychiatric/Behavioral:  Negative for depression, memory loss and substance abuse. The patient is not nervous/anxious.        PHYSICAL EXAM:     Vitals:    24 1327   BP: 132/74   Pulse: 93        Body mass index is 33.7 kg/m².  Weight: 86.3 kg (190 lb 4.1 oz)   Height: 5' 3" (160 cm)     Physical Exam  Vitals reviewed.   Constitutional:       General: She is not in acute distress.     Appearance: She is well-developed. She is obese. She is not diaphoretic.   HENT:      Head: Normocephalic.   Neck:      Vascular: No carotid bruit or JVD.   Cardiovascular:      Rate and Rhythm: Normal rate and regular rhythm.      Pulses: Normal pulses.      Heart sounds: Normal heart sounds.   Pulmonary:      Effort: Pulmonary effort is normal.      Breath sounds: Decreased air movement present.   Abdominal:      General: Bowel sounds are normal.      Palpations: Abdomen is soft.      Tenderness: There is abdominal tenderness.   Musculoskeletal:      Right lower le+ Edema present.      Left lower le+ Edema present.   Skin:     General: Skin is warm and dry.   Neurological:      Mental Status: She is alert and oriented to person, place, and time.   Psychiatric:         Speech: Speech normal.         Behavior: Behavior normal.         Thought Content: Thought content normal.         DATA:   EKG: (personally " reviewed tracing)  08/02/2024-normal sinus rhythm, incomplete right bundle branch block  09/14/2022: NSR, incomplete RBBB  Laboratory:  CBC:  Recent Labs   Lab 11/10/23  0509 11/11/23  0322 05/14/24  1244   WBC 17.92 H 15.38 H 12.67   Hemoglobin 8.0 L 8.3 L 11.7 L   Hematocrit 28.4 L 28.4 L 41.4   Platelets 414 370 355       CHEMISTRIES:  Recent Labs   Lab 05/04/22  1433 06/28/22  1037 06/29/22  0503 09/13/22  1652 12/09/22  1127 09/26/23  1131 09/27/23  0436 11/07/23  0611 11/08/23  0347 11/10/23  0509 11/11/23  0322 05/14/24  1244   Glucose 92 119 H 146 H   < > 94 121 H   < > 159 H   < > 116 H 110 114 H   Sodium 142 139 141   < > 138 136   < > 140   < > 141 141 137   Potassium 4.5 4.3 4.4   < > 4.7 2.8 L   < > 3.1 L   < > 2.9 L 3.9 4.0   BUN 9 10 16   < > 7 19   < > 11   < > 21 H 19 14   Creatinine 0.6 0.6 0.6   < > 0.6 0.6   < > 0.7   < > 0.7 0.7 0.6   eGFR if African American >60 >60 >60  --   --   --   --   --   --   --   --   --    eGFR if non African American >60 >60 >60  --   --   --   --   --   --   --   --   --    Calcium 9.2 9.2 9.4   < > 10.1 9.0   < > 9.4   < > 9.8 10.0 10.1   Magnesium  --   --  2.2   < > 2.0 2.3  --  1.9  --   --   --   --     < > = values in this interval not displayed.       CARDIAC BIOMARKERS:  Recent Labs   Lab 06/28/22  1503 09/14/22  1411 09/26/23  1131   Troponin I 0.020 <0.006 <0.006       COAGS:  Recent Labs   Lab 09/26/23  1131   INR 1.0       LIPIDS/LFTS:  Recent Labs   Lab 11/10/23  0509 11/11/23  0322 05/14/24  1244   AST 16 13 15   ALT 12 11 11       Cardiovascular Testing:    Stress echocardiogram 08/14/2024:      Left Ventricle: The left ventricle is normal in size. Mildly increased wall thickness. There is mild concentric hypertrophy. There is normal systolic function with a visually estimated ejection fraction of 55 - 60%.    Right Ventricle: Normal right ventricular cavity size. Systolic function is normal.    Pulmonary Artery: The estimated pulmonary artery  systolic pressure is 42 mmHg.    Stress Protocol: The patient was infused intravenously with dobutamine. The patient received a graduated infusion of the stress agent beginning at  mcg/kg/min . The peak heart rate was 142 bpm, which is 91% of age predicted maximum heart rate. Low-level exercise was used. The patient reported no symptoms during the stress test. The test was stopped because the end of the protocol was reached.    ECG Conclusion: The ECG portion of the study is negative for ischemia.    Post-stress Impression: The study is negative with no echocardiographic evidence of stress induced ischemia.    Echocardiogram 11/07/2022:    The left ventricle is normal in size with mild concentric hypertrophy and normal systolic function.  The estimated ejection fraction is 55%.  Moderate right ventricular enlargement with low normal right ventricular systolic function.  There is right ventricular hypertrophy.  The estimated PA systolic pressure is 46 mmHg.  There is pulmonary hypertension.     Venous US 11/07/2022:    There is no evidence of a right lower extremity DVT.  The right superficial femoral middle vein is normal.  There is no evidence of a left lower extremity DVT.    Echocardiogram 02/10/2022:    The estimated ejection fraction is 55%.  The left ventricle is normal in size with normal systolic function.  Normal left ventricular diastolic function.  Moderate right ventricular enlargement with mildly reduced right ventricular systolic function.  Moderate left atrial enlargement.  Mild tricuspid regurgitation.  Mild right atrial enlargement.  Intermediate central venous pressure (8 mmHg).  The estimated PA systolic pressure is 52 mmHg.  There is pulmonary hypertension.    Echocardiogram 03/14/2018:      1 - Normal left ventricular systolic function (EF 55-60%).     2 - No wall motion abnormalities.     3 - Trivial mitral regurgitation.     4 - Trivial tricuspid regurgitation.     5 - The estimated PA  systolic pressure is 32 mmHg.     ASSESSMENT:     Preoperative cardiovascular evaluation  Coronary atherosclerosis   Hypertension  Pulmonary hypertension  Localized edema  COPD    PLAN:     Preoperative cardiovascular evaluation: Patient can proceed with cholecystectomy. Dobutamine stress echocardiogram negative for ischemia. Patient can proceed. RCRI 3.9% 30 day risk of death, MI or cardiac arrest.  Coronary atherosclerosis: Ischemic evaluation negative.  Hypertension: Monitor.  Pulmonary hypertension: Echocardiogram shows mildly elevated pressures.  Localized edema: Continue Lasix, Zaroxolyn.  Compression hose.  Return to clinic 3 months with lipids..           Selvin Olvera MD, MPH, FACC, [facility]

## 2025-01-06 RX ORDER — METOLAZONE 5 MG/1
TABLET ORAL
Qty: 30 TABLET | Refills: 3 | OUTPATIENT
Start: 2025-01-06

## 2025-04-22 ENCOUNTER — HOSPITAL ENCOUNTER (INPATIENT)
Facility: HOSPITAL | Age: 55
LOS: 4 days | Discharge: HOME | DRG: 190 | End: 2025-04-26
Attending: EMERGENCY MEDICINE | Admitting: STUDENT IN AN ORGANIZED HEALTH CARE EDUCATION/TRAINING PROGRAM
Payer: MEDICAID

## 2025-04-22 DIAGNOSIS — J44.1 COPD EXACERBATION: Primary | ICD-10-CM

## 2025-04-22 DIAGNOSIS — J44.9 STAGE 4 VERY SEVERE COPD BY GOLD CLASSIFICATION: Chronic | ICD-10-CM

## 2025-04-22 DIAGNOSIS — R53.81 DEBILITY: ICD-10-CM

## 2025-04-22 DIAGNOSIS — R06.02 SHORTNESS OF BREATH: ICD-10-CM

## 2025-04-22 DIAGNOSIS — J96.01 ACUTE RESPIRATORY FAILURE WITH HYPOXIA: ICD-10-CM

## 2025-04-22 DIAGNOSIS — Z74.09 IMPAIRED FUNCTIONAL MOBILITY AND ACTIVITY TOLERANCE: ICD-10-CM

## 2025-04-22 PROBLEM — J96.21 ACUTE ON CHRONIC HYPOXIC RESPIRATORY FAILURE: Status: ACTIVE | Noted: 2025-04-22

## 2025-04-22 LAB
ABSOLUTE EOSINOPHIL (OHS): 0.06 K/UL
ABSOLUTE MONOCYTE (OHS): 0.25 K/UL (ref 0.3–1)
ABSOLUTE NEUTROPHIL COUNT (OHS): 7.04 K/UL (ref 1.8–7.7)
ALBUMIN SERPL BCP-MCNC: 4 G/DL (ref 3.5–5.2)
ALLENS TEST: ABNORMAL
ALP SERPL-CCNC: 140 UNIT/L (ref 40–150)
ALT SERPL W/O P-5'-P-CCNC: 13 UNIT/L (ref 10–44)
ANION GAP (OHS): 9 MMOL/L (ref 8–16)
AST SERPL-CCNC: 15 UNIT/L (ref 11–45)
BASOPHILS # BLD AUTO: 0.05 K/UL
BASOPHILS NFR BLD AUTO: 0.6 %
BILIRUB SERPL-MCNC: 1.2 MG/DL (ref 0.1–1)
BNP SERPL-MCNC: 98 PG/ML (ref 0–99)
BUN SERPL-MCNC: 13 MG/DL (ref 6–20)
CALCIUM SERPL-MCNC: 9.7 MG/DL (ref 8.7–10.5)
CHLORIDE SERPL-SCNC: 93 MMOL/L (ref 95–110)
CO2 SERPL-SCNC: 37 MMOL/L (ref 23–29)
CREAT SERPL-MCNC: 0.6 MG/DL (ref 0.5–1.4)
CTP QC/QA: YES
CTP QC/QA: YES
DELSYS: ABNORMAL
ERYTHROCYTE [DISTWIDTH] IN BLOOD BY AUTOMATED COUNT: 14 % (ref 11.5–14.5)
GFR SERPLBLD CREATININE-BSD FMLA CKD-EPI: >60 ML/MIN/1.73/M2
GLUCOSE SERPL-MCNC: 110 MG/DL (ref 70–110)
HCO3 UR-SCNC: 44.3 MMOL/L (ref 24–28)
HCT VFR BLD AUTO: 45 % (ref 37–48.5)
HGB BLD-MCNC: 13.2 GM/DL (ref 12–16)
IMM GRANULOCYTES # BLD AUTO: 0.03 K/UL (ref 0–0.04)
IMM GRANULOCYTES NFR BLD AUTO: 0.4 % (ref 0–0.5)
LYMPHOCYTES # BLD AUTO: 1.05 K/UL (ref 1–4.8)
MCH RBC QN AUTO: 27.8 PG (ref 27–31)
MCHC RBC AUTO-ENTMCNC: 29.3 G/DL (ref 32–36)
MCV RBC AUTO: 95 FL (ref 82–98)
NUCLEATED RBC (/100WBC) (OHS): 0 /100 WBC
PCO2 BLDA: 71.4 MMHG (ref 35–45)
PH SMN: 7.4 [PH] (ref 7.35–7.45)
PLATELET # BLD AUTO: 254 K/UL (ref 150–450)
PMV BLD AUTO: 10.5 FL (ref 9.2–12.9)
PO2 BLDA: 46 MMHG (ref 80–100)
POC BE: 15 MMOL/L (ref -2–2)
POC MOLECULAR INFLUENZA A AGN: NEGATIVE
POC MOLECULAR INFLUENZA B AGN: NEGATIVE
POC SATURATED O2: 79 % (ref 95–100)
POC TCO2: 46 MMOL/L (ref 23–27)
POTASSIUM SERPL-SCNC: 4.6 MMOL/L (ref 3.5–5.1)
PROT SERPL-MCNC: 7.5 GM/DL (ref 6–8.4)
RBC # BLD AUTO: 4.74 M/UL (ref 4–5.4)
RELATIVE EOSINOPHIL (OHS): 0.7 %
RELATIVE LYMPHOCYTE (OHS): 12.4 % (ref 18–48)
RELATIVE MONOCYTE (OHS): 2.9 % (ref 4–15)
RELATIVE NEUTROPHIL (OHS): 83 % (ref 38–73)
SAMPLE: ABNORMAL
SARS-COV-2 RDRP RESP QL NAA+PROBE: NEGATIVE
SITE: ABNORMAL
SODIUM SERPL-SCNC: 139 MMOL/L (ref 136–145)
TROPONIN I SERPL DL<=0.01 NG/ML-MCNC: <0.006 NG/ML
TROPONIN I SERPL DL<=0.01 NG/ML-MCNC: <0.006 NG/ML
WBC # BLD AUTO: 8.48 K/UL (ref 3.9–12.7)

## 2025-04-22 PROCEDURE — 94799 UNLISTED PULMONARY SVC/PX: CPT

## 2025-04-22 PROCEDURE — 21400001 HC TELEMETRY ROOM

## 2025-04-22 PROCEDURE — 87635 SARS-COV-2 COVID-19 AMP PRB: CPT | Performed by: EMERGENCY MEDICINE

## 2025-04-22 PROCEDURE — 93005 ELECTROCARDIOGRAM TRACING: CPT

## 2025-04-22 PROCEDURE — 87502 INFLUENZA DNA AMP PROBE: CPT

## 2025-04-22 PROCEDURE — 63600175 PHARM REV CODE 636 W HCPCS: Performed by: PHYSICIAN ASSISTANT

## 2025-04-22 PROCEDURE — 94640 AIRWAY INHALATION TREATMENT: CPT

## 2025-04-22 PROCEDURE — 27100171 HC OXYGEN HIGH FLOW UP TO 24 HOURS

## 2025-04-22 PROCEDURE — 99285 EMERGENCY DEPT VISIT HI MDM: CPT | Mod: 25

## 2025-04-22 PROCEDURE — 25000003 PHARM REV CODE 250: Performed by: PHYSICIAN ASSISTANT

## 2025-04-22 PROCEDURE — 5A0935A ASSISTANCE WITH RESPIRATORY VENTILATION, LESS THAN 24 CONSECUTIVE HOURS, HIGH NASAL FLOW/VELOCITY: ICD-10-PCS | Performed by: STUDENT IN AN ORGANIZED HEALTH CARE EDUCATION/TRAINING PROGRAM

## 2025-04-22 PROCEDURE — 82803 BLOOD GASES ANY COMBINATION: CPT

## 2025-04-22 PROCEDURE — 25000242 PHARM REV CODE 250 ALT 637 W/ HCPCS: Performed by: PHYSICIAN ASSISTANT

## 2025-04-22 PROCEDURE — 94644 CONT INHLJ TX 1ST HOUR: CPT

## 2025-04-22 PROCEDURE — 27000221 HC OXYGEN, UP TO 24 HOURS

## 2025-04-22 PROCEDURE — 84450 TRANSFERASE (AST) (SGOT): CPT | Performed by: EMERGENCY MEDICINE

## 2025-04-22 PROCEDURE — 25000242 PHARM REV CODE 250 ALT 637 W/ HCPCS: Performed by: EMERGENCY MEDICINE

## 2025-04-22 PROCEDURE — 84484 ASSAY OF TROPONIN QUANT: CPT | Performed by: EMERGENCY MEDICINE

## 2025-04-22 PROCEDURE — 36600 WITHDRAWAL OF ARTERIAL BLOOD: CPT

## 2025-04-22 PROCEDURE — 93010 ELECTROCARDIOGRAM REPORT: CPT | Mod: ,,, | Performed by: INTERNAL MEDICINE

## 2025-04-22 PROCEDURE — 36415 COLL VENOUS BLD VENIPUNCTURE: CPT | Performed by: INTERNAL MEDICINE

## 2025-04-22 PROCEDURE — 85025 COMPLETE CBC W/AUTO DIFF WBC: CPT | Performed by: EMERGENCY MEDICINE

## 2025-04-22 PROCEDURE — 84484 ASSAY OF TROPONIN QUANT: CPT | Performed by: INTERNAL MEDICINE

## 2025-04-22 PROCEDURE — 99900035 HC TECH TIME PER 15 MIN (STAT)

## 2025-04-22 PROCEDURE — 83880 ASSAY OF NATRIURETIC PEPTIDE: CPT | Performed by: EMERGENCY MEDICINE

## 2025-04-22 PROCEDURE — 25000242 PHARM REV CODE 250 ALT 637 W/ HCPCS: Performed by: INTERNAL MEDICINE

## 2025-04-22 RX ORDER — ENOXAPARIN SODIUM 100 MG/ML
40 INJECTION SUBCUTANEOUS EVERY 24 HOURS
Status: DISCONTINUED | OUTPATIENT
Start: 2025-04-23 | End: 2025-04-26 | Stop reason: HOSPADM

## 2025-04-22 RX ORDER — ALBUTEROL SULFATE 2.5 MG/.5ML
10 SOLUTION RESPIRATORY (INHALATION)
Status: COMPLETED | OUTPATIENT
Start: 2025-04-22 | End: 2025-04-22

## 2025-04-22 RX ORDER — PANTOPRAZOLE SODIUM 40 MG/10ML
40 INJECTION, POWDER, LYOPHILIZED, FOR SOLUTION INTRAVENOUS ONCE
Status: COMPLETED | OUTPATIENT
Start: 2025-04-23 | End: 2025-04-22

## 2025-04-22 RX ORDER — ALUMINUM HYDROXIDE, MAGNESIUM HYDROXIDE, AND SIMETHICONE 1200; 120; 1200 MG/30ML; MG/30ML; MG/30ML
30 SUSPENSION ORAL ONCE
Status: COMPLETED | OUTPATIENT
Start: 2025-04-23 | End: 2025-04-22

## 2025-04-22 RX ORDER — SODIUM CHLORIDE 0.9 % (FLUSH) 0.9 %
3 SYRINGE (ML) INJECTION
Status: DISCONTINUED | OUTPATIENT
Start: 2025-04-22 | End: 2025-04-26 | Stop reason: HOSPADM

## 2025-04-22 RX ORDER — IPRATROPIUM BROMIDE 0.5 MG/2.5ML
1 SOLUTION RESPIRATORY (INHALATION)
Status: COMPLETED | OUTPATIENT
Start: 2025-04-22 | End: 2025-04-22

## 2025-04-22 RX ORDER — TALC
6 POWDER (GRAM) TOPICAL NIGHTLY PRN
Status: DISCONTINUED | OUTPATIENT
Start: 2025-04-22 | End: 2025-04-26 | Stop reason: HOSPADM

## 2025-04-22 RX ORDER — IPRATROPIUM BROMIDE AND ALBUTEROL SULFATE 2.5; .5 MG/3ML; MG/3ML
3 SOLUTION RESPIRATORY (INHALATION) EVERY 4 HOURS PRN
Status: DISCONTINUED | OUTPATIENT
Start: 2025-04-22 | End: 2025-04-26 | Stop reason: HOSPADM

## 2025-04-22 RX ORDER — IPRATROPIUM BROMIDE AND ALBUTEROL SULFATE 2.5; .5 MG/3ML; MG/3ML
3 SOLUTION RESPIRATORY (INHALATION) ONCE
Status: COMPLETED | OUTPATIENT
Start: 2025-04-22 | End: 2025-04-22

## 2025-04-22 RX ORDER — IPRATROPIUM BROMIDE AND ALBUTEROL SULFATE 2.5; .5 MG/3ML; MG/3ML
3 SOLUTION RESPIRATORY (INHALATION)
Status: DISCONTINUED | OUTPATIENT
Start: 2025-04-22 | End: 2025-04-24

## 2025-04-22 RX ORDER — PREDNISONE 20 MG/1
40 TABLET ORAL DAILY
Status: DISCONTINUED | OUTPATIENT
Start: 2025-04-22 | End: 2025-04-23

## 2025-04-22 RX ORDER — PANTOPRAZOLE SODIUM 40 MG/1
40 TABLET, DELAYED RELEASE ORAL DAILY
Status: DISCONTINUED | OUTPATIENT
Start: 2025-04-23 | End: 2025-04-26 | Stop reason: HOSPADM

## 2025-04-22 RX ORDER — BENZONATATE 100 MG/1
100 CAPSULE ORAL 3 TIMES DAILY PRN
Status: DISCONTINUED | OUTPATIENT
Start: 2025-04-22 | End: 2025-04-26 | Stop reason: HOSPADM

## 2025-04-22 RX ORDER — DOXYCYCLINE HYCLATE 100 MG
100 TABLET ORAL EVERY 12 HOURS
Status: DISCONTINUED | OUTPATIENT
Start: 2025-04-22 | End: 2025-04-26 | Stop reason: HOSPADM

## 2025-04-22 RX ORDER — ACETAMINOPHEN 325 MG/1
650 TABLET ORAL EVERY 6 HOURS PRN
Status: DISCONTINUED | OUTPATIENT
Start: 2025-04-22 | End: 2025-04-26 | Stop reason: HOSPADM

## 2025-04-22 RX ORDER — ONDANSETRON 4 MG/1
4 TABLET, ORALLY DISINTEGRATING ORAL EVERY 6 HOURS PRN
Status: DISCONTINUED | OUTPATIENT
Start: 2025-04-22 | End: 2025-04-26 | Stop reason: HOSPADM

## 2025-04-22 RX ORDER — CALCIUM CARBONATE 200(500)MG
500 TABLET,CHEWABLE ORAL DAILY PRN
Status: DISCONTINUED | OUTPATIENT
Start: 2025-04-22 | End: 2025-04-26 | Stop reason: HOSPADM

## 2025-04-22 RX ADMIN — IPRATROPIUM BROMIDE AND ALBUTEROL SULFATE 3 ML: 2.5; .5 SOLUTION RESPIRATORY (INHALATION) at 08:04

## 2025-04-22 RX ADMIN — DOXYCYCLINE HYCLATE 100 MG: 100 TABLET, COATED ORAL at 09:04

## 2025-04-22 RX ADMIN — IPRATROPIUM BROMIDE AND ALBUTEROL SULFATE 3 ML: 2.5; .5 SOLUTION RESPIRATORY (INHALATION) at 01:04

## 2025-04-22 RX ADMIN — ALBUTEROL SULFATE 10 MG: 2.5 SOLUTION RESPIRATORY (INHALATION) at 10:04

## 2025-04-22 RX ADMIN — PREDNISONE 40 MG: 20 TABLET ORAL at 01:04

## 2025-04-22 RX ADMIN — IPRATROPIUM BROMIDE AND ALBUTEROL SULFATE 3 ML: 2.5; .5 SOLUTION RESPIRATORY (INHALATION) at 11:04

## 2025-04-22 RX ADMIN — Medication 6 MG: at 09:04

## 2025-04-22 RX ADMIN — PANTOPRAZOLE SODIUM 40 MG: 40 INJECTION, POWDER, FOR SOLUTION INTRAVENOUS at 11:04

## 2025-04-22 RX ADMIN — IPRATROPIUM BROMIDE 1 MG: 0.5 SOLUTION RESPIRATORY (INHALATION) at 10:04

## 2025-04-22 RX ADMIN — ALUMINUM HYDROXIDE, MAGNESIUM HYDROXIDE, AND SIMETHICONE 30 ML: 200; 200; 20 SUSPENSION ORAL at 11:04

## 2025-04-22 NOTE — SUBJECTIVE & OBJECTIVE
Past Medical History:   Diagnosis Date    Anxiety     Asthma     Carpal tunnel syndrome, bilateral     COPD (chronic obstructive pulmonary disease)     Heavy cigarette smoker     9/14/22 2PPD    Laryngeal papilloma     On home oxygen therapy     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    Right side with CT scan Referred to ENT for visualization       Past Surgical History:   Procedure Laterality Date    CARPAL TUNNEL RELEASE Bilateral     ESOPHAGOGASTRODUODENOSCOPY N/A 9/27/2023    Procedure: EGD (ESOPHAGOGASTRODUODENOSCOPY);  Surgeon: Sahil May MD;  Location: [facility];  Service: Endoscopy;  Laterality: N/A;    FINGER SURGERY      HYSTERECTOMY      OOPHORECTOMY  1996    Hysterectomy       Review of patient's allergies indicates:   Allergen Reactions    Antivert [meclizine] Other (See Comments)     Behavioral changes       No current facility-administered medications on file prior to encounter.     Current Outpatient Medications on File Prior to Encounter   Medication Sig    furosemide (LASIX) 40 MG tablet Take 40 mg by mouth 2 (two) times daily as needed.    LINZESS 72 mcg Cap capsule Take 72 mcg by mouth every morning.    albuterol (PROVENTIL/VENTOLIN HFA) 90 mcg/actuation inhaler Inhale 2 puffs into the lungs every 6 (six) hours as needed.    albuterol-ipratropium (DUO-NEB) 2.5 mg-0.5 mg/3 mL nebulizer solution Take 3 mLs by nebulization every 4 (four) hours as needed for Wheezing. Rescue    aluminum & magnesium hydroxide-simethicone (MYLANTA MAXIMUM STRENGTH) 400-400-40 mg/5 mL suspension Take 15 mLs by mouth every 4 (four) hours as needed for Indigestion.    fluocinolone acetonide oiL 0.01 % Drop Place 5 drops into both ears 2 (two) times daily.    fluticasone-umeclidin-vilanter (TRELEGY ELLIPTA) 200-62.5-25 mcg inhaler Trelegy Ellipta 200 mcg-62.5 mcg-25 mcg powder for inhalation   INHALE 1 PUFF(S) BY MOUTH into the lungs ONCE A DAY    gabapentin (NEURONTIN) 100 MG capsule Take 1 capsule (100 mg total) by mouth 2  (two) times daily.    metOLazone (ZAROXOLYN) 5 MG tablet TAKE 1/2 (one-half) TABLET(S) BY MOUTH EVERY OTHER DAY    olopatadine (PATANOL) 0.1 % ophthalmic solution SMARTSI Drop(s) In Eye(s) Twice Daily PRN (Patient not taking: Reported on 2024)    pantoprazole (PROTONIX) 40 MG tablet Take 1 tablet (40 mg total) by mouth 2 (two) times daily.    potassium chloride SA (K-DUR,KLOR-CON) 20 MEQ tablet Take 1 tablet (20 mEq total) by mouth once daily.     Family History       Problem Relation (Age of Onset)    COPD Mother    Heart disease Father    No Known Problems Sister, Brother          Tobacco Use    Smoking status: Every Day     Current packs/day: 2.00     Average packs/day: 2.0 packs/day for 41.4 years (82.8 ttl pk-yrs)     Types: Cigarettes     Start date: 1983    Smokeless tobacco: Current   Substance and Sexual Activity    Alcohol use: No     Alcohol/week: 0.0 standard drinks of alcohol    Drug use: No    Sexual activity: Yes     Partners: Male     Review of Systems   Constitutional:  Negative for chills and fever.   HENT:  Negative for nosebleeds and tinnitus.    Eyes:  Negative for photophobia and visual disturbance.   Respiratory:  Positive for cough, shortness of breath and wheezing. Negative for chest tightness.    Cardiovascular:  Negative for chest pain, palpitations and leg swelling.   Gastrointestinal:  Negative for abdominal distention, abdominal pain, nausea and vomiting.   Genitourinary:  Negative for dysuria, flank pain and hematuria.   Musculoskeletal:  Negative for gait problem and joint swelling.   Skin:  Negative for rash and wound.   Neurological:  Negative for seizures and syncope.     Objective:     Vital Signs (Most Recent):  Temp: 98.7 °F (37.1 °C) (25 1015)  Pulse: 80 (25 1204)  Resp: 15 (25 1204)  BP: (!) 143/73 (25 1200)  SpO2: (!) 90 % (25 1204) Vital Signs (24h Range):  Temp:  [98.7 °F (37.1 °C)] 98.7 °F (37.1 °C)  Pulse:  [] 80  Resp:   [15-24] 15  SpO2:  [82 %-90 %] 90 %  BP: (143-163)/() 143/73        There is no height or weight on file to calculate BMI.     Physical Exam  Vitals and nursing note reviewed.   Constitutional:       General: She is not in acute distress.     Appearance: She is well-developed. She is not diaphoretic.   HENT:      Head: Normocephalic and atraumatic.      Right Ear: External ear normal.      Left Ear: External ear normal.   Eyes:      General:         Right eye: No discharge.         Left eye: No discharge.      Conjunctiva/sclera: Conjunctivae normal.   Neck:      Thyroid: No thyromegaly.   Cardiovascular:      Rate and Rhythm: Normal rate and regular rhythm.      Heart sounds: No murmur heard.  Pulmonary:      Effort: Pulmonary effort is normal. No respiratory distress.      Breath sounds: Wheezing present.      Comments: Speaking in full sentences on 5L of oxygen  Abdominal:      General: Bowel sounds are normal. There is no distension.      Palpations: Abdomen is soft. There is no mass.      Tenderness: There is no abdominal tenderness.   Musculoskeletal:         General: No deformity.      Cervical back: Normal range of motion and neck supple.      Right lower leg: No edema.      Left lower leg: No edema.   Skin:     General: Skin is warm and dry.   Neurological:      Mental Status: She is alert and oriented to person, place, and time.      Sensory: No sensory deficit.   Psychiatric:         Mood and Affect: Mood normal.         Behavior: Behavior normal.                Significant Labs: CBC:   Recent Labs   Lab 04/22/25  1044   WBC 8.48   HGB 13.2   HCT 45.0        CMP:   Recent Labs   Lab 04/22/25  1044      K 4.6   CL 93*   CO2 37*   BUN 13   CREATININE 0.6   CALCIUM 9.7   ALBUMIN 4.0   BILITOT 1.2*   ALKPHOS 140   AST 15   ALT 13   ANIONGAP 9     Cardiac Markers:   Recent Labs   Lab 04/22/25  1044   BNP 98     Troponin:   Recent Labs   Lab 04/22/25  1044   TROPONINI <0.006       Significant  Imaging:   Imaging Results              X-Ray Chest AP Portable (Final result)  Result time 04/22/25 10:37:42      Final result by Brayden Cardoso MD (04/22/25 10:37:42)                   Impression:      Please see above.      Electronically signed by: Brayden Cardoso  Date:    04/22/2025  Time:    10:37               Narrative:    EXAMINATION:  XR CHEST AP PORTABLE    CLINICAL HISTORY:  CHF;    TECHNIQUE:  Single frontal view of the chest was performed.    COMPARISON:  Chest radiograph 05/14/2020    FINDINGS:  Cardiomediastinal silhouette is unchanged in size.  Mediastinal structures are midline.    Hypoventilatory exam with suspected bibasilar atelectasis.  Coarsened interstitial lung markings with probable trace pleural effusions.  No new consolidation or pneumothorax.    Osseous structures demonstrate no evidence for acute fracture or osseous destructive lesion.  Degenerative change.    No free intra-abdominal air.  Soft tissues are unremarkable.

## 2025-04-22 NOTE — ASSESSMENT & PLAN NOTE
Presents with wheezing, SOB, lethargy at home in setting of hypoxia on home oxygen. Suspect triggered by recent increase in tobacco abuse. Patient and family report increased smoking outpt. In the ED requiring 5 to 6L of O2 compared to 3L at home.   Started on steroids, duo-nebs, doxy  Wean oxygen  Smoking cessation  Goal O2 sat 88 to 92%      Patient's COPD is with exacerbation noted by continued dyspnea currently.  Patient is currently on COPD Pathway. Continue scheduled inhalers Steroids, Antibiotics, and Supplemental oxygen and monitor respiratory status closely.

## 2025-04-22 NOTE — NURSING
[facility]  Nurses Note -- 4 Eyes      4/22/2025       Skin assessed on: Admit      [x] No Pressure Injuries Present    []Prevention Measures Documented    [] Yes LDA  for Pressure Injury Previously documented     [] Yes New Pressure Injury Discovered   [] LDA for New Pressure Injury Added      Attending RN:  Lucina Moody RN     Second LPN: Reba

## 2025-04-22 NOTE — PLAN OF CARE
Problem: Adult Inpatient Plan of Care  Goal: Plan of Care Review  Outcome: Progressing  Flowsheets (Taken 4/22/2025 0815)  Plan of Care Reviewed With: patient  Goal: Patient-Specific Goal (Individualized)  Outcome: Progressing     Problem: Skin Injury Risk Increased  Goal: Skin Health and Integrity  Outcome: Progressing  Intervention: Optimize Skin Protection  Flowsheets (Taken 4/22/2025 0815)  Pressure Reduction Techniques:   frequent weight shift encouraged   weight shift assistance provided  Pressure Reduction Devices: pressure-redistributing mattress utilized  Skin Protection: incontinence pads utilized  Activity Management: Rolling - L1  Head of Bed (HOB) Positioning: HOB at 30 degrees     Problem: Adult Inpatient Plan of Care  Goal: Plan of Care Review  Outcome: Progressing  Flowsheets (Taken 4/22/2025 0815)  Plan of Care Reviewed With: patient     Problem: Adult Inpatient Plan of Care  Goal: Plan of Care Review  Outcome: Progressing  Flowsheets (Taken 4/22/2025 0815)  Plan of Care Reviewed With: patient     Problem: Adult Inpatient Plan of Care  Goal: Patient-Specific Goal (Individualized)  Outcome: Progressing     Problem: Adult Inpatient Plan of Care  Goal: Patient-Specific Goal (Individualized)  Outcome: Progressing     Problem: Skin Injury Risk Increased  Goal: Skin Health and Integrity  Outcome: Progressing  Intervention: Optimize Skin Protection  Flowsheets (Taken 4/22/2025 0815)  Pressure Reduction Techniques:   frequent weight shift encouraged   weight shift assistance provided  Pressure Reduction Devices: pressure-redistributing mattress utilized  Skin Protection: incontinence pads utilized  Activity Management: Rolling - L1  Head of Bed (HOB) Positioning: HOB at 30 degrees     Problem: Skin Injury Risk Increased  Goal: Skin Health and Integrity  Outcome: Progressing     Problem: Skin Injury Risk Increased  Goal: Skin Health and Integrity  Intervention: Optimize Skin Protection  Flowsheets (Taken  4/22/2025 0815)  Pressure Reduction Techniques:   frequent weight shift encouraged   weight shift assistance provided  Pressure Reduction Devices: pressure-redistributing mattress utilized  Skin Protection: incontinence pads utilized  Activity Management: Rolling - L1  Head of Bed (HOB) Positioning: HOB at 30 degrees     Problem: Skin Injury Risk Increased  Goal: Skin Health and Integrity  Intervention: Optimize Skin Protection  Flowsheets (Taken 4/22/2025 0815)  Pressure Reduction Techniques:   frequent weight shift encouraged   weight shift assistance provided  Pressure Reduction Devices: pressure-redistributing mattress utilized  Skin Protection: incontinence pads utilized  Activity Management: Rolling - L1  Head of Bed (HOB) Positioning: HOB at 30 degrees

## 2025-04-22 NOTE — HPI
Yasmeen Valderrama 54 y.o. female with Copd on home oxygen, pulm HTN, tobacco abuse, presents to the hospital with a chief complaint of SOB.  She presents with increasing shortness breath over the last 2-3 days.  Her has been attempted treatment home with home albuterol inhalers without improvement.  Today he noticed she was increasingly lethargic on her home oxygen causing presentation in the emergency room.  She reports she has recently increased the number of cigarettes she smokes per day due to stress in his life.  She is compliant with her home 2 L of oxygen.  She has a cough that is nonproductive.  She denies fever chest pain nausea vomiting abdominal pain melena hematuria hematemesis dizziness and syncope.  She has not been requiring her home as needed diuretics at home she finds she has not been having lower extremity edema.    In the ED, hypoxic to 82% on home 3 L of oxygen COVID negative flu negative BNP negative chest x-ray with hypoventilatory exam coarsened interstitial lung markings new consolidation or pneumothorax.

## 2025-04-22 NOTE — ASSESSMENT & PLAN NOTE
Smoking more than 1ppd. Greater than 3min spent counseling patient on dangers of continued tobacco abuse. Will provide tobacco cessation prior to discharge, PRN nicotine patch.

## 2025-04-22 NOTE — ED PROVIDER NOTES
Encounter Date: 4/22/2025       History     Chief Complaint   Patient presents with    Shortness of Breath     Pt arrived via ems, pt chief complaint is Shortness of breath. Pt states has a history of COPD, and has been having increased sob. Pt did receive 1 duo neb and 125 mg solumedrol from ems.     54-year-old female past medical history COPD on baseline 3 L oxygen, asthma, anxiety presenting today secondary shortness of breath started yesterday.  No pain.  Patient states that she has been compliant with her medications.  No fevers chills.  No chest pain.  States that she normally has worsening swelling of her bilateral lower extremities but not as much now.  Patient has been elevating them.  No change in bowel movements.  No abdominal pain.  No chest pain.  No sick contacts.    EMS gave patient DuoNebs and Solu-Medrol.  Patient states she is feeling a little better.    Generalized weakness        Review of patient's allergies indicates:   Allergen Reactions    Antivert [meclizine] Other (See Comments)     Behavioral changes     Past Medical History:   Diagnosis Date    Anxiety     Asthma     Carpal tunnel syndrome, bilateral     COPD (chronic obstructive pulmonary disease)     Heavy cigarette smoker     9/14/22 2PPD    Laryngeal papilloma     On home oxygen therapy     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    Right side with CT scan Referred to ENT for visualization     Past Surgical History:   Procedure Laterality Date    CARPAL TUNNEL RELEASE Bilateral     ESOPHAGOGASTRODUODENOSCOPY N/A 9/27/2023    Procedure: EGD (ESOPHAGOGASTRODUODENOSCOPY);  Surgeon: Sahil May MD;  Location: [facility];  Service: Endoscopy;  Laterality: N/A;    FINGER SURGERY      HYSTERECTOMY      OOPHORECTOMY  1996    Hysterectomy     Family History   Problem Relation Name Age of Onset    COPD Mother      Heart disease Father      No Known Problems Sister      No Known Problems Brother       Social History[1]  Review of Systems    Constitutional:  Negative for fever.   HENT:  Negative for sore throat.    Respiratory:  Positive for cough and shortness of breath.    Cardiovascular:  Negative for chest pain.   Gastrointestinal:  Negative for nausea.   Genitourinary:  Negative for dysuria.   Musculoskeletal:  Negative for back pain.   Skin:  Negative for rash.   Neurological:  Positive for weakness.   Hematological:  Does not bruise/bleed easily.       Physical Exam     Initial Vitals [04/22/25 1015]   BP Pulse Resp Temp SpO2   (!) 143/92 91 (!) 24 98.7 °F (37.1 °C) (!) 90 %      MAP       --         Physical Exam    Nursing note and vitals reviewed.  Constitutional: She appears well-developed and well-nourished.   HENT:   Head: Normocephalic and atraumatic. Mouth/Throat: Oropharynx is clear and moist and mucous membranes are normal.   Eyes: Conjunctivae and EOM are normal. Pupils are equal, round, and reactive to light. Right conjunctiva is not injected. Left conjunctiva is not injected. No scleral icterus.   Neck: Neck supple.   Normal range of motion.   Full passive range of motion without pain.     Cardiovascular:  Normal rate, regular rhythm, S1 normal, S2 normal, normal heart sounds and normal pulses.     Exam reveals no gallop and no friction rub.       No murmur heard.  Pulses:       Radial pulses are 2+ on the right side and 2+ on the left side.   Pulmonary/Chest: Effort normal.   On 5 L oxygen with diminished breath sounds and wheezing bilaterally.  Satting about 90%.  Taking patient down her 3 L oxygen immediately desatted to 80-82%.  Placed back on elevated oxygen requirement.   Abdominal: Abdomen is soft. She exhibits no distension. There is no abdominal tenderness.   Musculoskeletal:         General: No edema. Normal range of motion.      Cervical back: Full passive range of motion without pain, normal range of motion and neck supple.      Comments: Good active ROM of all extremities. No lower extremity edema or cyanosis.       Neurological: No cranial nerve deficit. Gait normal.   A&Ox4, normal speech.   Skin: Skin is warm. No ecchymosis and no rash noted.   Psychiatric: She has a normal mood and affect. Thought content normal.         ED Course   Procedures  Labs Reviewed   COMPREHENSIVE METABOLIC PANEL - Abnormal       Result Value    Sodium 139      Potassium 4.6      Chloride 93 (*)     CO2 37 (*)     Glucose 110      BUN 13      Creatinine 0.6      Calcium 9.7      Protein Total 7.5      Albumin 4.0      Bilirubin Total 1.2 (*)           AST 15      ALT 13      Anion Gap 9      eGFR >60     CBC WITH DIFFERENTIAL - Abnormal    WBC 8.48      RBC 4.74      HGB 13.2      HCT 45.0      MCV 95      MCH 27.8      MCHC 29.3 (*)     RDW 14.0      Platelet Count 254      MPV 10.5      Nucleated RBC 0      Neut % 83.0 (*)     Lymph % 12.4 (*)     Mono % 2.9 (*)     Eos % 0.7      Basophil % 0.6      Imm Grans % 0.4      Neut # 7.04      Lymph # 1.05      Mono # 0.25 (*)     Eos # 0.06      Baso # 0.05      Imm Grans # 0.03     TROPONIN I - Normal    Troponin-I <0.006     B-TYPE NATRIURETIC PEPTIDE - Normal    BNP 98     CBC W/ AUTO DIFFERENTIAL    Narrative:     The following orders were created for panel order CBC auto differential.  Procedure                               Abnormality         Status                     ---------                               -----------         ------                     CBC with Differential[[id number]]       Abnormal            Final result                 Please view results for these tests on the individual orders.   POCT INFLUENZA A/B MOLECULAR    POC Molecular Influenza A Ag Negative      POC Molecular Influenza B Ag Negative       Acceptable Yes     SARS-COV-2 RDRP GENE    POC Rapid COVID Negative       Acceptable Yes       EKG Readings: (Independently Interpreted)   EKG was showing normal sinus rhythm rate 89.  No ST-elevation.  Wavy baseline.  Incomplete right  bundle-branch block.  Technically poor equally G.  Nonspecific EKG.       Imaging Results              X-Ray Chest AP Portable (Final result)  Result time 04/22/25 10:37:42      Final result by Brayden Cardoso MD (04/22/25 10:37:42)                   Impression:      Please see above.      Electronically signed by: Brayden Cardoso  Date:    04/22/2025  Time:    10:37               Narrative:    EXAMINATION:  XR CHEST AP PORTABLE    CLINICAL HISTORY:  CHF;    TECHNIQUE:  Single frontal view of the chest was performed.    COMPARISON:  Chest radiograph 05/14/2020    FINDINGS:  Cardiomediastinal silhouette is unchanged in size.  Mediastinal structures are midline.    Hypoventilatory exam with suspected bibasilar atelectasis.  Coarsened interstitial lung markings with probable trace pleural effusions.  No new consolidation or pneumothorax.    Osseous structures demonstrate no evidence for acute fracture or osseous destructive lesion.  Degenerative change.    No free intra-abdominal air.  Soft tissues are unremarkable.                                       Medications   benzonatate capsule 100 mg (has no administration in time range)   calcium carbonate 200 mg calcium (500 mg) chewable tablet 500 mg (has no administration in time range)   ondansetron disintegrating tablet 4 mg (has no administration in time range)   melatonin tablet 6 mg (has no administration in time range)   sodium chloride 0.9% flush 3 mL (has no administration in time range)   predniSONE tablet 40 mg (40 mg Oral Given 4/22/25 1311)   albuterol-ipratropium 2.5 mg-0.5 mg/3 mL nebulizer solution 3 mL (3 mLs Nebulization Given 4/22/25 1320)   acetaminophen tablet 650 mg (has no administration in time range)   doxycycline tablet 100 mg (has no administration in time range)   enoxaparin injection 40 mg (has no administration in time range)   albuterol sulfate nebulizer solution 10 mg (10 mg Nebulization Given 4/22/25 1048)   ipratropium 0.02 % nebulizer solution  1 mg (1 mg Nebulization Given 4/22/25 1048)     Medical Decision Making  Pt presenting 2/2 wheezing and SOB c/w COPD exacerbation. Patient started on 10mg albuterol, 1 mg ipratropium, IV steroids. Will monitor for worsening respiratory distress to considered bipap vs intubation in patient. Will reassess after treatments    Also considered but less likely:  Acs: ekg doesn't show stemi. Troponin ordered  Pna: symptoms bilaterally and no fevers.   Bronchitis: considered but hpi most c/w copd  CHF: considered. Will compare bnp to previous and cxr for fluid overload. Possible  Pneumothorax: bilateral breath sounds    Patient hypoxic on her baseline 3 L oxygen down to 88 to% after interventions.  Increased O2 requirement.  Breathing treatments.  Admitted further workup management.    Please put in respiratory minutes of critical care due to patient having a high risk of respiratory failure.   Separate from teaching and exclusive of procedure and ekg time  Includes:  Time at bedside  Time reviewing test results  Time discussing case with staff  Time documenting the medical record  Time spent with family members  Time spent with consults  Management        Amount and/or Complexity of Data Reviewed  Labs: ordered.  Radiology: ordered.    Risk  OTC drugs.  Prescription drug management.  Decision regarding hospitalization.                                      Clinical Impression:  Final diagnoses:  [R06.02] Shortness of breath  [J44.1] COPD exacerbation (Primary)  [J96.01] Acute respiratory failure with hypoxia          ED Disposition Condition    Admit                     [1]   Social History  Tobacco Use    Smoking status: Every Day     Current packs/day: 2.00     Average packs/day: 2.0 packs/day for 41.4 years (82.8 ttl pk-yrs)     Types: Cigarettes     Start date: 12/1/1983    Smokeless tobacco: Current   Substance Use Topics    Alcohol use: No     Alcohol/week: 0.0 standard drinks of alcohol    Drug use: Rafaela Wu  Matheus GAMEZ MD  04/22/25 1419

## 2025-04-22 NOTE — ASSESSMENT & PLAN NOTE
Appears euvolemic, denies complaints associated with fluid overload. Will use PRN diuresis as needed, but suspect SOB related to COPD and increased tobacco abuse. Previous PA pressure 46    Results for orders placed during the hospital encounter of 11/07/22    Echo    Interpretation Summary  · The left ventricle is normal in size with mild concentric hypertrophy and normal systolic function.  · The estimated ejection fraction is 55%.  · Moderate right ventricular enlargement with low normal right ventricular systolic function.  · There is right ventricular hypertrophy.  · The estimated PA systolic pressure is 46 mmHg.  · There is pulmonary hypertension.

## 2025-04-22 NOTE — ASSESSMENT & PLAN NOTE
Patient with Hypoxic Respiratory failure which is Acute on chronic.  she is on home oxygen at 3 LPM. Supplemental oxygen was provided and noted-      .   Signs/symptoms of respiratory failure include- wheezing. Contributing diagnoses includes - COPD Labs and images were reviewed. Patient Has not had a recent ABG. Will treat underlying causes and adjust management of respiratory failure as follows- now requiring 6L of o2 in the ED compared to baseline of 3L outpt

## 2025-04-22 NOTE — H&P
[facility]  [facility] Medicine  History & Physical    Patient Name: Yasmeen Valderrama  MRN: [id number]  Patient Class: IP- Inpatient  Admission Date: 4/22/2025  Attending Physician: Matheus Wu MD   Primary Care Provider: Leeanna Stuart MD         Patient information was obtained from patient, past medical records, and ER records.     Subjective:     Principal Problem:COPD exacerbation    Chief Complaint:   Chief Complaint   Patient presents with    Shortness of Breath     Pt arrived via ems, pt chief complaint is Shortness of breath. Pt states has a history of COPD, and has been having increased sob. Pt did receive 1 duo neb and 125 mg solumedrol from ems.        HPI: Yasmeen Valderrama 54 y.o. female with Copd on home oxygen, pulm HTN, tobacco abuse, presents to the hospital with a chief complaint of SOB.  She presents with increasing shortness breath over the last 2-3 days.  Her has been attempted treatment home with home albuterol inhalers without improvement.  Today he noticed she was increasingly lethargic on her home oxygen causing presentation in the emergency room.  She reports she has recently increased the number of cigarettes she smokes per day due to stress in his life.  She is compliant with her home 2 L of oxygen.  She has a cough that is nonproductive.  She denies fever chest pain nausea vomiting abdominal pain melena hematuria hematemesis dizziness and syncope.  She has not been requiring her home as needed diuretics at home she finds she has not been having lower extremity edema.    In the ED, hypoxic to 82% on home 3 L of oxygen COVID negative flu negative BNP negative chest x-ray with hypoventilatory exam coarsened interstitial lung markings new consolidation or pneumothorax.    Past Medical History:   Diagnosis Date    Anxiety     Asthma     Carpal tunnel syndrome, bilateral     COPD (chronic obstructive pulmonary disease)     Heavy cigarette smoker     9/14/22  2PPD    Laryngeal papilloma     On home oxygen therapy     Vocal cord mass 2017    Right side with CT scan Referred to ENT for visualization       Past Surgical History:   Procedure Laterality Date    CARPAL TUNNEL RELEASE Bilateral     ESOPHAGOGASTRODUODENOSCOPY N/A 2023    Procedure: EGD (ESOPHAGOGASTRODUODENOSCOPY);  Surgeon: Sahil May MD;  Location: [facility];  Service: Endoscopy;  Laterality: N/A;    FINGER SURGERY      HYSTERECTOMY      OOPHORECTOMY      Hysterectomy       Review of patient's allergies indicates:   Allergen Reactions    Antivert [meclizine] Other (See Comments)     Behavioral changes       No current facility-administered medications on file prior to encounter.     Current Outpatient Medications on File Prior to Encounter   Medication Sig    furosemide (LASIX) 40 MG tablet Take 40 mg by mouth 2 (two) times daily as needed.    LINZESS 72 mcg Cap capsule Take 72 mcg by mouth every morning.    albuterol (PROVENTIL/VENTOLIN HFA) 90 mcg/actuation inhaler Inhale 2 puffs into the lungs every 6 (six) hours as needed.    albuterol-ipratropium (DUO-NEB) 2.5 mg-0.5 mg/3 mL nebulizer solution Take 3 mLs by nebulization every 4 (four) hours as needed for Wheezing. Rescue    aluminum & magnesium hydroxide-simethicone (MYLANTA MAXIMUM STRENGTH) 400-400-40 mg/5 mL suspension Take 15 mLs by mouth every 4 (four) hours as needed for Indigestion.    fluocinolone acetonide oiL 0.01 % Drop Place 5 drops into both ears 2 (two) times daily.    fluticasone-umeclidin-vilanter (TRELEGY ELLIPTA) 200-62.5-25 mcg inhaler Trelegy Ellipta 200 mcg-62.5 mcg-25 mcg powder for inhalation   INHALE 1 PUFF(S) BY MOUTH into the lungs ONCE A DAY    gabapentin (NEURONTIN) 100 MG capsule Take 1 capsule (100 mg total) by mouth 2 (two) times daily.    metOLazone (ZAROXOLYN) 5 MG tablet TAKE 1/2 (one-half) TABLET(S) BY MOUTH EVERY OTHER DAY    olopatadine (PATANOL) 0.1 % ophthalmic solution SMARTSI Drop(s) In  Eye(s) Twice Daily PRN (Patient not taking: Reported on 9/9/2024)    pantoprazole (PROTONIX) 40 MG tablet Take 1 tablet (40 mg total) by mouth 2 (two) times daily.    potassium chloride SA (K-DUR,KLOR-CON) 20 MEQ tablet Take 1 tablet (20 mEq total) by mouth once daily.     Family History       Problem Relation (Age of Onset)    COPD Mother    Heart disease Father    No Known Problems Sister, Brother          Tobacco Use    Smoking status: Every Day     Current packs/day: 2.00     Average packs/day: 2.0 packs/day for 41.4 years (82.8 ttl pk-yrs)     Types: Cigarettes     Start date: 12/1/1983    Smokeless tobacco: Current   Substance and Sexual Activity    Alcohol use: No     Alcohol/week: 0.0 standard drinks of alcohol    Drug use: No    Sexual activity: Yes     Partners: Male     Review of Systems   Constitutional:  Negative for chills and fever.   HENT:  Negative for nosebleeds and tinnitus.    Eyes:  Negative for photophobia and visual disturbance.   Respiratory:  Positive for cough, shortness of breath and wheezing. Negative for chest tightness.    Cardiovascular:  Negative for chest pain, palpitations and leg swelling.   Gastrointestinal:  Negative for abdominal distention, abdominal pain, nausea and vomiting.   Genitourinary:  Negative for dysuria, flank pain and hematuria.   Musculoskeletal:  Negative for gait problem and joint swelling.   Skin:  Negative for rash and wound.   Neurological:  Negative for seizures and syncope.     Objective:     Vital Signs (Most Recent):  Temp: 98.7 °F (37.1 °C) (04/22/25 1015)  Pulse: 80 (04/22/25 1204)  Resp: 15 (04/22/25 1204)  BP: (!) 143/73 (04/22/25 1200)  SpO2: (!) 90 % (04/22/25 1204) Vital Signs (24h Range):  Temp:  [98.7 °F (37.1 °C)] 98.7 °F (37.1 °C)  Pulse:  [] 80  Resp:  [15-24] 15  SpO2:  [82 %-90 %] 90 %  BP: (143-163)/() 143/73        There is no height or weight on file to calculate BMI.     Physical Exam  Vitals and nursing note reviewed.    Constitutional:       General: She is not in acute distress.     Appearance: She is well-developed. She is not diaphoretic.   HENT:      Head: Normocephalic and atraumatic.      Right Ear: External ear normal.      Left Ear: External ear normal.   Eyes:      General:         Right eye: No discharge.         Left eye: No discharge.      Conjunctiva/sclera: Conjunctivae normal.   Neck:      Thyroid: No thyromegaly.   Cardiovascular:      Rate and Rhythm: Normal rate and regular rhythm.      Heart sounds: No murmur heard.  Pulmonary:      Effort: Pulmonary effort is normal. No respiratory distress.      Breath sounds: Wheezing present.      Comments: Speaking in full sentences on 5L of oxygen  Abdominal:      General: Bowel sounds are normal. There is no distension.      Palpations: Abdomen is soft. There is no mass.      Tenderness: There is no abdominal tenderness.   Musculoskeletal:         General: No deformity.      Cervical back: Normal range of motion and neck supple.      Right lower leg: No edema.      Left lower leg: No edema.   Skin:     General: Skin is warm and dry.   Neurological:      Mental Status: She is alert and oriented to person, place, and time.      Sensory: No sensory deficit.   Psychiatric:         Mood and Affect: Mood normal.         Behavior: Behavior normal.                Significant Labs: CBC:   Recent Labs   Lab 04/22/25  1044   WBC 8.48   HGB 13.2   HCT 45.0        CMP:   Recent Labs   Lab 04/22/25  1044      K 4.6   CL 93*   CO2 37*   BUN 13   CREATININE 0.6   CALCIUM 9.7   ALBUMIN 4.0   BILITOT 1.2*   ALKPHOS 140   AST 15   ALT 13   ANIONGAP 9     Cardiac Markers:   Recent Labs   Lab 04/22/25  1044   BNP 98     Troponin:   Recent Labs   Lab 04/22/25  1044   TROPONINI <0.006       Significant Imaging:   Imaging Results              X-Ray Chest AP Portable (Final result)  Result time 04/22/25 10:37:42      Final result by Brayden Cardoso MD (04/22/25 10:37:42)                    Impression:      Please see above.      Electronically signed by: Brayden Cardoso  Date:    04/22/2025  Time:    10:37               Narrative:    EXAMINATION:  XR CHEST AP PORTABLE    CLINICAL HISTORY:  CHF;    TECHNIQUE:  Single frontal view of the chest was performed.    COMPARISON:  Chest radiograph 05/14/2020    FINDINGS:  Cardiomediastinal silhouette is unchanged in size.  Mediastinal structures are midline.    Hypoventilatory exam with suspected bibasilar atelectasis.  Coarsened interstitial lung markings with probable trace pleural effusions.  No new consolidation or pneumothorax.    Osseous structures demonstrate no evidence for acute fracture or osseous destructive lesion.  Degenerative change.    No free intra-abdominal air.  Soft tissues are unremarkable.                                      Assessment/Plan:     Assessment & Plan  COPD exacerbation  Presents with wheezing, SOB, lethargy at home in setting of hypoxia on home oxygen. Suspect triggered by recent increase in tobacco abuse. Patient and family report increased smoking outpt. In the ED requiring 5 to 6L of O2 compared to 3L at home.   Started on steroids, duo-nebs, doxy  Wean oxygen  Smoking cessation  Goal O2 sat 88 to 92%      Patient's COPD is with exacerbation noted by continued dyspnea currently.  Patient is currently on COPD Pathway. Continue scheduled inhalers Steroids, Antibiotics, and Supplemental oxygen and monitor respiratory status closely.   Pulmonary hypertension  Appears euvolemic, denies complaints associated with fluid overload. Will use PRN diuresis as needed, but suspect SOB related to COPD and increased tobacco abuse. Previous PA pressure 46    Results for orders placed during the hospital encounter of 11/07/22    Echo    Interpretation Summary  · The left ventricle is normal in size with mild concentric hypertrophy and normal systolic function.  · The estimated ejection fraction is 55%.  · Moderate right ventricular  enlargement with low normal right ventricular systolic function.  · There is right ventricular hypertrophy.  · The estimated PA systolic pressure is 46 mmHg.  · There is pulmonary hypertension.    Chronic pulmonary heart disease  As above  Tobacco abuse  Smoking more than 1ppd. Greater than 3min spent counseling patient on dangers of continued tobacco abuse. Will provide tobacco cessation prior to discharge, PRN nicotine patch.   Acute on chronic hypoxic respiratory failure  Patient with Hypoxic Respiratory failure which is Acute on chronic.  she is on home oxygen at 3 LPM. Supplemental oxygen was provided and noted-      .   Signs/symptoms of respiratory failure include- wheezing. Contributing diagnoses includes - COPD Labs and images were reviewed. Patient Has not had a recent ABG. Will treat underlying causes and adjust management of respiratory failure as follows- now requiring 6L of o2 in the ED compared to baseline of 3L outpt  VTE Risk Mitigation (From admission, onward)           Ordered     IP VTE HIGH RISK PATIENT  Once         04/22/25 1208     Place sequential compression device  Until discontinued         04/22/25 1208                     Discussed with ED physician.    VTE: lovenox  Code: full  Diet: cardiac  Dispo: pending improvement in respiratory status  As clarification, on 4/22/2025, patient should be admitted for hospital observation/admitted to inpatient services under my care in collaboration with Peri Guo MD. Damien Howard PA-C  Department of Hospital Medicine  [facility] - Emergency Dept

## 2025-04-23 LAB
ABSOLUTE EOSINOPHIL (OHS): 0.01 K/UL
ABSOLUTE MONOCYTE (OHS): 1.59 K/UL (ref 0.3–1)
ABSOLUTE NEUTROPHIL COUNT (OHS): 17.61 K/UL (ref 1.8–7.7)
ANION GAP (OHS): 7 MMOL/L (ref 8–16)
BASOPHILS # BLD AUTO: 0.03 K/UL
BASOPHILS NFR BLD AUTO: 0.1 %
BUN SERPL-MCNC: 20 MG/DL (ref 6–20)
CALCIUM SERPL-MCNC: 9.6 MG/DL (ref 8.7–10.5)
CHLORIDE SERPL-SCNC: 95 MMOL/L (ref 95–110)
CO2 SERPL-SCNC: 39 MMOL/L (ref 23–29)
CREAT SERPL-MCNC: 0.6 MG/DL (ref 0.5–1.4)
ERYTHROCYTE [DISTWIDTH] IN BLOOD BY AUTOMATED COUNT: 14.2 % (ref 11.5–14.5)
GFR SERPLBLD CREATININE-BSD FMLA CKD-EPI: >60 ML/MIN/1.73/M2
GLUCOSE SERPL-MCNC: 111 MG/DL (ref 70–110)
HCT VFR BLD AUTO: 43.7 % (ref 37–48.5)
HGB BLD-MCNC: 13.1 GM/DL (ref 12–16)
HOLD SPECIMEN: NORMAL
HOLD SPECIMEN: NORMAL
IMM GRANULOCYTES # BLD AUTO: 0.1 K/UL (ref 0–0.04)
IMM GRANULOCYTES NFR BLD AUTO: 0.5 % (ref 0–0.5)
LYMPHOCYTES # BLD AUTO: 2.19 K/UL (ref 1–4.8)
MAGNESIUM SERPL-MCNC: 1.8 MG/DL (ref 1.6–2.6)
MCH RBC QN AUTO: 28.2 PG (ref 27–31)
MCHC RBC AUTO-ENTMCNC: 30 G/DL (ref 32–36)
MCV RBC AUTO: 94 FL (ref 82–98)
NUCLEATED RBC (/100WBC) (OHS): 0 /100 WBC
OHS QRS DURATION: 104 MS
OHS QTC CALCULATION: 427 MS
PHOSPHATE SERPL-MCNC: 3.5 MG/DL (ref 2.7–4.5)
PLATELET # BLD AUTO: 282 K/UL (ref 150–450)
PMV BLD AUTO: 10.7 FL (ref 9.2–12.9)
POTASSIUM SERPL-SCNC: 4.2 MMOL/L (ref 3.5–5.1)
RBC # BLD AUTO: 4.65 M/UL (ref 4–5.4)
RELATIVE EOSINOPHIL (OHS): 0 %
RELATIVE LYMPHOCYTE (OHS): 10.2 % (ref 18–48)
RELATIVE MONOCYTE (OHS): 7.4 % (ref 4–15)
RELATIVE NEUTROPHIL (OHS): 81.8 % (ref 38–73)
SODIUM SERPL-SCNC: 141 MMOL/L (ref 136–145)
WBC # BLD AUTO: 21.53 K/UL (ref 3.9–12.7)

## 2025-04-23 PROCEDURE — 94640 AIRWAY INHALATION TREATMENT: CPT

## 2025-04-23 PROCEDURE — 21400001 HC TELEMETRY ROOM

## 2025-04-23 PROCEDURE — 25000003 PHARM REV CODE 250: Performed by: PHYSICIAN ASSISTANT

## 2025-04-23 PROCEDURE — 80048 BASIC METABOLIC PNL TOTAL CA: CPT | Performed by: PHYSICIAN ASSISTANT

## 2025-04-23 PROCEDURE — 25000242 PHARM REV CODE 250 ALT 637 W/ HCPCS: Performed by: PHYSICIAN ASSISTANT

## 2025-04-23 PROCEDURE — 94761 N-INVAS EAR/PLS OXIMETRY MLT: CPT

## 2025-04-23 PROCEDURE — 83735 ASSAY OF MAGNESIUM: CPT | Performed by: PHYSICIAN ASSISTANT

## 2025-04-23 PROCEDURE — 85025 COMPLETE CBC W/AUTO DIFF WBC: CPT | Performed by: PHYSICIAN ASSISTANT

## 2025-04-23 PROCEDURE — 36415 COLL VENOUS BLD VENIPUNCTURE: CPT | Performed by: PHYSICIAN ASSISTANT

## 2025-04-23 PROCEDURE — 63600175 PHARM REV CODE 636 W HCPCS: Performed by: PHYSICIAN ASSISTANT

## 2025-04-23 PROCEDURE — 84100 ASSAY OF PHOSPHORUS: CPT | Performed by: PHYSICIAN ASSISTANT

## 2025-04-23 PROCEDURE — 99900031 HC PATIENT EDUCATION (STAT)

## 2025-04-23 PROCEDURE — 27000221 HC OXYGEN, UP TO 24 HOURS

## 2025-04-23 PROCEDURE — 63600175 PHARM REV CODE 636 W HCPCS: Performed by: INTERNAL MEDICINE

## 2025-04-23 PROCEDURE — 63600175 PHARM REV CODE 636 W HCPCS: Performed by: STUDENT IN AN ORGANIZED HEALTH CARE EDUCATION/TRAINING PROGRAM

## 2025-04-23 PROCEDURE — 25000003 PHARM REV CODE 250: Performed by: INTERNAL MEDICINE

## 2025-04-23 RX ORDER — BUPRENORPHINE AND NALOXONE 2; .5 MG/1; MG/1
6 FILM, SOLUBLE BUCCAL; SUBLINGUAL 2 TIMES DAILY
Status: DISCONTINUED | OUTPATIENT
Start: 2025-04-23 | End: 2025-04-26 | Stop reason: HOSPADM

## 2025-04-23 RX ORDER — BUPRENORPHINE HYDROCHLORIDE AND NALOXONE HYDROCHLORIDE DIHYDRATE 2; .5 MG/1; MG/1
12 TABLET SUBLINGUAL 2 TIMES DAILY
COMMUNITY

## 2025-04-23 RX ORDER — BUPRENORPHINE HYDROCHLORIDE AND NALOXONE HYDROCHLORIDE DIHYDRATE 2; .5 MG/1; MG/1
12 TABLET SUBLINGUAL 2 TIMES DAILY
Status: DISCONTINUED | OUTPATIENT
Start: 2025-04-23 | End: 2025-04-23 | Stop reason: CLARIF

## 2025-04-23 RX ADMIN — ENOXAPARIN SODIUM 40 MG: 40 INJECTION SUBCUTANEOUS at 04:04

## 2025-04-23 RX ADMIN — DOXYCYCLINE HYCLATE 100 MG: 100 TABLET, COATED ORAL at 08:04

## 2025-04-23 RX ADMIN — ACETAMINOPHEN 650 MG: 325 TABLET ORAL at 11:04

## 2025-04-23 RX ADMIN — BUPRENORPHINE AND NALOXONE 6 FILM: 2; .5 FILM BUCCAL; SUBLINGUAL at 09:04

## 2025-04-23 RX ADMIN — IPRATROPIUM BROMIDE AND ALBUTEROL SULFATE 3 ML: 2.5; .5 SOLUTION RESPIRATORY (INHALATION) at 09:04

## 2025-04-23 RX ADMIN — PREDNISONE 40 MG: 20 TABLET ORAL at 08:04

## 2025-04-23 RX ADMIN — METHYLPREDNISOLONE SODIUM SUCCINATE 60 MG: 40 INJECTION, POWDER, FOR SOLUTION INTRAMUSCULAR; INTRAVENOUS at 01:04

## 2025-04-23 RX ADMIN — DOXYCYCLINE HYCLATE 100 MG: 100 TABLET, COATED ORAL at 09:04

## 2025-04-23 RX ADMIN — IPRATROPIUM BROMIDE AND ALBUTEROL SULFATE 3 ML: 2.5; .5 SOLUTION RESPIRATORY (INHALATION) at 01:04

## 2025-04-23 RX ADMIN — PANTOPRAZOLE SODIUM 40 MG: 40 TABLET, DELAYED RELEASE ORAL at 08:04

## 2025-04-23 RX ADMIN — BUPRENORPHINE AND NALOXONE 6 FILM: 2; .5 FILM BUCCAL; SUBLINGUAL at 01:04

## 2025-04-23 RX ADMIN — IPRATROPIUM BROMIDE AND ALBUTEROL SULFATE 3 ML: 2.5; .5 SOLUTION RESPIRATORY (INHALATION) at 07:04

## 2025-04-23 RX ADMIN — METHYLPREDNISOLONE SODIUM SUCCINATE 60 MG: 40 INJECTION, POWDER, FOR SOLUTION INTRAMUSCULAR; INTRAVENOUS at 09:04

## 2025-04-23 NOTE — NURSING
[facility] [place]es [place] -- 4 [place]      4/23/2025       Skin assessed on: Q Shift      [x] No Pressure Injuries Present    []Prevention Measures Documented    [] Yes LDA  for Pressure Injury Previously documented     [] Yes New Pressure Injury Discovered   [] LDA for New Pressure Injury Added      Attending RN:  Marley Mari RN     Second RN:  KUN Rodarte

## 2025-04-23 NOTE — NURSING
[facility]  Nurses Note -- 4 Eyes      4/22/2025       Skin assessed on: Q Shift      [x] No Pressure Injuries Present    []Prevention Measures Documented    [] Yes LDA  for Pressure Injury Previously documented     [] Yes New Pressure Injury Discovered   [] LDA for New Pressure Injury Added      Attending RN:  Aster Chandler LPN     Second RN:  Lucina Moody RN

## 2025-04-23 NOTE — NURSING
RAPID RESPONSE NURSE PROACTIVE ROUNDING NOTE       Time of Visit: 2300    Admit Date: 2025  LOS: 0  Code Status: Full Code   Date of Visit: 2025  : 1970  Age: 54 y.o.  Sex: female  Race: White  Bed: W340/W340 A:   MRN: [id number]  Was the patient discharged from an ICU this admission? No   Was the patient discharged from a PACU within last 24 hours? No   Did the patient receive conscious sedation/general anesthesia in last 24 hours? No   Was the patient in the ED within the past 24 hours? Yes   Was the patient on NIPPV within the past 24 hours? No   Attending Physician: Jabier Ross MD  Primary Service: MILAGROS CRAFT   Time spent at the bedside: 15 -30 min    SITUATION    Notified by  Dr. Alvarez  Reason for alert: c/o chest pain     Diagnosis: COPD exacerbation   has a past medical history of Anxiety, Asthma, Carpal tunnel syndrome, bilateral, COPD (chronic obstructive pulmonary disease), Heavy cigarette smoker, Laryngeal papilloma, On home oxygen therapy, and Vocal cord mass.    Last Vitals:  Temp: 98.6 °F (37 °C) (1941)  Pulse: 94 (2306)  Resp: 22 (2048)  BP: 133/74 (1941)  SpO2: 90 % (2048)    24 Hour Vitals Range:  Temp:  [98.2 °F (36.8 °C)-98.7 °F (37.1 °C)]   Pulse:  []   Resp:  [15-24]   BP: (127-163)/()   SpO2:  [82 %-95 %]     Clinical Issues: Respiratory    ASSESSMENT/INTERVENTIONS    Patient sitting up in bed on 3L n/c complaining of pain to left chest that gets worse when she takes a deep breath in; no ST elevation noted on EKG; troponin drawn; Bilateral breath sounds tight with coarse wheezing noted to upper lobes     RECOMMENDATIONS  Will give stat resp treatment and reassess    Discussed plan of care with bedside RNAster    PROVIDER ESCALATION    Physician escalation: Yes    Orders received and case discussed with Dr. Alvarez .    Disposition:Remain in room 340    FOLLOW UP    Call back the Rapid Response NurseMatt at [phone] for  additional questions or concerns.

## 2025-04-23 NOTE — PLAN OF CARE
Problem: COPD (Chronic Obstructive Pulmonary Disease)  Goal: Optimal Chronic Illness Coping  Outcome: Progressing  Intervention: Support and Optimize Psychosocial Response  Flowsheets (Taken 4/23/2025 9067)  Supportive Measures:   active listening utilized   counseling provided   positive reinforcement provided

## 2025-04-23 NOTE — PROGRESS NOTES
[facility] Medicine  Progress Note    Patient Name: Yasmeen Valderrama  MRN: [id number]  Patient Class: IP- Inpatient   Admission Date: 4/22/2025  Length of Stay: 1 days  Attending Physician: Jabier Ross MD  Primary Care Provider: Leeanna Stuart MD        Subjective     Principal Problem:COPD exacerbation        HPI:  Yasmeen Valderrama 54 y.o. female with Copd on home oxygen, pulm HTN, tobacco abuse, presents to the hospital with a chief complaint of SOB.  She presents with increasing shortness breath over the last 2-3 days.  Her has been attempted treatment home with home albuterol inhalers without improvement.  Today he noticed she was increasingly lethargic on her home oxygen causing presentation in the emergency room.  She reports she has recently increased the number of cigarettes she smokes per day due to stress in his life.  She is compliant with her home 2 L of oxygen.  She has a cough that is nonproductive.  She denies fever chest pain nausea vomiting abdominal pain melena hematuria hematemesis dizziness and syncope.  She has not been requiring her home as needed diuretics at home she finds she has not been having lower extremity edema.    In the ED, hypoxic to 82% on home 3 L of oxygen COVID negative flu negative BNP negative chest x-ray with hypoventilatory exam coarsened interstitial lung markings new consolidation or pneumothorax.    Overview/Hospital Course:  Admitted with COPD exacerbation. AHHRF, but CO2 70 appears near her baseline with normal pH of 7.40. Suspect needs home bipap.     Interval History:  NAEON.  No new issues.   CC- SOB  All questions answered and updates on care given.       ROS:  General: Negative for fevers   Cardiac: Negative for chest pain   GI: Negative for abdominal distention      Vitals:    04/23/25 0714 04/23/25 0728 04/23/25 0913 04/23/25 0916   BP: (!) 146/78      BP Location:       Pulse: 91 106 89    Resp: 18  (!) 22    Temp: 98.5 °F  (36.9 °C)      TempSrc: Oral      SpO2: (!) 91% (!) 91% (!) 87% (!) 92%   Weight:       Height:              Body mass index is 31.16 kg/m².      PHYSICAL EXAM:  GENERAL APPEARANCE: alert and cooperative.     HEAD: NC/AT  CARDIAC: There is no cyanosis or pallor.   LUNGS: No apparent stridor. +wheeze  ABDOMEN: Non-distended. No guarding.  PSYCHIATRIC: No tangential speech. No Hyperactive features.        Recent Results (from the past 24 hours)   Comprehensive metabolic panel    Collection Time: 04/22/25 10:44 AM   Result Value Ref Range    Sodium 139 136 - 145 mmol/L    Potassium 4.6 3.5 - 5.1 mmol/L    Chloride 93 (L) 95 - 110 mmol/L    CO2 37 (H) 23 - 29 mmol/L    Glucose 110 70 - 110 mg/dL    BUN 13 6 - 20 mg/dL    Creatinine 0.6 0.5 - 1.4 mg/dL    Calcium 9.7 8.7 - 10.5 mg/dL    Protein Total 7.5 6.0 - 8.4 gm/dL    Albumin 4.0 3.5 - 5.2 g/dL    Bilirubin Total 1.2 (H) 0.1 - 1.0 mg/dL     40 - 150 unit/L    AST 15 11 - 45 unit/L    ALT 13 10 - 44 unit/L    Anion Gap 9 8 - 16 mmol/L    eGFR >60 >60 mL/min/1.73/m2   Troponin I    Collection Time: 04/22/25 10:44 AM   Result Value Ref Range    Troponin-I <0.006 <=0.026 ng/mL   Brain natriuretic peptide    Collection Time: 04/22/25 10:44 AM   Result Value Ref Range    BNP 98 0 - 99 pg/mL   CBC with Differential    Collection Time: 04/22/25 10:44 AM   Result Value Ref Range    WBC 8.48 3.90 - 12.70 K/uL    RBC 4.74 4.00 - 5.40 M/uL    HGB 13.2 12.0 - 16.0 gm/dL    HCT 45.0 37.0 - 48.5 %    MCV 95 82 - 98 fL    MCH 27.8 27.0 - 31.0 pg    MCHC 29.3 (L) 32.0 - 36.0 g/dL    RDW 14.0 11.5 - 14.5 %    Platelet Count 254 150 - 450 K/uL    MPV 10.5 9.2 - 12.9 fL    Nucleated RBC 0 <=0 /100 WBC    Neut % 83.0 (H) 38 - 73 %    Lymph % 12.4 (L) 18 - 48 %    Mono % 2.9 (L) 4 - 15 %    Eos % 0.7 <=8 %    Basophil % 0.6 <=1.9 %    Imm Grans % 0.4 0.0 - 0.5 %    Neut # 7.04 1.8 - 7.7 K/uL    Lymph # 1.05 1 - 4.8 K/uL    Mono # 0.25 (L) 0.3 - 1 K/uL    Eos # 0.06 <=0.5 K/uL     Baso # 0.05 <=0.2 K/uL    Imm Grans # 0.03 0.00 - 0.04 K/uL   POCT Influenza A/B Molecular    Collection Time: 04/22/25 11:03 AM   Result Value Ref Range    POC Molecular Influenza A Ag Negative Negative    POC Molecular Influenza B Ag Negative Negative     Acceptable Yes    POCT COVID-19 Rapid Screening    Collection Time: 04/22/25 11:03 AM   Result Value Ref Range    POC Rapid COVID Negative Negative     Acceptable Yes    ISTAT PROCEDURE    Collection Time: 04/22/25  3:37 PM   Result Value Ref Range    POC PH 7.400 7.35 - 7.45    POC PCO2 71.4 (HH) 35 - 45 mmHg    POC PO2 46 (LL) 80 - 100 mmHg    POC HCO3 44.3 (H) 24 - 28 mmol/L    POC BE 15 (H) -2 to 2 mmol/L    POC SATURATED O2 79 95 - 100 %    POC TCO2 46 (H) 23 - 27 mmol/L    Sample ARTERIAL     Site LR     Allens Test Pass     DelSys Nasal Can    Troponin I    Collection Time: 04/22/25 11:03 PM   Result Value Ref Range    Troponin-I <0.006 <=0.026 ng/mL   Lavender Top Hold    Collection Time: 04/22/25 11:03 PM   Result Value Ref Range    Extra Tube Hold for add-ons.    Gold Top Hold    Collection Time: 04/22/25 11:03 PM   Result Value Ref Range    Extra Tube Hold for add-ons.    Basic metabolic panel    Collection Time: 04/23/25  4:21 AM   Result Value Ref Range    Sodium 141 136 - 145 mmol/L    Potassium 4.2 3.5 - 5.1 mmol/L    Chloride 95 95 - 110 mmol/L    CO2 39 (H) 23 - 29 mmol/L    Glucose 111 (H) 70 - 110 mg/dL    BUN 20 6 - 20 mg/dL    Creatinine 0.6 0.5 - 1.4 mg/dL    Calcium 9.6 8.7 - 10.5 mg/dL    Anion Gap 7 (L) 8 - 16 mmol/L    eGFR >60 >60 mL/min/1.73/m2   Magnesium    Collection Time: 04/23/25  4:21 AM   Result Value Ref Range    Magnesium  1.8 1.6 - 2.6 mg/dL   Phosphorus    Collection Time: 04/23/25  4:21 AM   Result Value Ref Range    Phosphorus Level 3.5 2.7 - 4.5 mg/dL   CBC with Differential    Collection Time: 04/23/25  4:21 AM   Result Value Ref Range    WBC 21.53 (H) 3.90 - 12.70 K/uL    RBC 4.65 4.00 -  5.40 M/uL    HGB 13.1 12.0 - 16.0 gm/dL    HCT 43.7 37.0 - 48.5 %    MCV 94 82 - 98 fL    MCH 28.2 27.0 - 31.0 pg    MCHC 30.0 (L) 32.0 - 36.0 g/dL    RDW 14.2 11.5 - 14.5 %    Platelet Count 282 150 - 450 K/uL    MPV 10.7 9.2 - 12.9 fL    Nucleated RBC 0 <=0 /100 WBC    Neut % 81.8 (H) 38 - 73 %    Lymph % 10.2 (L) 18 - 48 %    Mono % 7.4 4 - 15 %    Eos % 0.0 <=8 %    Basophil % 0.1 <=1.9 %    Imm Grans % 0.5 0.0 - 0.5 %    Neut # 17.61 (H) 1.8 - 7.7 K/uL    Lymph # 2.19 1 - 4.8 K/uL    Mono # 1.59 (H) 0.3 - 1 K/uL    Eos # 0.01 <=0.5 K/uL    Baso # 0.03 <=0.2 K/uL    Imm Grans # 0.10 (H) 0.00 - 0.04 K/uL       Microbiology Results (last 7 days)       ** No results found for the last 168 hours. **             Imaging Results              X-Ray Chest AP Portable (Final result)  Result time 04/22/25 10:37:42      Final result by Bradyen Cardoso MD (04/22/25 10:37:42)                   Impression:      Please see above.      Electronically signed by: Brayden Cardoso  Date:    04/22/2025  Time:    10:37               Narrative:    EXAMINATION:  XR CHEST AP PORTABLE    CLINICAL HISTORY:  CHF;    TECHNIQUE:  Single frontal view of the chest was performed.    COMPARISON:  Chest radiograph 05/14/2020    FINDINGS:  Cardiomediastinal silhouette is unchanged in size.  Mediastinal structures are midline.    Hypoventilatory exam with suspected bibasilar atelectasis.  Coarsened interstitial lung markings with probable trace pleural effusions.  No new consolidation or pneumothorax.    Osseous structures demonstrate no evidence for acute fracture or osseous destructive lesion.  Degenerative change.    No free intra-abdominal air.  Soft tissues are unremarkable.                                               Assessment & Plan  COPD exacerbation  Presents with wheezing, SOB, lethargy at home in setting of hypoxia on home oxygen. Suspect triggered by recent increase in tobacco abuse. Patient and family report increased smoking outpt. In  the ED requiring 5 to 6L of O2 compared to 3L at home.   Started on steroids, duo-nebs, doxy  Wean oxygen  Smoking cessation  Goal O2 sat 88 to 92%      Patient's COPD is with exacerbation noted by continued dyspnea currently.  Patient is currently on COPD Pathway. Continue scheduled inhalers Steroids, Antibiotics, and Supplemental oxygen and monitor respiratory status closely.   Pulmonary hypertension  Appears euvolemic, denies complaints associated with fluid overload. Will use PRN diuresis as needed, but suspect SOB related to COPD and increased tobacco abuse. Previous PA pressure 46    Results for orders placed during the hospital encounter of 11/07/22    Echo    Interpretation Summary  · The left ventricle is normal in size with mild concentric hypertrophy and normal systolic function.  · The estimated ejection fraction is 55%.  · Moderate right ventricular enlargement with low normal right ventricular systolic function.  · There is right ventricular hypertrophy.  · The estimated PA systolic pressure is 46 mmHg.  · There is pulmonary hypertension.    Chronic pulmonary heart disease  As above  Tobacco abuse  Smoking more than 1ppd. Greater than 3min spent counseling patient on dangers of continued tobacco abuse. Will provide tobacco cessation prior to discharge, PRN nicotine patch.   Acute on chronic hypoxic respiratory failure  Patient with Hypoxic Respiratory failure which is Acute on chronic.  she is on home oxygen at 3 LPM. Supplemental oxygen was provided and noted-      .   Signs/symptoms of respiratory failure include- wheezing. Contributing diagnoses includes - COPD Labs and images were reviewed. Patient Has not had a recent ABG. Will treat underlying causes and adjust management of respiratory failure as follows- now requiring 6L of o2 in the ED compared to baseline of 3L outpt  VTE Risk Mitigation (From admission, onward)           Ordered     enoxaparin injection 40 mg  Every 24 hours         04/22/25  1229     IP VTE HIGH RISK PATIENT  Once         04/22/25 1208     Place sequential compression device  Until discontinued         04/22/25 1208                    Discharge Planning   MONICO:      Code Status: Full Code   Medical Readiness for Discharge Date:                            Jabier Ross MD  Department of [facility] Medicine   [facility]

## 2025-04-23 NOTE — NURSING
[facility] [place]- Rapid Response Follow-up Note     Followed up with patient for proactive rounding.     No acute issues at this time. Reviewed plan of care with primary nurse, Aster.   Please call Rapid Response RN with any questions or concerns at  [phone]

## 2025-04-23 NOTE — ASSESSMENT & PLAN NOTE
Patient with Hypoxic Respiratory failure which is Acute on chronic.  she is on home oxygen at 3 LPM. Supplemental oxygen was provided and noted-      .   Signs/symptoms of respiratory failure include- wheezing. Contributing diagnoses includes - COPD Labs and images were reviewed. Patient Has not had a recent ABG. Will treat underlying causes and adjust management of respiratory failure as follows- now requiring 6L of o2 in the ED compared to baseline of 3L outpt   last six months

## 2025-04-23 NOTE — PLAN OF CARE
Problem: COPD (Chronic Obstructive Pulmonary Disease)  Goal: Optimal Chronic Illness Coping  Outcome: Progressing  Goal: Effective Oxygenation and Ventilation  Outcome: Progressing     Problem: Adult Inpatient Plan of Care  Goal: Optimal Comfort and Wellbeing  Outcome: Progressing

## 2025-04-23 NOTE — HOSPITAL COURSE
Admitted with COPD exacerbation. AHHRF, but CO2 70 appears near her baseline with normal pH of 7.40. Suspect needs home bipap. After discussion, she was told she was ordered bipap but it never arrived. CM checking to see if we need to redo an ABG on her home 3L to qualify, or if we can just order. Still with significant wheezing and strained speech, will add back IV steroids.

## 2025-04-23 NOTE — PLAN OF CARE
[facility]    PCP: Leeanna Stuart MD    Pharmacy:   [facility] - [place], [place] - [address]  [address]  [place] [address]  Phone: [phone] Fax: [phone]        Patient Arrived From: home  Existing Help at Home: Spouse Ousmane Juan. [phone]    Barriers to Discharge: None    Discharge Plan:    A. Home    B. Home with family      CM met with patient at bedside to discuss discharge planning. Patient reside home with spouse Ousmane Juan. [phone], and uses a wheelchair at home for mobility and oxygen. Patient spouse will provide needed support and transportation upon discharge.     CM will continue to follow patient for discharge.     04/23/25 1247   Discharge Assessment   Assessment Type Discharge Planning Assessment   Confirmed/corrected address, phone number and insurance Yes   Confirmed Demographics Correct on Facesheet   Source of Information patient   When was your last doctors appointment? 03/11/25   Reason For Admission Shortness of Breath   People in Home spouse  (Ousmane Sr. [phone])   Facility Arrived From: Home   Do you expect to return to your current living situation? Yes   Do you have help at home or someone to help you manage your care at home? Yes  (Spouse Ousmane Sr. [phone])   Who are your caregiver(s) and their phone number(s)? Spouse Ousmane [phone]   Prior to hospitilization cognitive status: Alert/Oriented   Current cognitive status: Alert/Oriented   Walking or Climbing Stairs Difficulty yes   Walking or Climbing Stairs ambulation difficulty, requires equipment   Mobility Management Wheelchair   Dressing/Bathing Difficulty no   Equipment Currently Used at Home wheelchair   Readmission within 30 days? No   Patient currently being followed by outpatient case management? No   Do you currently have service(s) that help you manage your care at home? No   Do you take prescription medications? Yes   Do you have prescription coverage? Yes    Coverage Medicaid   Do you have any problems affording any of your prescribed medications? No   Is the patient taking medications as prescribed? yes   Who is going to help you get home at discharge? Spouse Ousmane Sr. [phone]   How do you get to doctors appointments? family or friend will provide  (Spouse Ousmane Sr [phone])   Are you on dialysis? No   Do you take coumadin? No   Discharge Plan A Home   Discharge Plan B Home with family   DME Needed Upon Discharge  other (see comments)  (tbd)   Discharge Plan discussed with: Patient   Transition of Care Barriers None   Stress   Do you feel stress - tense, restless, nervous, or anxious, or unable to sleep at night because your mind is troubled all the time - these days? Only a littl   Alcohol Use   Q1: How often do you have a drink containing alcohol? Never   Q2: How many drinks containing alcohol do you have on a typical day when you are drinking? None   Q3: How often do you have six or more drinks on one occasion? Never

## 2025-04-24 LAB
ABSOLUTE EOSINOPHIL (OHS): 0 K/UL
ABSOLUTE MONOCYTE (OHS): 0.33 K/UL (ref 0.3–1)
ABSOLUTE NEUTROPHIL COUNT (OHS): 15.68 K/UL (ref 1.8–7.7)
ANION GAP (OHS): 9 MMOL/L (ref 8–16)
BASOPHILS # BLD AUTO: 0.02 K/UL
BASOPHILS NFR BLD AUTO: 0.1 %
BUN SERPL-MCNC: 26 MG/DL (ref 6–20)
CALCIUM SERPL-MCNC: 10.1 MG/DL (ref 8.7–10.5)
CHLORIDE SERPL-SCNC: 98 MMOL/L (ref 95–110)
CO2 SERPL-SCNC: 37 MMOL/L (ref 23–29)
CREAT SERPL-MCNC: 0.7 MG/DL (ref 0.5–1.4)
ERYTHROCYTE [DISTWIDTH] IN BLOOD BY AUTOMATED COUNT: 14.6 % (ref 11.5–14.5)
GFR SERPLBLD CREATININE-BSD FMLA CKD-EPI: >60 ML/MIN/1.73/M2
GLUCOSE SERPL-MCNC: 139 MG/DL (ref 70–110)
HCT VFR BLD AUTO: 46.3 % (ref 37–48.5)
HGB BLD-MCNC: 13.5 GM/DL (ref 12–16)
IMM GRANULOCYTES # BLD AUTO: 0.11 K/UL (ref 0–0.04)
IMM GRANULOCYTES NFR BLD AUTO: 0.6 % (ref 0–0.5)
LYMPHOCYTES # BLD AUTO: 1.5 K/UL (ref 1–4.8)
MAGNESIUM SERPL-MCNC: 2.1 MG/DL (ref 1.6–2.6)
MCH RBC QN AUTO: 27.7 PG (ref 27–31)
MCHC RBC AUTO-ENTMCNC: 29.2 G/DL (ref 32–36)
MCV RBC AUTO: 95 FL (ref 82–98)
NUCLEATED RBC (/100WBC) (OHS): 0 /100 WBC
PHOSPHATE SERPL-MCNC: 3.7 MG/DL (ref 2.7–4.5)
PLATELET # BLD AUTO: 282 K/UL (ref 150–450)
PMV BLD AUTO: 11.5 FL (ref 9.2–12.9)
POTASSIUM SERPL-SCNC: 5.6 MMOL/L (ref 3.5–5.1)
RBC # BLD AUTO: 4.87 M/UL (ref 4–5.4)
RELATIVE EOSINOPHIL (OHS): 0 %
RELATIVE LYMPHOCYTE (OHS): 8.5 % (ref 18–48)
RELATIVE MONOCYTE (OHS): 1.9 % (ref 4–15)
RELATIVE NEUTROPHIL (OHS): 88.9 % (ref 38–73)
SODIUM SERPL-SCNC: 144 MMOL/L (ref 136–145)
WBC # BLD AUTO: 17.64 K/UL (ref 3.9–12.7)

## 2025-04-24 PROCEDURE — 94761 N-INVAS EAR/PLS OXIMETRY MLT: CPT

## 2025-04-24 PROCEDURE — 25000003 PHARM REV CODE 250: Performed by: PHYSICIAN ASSISTANT

## 2025-04-24 PROCEDURE — 84100 ASSAY OF PHOSPHORUS: CPT | Performed by: PHYSICIAN ASSISTANT

## 2025-04-24 PROCEDURE — 94640 AIRWAY INHALATION TREATMENT: CPT

## 2025-04-24 PROCEDURE — 83735 ASSAY OF MAGNESIUM: CPT | Performed by: PHYSICIAN ASSISTANT

## 2025-04-24 PROCEDURE — 25000242 PHARM REV CODE 250 ALT 637 W/ HCPCS: Performed by: INTERNAL MEDICINE

## 2025-04-24 PROCEDURE — 25000003 PHARM REV CODE 250: Performed by: STUDENT IN AN ORGANIZED HEALTH CARE EDUCATION/TRAINING PROGRAM

## 2025-04-24 PROCEDURE — 80048 BASIC METABOLIC PNL TOTAL CA: CPT | Performed by: PHYSICIAN ASSISTANT

## 2025-04-24 PROCEDURE — 25000242 PHARM REV CODE 250 ALT 637 W/ HCPCS: Performed by: STUDENT IN AN ORGANIZED HEALTH CARE EDUCATION/TRAINING PROGRAM

## 2025-04-24 PROCEDURE — 27000221 HC OXYGEN, UP TO 24 HOURS

## 2025-04-24 PROCEDURE — 99900035 HC TECH TIME PER 15 MIN (STAT)

## 2025-04-24 PROCEDURE — 36415 COLL VENOUS BLD VENIPUNCTURE: CPT | Performed by: PHYSICIAN ASSISTANT

## 2025-04-24 PROCEDURE — 85025 COMPLETE CBC W/AUTO DIFF WBC: CPT | Performed by: PHYSICIAN ASSISTANT

## 2025-04-24 PROCEDURE — 21400001 HC TELEMETRY ROOM

## 2025-04-24 PROCEDURE — 63600175 PHARM REV CODE 636 W HCPCS: Performed by: PHYSICIAN ASSISTANT

## 2025-04-24 PROCEDURE — S4991 NICOTINE PATCH NONLEGEND: HCPCS | Performed by: STUDENT IN AN ORGANIZED HEALTH CARE EDUCATION/TRAINING PROGRAM

## 2025-04-24 PROCEDURE — 63600175 PHARM REV CODE 636 W HCPCS: Mod: JZ,TB | Performed by: STUDENT IN AN ORGANIZED HEALTH CARE EDUCATION/TRAINING PROGRAM

## 2025-04-24 PROCEDURE — 25000003 PHARM REV CODE 250: Performed by: INTERNAL MEDICINE

## 2025-04-24 RX ORDER — IBUPROFEN 200 MG
1 TABLET ORAL DAILY
Status: DISCONTINUED | OUTPATIENT
Start: 2025-04-24 | End: 2025-04-26 | Stop reason: HOSPADM

## 2025-04-24 RX ORDER — IPRATROPIUM BROMIDE AND ALBUTEROL SULFATE 2.5; .5 MG/3ML; MG/3ML
3 SOLUTION RESPIRATORY (INHALATION) EVERY 4 HOURS
Status: DISCONTINUED | OUTPATIENT
Start: 2025-04-24 | End: 2025-04-26 | Stop reason: HOSPADM

## 2025-04-24 RX ADMIN — BUPRENORPHINE AND NALOXONE 6 FILM: 2; .5 FILM BUCCAL; SUBLINGUAL at 08:04

## 2025-04-24 RX ADMIN — ACETAMINOPHEN 650 MG: 325 TABLET ORAL at 08:04

## 2025-04-24 RX ADMIN — METHYLPREDNISOLONE SODIUM SUCCINATE 60 MG: 40 INJECTION, POWDER, FOR SOLUTION INTRAMUSCULAR; INTRAVENOUS at 05:04

## 2025-04-24 RX ADMIN — DOXYCYCLINE HYCLATE 100 MG: 100 TABLET, COATED ORAL at 08:04

## 2025-04-24 RX ADMIN — IPRATROPIUM BROMIDE AND ALBUTEROL SULFATE 3 ML: 2.5; .5 SOLUTION RESPIRATORY (INHALATION) at 11:04

## 2025-04-24 RX ADMIN — IPRATROPIUM BROMIDE AND ALBUTEROL SULFATE 3 ML: 2.5; .5 SOLUTION RESPIRATORY (INHALATION) at 08:04

## 2025-04-24 RX ADMIN — IPRATROPIUM BROMIDE AND ALBUTEROL SULFATE 3 ML: 2.5; .5 SOLUTION RESPIRATORY (INHALATION) at 06:04

## 2025-04-24 RX ADMIN — PANTOPRAZOLE SODIUM 40 MG: 40 TABLET, DELAYED RELEASE ORAL at 08:04

## 2025-04-24 RX ADMIN — IPRATROPIUM BROMIDE AND ALBUTEROL SULFATE 3 ML: 2.5; .5 SOLUTION RESPIRATORY (INHALATION) at 03:04

## 2025-04-24 RX ADMIN — ENOXAPARIN SODIUM 40 MG: 40 INJECTION SUBCUTANEOUS at 05:04

## 2025-04-24 RX ADMIN — Medication 1 PATCH: at 05:04

## 2025-04-24 RX ADMIN — METHYLPREDNISOLONE SODIUM SUCCINATE 60 MG: 40 INJECTION, POWDER, FOR SOLUTION INTRAMUSCULAR; INTRAVENOUS at 10:04

## 2025-04-24 RX ADMIN — METHYLPREDNISOLONE SODIUM SUCCINATE 60 MG: 40 INJECTION, POWDER, FOR SOLUTION INTRAMUSCULAR; INTRAVENOUS at 03:04

## 2025-04-24 RX ADMIN — ACETAMINOPHEN 650 MG: 325 TABLET ORAL at 06:04

## 2025-04-24 NOTE — PROGRESS NOTES
[facility] Medicine  Progress Note    Patient Name: Yasmeen Valderrama  MRN: [id number]  Patient Class: IP- Inpatient   Admission Date: 4/22/2025  Length of Stay: 2 days  Attending Physician: Jabier Ross MD  Primary Care Provider: Leeanna Stuart MD        Subjective     Principal Problem:COPD exacerbation        HPI:  Yasmeen Valderrama 54 y.o. female with Copd on home oxygen, pulm HTN, tobacco abuse, presents to the hospital with a chief complaint of SOB.  She presents with increasing shortness breath over the last 2-3 days.  Her has been attempted treatment home with home albuterol inhalers without improvement.  Today he noticed she was increasingly lethargic on her home oxygen causing presentation in the emergency room.  She reports she has recently increased the number of cigarettes she smokes per day due to stress in his life.  She is compliant with her home 2 L of oxygen.  She has a cough that is nonproductive.  She denies fever chest pain nausea vomiting abdominal pain melena hematuria hematemesis dizziness and syncope.  She has not been requiring her home as needed diuretics at home she finds she has not been having lower extremity edema.    In the ED, hypoxic to 82% on home 3 L of oxygen COVID negative flu negative BNP negative chest x-ray with hypoventilatory exam coarsened interstitial lung markings new consolidation or pneumothorax.    Overview/Hospital Course:  Admitted with COPD exacerbation. AHHRF, but CO2 70 appears near her baseline with normal pH of 7.40. Suspect needs home bipap. After discussion, she was told she was ordered bipap but it never arrived. CM checking to see if we need to redo an ABG on her home 3L to qualify, or if we can just order. Still with significant wheezing and strained speech, will add back IV steroids.    Interval History:  NAEON.  No new issues.   CC- SOB  All questions answered and updates on care given.       ROS:  General: Negative  for fevers   Cardiac: Negative for chest pain   GI: Negative for abdominal distention      Vitals:    04/24/25 0530 04/24/25 0635 04/24/25 0638 04/24/25 0648   BP:       Pulse:  78     Resp:  20     Temp:    97.7 °F (36.5 °C)   TempSrc:       SpO2: 96% (!) 91% 98%    Weight:       Height:              Body mass index is 31.16 kg/m².      PHYSICAL EXAM:  GENERAL APPEARANCE: alert and cooperative.     HEAD: NC/AT  CARDIAC: There is no cyanosis or pallor.   LUNGS: No apparent stridor. +wheeze  ABDOMEN: Non-distended. No guarding.  PSYCHIATRIC: No tangential speech. No Hyperactive features.        Recent Results (from the past 24 hours)   CBC with Differential    Collection Time: 04/24/25  4:16 AM   Result Value Ref Range    WBC 17.64 (H) 3.90 - 12.70 K/uL    RBC 4.87 4.00 - 5.40 M/uL    HGB 13.5 12.0 - 16.0 gm/dL    HCT 46.3 37.0 - 48.5 %    MCV 95 82 - 98 fL    MCH 27.7 27.0 - 31.0 pg    MCHC 29.2 (L) 32.0 - 36.0 g/dL    RDW 14.6 (H) 11.5 - 14.5 %    Platelet Count 282 150 - 450 K/uL    MPV 11.5 9.2 - 12.9 fL    Nucleated RBC 0 <=0 /100 WBC    Neut % 88.9 (H) 38 - 73 %    Lymph % 8.5 (L) 18 - 48 %    Mono % 1.9 (L) 4 - 15 %    Eos % 0.0 <=8 %    Basophil % 0.1 <=1.9 %    Imm Grans % 0.6 (H) 0.0 - 0.5 %    Neut # 15.68 (H) 1.8 - 7.7 K/uL    Lymph # 1.50 1 - 4.8 K/uL    Mono # 0.33 0.3 - 1 K/uL    Eos # 0.00 <=0.5 K/uL    Baso # 0.02 <=0.2 K/uL    Imm Grans # 0.11 (H) 0.00 - 0.04 K/uL       Microbiology Results (last 7 days)       ** No results found for the last 168 hours. **             Imaging Results              X-Ray Chest AP Portable (Final result)  Result time 04/22/25 10:37:42      Final result by Brayden Cardoso MD (04/22/25 10:37:42)                   Impression:      Please see above.      Electronically signed by: Brayden Cardoso  Date:    04/22/2025  Time:    10:37               Narrative:    EXAMINATION:  XR CHEST AP PORTABLE    CLINICAL HISTORY:  CHF;    TECHNIQUE:  Single frontal view of the chest was  performed.    COMPARISON:  Chest radiograph 05/14/2020    FINDINGS:  Cardiomediastinal silhouette is unchanged in size.  Mediastinal structures are midline.    Hypoventilatory exam with suspected bibasilar atelectasis.  Coarsened interstitial lung markings with probable trace pleural effusions.  No new consolidation or pneumothorax.    Osseous structures demonstrate no evidence for acute fracture or osseous destructive lesion.  Degenerative change.    No free intra-abdominal air.  Soft tissues are unremarkable.                                               Assessment & Plan  COPD exacerbation  Presents with wheezing, SOB, lethargy at home in setting of hypoxia on home oxygen. Suspect triggered by recent increase in tobacco abuse. Patient and family report increased smoking outpt. In the ED requiring 5 to 6L of O2 compared to 3L at home.   Started on steroids, duo-nebs, doxy  Wean oxygen  Smoking cessation  Goal O2 sat 88 to 92%      Patient's COPD is with exacerbation noted by continued dyspnea currently.  Patient is currently on COPD Pathway. Continue scheduled inhalers Steroids, Antibiotics, and Supplemental oxygen and monitor respiratory status closely.   Pulmonary hypertension  Appears euvolemic, denies complaints associated with fluid overload. Will use PRN diuresis as needed, but suspect SOB related to COPD and increased tobacco abuse. Previous PA pressure 46    Results for orders placed during the hospital encounter of 11/07/22    Echo    Interpretation Summary  · The left ventricle is normal in size with mild concentric hypertrophy and normal systolic function.  · The estimated ejection fraction is 55%.  · Moderate right ventricular enlargement with low normal right ventricular systolic function.  · There is right ventricular hypertrophy.  · The estimated PA systolic pressure is 46 mmHg.  · There is pulmonary hypertension.    Chronic pulmonary heart disease  As above  Tobacco abuse  Smoking more than 1ppd.  Greater than 3min spent counseling patient on dangers of continued tobacco abuse. Will provide tobacco cessation prior to discharge, PRN nicotine patch.   Acute on chronic hypoxic respiratory failure  Patient with Hypoxic Respiratory failure which is Acute on chronic.  she is on home oxygen at 3 LPM. Supplemental oxygen was provided and noted-      .   Signs/symptoms of respiratory failure include- wheezing. Contributing diagnoses includes - COPD Labs and images were reviewed. Patient Has not had a recent ABG. Will treat underlying causes and adjust management of respiratory failure as follows- now requiring 6L of o2 in the ED compared to baseline of 3L outpt    VTE Risk Mitigation (From admission, onward)           Ordered     enoxaparin injection 40 mg  Every 24 hours         04/22/25 1229     IP VTE HIGH RISK PATIENT  Once         04/22/25 1208     Place sequential compression device  Until discontinued         04/22/25 1208                    Discharge Planning   MONICO:      Code Status: Full Code   Medical Readiness for Discharge Date:   Discharge Plan A: Home                        Jabier Ross MD  Department of Hospital Medicine   [facility] - Telemetry

## 2025-04-24 NOTE — PLAN OF CARE
Problem: COPD (Chronic Obstructive Pulmonary Disease)  Goal: Optimal Chronic Illness Coping  Outcome: Progressing  Intervention: Support and Optimize Psychosocial Response  Flowsheets (Taken 4/24/2025 7966)  Supportive Measures:   active listening utilized   counseling provided   positive reinforcement provided

## 2025-04-24 NOTE — NURSING
[facility] [place] 4 [place]      4/24/2025       Skin assessed on: Q Shift      [x] No Pressure Injuries Present    []Prevention Measures Documented    [] Yes LDA  for Pressure Injury Previously documented     [] Yes New Pressure Injury Discovered   [] LDA for New Pressure Injury Added      Attending RN:  Marley Mari RN     Second RN:  ERIC Saldivar

## 2025-04-24 NOTE — PLAN OF CARE
Problem: COPD (Chronic Obstructive Pulmonary Disease)  Goal: Effective Oxygenation and Ventilation  Outcome: Progressing     Problem: Adult Inpatient Plan of Care  Goal: Absence of Hospital-Acquired Illness or Injury  Outcome: Progressing  Intervention: Identify and Manage Fall Risk  Flowsheets (Taken 4/24/2025 8029)  Safety Promotion/Fall Prevention:   assistive device/personal item within reach   bed alarm set   side rails raised x 2  Goal: Optimal Comfort and Wellbeing  Outcome: Progressing

## 2025-04-24 NOTE — NURSING
[facility], [facility]  Nurses Note -- 4 Eyes      4/23/2025       Skin assessed on: Q Shift      [x] No Pressure Injuries Present    [x]Prevention Measures Documented    [] Yes LDA  for Pressure Injury Previously documented     [] Yes New Pressure Injury Discovered   [] LDA for New Pressure Injury Added      Attending RN:  Yariel Valencia RN     Second RN:  ERIC Greer

## 2025-04-25 LAB
ABSOLUTE EOSINOPHIL (OHS): 0 K/UL
ABSOLUTE MONOCYTE (OHS): 0.4 K/UL (ref 0.3–1)
ABSOLUTE NEUTROPHIL COUNT (OHS): 14.4 K/UL (ref 1.8–7.7)
ANION GAP (OHS): 7 MMOL/L (ref 8–16)
BASOPHILS # BLD AUTO: 0.01 K/UL
BASOPHILS NFR BLD AUTO: 0.1 %
BUN SERPL-MCNC: 25 MG/DL (ref 6–20)
CALCIUM SERPL-MCNC: 9.8 MG/DL (ref 8.7–10.5)
CHLORIDE SERPL-SCNC: 96 MMOL/L (ref 95–110)
CO2 SERPL-SCNC: 35 MMOL/L (ref 23–29)
CREAT SERPL-MCNC: 0.6 MG/DL (ref 0.5–1.4)
ERYTHROCYTE [DISTWIDTH] IN BLOOD BY AUTOMATED COUNT: 14.7 % (ref 11.5–14.5)
GFR SERPLBLD CREATININE-BSD FMLA CKD-EPI: >60 ML/MIN/1.73/M2
GLUCOSE SERPL-MCNC: 139 MG/DL (ref 70–110)
HCT VFR BLD AUTO: 42.4 % (ref 37–48.5)
HGB BLD-MCNC: 13 GM/DL (ref 12–16)
IMM GRANULOCYTES # BLD AUTO: 0.14 K/UL (ref 0–0.04)
IMM GRANULOCYTES NFR BLD AUTO: 0.9 % (ref 0–0.5)
LYMPHOCYTES # BLD AUTO: 1.29 K/UL (ref 1–4.8)
MAGNESIUM SERPL-MCNC: 2.1 MG/DL (ref 1.6–2.6)
MCH RBC QN AUTO: 28.4 PG (ref 27–31)
MCHC RBC AUTO-ENTMCNC: 30.7 G/DL (ref 32–36)
MCV RBC AUTO: 93 FL (ref 82–98)
NUCLEATED RBC (/100WBC) (OHS): 0 /100 WBC
PHOSPHATE SERPL-MCNC: 3.6 MG/DL (ref 2.7–4.5)
PLATELET # BLD AUTO: 258 K/UL (ref 150–450)
PMV BLD AUTO: 11.4 FL (ref 9.2–12.9)
POTASSIUM SERPL-SCNC: 4.6 MMOL/L (ref 3.5–5.1)
RBC # BLD AUTO: 4.58 M/UL (ref 4–5.4)
RELATIVE EOSINOPHIL (OHS): 0 %
RELATIVE LYMPHOCYTE (OHS): 7.9 % (ref 18–48)
RELATIVE MONOCYTE (OHS): 2.5 % (ref 4–15)
RELATIVE NEUTROPHIL (OHS): 88.6 % (ref 38–73)
SODIUM SERPL-SCNC: 138 MMOL/L (ref 136–145)
WBC # BLD AUTO: 16.24 K/UL (ref 3.9–12.7)

## 2025-04-25 PROCEDURE — S4991 NICOTINE PATCH NONLEGEND: HCPCS | Performed by: STUDENT IN AN ORGANIZED HEALTH CARE EDUCATION/TRAINING PROGRAM

## 2025-04-25 PROCEDURE — 84132 ASSAY OF SERUM POTASSIUM: CPT | Performed by: PHYSICIAN ASSISTANT

## 2025-04-25 PROCEDURE — 25000003 PHARM REV CODE 250: Performed by: STUDENT IN AN ORGANIZED HEALTH CARE EDUCATION/TRAINING PROGRAM

## 2025-04-25 PROCEDURE — 27000221 HC OXYGEN, UP TO 24 HOURS

## 2025-04-25 PROCEDURE — 94761 N-INVAS EAR/PLS OXIMETRY MLT: CPT

## 2025-04-25 PROCEDURE — 99900035 HC TECH TIME PER 15 MIN (STAT)

## 2025-04-25 PROCEDURE — 36415 COLL VENOUS BLD VENIPUNCTURE: CPT | Performed by: PHYSICIAN ASSISTANT

## 2025-04-25 PROCEDURE — 63600175 PHARM REV CODE 636 W HCPCS: Performed by: STUDENT IN AN ORGANIZED HEALTH CARE EDUCATION/TRAINING PROGRAM

## 2025-04-25 PROCEDURE — 21400001 HC TELEMETRY ROOM

## 2025-04-25 PROCEDURE — 85025 COMPLETE CBC W/AUTO DIFF WBC: CPT | Performed by: PHYSICIAN ASSISTANT

## 2025-04-25 PROCEDURE — 25000242 PHARM REV CODE 250 ALT 637 W/ HCPCS: Performed by: STUDENT IN AN ORGANIZED HEALTH CARE EDUCATION/TRAINING PROGRAM

## 2025-04-25 PROCEDURE — 84100 ASSAY OF PHOSPHORUS: CPT | Performed by: PHYSICIAN ASSISTANT

## 2025-04-25 PROCEDURE — 25000003 PHARM REV CODE 250: Performed by: PHYSICIAN ASSISTANT

## 2025-04-25 PROCEDURE — 63600175 PHARM REV CODE 636 W HCPCS: Performed by: PHYSICIAN ASSISTANT

## 2025-04-25 PROCEDURE — 83735 ASSAY OF MAGNESIUM: CPT | Performed by: PHYSICIAN ASSISTANT

## 2025-04-25 PROCEDURE — 94640 AIRWAY INHALATION TREATMENT: CPT

## 2025-04-25 PROCEDURE — 25000003 PHARM REV CODE 250: Performed by: INTERNAL MEDICINE

## 2025-04-25 RX ADMIN — IPRATROPIUM BROMIDE AND ALBUTEROL SULFATE 3 ML: 2.5; .5 SOLUTION RESPIRATORY (INHALATION) at 04:04

## 2025-04-25 RX ADMIN — DOXYCYCLINE HYCLATE 100 MG: 100 TABLET, COATED ORAL at 08:04

## 2025-04-25 RX ADMIN — IPRATROPIUM BROMIDE AND ALBUTEROL SULFATE 3 ML: 2.5; .5 SOLUTION RESPIRATORY (INHALATION) at 07:04

## 2025-04-25 RX ADMIN — IPRATROPIUM BROMIDE AND ALBUTEROL SULFATE 3 ML: 2.5; .5 SOLUTION RESPIRATORY (INHALATION) at 03:04

## 2025-04-25 RX ADMIN — ACETAMINOPHEN 650 MG: 325 TABLET ORAL at 06:04

## 2025-04-25 RX ADMIN — METHYLPREDNISOLONE SODIUM SUCCINATE 60 MG: 40 INJECTION, POWDER, FOR SOLUTION INTRAMUSCULAR; INTRAVENOUS at 10:04

## 2025-04-25 RX ADMIN — BUPRENORPHINE AND NALOXONE 6 FILM: 2; .5 FILM BUCCAL; SUBLINGUAL at 09:04

## 2025-04-25 RX ADMIN — ENOXAPARIN SODIUM 40 MG: 40 INJECTION SUBCUTANEOUS at 04:04

## 2025-04-25 RX ADMIN — Medication 1 PATCH: at 08:04

## 2025-04-25 RX ADMIN — IPRATROPIUM BROMIDE AND ALBUTEROL SULFATE 3 ML: 2.5; .5 SOLUTION RESPIRATORY (INHALATION) at 12:04

## 2025-04-25 RX ADMIN — BUPRENORPHINE AND NALOXONE 6 FILM: 2; .5 FILM BUCCAL; SUBLINGUAL at 08:04

## 2025-04-25 RX ADMIN — METHYLPREDNISOLONE SODIUM SUCCINATE 60 MG: 40 INJECTION, POWDER, FOR SOLUTION INTRAMUSCULAR; INTRAVENOUS at 05:04

## 2025-04-25 RX ADMIN — DOXYCYCLINE HYCLATE 100 MG: 100 TABLET, COATED ORAL at 09:04

## 2025-04-25 RX ADMIN — METHYLPREDNISOLONE SODIUM SUCCINATE 60 MG: 40 INJECTION, POWDER, FOR SOLUTION INTRAMUSCULAR; INTRAVENOUS at 01:04

## 2025-04-25 RX ADMIN — PANTOPRAZOLE SODIUM 40 MG: 40 TABLET, DELAYED RELEASE ORAL at 08:04

## 2025-04-25 NOTE — PLAN OF CARE
Problem: COPD (Chronic Obstructive Pulmonary Disease)  Goal: Optimal Level of Functional [place]  Outcome: Progressing  Intervention: Optimize Functional Ability  Flowsheets (Taken 4/25/2025 2925)  Activity Management: Up to bedside commode - L3

## 2025-04-25 NOTE — PLAN OF CARE
Problem: COPD (Chronic Obstructive Pulmonary Disease)  Goal: Optimal Chronic Illness Coping  Outcome: Progressing  Goal: Optimal Level of Functional [place]  Outcome: Progressing  Goal: Absence of Infection Signs and Symptoms  Outcome: Progressing  Goal: Improved Oral Intake  Outcome: Progressing  Goal: Effective Oxygenation and Ventilation  Outcome: Progressing     Problem: Adult Inpatient Plan of Care  Goal: Plan of Care Review  Outcome: Progressing  Goal: Patient-Specific Goal (Individualized)  Outcome: Progressing  Goal: Absence of Hospital-Acquired Illness or Injury  Outcome: Progressing  Goal: Optimal Comfort and Wellbeing  Outcome: Progressing  Goal: Readiness for Transition of Care  Outcome: Progressing     Problem: Skin Injury Risk Increased  Goal: Skin Health and Integrity  Outcome: Progressing

## 2025-04-25 NOTE — PLAN OF CARE
GLORIA along with Dr. Ross met with patient at bedside  Patient stated that she had a BIPAP order from a previous visit but it was never delivered.    2:38 pm CM sent a secure chat to Faiza with E to follow up on BIPAP. CM awaiting a response.     Per Faiza with DME patient had a balance with [facility] of $1000 due to this balance she was not able to get the BIPAP back in 2023. CM also tried to send the order to [facility] and patient was denied the BIPAP due to her $1000 balance with [facility]. Patient's balance is now cleared so she would need to re qualify. MD notified.     CM will continue to follow patient for discharge needs.

## 2025-04-25 NOTE — PROGRESS NOTES
[facility] Medicine  Progress Note    Patient Name: Yasmeen Valderrama  MRN: [id number]  Patient Class: IP- Inpatient   Admission Date: 4/22/2025  Length of Stay: 3 days  Attending Physician: Jabier Ross MD  Primary Care Provider: Leeanna Stuart MD        Subjective     Principal Problem:COPD exacerbation        HPI:  Yasmeen Valderrama 54 y.o. female with Copd on home oxygen, pulm HTN, tobacco abuse, presents to the hospital with a chief complaint of SOB.  She presents with increasing shortness breath over the last 2-3 days.  Her has been attempted treatment home with home albuterol inhalers without improvement.  Today he noticed she was increasingly lethargic on her home oxygen causing presentation in the emergency room.  She reports she has recently increased the number of cigarettes she smokes per day due to stress in his life.  She is compliant with her home 2 L of oxygen.  She has a cough that is nonproductive.  She denies fever chest pain nausea vomiting abdominal pain melena hematuria hematemesis dizziness and syncope.  She has not been requiring her home as needed diuretics at home she finds she has not been having lower extremity edema.    In the ED, hypoxic to 82% on home 3 L of oxygen COVID negative flu negative BNP negative chest x-ray with hypoventilatory exam coarsened interstitial lung markings new consolidation or pneumothorax.    Overview/Hospital Course:  Admitted with COPD exacerbation. AHHRF, but CO2 70 appears near her baseline with normal pH of 7.40. Suspect needs home bipap. After discussion, she was told she was ordered bipap but it never arrived. CM checking to see if we need to redo an ABG on her home 3L to qualify, or if we can just order. Still with significant wheezing and strained speech, will add back IV steroids.    Interval History:  NAEON.  No new issues.   CC- SOB  All questions answered and updates on care given.       ROS:  General: Negative  for fevers   Cardiac: Negative for chest pain   GI: Negative for abdominal distention      Vitals:    04/25/25 0518 04/25/25 0704 04/25/25 0750 04/25/25 0755   BP: (!) 169/91   (!) 151/79   BP Location: Right arm   Right arm   Patient Position: Lying   Sitting   Pulse: 86 82 85 80   Resp: 18  18 18   Temp: 98.4 °F (36.9 °C)   98.1 °F (36.7 °C)   TempSrc: Oral   Oral   SpO2: 99%  (!) 94% (!) 94%   Weight:       Height:              Body mass index is 31.16 kg/m².      PHYSICAL EXAM:  GENERAL APPEARANCE: alert and cooperative.     HEAD: NC/AT  CARDIAC: There is no cyanosis or pallor.   LUNGS: No apparent stridor. +wheeze  ABDOMEN: Non-distended. No guarding.  PSYCHIATRIC: No tangential speech. No Hyperactive features.        Recent Results (from the past 24 hours)   CBC with Differential    Collection Time: 04/25/25  4:52 AM   Result Value Ref Range    WBC 16.24 (H) 3.90 - 12.70 K/uL    RBC 4.58 4.00 - 5.40 M/uL    HGB 13.0 12.0 - 16.0 gm/dL    HCT 42.4 37.0 - 48.5 %    MCV 93 82 - 98 fL    MCH 28.4 27.0 - 31.0 pg    MCHC 30.7 (L) 32.0 - 36.0 g/dL    RDW 14.7 (H) 11.5 - 14.5 %    Platelet Count 258 150 - 450 K/uL    MPV 11.4 9.2 - 12.9 fL    Nucleated RBC 0 <=0 /100 WBC    Neut % 88.6 (H) 38 - 73 %    Lymph % 7.9 (L) 18 - 48 %    Mono % 2.5 (L) 4 - 15 %    Eos % 0.0 <=8 %    Basophil % 0.1 <=1.9 %    Imm Grans % 0.9 (H) 0.0 - 0.5 %    Neut # 14.40 (H) 1.8 - 7.7 K/uL    Lymph # 1.29 1 - 4.8 K/uL    Mono # 0.40 0.3 - 1 K/uL    Eos # 0.00 <=0.5 K/uL    Baso # 0.01 <=0.2 K/uL    Imm Grans # 0.14 (H) 0.00 - 0.04 K/uL       Microbiology Results (last 7 days)       ** No results found for the last 168 hours. **             Imaging Results              X-Ray Chest AP Portable (Final result)  Result time 04/22/25 10:37:42      Final result by Brayden Cardoso MD (04/22/25 10:37:42)                   Impression:      Please see above.      Electronically signed by: Brayden Cardoso  Date:    04/22/2025  Time:    10:37                Narrative:    EXAMINATION:  XR CHEST AP PORTABLE    CLINICAL HISTORY:  CHF;    TECHNIQUE:  Single frontal view of the chest was performed.    COMPARISON:  Chest radiograph 05/14/2020    FINDINGS:  Cardiomediastinal silhouette is unchanged in size.  Mediastinal structures are midline.    Hypoventilatory exam with suspected bibasilar atelectasis.  Coarsened interstitial lung markings with probable trace pleural effusions.  No new consolidation or pneumothorax.    Osseous structures demonstrate no evidence for acute fracture or osseous destructive lesion.  Degenerative change.    No free intra-abdominal air.  Soft tissues are unremarkable.                                               Assessment & Plan  COPD exacerbation  Presents with wheezing, SOB, lethargy at home in setting of hypoxia on home oxygen. Suspect triggered by recent increase in tobacco abuse. Patient and family report increased smoking outpt. In the ED requiring 5 to 6L of O2 compared to 3L at home.   Started on steroids, duo-nebs, doxy  Wean oxygen  Smoking cessation  Goal O2 sat 88 to 92%      Patient's COPD is with exacerbation noted by continued dyspnea currently.  Patient is currently on COPD Pathway. Continue scheduled inhalers Steroids, Antibiotics, and Supplemental oxygen and monitor respiratory status closely.   Pulmonary hypertension  Appears euvolemic, denies complaints associated with fluid overload. Will use PRN diuresis as needed, but suspect SOB related to COPD and increased tobacco abuse. Previous PA pressure 46    Results for orders placed during the hospital encounter of 11/07/22    Echo    Interpretation Summary  · The left ventricle is normal in size with mild concentric hypertrophy and normal systolic function.  · The estimated ejection fraction is 55%.  · Moderate right ventricular enlargement with low normal right ventricular systolic function.  · There is right ventricular hypertrophy.  · The estimated PA systolic pressure is 46  mmHg.  · There is pulmonary hypertension.    Chronic pulmonary heart disease  As above  Tobacco abuse  Smoking more than 1ppd. Greater than 3min spent counseling patient on dangers of continued tobacco abuse. Will provide tobacco cessation prior to discharge, PRN nicotine patch.   Acute on chronic hypoxic respiratory failure  Patient with Hypoxic Respiratory failure which is Acute on chronic.  she is on home oxygen at 3 LPM. Supplemental oxygen was provided and noted-      .   Signs/symptoms of respiratory failure include- wheezing. Contributing diagnoses includes - COPD Labs and images were reviewed. Patient Has not had a recent ABG. Will treat underlying causes and adjust management of respiratory failure as follows- now requiring 6L of o2 in the ED compared to baseline of 3L outpt    VTE Risk Mitigation (From admission, onward)           Ordered     enoxaparin injection 40 mg  Every 24 hours         04/22/25 1229     IP VTE HIGH RISK PATIENT  Once         04/22/25 1208     Place sequential compression device  Until discontinued         04/22/25 1208                    Discharge Planning   MONICO: 4/27/2025     Code Status: Full Code   Medical Readiness for Discharge Date:   Discharge Plan A: Home                        Jabier Ross MD  Department of Hospital Medicine   [facility] - Telemetry

## 2025-04-25 NOTE — NURSING
[facility] [place]  Nurses Note -- 4 Eyes      4/25/2025       Skin assessed on: Q Shift      [x] No Pressure Injuries Present    []Prevention Measures Documented    [] Yes LDA  for Pressure Injury Previously documented     [] Yes New Pressure Injury Discovered   [] LDA for New Pressure Injury Added      Attending RN:  Marley Mari RN     Second RN:  KUN Bauer

## 2025-04-26 VITALS
SYSTOLIC BLOOD PRESSURE: 144 MMHG | HEIGHT: 63 IN | TEMPERATURE: 98 F | HEART RATE: 97 BPM | WEIGHT: 175.94 LBS | OXYGEN SATURATION: 96 % | BODY MASS INDEX: 31.17 KG/M2 | DIASTOLIC BLOOD PRESSURE: 86 MMHG | RESPIRATION RATE: 18 BRPM

## 2025-04-26 PROBLEM — J96.02 ACUTE RESPIRATORY FAILURE WITH HYPOXIA AND HYPERCARBIA: Status: ACTIVE | Noted: 2025-04-26

## 2025-04-26 PROBLEM — J96.01 ACUTE RESPIRATORY FAILURE WITH HYPOXIA AND HYPERCARBIA: Status: ACTIVE | Noted: 2025-04-26

## 2025-04-26 PROCEDURE — 94640 AIRWAY INHALATION TREATMENT: CPT

## 2025-04-26 PROCEDURE — 94761 N-INVAS EAR/PLS OXIMETRY MLT: CPT

## 2025-04-26 PROCEDURE — 27000221 HC OXYGEN, UP TO 24 HOURS

## 2025-04-26 PROCEDURE — 25000003 PHARM REV CODE 250: Performed by: STUDENT IN AN ORGANIZED HEALTH CARE EDUCATION/TRAINING PROGRAM

## 2025-04-26 PROCEDURE — 25000003 PHARM REV CODE 250: Performed by: INTERNAL MEDICINE

## 2025-04-26 PROCEDURE — 25000242 PHARM REV CODE 250 ALT 637 W/ HCPCS: Performed by: STUDENT IN AN ORGANIZED HEALTH CARE EDUCATION/TRAINING PROGRAM

## 2025-04-26 PROCEDURE — 25000003 PHARM REV CODE 250: Performed by: PHYSICIAN ASSISTANT

## 2025-04-26 PROCEDURE — 99900035 HC TECH TIME PER 15 MIN (STAT)

## 2025-04-26 PROCEDURE — 63600175 PHARM REV CODE 636 W HCPCS: Performed by: STUDENT IN AN ORGANIZED HEALTH CARE EDUCATION/TRAINING PROGRAM

## 2025-04-26 PROCEDURE — S4991 NICOTINE PATCH NONLEGEND: HCPCS | Performed by: STUDENT IN AN ORGANIZED HEALTH CARE EDUCATION/TRAINING PROGRAM

## 2025-04-26 RX ORDER — PREDNISONE 20 MG/1
TABLET ORAL
Qty: 46 TABLET | Refills: 0 | Status: SHIPPED | OUTPATIENT
Start: 2025-04-26 | End: 2025-05-24

## 2025-04-26 RX ORDER — PANTOPRAZOLE SODIUM 40 MG/1
40 TABLET, DELAYED RELEASE ORAL DAILY
Qty: 30 TABLET | Refills: 0 | Status: SHIPPED | OUTPATIENT
Start: 2025-04-26 | End: 2025-05-26

## 2025-04-26 RX ADMIN — PANTOPRAZOLE SODIUM 40 MG: 40 TABLET, DELAYED RELEASE ORAL at 08:04

## 2025-04-26 RX ADMIN — IPRATROPIUM BROMIDE AND ALBUTEROL SULFATE 3 ML: 2.5; .5 SOLUTION RESPIRATORY (INHALATION) at 11:04

## 2025-04-26 RX ADMIN — METHYLPREDNISOLONE SODIUM SUCCINATE 60 MG: 40 INJECTION, POWDER, FOR SOLUTION INTRAMUSCULAR; INTRAVENOUS at 01:04

## 2025-04-26 RX ADMIN — IPRATROPIUM BROMIDE AND ALBUTEROL SULFATE 3 ML: 2.5; .5 SOLUTION RESPIRATORY (INHALATION) at 04:04

## 2025-04-26 RX ADMIN — Medication 1 PATCH: at 08:04

## 2025-04-26 RX ADMIN — BUPRENORPHINE AND NALOXONE 6 FILM: 2; .5 FILM BUCCAL; SUBLINGUAL at 08:04

## 2025-04-26 RX ADMIN — METHYLPREDNISOLONE SODIUM SUCCINATE 60 MG: 40 INJECTION, POWDER, FOR SOLUTION INTRAMUSCULAR; INTRAVENOUS at 05:04

## 2025-04-26 RX ADMIN — DOXYCYCLINE HYCLATE 100 MG: 100 TABLET, COATED ORAL at 08:04

## 2025-04-26 RX ADMIN — IPRATROPIUM BROMIDE AND ALBUTEROL SULFATE 3 ML: 2.5; .5 SOLUTION RESPIRATORY (INHALATION) at 12:04

## 2025-04-26 RX ADMIN — IPRATROPIUM BROMIDE AND ALBUTEROL SULFATE 3 ML: 2.5; .5 SOLUTION RESPIRATORY (INHALATION) at 07:04

## 2025-04-26 NOTE — ASSESSMENT & PLAN NOTE
Patient with Hypoxic and hypercapnic. Respiratory failure which is Acute on chronic.  she is on home oxygen at 3 LPM. Supplemental oxygen was provided and noted-      Signs/symptoms of respiratory failure include- wheezing. Contributing diagnoses includes - COPD Labs and images were reviewed. Patient Has not had a recent ABG. Will treat underlying causes and adjust management of respiratory failure as follows- now requiring 6L of o2 in the ED compared to baseline of 3L outpt    For billing clarification/conflict: this is both Hypoxemic and hypercapnic, acute on chronic. PCO2 70 with stage 4 COPD.

## 2025-04-26 NOTE — PLAN OF CARE
Case Management Final Discharge Note    Discharge Disposition: Home/Spouse    New DME ordered / company name: BI-Pap approved and delivered to patient bedside.    Relevant SDOH / Transition of Care Barriers:  none    Person available to provide assistance at home when needed and their contact information: Spouse?Ousmane Sr. [phone]    Scheduled followup appointment: Patient will schedule PCP appointment.    Referrals placed: Pulmonary referral faxed to [facility]. Hospital will call patient with date and Time.    Transportation: Spouse/ Ousmane. Sr. [phone]    Patient and family educated on discharge services and updated on DC plan. Bedside ERIC Burton notified, patient clear to discharge from Case Management Perspective.      04/26/25 1248   Final Note   Assessment Type Final Discharge Note   Anticipated Discharge Disposition Home   What phone number can be called within the next 1-3 days to see how you are doing after discharge? [phone]   Hospital Resources/Appts/Education Provided Appointments scheduled and added to AVS   Post-Acute Status   Discharge Delays None known at this time

## 2025-04-26 NOTE — PLAN OF CARE
CM sent secured message to [employer] in DME services, for patient Bi-Pap order.    Bi-Pap approved and delivered to bedside.

## 2025-04-26 NOTE — PLAN OF CARE
Problem: COPD (Chronic Obstructive Pulmonary Disease)  Goal: Optimal Chronic Illness Coping  Outcome: Adequate for Care Transition  Goal: Optimal Level of Functional [place]  Outcome: Adequate for Care Transition  Goal: Absence of Infection Signs and Symptoms  Outcome: Adequate for Care Transition  Goal: Improved Oral Intake  Outcome: Adequate for Care Transition  Goal: Effective Oxygenation and Ventilation  Outcome: Adequate for Care Transition     Problem: Adult Inpatient Plan of Care  Goal: Plan of Care Review  Outcome: Adequate for Care Transition  Goal: Patient-Specific Goal (Individualized)  Outcome: Adequate for Care Transition  Goal: Absence of Hospital-Acquired Illness or Injury  Outcome: Adequate for Care Transition  Goal: Optimal Comfort and Wellbeing  Outcome: Adequate for Care Transition  Goal: Readiness for Transition of Care  Outcome: Adequate for Care Transition     Problem: Skin Injury Risk Increased  Goal: Skin Health and Integrity  Outcome: Adequate for Care Transition

## 2025-04-26 NOTE — NURSING
[facility]  Nurses Note -- 4 Eyes      4/26/2025       Skin assessed on: Q Shift      [x] No Pressure Injuries Present    [x]Prevention Measures Documented    [] Yes LDA  for Pressure Injury Previously documented     [] Yes New Pressure Injury Discovered   [] LDA for New Pressure Injury Added      Attending RN:  Micheal Swain, RN     Second RN:  Lizette JOSEPH LPN

## 2025-04-26 NOTE — DISCHARGE INSTRUCTIONS
Steroid taper over 4 weeks sent to [facility] [address] ([facility] is closed)  Take prontonix while on steroids  Follow up PCP and Pulmonology    Thank you for trusting [facility] and me with your care.  We are honored that you entrusted us with your healthcare needs. Your satisfaction is very important to us and we hope you have been very pleased with your experience at [facility]. After your discharge you may receive a survey asking you to rate your hospital experience- Please help us by completing this survey. We hope that you have received the very best care possible during your hospitalization at [facility], as your satisfaction is our top priority.    Let me know if there is anything more I can do!!              Jabier Ross MD        Medical Director        Section Head of         Board-Certified IM Attending      PATIENT GENERAL DISCHARGE INFORMATION   Things that YOU are responsible for to Manage Your Care At Home:  1. Getting your prescriptions filled.  2. Taking you medications as directed. (DO NOT MISS ANY DOSES!)  3. Going to your follow-up doctor appointments.                 *This is important because it allows the doctor to monitor your progress and make changes.      If no one calls you for your appointments, please call ([phone]) and reschedule this appointment.   After discharge, if you need assistance, you can call [facility] On Call Nurse Care Line for 24/7 assistance at [phone]  If you are experience any signs or symptoms, Call your doctor or Call [phone] and come to your nearest Emergency Room.    You should receive a call from [facility] within 48-72 hours to help manage your care after discharge.   Please try to make sure that you answer your phone for this important phone call.

## 2025-04-26 NOTE — DISCHARGE SUMMARY
[facility] Medicine  Discharge Summary      Patient Name: Yasmeen Valderrama  MRN: [id number]  JAMAAL: [id number]  Patient Class: IP- Inpatient  Admission Date: 4/22/2025  Hospital Length of Stay: 4 days  Discharge Date and Time: 04/26/2025 8:00 AM  Attending Physician: Jabier Ross MD   Discharging Provider: Jabier Ross MD  Primary Care Provider: Leeanna Stuart MD    Primary Care Team: Networked reference to record PCT     HPI:   Yasmeen Valderrama 54 y.o. female with Copd on home oxygen, pulm HTN, tobacco abuse, presents to the hospital with a chief complaint of SOB.  She presents with increasing shortness breath over the last 2-3 days.  Her has been attempted treatment home with home albuterol inhalers without improvement.  Today he noticed she was increasingly lethargic on her home oxygen causing presentation in the emergency room.  She reports she has recently increased the number of cigarettes she smokes per day due to stress in his life.  She is compliant with her home 2 L of oxygen.  She has a cough that is nonproductive.  She denies fever chest pain nausea vomiting abdominal pain melena hematuria hematemesis dizziness and syncope.  She has not been requiring her home as needed diuretics at home she finds she has not been having lower extremity edema.    In the ED, hypoxic to 82% on home 3 L of oxygen COVID negative flu negative BNP negative chest x-ray with hypoventilatory exam coarsened interstitial lung markings new consolidation or pneumothorax.    * No surgery found *      Hospital Course:   Admitted with COPD exacerbation. AHHRF, but CO2 70 appears near her baseline with normal pH of 7.40. Suspect needs home bipap. After discussion, she was told she was ordered bipap but it never arrived. CM checking to see if we need to redo an ABG on her home 3L to qualify, or if we can just order. Still with significant wheezing and strained speech, will add back IV steroids.      Stage 4 COPD with PCO2 of 70 with a normal pH- as stated ^^ directly above if patient did not have home bipap, patient would benefit from home bipap.      Steroid taper over 4 weeks sent to [facility] ([facility] is closed)  Take prontonix while on steroids  Follow up PCP and Pulmonology    Thank you for trusting [facility] and me with your care.  We are honored that you entrusted us with your healthcare needs. Your satisfaction is very important to us and we hope you have been very pleased with your experience at [facility]. After your discharge you may receive a survey asking you to rate your hospital experience- Please help us by completing this survey. We hope that you have received the very best care possible during your hospitalization at [facility], as your satisfaction is our top priority.    Let me know if there is anything more I can do!!              Jabier Ross MD        Medical Director        Section Head of         Board-Certified IM Attending    ROS:  General: Negative for fevers or chills.  Cardiac: Negative for chest pain   GI: Negative for abdominal pain     Vitals:    04/26/25 0744 04/26/25 1116 04/26/25 1134 04/26/25 1142   BP: (!) 150/83 (!) 171/91 (!) 144/86    BP Location: Right arm Right arm Right arm    Patient Position: Lying Lying Lying    Pulse: 87 100  97   Resp: 18 18  18   Temp: 98.3 °F (36.8 °C) 98.1 °F (36.7 °C)     TempSrc: Oral Oral     SpO2: (!) 94% 96%  96%   Weight:       Height:              Body mass index is 31.16 kg/m².      PHYSICAL EXAM:  GENERAL APPEARANCE: alert and cooperative  HEENT:     HEAD: NC/AT  CARDIAC: There is no peripheral edema, cyanosis or pallor.   LUNGS: No wheezing, work of breathing normal  ABDOMEN: Non-distended. No guarding.  PSYCHIATRIC: No tangential speech. No Hyperactive features.      Goals of Care Treatment Preferences:  Code Status: Full Code      SDOH Screening:  The patient was screened for utility  difficulties, food insecurity, transport difficulties, housing insecurity, and interpersonal safety and there were no concerns identified this admission.     Consults:     Assessment & Plan  COPD exacerbation  Presents with wheezing, SOB, lethargy at home in setting of hypoxia on home oxygen. Suspect triggered by recent increase in tobacco abuse. Patient and family report increased smoking outpt. In the ED requiring 5 to 6L of O2 compared to 3L at home.   Started on steroids, duo-nebs, doxy  Wean oxygen  Smoking cessation  Goal O2 sat 88 to 92%      Patient's COPD is with exacerbation noted by continued dyspnea currently.  Patient is currently on COPD Pathway. Continue scheduled inhalers Steroids, Antibiotics, and Supplemental oxygen and monitor respiratory status closely.   Pulmonary hypertension  Appears euvolemic, denies complaints associated with fluid overload. Will use PRN diuresis as needed, but suspect SOB related to COPD and increased tobacco abuse. Previous PA pressure 46    Results for orders placed during the hospital encounter of 11/07/22    Echo    Interpretation Summary  · The left ventricle is normal in size with mild concentric hypertrophy and normal systolic function.  · The estimated ejection fraction is 55%.  · Moderate right ventricular enlargement with low normal right ventricular systolic function.  · There is right ventricular hypertrophy.  · The estimated PA systolic pressure is 46 mmHg.  · There is pulmonary hypertension.    Chronic pulmonary heart disease  As above  Tobacco abuse  Smoking more than 1ppd. Greater than 3min spent counseling patient on dangers of continued tobacco abuse. Will provide tobacco cessation prior to discharge, PRN nicotine patch.   Acute respiratory failure with hypoxia and hypercarbia  Patient with Hypoxic and hypercapnic. Respiratory failure which is Acute on chronic.  she is on home oxygen at 3 LPM. Supplemental oxygen was provided and noted-      Signs/symptoms  of respiratory failure include- wheezing. Contributing diagnoses includes - COPD Labs and images were reviewed. Patient Has not had a recent ABG. Will treat underlying causes and adjust management of respiratory failure as follows- now requiring 6L of o2 in the ED compared to baseline of 3L outpt    For billing clarification/conflict: this is both Hypoxemic and hypercapnic, acute on chronic. PCO2 70 with stage 4 COPD.    Final Active Diagnoses:    Diagnosis Date Noted POA    PRINCIPAL PROBLEM:  COPD exacerbation [J44.1] 06/28/2022 Yes    Acute respiratory failure with hypoxia and hypercarbia [J96.01, J96.02] 04/26/2025 Yes    Tobacco abuse [Z72.0] 11/07/2023 Yes    Chronic pulmonary heart disease [I27.9] 12/09/2022 Yes    Pulmonary hypertension [I27.20] 06/28/2022 Yes     Chronic      Problems Resolved During this Admission:       Discharged Condition: fair    Disposition: Home or Self Carehomer    Follow Up:   Follow-up Information       Leeanna Stuart MD. Call in 2 day(s).    Specialty: Family Medicine  Why: to schedule a hospital folow-up.  Contact information:  [address] [address]  [address]  [facility] [address]  [phone]               [facility] - Follow up.    Specialty: Pulmonary Disease  Why: Office will contact patient with date and time. Referral faxed to office staff.  Contact information:  [address] [facility] [address]  [phone]                           Patient Instructions:      BIPAP FOR HOME USE   Order Comments: 4L continuous O2 required     Order Specific Question Answer Comments   Length of need (1-99 months): 99    Type:  BiPap    BiPap setting (cmH20) Inspiratory: 15    BiPap setting (cmH20) Expiratory: 5    Humidification (): Heated    Choose ONE mask type and its corresponding cushions and/or pillows:  Full Face Mask, 1 per 90 days:  Full Face Cushion, (3 per 90 days)    Choose EITHER Heated or Non-Heated Tubjing  Heated  Tubing, 1 per 6 months    All other supplies as needed as listed below:  Headgear, 1 per 180 days    All other supplies as needed as listed below:  Disposable Filter, 6 per 90 days    All other supplies as needed as listed below:  Exhalation Port, contact payer for quantity/frequency    All other supplies as needed as listed below:  Humidifier Chamber, 1 per 180 days    DME Agency: [facility]      Ambulatory referral/consult to Pulmonology   Standing Status: Future   Referral Priority: Routine Referral Type: Consultation   Referral Reason: Specialty Services Required   Requested Specialty: Pulmonary Disease   Number of Visits Requested: 1     Ambulatory referral/consult to Internal Medicine   Standing Status: Future   Referral Priority: Routine Referral Type: Consultation   Referral Reason: Specialty Services Required   Requested Specialty: Internal Medicine   Number of Visits Requested: 1       Significant Diagnostic Studies:   Recent Results (from the past 100 hours)   Comprehensive metabolic panel    Collection Time: 04/22/25 10:44 AM   Result Value Ref Range    Sodium 139 136 - 145 mmol/L    Potassium 4.6 3.5 - 5.1 mmol/L    Chloride 93 (L) 95 - 110 mmol/L    CO2 37 (H) 23 - 29 mmol/L    Glucose 110 70 - 110 mg/dL    BUN 13 6 - 20 mg/dL    Creatinine 0.6 0.5 - 1.4 mg/dL    Calcium 9.7 8.7 - 10.5 mg/dL    Protein Total 7.5 6.0 - 8.4 gm/dL    Albumin 4.0 3.5 - 5.2 g/dL    Bilirubin Total 1.2 (H) 0.1 - 1.0 mg/dL     40 - 150 unit/L    AST 15 11 - 45 unit/L    ALT 13 10 - 44 unit/L    Anion Gap 9 8 - 16 mmol/L    eGFR >60 >60 mL/min/1.73/m2   Troponin I    Collection Time: 04/22/25 10:44 AM   Result Value Ref Range    Troponin-I <0.006 <=0.026 ng/mL   Brain natriuretic peptide    Collection Time: 04/22/25 10:44 AM   Result Value Ref Range    BNP 98 0 - 99 pg/mL   CBC with Differential    Collection Time: 04/22/25 10:44 AM   Result Value Ref Range    WBC 8.48 3.90 -  12.70 K/uL    RBC 4.74 4.00 - 5.40 M/uL    HGB 13.2 12.0 - 16.0 gm/dL    HCT 45.0 37.0 - 48.5 %    MCV 95 82 - 98 fL    MCH 27.8 27.0 - 31.0 pg    MCHC 29.3 (L) 32.0 - 36.0 g/dL    RDW 14.0 11.5 - 14.5 %    Platelet Count 254 150 - 450 K/uL    MPV 10.5 9.2 - 12.9 fL    Nucleated RBC 0 <=0 /100 WBC    Neut % 83.0 (H) 38 - 73 %    Lymph % 12.4 (L) 18 - 48 %    Mono % 2.9 (L) 4 - 15 %    Eos % 0.7 <=8 %    Basophil % 0.6 <=1.9 %    Imm Grans % 0.4 0.0 - 0.5 %    Neut # 7.04 1.8 - 7.7 K/uL    Lymph # 1.05 1 - 4.8 K/uL    Mono # 0.25 (L) 0.3 - 1 K/uL    Eos # 0.06 <=0.5 K/uL    Baso # 0.05 <=0.2 K/uL    Imm Grans # 0.03 0.00 - 0.04 K/uL   POCT Influenza A/B Molecular    Collection Time: 04/22/25 11:03 AM   Result Value Ref Range    POC Molecular Influenza A Ag Negative Negative    POC Molecular Influenza B Ag Negative Negative     Acceptable Yes    POCT COVID-19 Rapid Screening    Collection Time: 04/22/25 11:03 AM   Result Value Ref Range    POC Rapid COVID Negative Negative     Acceptable Yes    ISTAT PROCEDURE    Collection Time: 04/22/25  3:37 PM   Result Value Ref Range    POC PH 7.400 7.35 - 7.45    POC PCO2 71.4 (HH) 35 - 45 mmHg    POC PO2 46 (LL) 80 - 100 mmHg    POC HCO3 44.3 (H) 24 - 28 mmol/L    POC BE 15 (H) -2 to 2 mmol/L    POC SATURATED O2 79 95 - 100 %    POC TCO2 46 (H) 23 - 27 mmol/L    Sample ARTERIAL     Site LR     Allens Test Pass     DelSys Nasal Can    EKG 12-lead    Collection Time: 04/22/25 10:29 PM   Result Value Ref Range    QRS Duration 104 ms    [facility] QTC Calculation 427 ms   Troponin I    Collection Time: 04/22/25 11:03 PM   Result Value Ref Range    Troponin-I <0.006 <=0.026 ng/mL   Lavender Top Hold    Collection Time: 04/22/25 11:03 PM   Result Value Ref Range    Extra Tube Hold for add-ons.    Gold Top Hold    Collection Time: 04/22/25 11:03 PM   Result Value Ref Range    Extra Tube Hold for add-ons.    Basic metabolic panel    Collection Time: 04/23/25   4:21 AM   Result Value Ref Range    Sodium 141 136 - 145 mmol/L    Potassium 4.2 3.5 - 5.1 mmol/L    Chloride 95 95 - 110 mmol/L    CO2 39 (H) 23 - 29 mmol/L    Glucose 111 (H) 70 - 110 mg/dL    BUN 20 6 - 20 mg/dL    Creatinine 0.6 0.5 - 1.4 mg/dL    Calcium 9.6 8.7 - 10.5 mg/dL    Anion Gap 7 (L) 8 - 16 mmol/L    eGFR >60 >60 mL/min/1.73/m2   Magnesium    Collection Time: 04/23/25  4:21 AM   Result Value Ref Range    Magnesium  1.8 1.6 - 2.6 mg/dL   Phosphorus    Collection Time: 04/23/25  4:21 AM   Result Value Ref Range    Phosphorus Level 3.5 2.7 - 4.5 mg/dL   CBC with Differential    Collection Time: 04/23/25  4:21 AM   Result Value Ref Range    WBC 21.53 (H) 3.90 - 12.70 K/uL    RBC 4.65 4.00 - 5.40 M/uL    HGB 13.1 12.0 - 16.0 gm/dL    HCT 43.7 37.0 - 48.5 %    MCV 94 82 - 98 fL    MCH 28.2 27.0 - 31.0 pg    MCHC 30.0 (L) 32.0 - 36.0 g/dL    RDW 14.2 11.5 - 14.5 %    Platelet Count 282 150 - 450 K/uL    MPV 10.7 9.2 - 12.9 fL    Nucleated RBC 0 <=0 /100 WBC    Neut % 81.8 (H) 38 - 73 %    Lymph % 10.2 (L) 18 - 48 %    Mono % 7.4 4 - 15 %    Eos % 0.0 <=8 %    Basophil % 0.1 <=1.9 %    Imm Grans % 0.5 0.0 - 0.5 %    Neut # 17.61 (H) 1.8 - 7.7 K/uL    Lymph # 2.19 1 - 4.8 K/uL    Mono # 1.59 (H) 0.3 - 1 K/uL    Eos # 0.01 <=0.5 K/uL    Baso # 0.03 <=0.2 K/uL    Imm Grans # 0.10 (H) 0.00 - 0.04 K/uL   Basic metabolic panel    Collection Time: 04/24/25  4:16 AM   Result Value Ref Range    Sodium 144 136 - 145 mmol/L    Potassium 5.6 (H) 3.5 - 5.1 mmol/L    Chloride 98 95 - 110 mmol/L    CO2 37 (H) 23 - 29 mmol/L    Glucose 139 (H) 70 - 110 mg/dL    BUN 26 (H) 6 - 20 mg/dL    Creatinine 0.7 0.5 - 1.4 mg/dL    Calcium 10.1 8.7 - 10.5 mg/dL    Anion Gap 9 8 - 16 mmol/L    eGFR >60 >60 mL/min/1.73/m2   Magnesium    Collection Time: 04/24/25  4:16 AM   Result Value Ref Range    Magnesium  2.1 1.6 - 2.6 mg/dL   Phosphorus    Collection Time: 04/24/25  4:16 AM   Result Value Ref Range    Phosphorus Level 3.7 2.7 - 4.5  mg/dL   CBC with Differential    Collection Time: 04/24/25  4:16 AM   Result Value Ref Range    WBC 17.64 (H) 3.90 - 12.70 K/uL    RBC 4.87 4.00 - 5.40 M/uL    HGB 13.5 12.0 - 16.0 gm/dL    HCT 46.3 37.0 - 48.5 %    MCV 95 82 - 98 fL    MCH 27.7 27.0 - 31.0 pg    MCHC 29.2 (L) 32.0 - 36.0 g/dL    RDW 14.6 (H) 11.5 - 14.5 %    Platelet Count 282 150 - 450 K/uL    MPV 11.5 9.2 - 12.9 fL    Nucleated RBC 0 <=0 /100 WBC    Neut % 88.9 (H) 38 - 73 %    Lymph % 8.5 (L) 18 - 48 %    Mono % 1.9 (L) 4 - 15 %    Eos % 0.0 <=8 %    Basophil % 0.1 <=1.9 %    Imm Grans % 0.6 (H) 0.0 - 0.5 %    Neut # 15.68 (H) 1.8 - 7.7 K/uL    Lymph # 1.50 1 - 4.8 K/uL    Mono # 0.33 0.3 - 1 K/uL    Eos # 0.00 <=0.5 K/uL    Baso # 0.02 <=0.2 K/uL    Imm Grans # 0.11 (H) 0.00 - 0.04 K/uL   Basic metabolic panel    Collection Time: 04/25/25  4:52 AM   Result Value Ref Range    Sodium 138 136 - 145 mmol/L    Potassium 4.6 3.5 - 5.1 mmol/L    Chloride 96 95 - 110 mmol/L    CO2 35 (H) 23 - 29 mmol/L    Glucose 139 (H) 70 - 110 mg/dL    BUN 25 (H) 6 - 20 mg/dL    Creatinine 0.6 0.5 - 1.4 mg/dL    Calcium 9.8 8.7 - 10.5 mg/dL    Anion Gap 7 (L) 8 - 16 mmol/L    eGFR >60 >60 mL/min/1.73/m2   Magnesium    Collection Time: 04/25/25  4:52 AM   Result Value Ref Range    Magnesium  2.1 1.6 - 2.6 mg/dL   Phosphorus    Collection Time: 04/25/25  4:52 AM   Result Value Ref Range    Phosphorus Level 3.6 2.7 - 4.5 mg/dL   CBC with Differential    Collection Time: 04/25/25  4:52 AM   Result Value Ref Range    WBC 16.24 (H) 3.90 - 12.70 K/uL    RBC 4.58 4.00 - 5.40 M/uL    HGB 13.0 12.0 - 16.0 gm/dL    HCT 42.4 37.0 - 48.5 %    MCV 93 82 - 98 fL    MCH 28.4 27.0 - 31.0 pg    MCHC 30.7 (L) 32.0 - 36.0 g/dL    RDW 14.7 (H) 11.5 - 14.5 %    Platelet Count 258 150 - 450 K/uL    MPV 11.4 9.2 - 12.9 fL    Nucleated RBC 0 <=0 /100 WBC    Neut % 88.6 (H) 38 - 73 %    Lymph % 7.9 (L) 18 - 48 %    Mono % 2.5 (L) 4 - 15 %    Eos % 0.0 <=8 %    Basophil % 0.1 <=1.9 %    Imm  Grans % 0.9 (H) 0.0 - 0.5 %    Neut # 14.40 (H) 1.8 - 7.7 K/uL    Lymph # 1.29 1 - 4.8 K/uL    Mono # 0.40 0.3 - 1 K/uL    Eos # 0.00 <=0.5 K/uL    Baso # 0.01 <=0.2 K/uL    Imm Grans # 0.14 (H) 0.00 - 0.04 K/uL       Microbiology Results (last 7 days)       ** No results found for the last 168 hours. **            Imaging Results              X-Ray Chest AP Portable (Final result)  Result time 04/22/25 10:37:42      Final result by Brayden Cardoso MD (04/22/25 10:37:42)                   Impression:      Please see above.      Electronically signed by: Brayden Cardoso  Date:    04/22/2025  Time:    10:37               Narrative:    EXAMINATION:  XR CHEST AP PORTABLE    CLINICAL HISTORY:  CHF;    TECHNIQUE:  Single frontal view of the chest was performed.    COMPARISON:  Chest radiograph 05/14/2020    FINDINGS:  Cardiomediastinal silhouette is unchanged in size.  Mediastinal structures are midline.    Hypoventilatory exam with suspected bibasilar atelectasis.  Coarsened interstitial lung markings with probable trace pleural effusions.  No new consolidation or pneumothorax.    Osseous structures demonstrate no evidence for acute fracture or osseous destructive lesion.  Degenerative change.    No free intra-abdominal air.  Soft tissues are unremarkable.                                          Pending Diagnostic Studies:       None           Medications:  Reconciled Home Medications:      Medication List        START taking these medications      predniSONE 20 MG tablet  Commonly known as: DELTASONE  Take 3 tablets (60 mg total) by mouth once daily for 7 days, THEN 2 tablets (40 mg total) once daily for 7 days, THEN 1 tablet (20 mg total) once daily for 7 days, THEN 0.5 tablets (10 mg total) once daily for 7 days.  Start taking on: April 26, 2025            CHANGE how you take these medications      pantoprazole 40 MG tablet  Commonly known as: PROTONIX  Take 1 tablet (40 mg total) by mouth once daily.  What changed: when  to take this            CONTINUE taking these medications      albuterol 90 mcg/actuation inhaler  Commonly known as: PROVENTIL/VENTOLIN HFA  Inhale 2 puffs into the lungs every 6 (six) hours as needed.     albuterol-ipratropium 2.5 mg-0.5 mg/3 mL nebulizer solution  Commonly known as: DUO-NEB  Take 3 mLs by nebulization every 4 (four) hours as needed for Wheezing. Rescue     aluminum & magnesium hydroxide-simethicone 400-400-40 mg/5 mL suspension  Commonly known as: MYLANTA MAXIMUM STRENGTH  Take 15 mLs by mouth every 4 (four) hours as needed for Indigestion.     buprenorphine-naloxone 2-0.5 mg 2-0.5 mg Subl  Commonly known as: SUBOXONE  Place 12 mg under the tongue 2 (two) times a day.     fluocinolone acetonide oiL 0.01 % Drop  Place 5 drops into both ears 2 (two) times daily.     fluticasone-umeclidin-vilanter 200-62.5-25 mcg inhaler  Commonly known as: TRELEGY ELLIPTA  Trelegy Ellipta 200 mcg-62.5 mcg-25 mcg powder for inhalation   INHALE 1 PUFF(S) BY MOUTH into the lungs ONCE A DAY     furosemide 40 MG tablet  Commonly known as: LASIX  Take 40 mg by mouth 2 (two) times daily as needed.     gabapentin 100 MG capsule  Commonly known as: NEURONTIN  Take 1 capsule (100 mg total) by mouth 2 (two) times daily.     LINZESS 72 mcg Cap capsule  Generic drug: linaCLOtide  Take 72 mcg by mouth every morning.     metOLazone 5 MG tablet  Commonly known as: ZAROXOLYN  TAKE 1/2 (one-half) TABLET(S) BY MOUTH EVERY OTHER DAY            STOP taking these medications      potassium chloride SA 20 MEQ tablet  Commonly known as: K-DUR,KLOR-CON            ASK your doctor about these medications      olopatadine 0.1 % ophthalmic solution  Commonly known as: PATANOL  SMARTSI Drop(s) In Eye(s) Twice Daily PRN              Indwelling Lines/Drains at time of discharge:   Lines/Drains/Airways       None                   Time spent on the discharge of patient: 35 minutes         Jabier Ross MD  Department of [facility]  [place]  [facility] - Telemetry

## 2025-05-05 ENCOUNTER — OFFICE VISIT (OUTPATIENT)
Dept: PULMONOLOGY | Facility: CLINIC | Age: 55
End: 2025-05-05
Payer: MEDICAID

## 2025-05-05 VITALS
BODY MASS INDEX: 33.66 KG/M2 | SYSTOLIC BLOOD PRESSURE: 143 MMHG | WEIGHT: 190 LBS | DIASTOLIC BLOOD PRESSURE: 90 MMHG | OXYGEN SATURATION: 93 % | HEIGHT: 63 IN | HEART RATE: 108 BPM

## 2025-05-05 DIAGNOSIS — R13.19 ESOPHAGEAL DYSPHAGIA: ICD-10-CM

## 2025-05-05 DIAGNOSIS — J84.9 INTERSTITIAL PULMONARY DISEASE, UNSPECIFIED: ICD-10-CM

## 2025-05-05 DIAGNOSIS — J44.9 OSA AND COPD OVERLAP SYNDROME: ICD-10-CM

## 2025-05-05 DIAGNOSIS — J44.1 COPD EXACERBATION: ICD-10-CM

## 2025-05-05 DIAGNOSIS — J44.9 STAGE 4 VERY SEVERE COPD BY GOLD CLASSIFICATION: ICD-10-CM

## 2025-05-05 DIAGNOSIS — I27.20 PULMONARY HYPERTENSION: ICD-10-CM

## 2025-05-05 DIAGNOSIS — J21.9 BRONCHIOLITIS: ICD-10-CM

## 2025-05-05 DIAGNOSIS — J44.9 STAGE 4 VERY SEVERE COPD BY GOLD CLASSIFICATION: Chronic | ICD-10-CM

## 2025-05-05 DIAGNOSIS — G47.33 OSA AND COPD OVERLAP SYNDROME: ICD-10-CM

## 2025-05-05 DIAGNOSIS — J96.12 CHRONIC HYPERCAPNIC RESPIRATORY FAILURE: Primary | ICD-10-CM

## 2025-05-05 PROCEDURE — 99999 PR PBB SHADOW E&M-EST. PATIENT-LVL V: CPT | Mod: PBBFAC,,, | Performed by: INTERNAL MEDICINE

## 2025-05-05 PROCEDURE — 99215 OFFICE O/P EST HI 40 MIN: CPT | Mod: S$PBB,,, | Performed by: INTERNAL MEDICINE

## 2025-05-05 PROCEDURE — 1160F RVW MEDS BY RX/DR IN RCRD: CPT | Mod: CPTII,,, | Performed by: INTERNAL MEDICINE

## 2025-05-05 PROCEDURE — 3008F BODY MASS INDEX DOCD: CPT | Mod: CPTII,,, | Performed by: INTERNAL MEDICINE

## 2025-05-05 PROCEDURE — 1159F MED LIST DOCD IN RCRD: CPT | Mod: CPTII,,, | Performed by: INTERNAL MEDICINE

## 2025-05-05 PROCEDURE — 3077F SYST BP >= 140 MM HG: CPT | Mod: CPTII,,, | Performed by: INTERNAL MEDICINE

## 2025-05-05 PROCEDURE — 3080F DIAST BP >= 90 MM HG: CPT | Mod: CPTII,,, | Performed by: INTERNAL MEDICINE

## 2025-05-05 PROCEDURE — 99215 OFFICE O/P EST HI 40 MIN: CPT | Mod: PBBFAC | Performed by: INTERNAL MEDICINE

## 2025-05-05 PROCEDURE — 1111F DSCHRG MED/CURRENT MED MERGE: CPT | Mod: CPTII,,, | Performed by: INTERNAL MEDICINE

## 2025-05-05 RX ORDER — ROFLUMILAST 250 UG/1
TABLET ORAL
Qty: 84 TABLET | Refills: 0 | Status: SHIPPED | OUTPATIENT
Start: 2025-05-05 | End: 2025-05-06 | Stop reason: SDUPTHER

## 2025-05-05 RX ORDER — BUDESONIDE 0.5 MG/2ML
1 INHALANT ORAL 2 TIMES DAILY
Qty: 120 ML | Refills: 5 | Status: CANCELLED | OUTPATIENT
Start: 2025-05-05 | End: 2026-05-05

## 2025-05-05 RX ORDER — FORMOTEROL FUMARATE 20 UG/2ML
20 SOLUTION RESPIRATORY (INHALATION) 2 TIMES DAILY
Qty: 120 ML | Refills: 11 | Status: CANCELLED | OUTPATIENT
Start: 2025-05-05 | End: 2026-05-05

## 2025-05-05 RX ORDER — ALBUTEROL SULFATE 90 UG/1
2 INHALANT RESPIRATORY (INHALATION) EVERY 6 HOURS PRN
Qty: 18 G | Refills: 11 | Status: SHIPPED | OUTPATIENT
Start: 2025-05-05 | End: 2026-05-05

## 2025-05-05 RX ORDER — IPRATROPIUM BROMIDE AND ALBUTEROL SULFATE 2.5; .5 MG/3ML; MG/3ML
3 SOLUTION RESPIRATORY (INHALATION) EVERY 4 HOURS PRN
Qty: 1620 ML | Refills: 5 | Status: SHIPPED | OUTPATIENT
Start: 2025-05-05

## 2025-05-05 NOTE — PATIENT INSTRUCTIONS
We will order lung function testing, walk test, swallow test and blood work for you. I would like to get a new CT chest    We will do a blood tests to assess for rheumatologic disease    It is very important to use your oxygen and to bipap. You can have a respiratory or cardiac arrest if not. Do not smoke with your oxygen on as oxygen is flammable and you can catch on fire.      I am prescribing you trelegy to help with your breathing. Please take 1 puff in the morning very day.  Please ensure your gargle after each use. There are little to no immediate side effects with this medication as there is minimal absorption to the blood. A low grade yeast infection in the mouth called thrush can develop if you do not rinse/gargle your mouth after use    common side effects include dry mouth, palpitations, headache    I am also prescribing an albuterol inhaler to take AS NEEDED for shortness of breath above your baseline. This medication can increase your heart rate. If using for more than 2 times per day, please let me know.    Please watch a video on appropriate use of your inhaler as it is crucial that is delivered to your airways (and not the back of your mouth to be effective.)    Videos:    How to use a meter-dosed inhaler: https://www.[employer].com/watch?v=9ipqxF-4p5g    How to use a spacer (Aerochamber) with meter-dosed inhaler: https://www.[employer].com/watch?v=ltB-ZOjlkgY    How to use an ellipta inhaler: https://youtu.be/Nx_JDTcmSeo      For General information on Inhalers:    https://www.[employer].org/conditions/medications/asthma-medications/devices      Continue albuterol inhaler and nebulizer as needed    I will like to start you on roflumilast (daliresp). It has been shown to stabilize lung function if not modestly improve, and can also reduced exacerbations of your COPD. It will be an upramp in dosing as follows    250 mcg (1 tablet) daily for 28 days followed by  500 mcg ( 2 tablets) daily moving  forward    Side effects include  - headache  - weight loss  - diarrhea  - worsening depression    Please let me know if you experience the following symptoms.    I recommend pulmonary rehabilitation. In COPD, it has been shown to    Decrease breathlessness  Increase exercise capacity  Decrease hospitalizations and duration of hospitalization  Imrpve recovery after exacerbation  Decrease anxiety and depression   Help train respiratory muscles    I will refer you to [facility]  Phone [phone]  Fax: [phone]
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
## 2025-05-06 DIAGNOSIS — J44.9 STAGE 4 VERY SEVERE COPD BY GOLD CLASSIFICATION: Chronic | ICD-10-CM

## 2025-05-06 RX ORDER — ROFLUMILAST 250 UG/1
TABLET ORAL
Qty: 84 TABLET | Refills: 0 | Status: SHIPPED | OUTPATIENT
Start: 2025-05-06 | End: 2025-07-01

## 2025-05-19 ENCOUNTER — PATIENT MESSAGE (OUTPATIENT)
Dept: PULMONOLOGY | Facility: CLINIC | Age: 55
End: 2025-05-19
Payer: MEDICAID

## 2025-05-19 ENCOUNTER — HOSPITAL ENCOUNTER (OUTPATIENT)
Dept: RADIOLOGY | Facility: HOSPITAL | Age: 55
Discharge: HOME | End: 2025-05-19
Attending: INTERNAL MEDICINE
Payer: MEDICAID

## 2025-05-19 ENCOUNTER — HOSPITAL ENCOUNTER (OUTPATIENT)
Dept: RESPIRATORY THERAPY | Facility: HOSPITAL | Age: 55
Discharge: HOME | End: 2025-05-19
Attending: INTERNAL MEDICINE
Payer: MEDICAID

## 2025-05-19 VITALS — RESPIRATION RATE: 20 BRPM | HEART RATE: 88 BPM | OXYGEN SATURATION: 99 %

## 2025-05-19 DIAGNOSIS — J44.9 STAGE 4 VERY SEVERE COPD BY GOLD CLASSIFICATION: Primary | ICD-10-CM

## 2025-05-19 DIAGNOSIS — J96.12 CHRONIC HYPERCAPNIC RESPIRATORY FAILURE: ICD-10-CM

## 2025-05-19 DIAGNOSIS — R13.19 ESOPHAGEAL DYSPHAGIA: ICD-10-CM

## 2025-05-19 DIAGNOSIS — J84.9 INTERSTITIAL PULMONARY DISEASE, UNSPECIFIED: ICD-10-CM

## 2025-05-19 LAB
BRPFT: NORMAL
BRPFT: NORMAL
DLCO ADJ PRE: 11.11 ML/(MIN*MMHG)
DLCO SINGLE BREATH LLN: 16.97
DLCO SINGLE BREATH PRE REF: 48.3 %
DLCO SINGLE BREATH REF: 22.71
DLCOC SBVA LLN: 3.2
DLCOC SBVA PRE REF: 86.4 %
DLCOC SBVA REF: 4.76
DLCOC SINGLE BREATH LLN: 16.97
DLCOC SINGLE BREATH PRE REF: 48.9 %
DLCOC SINGLE BREATH REF: 22.71
DLCOVA LLN: 3.2
DLCOVA PRE REF: 85.3 %
DLCOVA PRE: 4.06 ML/(MIN*MMHG*L)
DLCOVA REF: 4.76
DLVAADJ PRE: 4.11 ML/(MIN*MMHG*L)
ERVN2 LLN: -16449.15
ERVN2 PRE REF: 50.7 %
ERVN2 PRE: 0.43 L
ERVN2 REF: 0.85
FEF 25 75 CHG: 0 %
FEF 25 75 LLN: 1.62
FEF 25 75 POST REF: 8.5 %
FEF 25 75 PRE REF: 8.5 %
FEF 25 75 REF: 3.02
FET100 CHG: 27.6 %
FEV1 CHG: 5.1 %
FEV1 FVC CHG: 5.4 %
FEV1 FVC LLN: 68
FEV1 FVC POST REF: 52 %
FEV1 FVC PRE REF: 49.3 %
FEV1 FVC REF: 80
FEV1 LLN: 1.91
FEV1 POST REF: 27.8 %
FEV1 PRE REF: 26.4 %
FEV1 REF: 2.5
FRCN2 LLN: 1.82
FRCN2 PRE REF: 82.2 %
FRCN2 REF: 2.64
FVC CHG: -0.2 %
FVC LLN: 2.41
FVC POST REF: 53.2 %
FVC PRE REF: 53.3 %
FVC REF: 3.15
IVC PRE: 1.39 L
IVC SINGLE BREATH LLN: 2.41
IVC SINGLE BREATH PRE REF: 44.1 %
IVC SINGLE BREATH REF: 3.15
PEF CHG: 25.6 %
PEF LLN: 4.68
PEF POST REF: 43.8 %
PEF PRE REF: 34.9 %
PEF REF: 6.34
POST FEF 25 75: 0.26 L/S
POST FET 100: 10.43 SEC
POST FEV1 FVC: 41.49 %
POST FEV1: 0.69 L
POST FVC: 1.68 L
POST PEF: 2.78 L/S
PRE DLCO: 10.97 ML/(MIN*MMHG)
PRE FEF 25 75: 0.26 L/S
PRE FET 100: 8.18 SEC
PRE FEV1 FVC: 39.37 %
PRE FEV1: 0.66 L
PRE FRC N2: 2.17 L
PRE FVC: 1.68 L
PRE PEF: 2.21 L/S
RVN2 LLN: 1.22
RVN2 PRE REF: 97.1 %
RVN2 PRE: 1.74 L
RVN2 REF: 1.79
RVN2TLCN2 LLN: 28.41
RVN2TLCN2 PRE REF: 139.2 %
RVN2TLCN2 PRE: 52.89 %
RVN2TLCN2 REF: 38
TLCN2 LLN: 3.78
TLCN2 PRE REF: 69 %
TLCN2 PRE: 3.29 L
TLCN2 REF: 4.77
VA PRE: 2.71 L
VA SINGLE BREATH LLN: 4.62
VA SINGLE BREATH PRE REF: 58.6 %
VA SINGLE BREATH REF: 4.62
VCMAXN2 LLN: 2.41
VCMAXN2 PRE REF: 49.2 %
VCMAXN2 PRE: 1.55 L
VCMAXN2 REF: 3.15

## 2025-05-19 PROCEDURE — 94618 PULMONARY STRESS TESTING: CPT

## 2025-05-19 PROCEDURE — A9698 NON-RAD CONTRAST MATERIALNOC: HCPCS | Performed by: INTERNAL MEDICINE

## 2025-05-19 PROCEDURE — 71250 CT THORAX DX C-: CPT | Mod: TC

## 2025-05-19 PROCEDURE — 74220 X-RAY XM ESOPHAGUS 1CNTRST: CPT | Mod: 26,,, | Performed by: RADIOLOGY

## 2025-05-19 PROCEDURE — 94727 GAS DIL/WSHOT DETER LNG VOL: CPT | Mod: 26,,, | Performed by: INTERNAL MEDICINE

## 2025-05-19 PROCEDURE — 25500020 PHARM REV CODE 255: Performed by: INTERNAL MEDICINE

## 2025-05-19 PROCEDURE — 94200 LUNG FUNCTION TEST (MBC/MVV): CPT

## 2025-05-19 PROCEDURE — 94729 DIFFUSING CAPACITY: CPT

## 2025-05-19 PROCEDURE — 94729 DIFFUSING CAPACITY: CPT | Mod: 26,,, | Performed by: INTERNAL MEDICINE

## 2025-05-19 PROCEDURE — 71250 CT THORAX DX C-: CPT | Mod: 26,,, | Performed by: RADIOLOGY

## 2025-05-19 PROCEDURE — 94618 PULMONARY STRESS TESTING: CPT | Mod: 26,,, | Performed by: INTERNAL MEDICINE

## 2025-05-19 PROCEDURE — 94060 EVALUATION OF WHEEZING: CPT | Mod: 26,59,, | Performed by: INTERNAL MEDICINE

## 2025-05-19 PROCEDURE — 25000242 PHARM REV CODE 250 ALT 637 W/ HCPCS: Performed by: INTERNAL MEDICINE

## 2025-05-19 PROCEDURE — 74220 X-RAY XM ESOPHAGUS 1CNTRST: CPT | Mod: TC

## 2025-05-19 RX ORDER — ALBUTEROL SULFATE 2.5 MG/.5ML
2.5 SOLUTION RESPIRATORY (INHALATION) ONCE
Status: COMPLETED | OUTPATIENT
Start: 2025-05-19 | End: 2025-05-19

## 2025-05-19 RX ADMIN — ALBUTEROL SULFATE 2.5 MG: 2.5 SOLUTION RESPIRATORY (INHALATION) at 09:05

## 2025-05-19 RX ADMIN — BARIUM SULFATE 255 ML: 0.6 SUSPENSION ORAL at 10:05

## 2025-05-22 ENCOUNTER — RESULTS FOLLOW-UP (OUTPATIENT)
Dept: TRANSPLANT | Facility: CLINIC | Age: 55
End: 2025-05-22

## 2025-07-09 ENCOUNTER — CLINICAL SUPPORT (OUTPATIENT)
Dept: REHABILITATION | Facility: HOSPITAL | Age: 55
End: 2025-07-09
Attending: INTERNAL MEDICINE
Payer: MEDICAID

## 2025-07-09 DIAGNOSIS — Z74.09 IMPAIRED MOBILITY AND ACTIVITIES OF DAILY LIVING: Primary | ICD-10-CM

## 2025-07-09 DIAGNOSIS — Z78.9 IMPAIRED MOBILITY AND ACTIVITIES OF DAILY LIVING: Primary | ICD-10-CM

## 2025-07-09 PROCEDURE — 97162 PT EVAL MOD COMPLEX 30 MIN: CPT | Mod: PN | Performed by: PHYSICAL THERAPIST

## 2025-07-09 NOTE — PROGRESS NOTES
"[facility]  Physical Therapy  Functional Mobility and Wheelchair Assessment    Date: 7/9/2025   Name: Yasmeen Valderrama  Clinic Number: [id number]    Therapy Diagnosis:   Encounter Diagnosis   Name Primary?    Impaired mobility and activities of daily living Yes     Physician: Leeanna Stuart*    Physician Orders: PT Eval and Treat Wheelchair Evaluation    Medical Diagnosis from Referral: Z74.09 (ICD-10-CM) - Limited mobility   Evaluation Date: 7/9/2025  Authorization Period Expiration: 5/30/2026  Plan of Care Expiration: 7/9/2025  Evaluation Only  Visit # / Visits authorized: 1 / 1    Time In: 1437  Time Out: 1515  Total Billable Time: 38 minutes    Precautions: Standard and 4L O2 NC    Subjective   Date of onset: 4/22/25  History of current condition - Yasmeen was referred by Dr. Stuart for a functional mobility and custom wheelchair assessment due to impaired mobility. This problem began ~4/22/25 and patient had a short hospital stay to address shortness of breath. Patient reports noting increased trouble with getting out of car and walking to medical appointments; patient has been having to cancel appointments due to this. Patient is currently on 4L of oxygen via NC. Supplement oxygen was just increased 3-4 months ago when patient was in the hospital. Patient reports oxygen use for > 1 yr. Patient reports sometimes hands "lock up". Patient is not performing much ambulation at home due to pain. Has to sit to perform cooking and other activities of daily living. Patient reports "episodes" of sleeping for days (not even getting up to use restroom) due to lack of oxygen. In these scenarios her  wakes her for toileting. PMHx: COPD, carpel tunnel with surgery bilaterally, HTN, arthritis, chronic back and neck pain, crushed vertebrae in back, sleep apnea  Prior Therapy: none, patient reports she was unable to participate a few years ago when referred due to shortness of breath " "    Medical History:   Past Medical History:   Diagnosis Date    Anxiety     Asthma     Carpal tunnel syndrome, bilateral     COPD (chronic obstructive pulmonary disease)     Heavy cigarette smoker     9/14/22 2PPD    Laryngeal papilloma     On home oxygen therapy     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    Right side with CT scan Referred to ENT for visualization       Surgical History:   Yasmeen Valderrama  has a past surgical history that includes Hysterectomy; Finger surgery; Carpal tunnel release (Bilateral); Oophorectomy (1996); and Esophagogastroduodenoscopy (N/A, 9/27/2023).    Medications:   Yasmeen has a current medication list which includes the following prescription(s): albuterol, albuterol-ipratropium, aluminum & magnesium hydroxide-simethicone, buprenorphine-naloxone 2-0.5 mg, fluocinolone acetonide oil, fluticasone-umeclidin-vilanter, furosemide, gabapentin, linzess, metolazone, olopatadine, and pantoprazole.    Allergies:   Review of patient's allergies indicates:   Allergen Reactions    Antivert [meclizine] Other (See Comments)     Behavioral changes        Patient height: 5'3"  Patient weight:   Wt Readings from Last 1 Encounters:   05/05/25 86.2 kg (190 lb)      Is this a progressive disease? Yes  Any recent weight changes or trends? No  Relevant future surgeries: No  Cardio status: intact  Respiratory status: impaired - severe   Does this patient have an amputation: No   Orthotic use: No    HOME ENVIRONMENT  Living environment: trailer steps to enter- willing to install ramp  Social support: lives with their spouse   Hours without assistance: can be alone for up to 5 days at a time  Home is accessible to patient: no, needs modifications  Storage of wheelchair: in home     COMMUNITY  Transportation: other - SUV, truck  Does patient sit in wheelchair during transport? No   Self-?  Yes  Employment and/or School - specific requirements related to mobility needs: not applicable     COMMUNICATION   Verbal " communication: WFL receptive and WFL expressive  Language - primary:  English  Language - secondary: n/a   Communication provided by patient    CURRENT SEATING / MOBILITY:   Current Mobility Device: none  , model, serial number and specs listed below if available.   Not applicable   Age of current device (years): not applicable   Purchased by not applicable   Current condition of mobility base: not applicable   Current seating system: not applicable   Age of seating system, if different from base (years): not applicable   Describe posture in present seating system: not applicable   Is the current mobility device meeting medical necessity? No Explain: patient has difficulty completing activities of daily living without use of wheelchair or power mobility device    Prior Level of Function: able to perform activities of daily living and daily mobility with increased time, able to tolerate household distances for ambulation  Current Level of Function: noted significant functional decline and tolerance to ambulation and activity 3-4 months ago (after hospitalization). This has not improved.    Pain:  Current 8/10, worst 10/10, best 5/10   Location: lower back and bilateral hip. Patient takes Gabapentin and flexeril for pain and muscle spasms    Pt's goals: Obtain power wheelchair for independent mobility in home and community.   Caregiver goals and specific limitations that may affect care: per patient report unable to tolerate physical therapy to improve endurance and mobility.      See face-sheet for patient contact and insurance information         Objective     MOBILITY / BALANCE  Sitting Balance Standing Balance Transfers Ambulation   Normal: Patient able to maintain steady balance without handhold support. Good: Patient able to maintain balance without handhold support, limited postural sway independent Able to walk 50 ft with modified independence, but significant shortness of breath reported.  Decreased safety due to patient report of frequent near falls with gait   Fall History:   Number of falls in last 6 months: no  Number of near falls in last 6 months: 2-3 times/ week Transfer method: stand pivot     Ability to complete Mobility-Related Activities of Daily Living (MRADL's) with CURRENT Mobility Device  Move room to room independent with increased time MRADL's Comments: increased time and frequent rest breaks required due to shortness of breath    Meal preparation independent with increased time    Feeding independent    Bathing maximal assist    Grooming independent with increased time    Upper Extremity Dressing independent with increased time    Lower Extremity Dressing moderate assist    Toileting independent with increased time    Bowel Management: incontinent- due to time it takes to get to bathroom   Bladder Management: continent     Current Functional Mobility Equipment Skills     Cane/Crutches Does not meet mobility needs due to:, Safety concerns with physical ability, Decreased / limitations in endurance & strength, Pain, Pace / Speed, and Cardiac and/or respiratory condition   Walker / Rollator Does not meet mobility needs due to:, Safety concerns with physical ability, Decreased / limitations in endurance & strength, Pain, Pace / Speed, and Cardiac and/or respiratory condition   Manual Wheelchair - Does not meet mobility needs due to:, Decreased / limitations in endurance & strength, Pain, Pace / Speed, and Cardiac and/or respiratory condition   Manual Wheelchair with Power Assist () Does not meet mobility needs due to:, Decreased / limitations in endurance & strength, Pain, Pace / Speed, and Cardiac and/or respiratory condition   Scooter Does not meet mobility needs due to:, Environmental limitations, and Pain   Power Wheelchair: standard joystick Meets needs for safe Independent functional ambulation / mobility   Power Wheelchair: alternative controls Not applicable    Summary: least costly alternative for independent functional mobility was found to be: power wheelchair     Functional Processing Skills for Wheeled Mobility        Processing skills are adequate for safe mobility: Yes  Patient is willing and motivated to use recommended mobility equipment: Yes  Patient is UNABLE to safely operate mobility equipment independently and requires dependent care mobility: No       PATIENT MEASUREMENTS (inches)    1 29 seat to top of head    2 19 seat to shoulder left and right    3 13 seat to axilla left and right    4 7 seat to 90 degree elbow left and right    5 19 seat depth    6 8 foot length left and right    7  head width    8 17 shoulder width    9 15.5 chest width    10 18 hip width    11 16 floor to seat left    12 16 floor to seat right   Comments:         SENSATION and SKIN ISSUES    Sensation: intact Location(s) of sensation impairment: reports tingling in extremities    Pressure Relief Methods: lean side to side to offload (without risk of falling) and stand up (without risk of falling) Effective pressure relief method(s) above can be performed consistently throughout the day: Yes      Skin Issues/Skin Integrity - Current skin issues:  No, intact         History of skin issues:   No   History of skin flap surgeries:   No   PAIN  8/10   How does pain interfere with mobility and/or MRADLs? Increased time and rest breaks needed. Pain can limit gait distance. Patient has to sit to complete activities of daily living. Assist is required for bathing and dressing.       MAT EVALUATION    Neuro-Muscular Status (tone, reflexive, responses, etc.): Spasticity not noted during evaluation, but patient reports muscle spasms     Pelvis     posterior; flexible - self correction       WFL       WFL      Tonal Influence at Pelvis: Normal   Trunk     increased thoracic kyphosis; tendency away from neutral       WFL        neutral       Tonal Influence at Trunk: Normal   Head & Neck  functional Good head control Tone/Movement of head and neck comments: normal      Hips     neutral         neutral   Tone/Movement of lower extremities:  Normal  Edema: 3+ and pitting  Location: bilateral lower extremity         Lower Extremity Passive Range of Motion     ROM   Hip Flexion WFLs     Hip Extension WFLs   Hip Abduction WFLs   Hip Adduction WFLs   Knee Extension WFLs   Knee Flexion    Ankle Dorsiflexion WFLs   Ankle Plantarflexion        Lower Extremity Strength  Right LE  Left LE    Hip flexion: 4/5 Hip flexion: 4-/5   Hip extension:  Not tested  Hip extension: Not tested    Hip abduction: 3+/5 Hip abduction: 3+/5   Hip adduction: 3/5 Hip adduction 3/5   Knee extension: 5/5 Knee extension: 5/5   Knee flexion: 5/5 Knee flexion: 5/5   Ankle dorsiflexion: 3+/5 Ankle dorsiflexion: 3+/5   Ankle plantarflexion: 3+/5 Ankle plantarflexion: 3+/5         Upper Extremity Shoulder Posture:  Functional -bilateral rounded shoulders     Tone/Movement of upper extremities:   Normal    Edema: none  Location: not applicable          Wrist & Hand Handedness: right   Hand Limitations:  WNL -bilateral  Limitations -not applicable   Contractures -not applicable   Fisting -not applicable   Tremors -not applicable   Weak grasp - only fair grasp   Poor dexterity -fair dexterity  Hand movement non-functional -not applicable   Paralysis -not applicable        Upper Extremity Range of Motion     ROM   Shoulder Flexion WFLs   Shoulder Abduction WFLs   Shoulder Adduction  WFLs   Elbow Flexion WFLs   Elbow Extension WFLs   Wrist Flexion   WFLs   Wrist Extension WFLs   Pinch Strength Not tested     Strength Right: fair   Left: fair     Upper Extremity Strength  (R) UE  (L) UE    Shoulder flexion: 4/5 Shoulder flexion: 4+/5   Shoulder Abduction: 4/5 Shoulder Abduction: 4+/5   Shoulder Adduction: Not tested  Shoulder Adduction: Not tested    Elbow flexion: 5/5 Elbow flexion: 5/5   Elbow extension: 4-/5 Elbow extension: 5/5   Wrist  flexion: 5/5 Wrist flexion: 5/5   Wrist extension: 5/5 Wrist extension: 5/5       Mobility Base Recommendations and Justification    Mobility Base Recommendation: Power mobility base  Justification for decision: is not a safe, functional ambulator, limitation prevents from completing a MRADL(s) within a reasonable timeframe, limitation places at high risk of morbidity or mortality secondary to the attempts to perform a MRADL(s), provide independent mobility, equipment is a lifetime medical need, walker or cane inadequate, any type manual wheelchair inadequate , and scooter/POV inadequate  Number of Hours per day in above selected mobility base: 8+      Component Recommendations and Justification  Should include but not be limited to:   POWER MOBILITY    [] Scooter/POV  [] can safely operate   [] can safely transfer   [] has adequate trunk stability   [] functionally propel manual wheelchair    [x] Power mobility base  [] non-ambulatory   [x] cannot functionally propel manual wheelchair   [] cannot functionally and safely operate scooter/POV   [x] can safely operate power wheelchair   [x] home is accessible   [x] willing to use power wheelchair    Tilt   [] Powered tilt on powered chair   [] Powered tilt on manual chair   [] Manual tilt on manual chair   Comments:  [] change position for pressure relief/cannot weight shift  [] change position against gravitational force on head and shoulders   [] decrease pain   [] blood pressure management   [] control autonomic dysreflexia   [] decrease respiratory distress   [] management of spasticity   [] management of low tone   [] facilitate postural control   [] rest periods   [] control edema   [] increase sitting tolerance   [] aid with transfers    Recline   [] Power recline on power chair   [] Manual recline on manual chair   Comments:  [] intermittent catheterization   [] manage spasticity   [] accommodate femur to back angle   [] change position for pressure  relief/cannot weight shift   [] high risk of pressure sore development   [] tilt alone does not accomplish effective pressure relief   [] difficult to transfer to and from bed  [] rest periods and sleeping in chair   [] repositioning for transfers   [] bring to full recline for ADL care   [] clothing/diaper changes in chair   [] gravity PEG tube feeding   [] head positioning   [] decrease pain   [] blood pressure management   [] control autonomic dysreflexia   [] decrease respiratory distress   [] user on ventilator    Elevator on mobility base   [] Power wheelchair   [] Scooter  [] increase Indep in transfers   [] increase Indep in ADLs   [] bathroom function and safety   [] kitchen/cooking function and safety   [] shopping   [] raise height for communication at standing level   [] raise height for eye contact which reduces cervical neck strain and pain   [] drive at raised height for safety and navigating crowds    [] Vertical position system (anterior tilt) (Drive locks-out)   [] Stand (Drive enabled)  [] independent weight bearing   [] decrease joint contractures   [] decrease/manage spasticity   [] decrease/manage spasms   [] pressure distribution away from scapula, sacrum, coccyx, and ischial tuberosity   [] increase digestion and elimination   [] access to counters and cabinets   [] increase reach   [] increase interaction with others at eye level, reduces neck strain   [] increase performance of MRADL(s)    Power elevating legrest   [] Center mount (Single)  degrees  [] Standard (Pair) 100-170 degrees [] position legs at 90 degrees, not available with std power ELR   [] center mount tucks into chair to decrease turning radius in home, not available with std power ELR   [] provide change in position for LE   [] elevate legs during recline   [] maintain placement of feet on footplate  [] decrease edema   [] improve circulation   [] actuator needed to elevate legrest   [] actuator needed to articulate  legrest preventing knees from flexing   [] Increase ground clearance over curbs  [] STD (pair) independently elevate legrest   POWER WHEELCHAIR CONTROLS    Controls/input device   [x] Right Hand  [] Left Hand  [] Expandable   [x] Non-expandable   [x] Proportional   [] Non-proportional/switches/ head-array   [] Electrical/proximity  [] Mechanical    [x] provides access for controlling wheelchair   [] programming for accurate control   [] progressive disease/changing condition   [] required for alternative drive controls   [] lacks motor control to operate proportional drive control   [] unable to understand proportional controls   [] limited movement/strength   [] extraneous movement / tremors / ataxic / spastic   [] Upgraded electronics controller/harness   [] Single power (tilt or recline)   [] Expandable   [] Non-expandable plus   [] Multi-power (tilt, recline, power legrest, power seat lift, vertical positioning system, stand)  [] allows input device to communicate with drive motors   [] harness provides necessary connections between the controller, input device, and seat functions   [] needed in order to operate power seat functions through joystick/ input device   [] required for alternative drive controls    [] Enhanced display [] required to connect all alternative drive controls   [] required for upgraded joystick (lite-throw, heavy duty, micro)  [] Allows user to see in which mode and drive the wheelchair is set; necessary for alternate controls    [] Upgraded tracking electronics [] correct tracking when on uneven surfaces   [] makes switch driving more efficient and less fatiguing   [] increase safety when driving  [] increase ability to traverse thresholds    [] Safety / reset / mode switches   Type:  [] Used to change modes and stop the wheelchair when driving    [x] Mount for joystick / input device/switches  [x] swing away for access or transfers  [x] attaches joystick / input device / switches to  wheelchair  [x] provides for consistent access  [] midline for optimal placement   [] Attendant controlled joystick plus mount  [] safety   [] long distance driving   [] operation of seat functions   [] compliance with transportation regulations    [x] Battery  [x] required to power (power assist / scooter/ power wc / other):    [] Power inverter (24V to 12V) [] required for ventilator / respiratory equipment / other:       CHAIR OPTIONS MANUAL & POWER   Armrests   [x] adjustable height [] removable [] swing away[] fixed   [x] flip back [] reclining   [x] full length pads [] desk [] tube arms   [] gel pads  [x] provide support with elbow at 90  [x] remove/flip back/swing away for transfers   [x] provide support and positioning of upper body   [x] allow to come closer to table top   [x] remove for access to tables   [] provide support for w/c tray   [x] change of height/angles for variable activities   [] Elbow support / Elbow stop  [] keep elbow positioned on arm pad   [] keep arms from falling off arm pad during tilt and/or recline    Upper Extremity Support   [] Arm trough    [] Right [] Left [] Bilateral  Style:   [] swivel mount [] fixed mount   [] posterior hand support   [] ½ tray   [] full tray -joystick cut out  [] Right [] Left [] Bilateral  Style:  [] decrease gravitational pull on shoulders   [] provide support to increase UE function   [] provide hand support in natural position   [] position flaccid UE   [] decrease subluxation   [] decrease edema   [] manage spasticity   [] provide midline positioning   [] provide work surface   [] placement for AAC/Computer/EADL   Hangers/ Legrests   [] ____ degree   [] elevating   [] articulating   [] swing away   [] fixed   [] lift off   [] heavy duty   [] adjustable knee angle   [] adjustable calf panel   [] longer extension tube  [] provide LE support   [] maintain placement of feet on footplate  [] accommodate lower leg length   [] accommodate to hamstring  tightness   [] enable transfers   [] provide change in position for LE's   [] elevate legs during recline   [] decrease edema   [] durability    Foot support   [] footplate   [] Right [] Left [] Bilateral  [x] flip up  [] depth adjustable   [] angle adjustable  [x] foot board/one piece  [x] provide foot support   [] accommodate to ankle ROM   [x] allow foot to go under wheelchair base   [x] enable transfers    [] Shoe holders  [] position foot   [] decrease / manage spasticity   [] control position of LE   [] stability   [] safety    [] Ankle strap/heel loops  [] support foot on foot support   [] decrease extraneous movement   [] provide input to heel   [] protect foot   [] Amputee adapter   [] Right [] Left [] Bilateral  Style: ___ Size: ___  [] Provide support for stump/residual extremity   [] Transportation tie-down [] to provide crash tested tie-down brackets    [] Crutch/cane matthews   [x] O2 matthews   [] IV   [] Ventilator tray/mount  [x] stabilize accessory on wheelchair   [x] Seat cushion- captain seat [] accommodate impaired sensation   [] decubitus ulcers present or history   [] unable to shift weight   [x] increase pressure distribution   [] prevent pelvic extension   [x] stabilize/promote pelvis alignment   [] stabilize/promote femur alignment   [] accommodate obliquity   [] accommodate multiple deformity   [x] incontinent/accidents   [x] low maintenance   [x] seat jovi   [] fixed   [x] removable [x] attach seat platform/cushion to wheelchair frame   [] Seat wedge  [] provide increased aggressiveness of seat shape to decrease sliding down in the seat   [] accommodate ROM    [] Cover replacement  [] protect back or seat cushion   [] incontinent/accidents   [] Solid seat / insert  [] support cushion to prevent hammocking   [] allows attachment of cushion to mobility base    [] Lateral pelvic/thigh/hip support (Guides)  [] decrease abduction   [] accommodate pelvis   [] position upper legs   []  accommodate spasticity   [] removable for transfers     [] Lateral pelvic/thigh supports jovi  [] fixed   [] swing-away   [] removable   [] jovi lateral pelvic/thigh supports   [] jovi lateral pelvic/thigh supports swing-away or removable for transfers   [] Medial thigh support (Pommel)  [] decrease adduction   [] accommodate ROM   [] alignment  [] remove for transfers     [] Medial thigh support jovi   [] fixed   [] swing-away  [] removable   [] jovi medial thigh supports  [] jovi medial supports swing- away or removable for transfers   [x] Back- captain seat [x] provide posterior trunk support   [x] provide lumbar/sacral support   [x] support trunk in midline  [] provide lateral trunk support   [] accommodate or decrease tone   [] facilitate tone   [] accommodate deformity   [] custom required off-the-shelf back support will not accommodate deformity    [x] Back jovi  [x] fixed   [] removable [x] attach back rest/cushion to wheelchair frame   [] Lateral trunk supports    [] Right [] Left [] Bilateral [] decrease lateral trunk leaning   [] accommodate asymmetry   [] contour for increased contact   [] safety   [] control of tone    [] Lateral trunk supports jovi  [] fixed   [] swing-away   [] removable [] jovi lateral trunk supports   [] jovi lateral trunk supports swing away or removable for transfers   [] Anterior chest strap, vest  [] decrease forward movement of shoulder   [] decrease forward movement of trunk   [] safety/stability   [] added abdominal support   [] trunk alignment   [] assistance with shoulder control   [] decrease shoulder elevation    [] Headrest  [] provide posterior head support   [] provide posterior neck support   [] provide lateral head support   [] provide anterior head support   [] support during tilt and recline   [] improve feeding   [] improve respiration   [] placement of switches   [] safety   [] accommodate ROM   [] accommodate tone   [] improve visual  orientation    [] Headrest mounting hardware  [] fixed   [] removable   [] flip down   [] swing-away laterals/switches [] mount headrest   [] jovi headrest flip down or removable for transfers  [] mount headrest swing-away laterals   [] mount switches    [] Neck Support  [] decrease neck rotation   [] decrease forward neck flexion    [x] Pelvic Positioner   [x] std hip belt   [] padded hip belt   [] dual pull hip belt   [] four point hip belt  [] stabilize tone   [x] decrease falling out of chair   [] prevent excessive extension   [] special pull angle to control rotation   [] pad for protection over natalie prominence   [x] promote comfort    [] Essential needs bag/pouch  [] medicines [] special food [] orthotics [] clothing changes   [] diapers [] catheter/hygiene [] ostomy supplies              The above equipment has a life- long use expectancy. Growth and changes in medical and/or functional conditions would be the exceptions.      *This functional mobility and wheelchair assessment form has been adapted with permission from Tyrese Vigil.     TREATMENT   No treatment provided today, evaluation only.        Home Exercises and Patient Education Provided    Education provided: Process of obtaining custom wheelchair    Written Home Exercises Provided: not applicable .      See EMR under not applicable for exercises provided not applicable .    Assessment   Yasmeen is a 54 y.o. female referred to outpatient Physical Therapy with a medical diagnosis of Limited mobility for custom power wheelchair evaluation. Patient has the following diagnoses that impact mobility and ability to independently perform activities of daily living: COPD, carpel tunnel with surgery bilaterally, arthritis, chronic back and neck pain, and crushed vertebrae in back. Patient uses supplemental oxygen at 4L at all times. Patient continues to report shortness of breath with activities of daily living and mobility with oxygen. Patient reports  shortness of breath and mobility impairments worsened about 3-4 months ago. Patient is only able to ambulate ~50 ft maximal with reported shortness of breath and need for sitting rest break. Patient reports at times having to cancel medical appointments as she was unable to ambulate distance from parking lot to office. She reports requiring additional time to complete all activities of daily living, mobility, and MRADLs. Patient has to sit when performing tasks such as cooking and meal prep. She requires assist for lower body dressing and bathing. Yasmeen is unable to consistently ambulate/ stand and cannot resolve her mobility limitations with the use of a cane, walker, manual wheelchair, or optimally configured manual wheelchair inside of the home due to severely compromised respiratory status, weakness in hands, and chronic pain.  A scooter is not appropriate because: it is too long for patient's home environment.  Patient cannot propel an optimally configured manual wheelchair due to weakness of hands, respiratory status, and safety. Patient reports neck and back pain limiting mobility, but also has cramping in hands that would limit wheelchair propulsion. The following power wheelchair is recommended to optimize household mobility and safety:  ViSSee Select, right joystick control, with 18 x 20 captain's seat. Their home environment is accessible and supports the use of a powered wheelchair. Patient is willing to have a ramp installed. Obtaining a power wheelchair will improve patient's independence and safety with household mobility and MRADLs including obtaining hydration and nutrition.     Patient has mobility limitation that significantly impairs safe, timely, consistent participation in one or more MRADL. Yes  A mobility assistive device will effectively improve ability to participate in or aid participation in MRADL's.  Yes   A cane or walker will provide patient the ability to safely and consistently  perform MRADLs in a timely manner  No  The patient's home environment will support use of recommended mobility device. Yes  The patient has sufficient UE strength to effectively self propel a manual wheelchair for all MRADL's. No  The patient has sufficient upper extremity strength, , transfer ability and trunk stability to safely operate a POV (scooter). No  The additional benefits of a power wheelchair will more effectively enable MRADL's in home. Yes   The patient demonstrates ability/potential ability to use recommended equipment. Yes  The patient is willing and motivated to use the recommended equipment. Yes      Pt prognosis is Fair.       Plan of care discussed with patient: Yes  Pt's spiritual, cultural and educational needs considered and patient is agreeable to the plan of care and goals as stated below:     Anticipated Barriers for therapy: respiratory status    PT Medical Necessity is demonstrated by the following:    History  Co-morbidities and personal factors that may impact the plan of care Co-morbidities:   COPD, carpel tunnel with surgery bilaterally, HTN, arthritis, chronic back and neck pain, crushed vertebrae in back, sleep apnea    Personal Factors:   no deficits     high   Examination  Body Structures and Functions, activity limitations and participation restrictions that may impact the plan of care Body Regions:   neck  back  lower extremities  upper extremities  trunk    Body Systems:    gross symmetry  ROM  strength  gross coordinated movement  balance  gait  transfers  transitions  motor control  edema    Participation Restrictions:   Requires increased time for activities of daily living/ MRADLs  Frequent near falls  Poor tolerance to standing/ ambulation  Requires assist for bathing and dressing  Pain with MRADLs    Activity limitations:   Learning and applying knowledge  no deficits    General Tasks and Commands  no deficits    Communication  no deficits    Mobility  fine hand use  (grasping/picking up)  walking  moving around using equipment (WC)    -manual only    Self care  washing oneself (bathing, drying, washing hands)  toileting  dressing    Domestic Life  shopping  cooking  doing house work (cleaning house, washing dishes, laundry)    Interactions/Relationships  no deficits    Life Areas  no deficits    Community and Social Life  community life  recreation and leisure         moderate   Clinical Presentation evolving clinical presentation with changing clinical characteristics moderate   Decision Making/ Complexity Score: moderate       Goals:  Short Term Goals: 1 day  Patient will verbalize knowledge and understanding of W/C evaluation process.   Patient will verbalize knowledge and understanding of purpose, function, and functional benefits of recommended W/C.    Plan   Plan of care Certification: 7/9/2025 - evaluation only        Jessica Means, PT, DPT,   Board-Certified Clinical Specialist in Neurologic Physical Therapy   Certified Brain Injury Specialist   7/9/2025   License Number: [id number]   Therapist contact phone number: [phone]     As the evaluating therapist, I hereby attest that I have personally completed this evaluation and that I am not an employee of or working under contract to the (s) or the provider(s) of the durable medical equipment recommended in my evaluation. I further attest that I have not and will not receive remuneration of any kind from the (s) or the durable medical equipment provider(s) for the equipment I have recommended with this evaluation.     The following ATP was present and participated in this evaluation:   Facundo Voss, ATP from [email or web address].   Vendor phone: [phone]        I concur with the above findings and recommendations of the therapist:      Physician: Leeanna Stuart MD        Physician signature:___________________________________   date: ___________

## 2025-07-14 ENCOUNTER — OFFICE VISIT (OUTPATIENT)
Dept: PULMONOLOGY | Facility: CLINIC | Age: 55
End: 2025-07-14
Payer: MEDICAID

## 2025-07-14 VITALS
DIASTOLIC BLOOD PRESSURE: 74 MMHG | SYSTOLIC BLOOD PRESSURE: 117 MMHG | OXYGEN SATURATION: 95 % | HEART RATE: 81 BPM | BODY MASS INDEX: 35.43 KG/M2 | WEIGHT: 200 LBS

## 2025-07-14 DIAGNOSIS — J44.9 OSA AND COPD OVERLAP SYNDROME: ICD-10-CM

## 2025-07-14 DIAGNOSIS — F17.210 NICOTINE DEPENDENCE, CIGARETTES, UNCOMPLICATED: ICD-10-CM

## 2025-07-14 DIAGNOSIS — G47.33 OSA AND COPD OVERLAP SYNDROME: ICD-10-CM

## 2025-07-14 DIAGNOSIS — J44.9 STAGE 4 VERY SEVERE COPD BY GOLD CLASSIFICATION: Primary | ICD-10-CM

## 2025-07-14 DIAGNOSIS — I27.20 PULMONARY HYPERTENSION: ICD-10-CM

## 2025-07-14 DIAGNOSIS — E66.01 MORBID OBESITY: ICD-10-CM

## 2025-07-14 PROCEDURE — 99214 OFFICE O/P EST MOD 30 MIN: CPT | Mod: PBBFAC | Performed by: INTERNAL MEDICINE

## 2025-07-14 PROCEDURE — 99999 PR PBB SHADOW E&M-EST. PATIENT-LVL IV: CPT | Mod: PBBFAC,,, | Performed by: INTERNAL MEDICINE

## 2025-07-14 RX ORDER — ROFLUMILAST 500 UG/1
500 TABLET ORAL DAILY
Qty: 30 TABLET | Refills: 11 | Status: SHIPPED | OUTPATIENT
Start: 2025-07-14 | End: 2026-07-14
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
## 2025-07-24 ENCOUNTER — PATIENT MESSAGE (OUTPATIENT)
Dept: PULMONOLOGY | Facility: CLINIC | Age: 55
End: 2025-07-24
Payer: MEDICAID

## 2025-07-30 ENCOUNTER — TELEPHONE (OUTPATIENT)
Dept: PULMONOLOGY | Facility: CLINIC | Age: 55
End: 2025-07-30
Payer: MEDICAID

## 2025-07-30 NOTE — TELEPHONE ENCOUNTER
Chart reviewed.  Titration order received.  Unable to locate diagnostic psg  will reach out to ordering provider.

## 2025-07-31 DIAGNOSIS — G47.33 OSA AND COPD OVERLAP SYNDROME: ICD-10-CM

## 2025-07-31 DIAGNOSIS — J44.9 OSA AND COPD OVERLAP SYNDROME: ICD-10-CM

## 2025-07-31 DIAGNOSIS — G47.33 OSA (OBSTRUCTIVE SLEEP APNEA): Primary | ICD-10-CM

## 2025-08-04 ENCOUNTER — TELEPHONE (OUTPATIENT)
Dept: SLEEP MEDICINE | Facility: HOSPITAL | Age: 55
End: 2025-08-04
Payer: MEDICAID

## 2025-08-28 DIAGNOSIS — J44.9 OSA AND COPD OVERLAP SYNDROME: Primary | ICD-10-CM

## 2025-08-28 DIAGNOSIS — G47.33 OSA AND COPD OVERLAP SYNDROME: Primary | ICD-10-CM

## 2025-09-04 DIAGNOSIS — G47.33 OSA AND COPD OVERLAP SYNDROME: Primary | ICD-10-CM

## 2025-09-04 DIAGNOSIS — J44.9 OSA AND COPD OVERLAP SYNDROME: Primary | ICD-10-CM
